# Patient Record
Sex: MALE | Race: WHITE | NOT HISPANIC OR LATINO | Employment: OTHER | URBAN - METROPOLITAN AREA
[De-identification: names, ages, dates, MRNs, and addresses within clinical notes are randomized per-mention and may not be internally consistent; named-entity substitution may affect disease eponyms.]

---

## 2017-01-04 ENCOUNTER — HISTORICAL (OUTPATIENT)
Dept: WOUND CARE | Facility: HOSPITAL | Age: 37
End: 2017-01-04

## 2017-01-25 ENCOUNTER — HISTORICAL (OUTPATIENT)
Dept: WOUND CARE | Facility: HOSPITAL | Age: 37
End: 2017-01-25

## 2017-01-30 ENCOUNTER — HISTORICAL (OUTPATIENT)
Dept: RADIOLOGY | Facility: HOSPITAL | Age: 37
End: 2017-01-30

## 2017-02-08 ENCOUNTER — HISTORICAL (OUTPATIENT)
Dept: WOUND CARE | Facility: HOSPITAL | Age: 37
End: 2017-02-08

## 2017-02-22 ENCOUNTER — HISTORICAL (OUTPATIENT)
Dept: WOUND CARE | Facility: HOSPITAL | Age: 37
End: 2017-02-22

## 2017-03-15 ENCOUNTER — HISTORICAL (OUTPATIENT)
Dept: WOUND CARE | Facility: HOSPITAL | Age: 37
End: 2017-03-15

## 2017-04-05 ENCOUNTER — HISTORICAL (OUTPATIENT)
Dept: WOUND CARE | Facility: HOSPITAL | Age: 37
End: 2017-04-05

## 2017-05-10 ENCOUNTER — HISTORICAL (OUTPATIENT)
Dept: WOUND CARE | Facility: HOSPITAL | Age: 37
End: 2017-05-10

## 2017-06-07 ENCOUNTER — HISTORICAL (OUTPATIENT)
Dept: WOUND CARE | Facility: HOSPITAL | Age: 37
End: 2017-06-07

## 2017-06-10 LAB — FINAL CULTURE: NORMAL

## 2017-06-11 LAB — FINAL CULTURE: NORMAL

## 2017-07-12 ENCOUNTER — HISTORICAL (OUTPATIENT)
Dept: WOUND CARE | Facility: HOSPITAL | Age: 37
End: 2017-07-12

## 2017-08-02 ENCOUNTER — HISTORICAL (OUTPATIENT)
Dept: WOUND CARE | Facility: HOSPITAL | Age: 37
End: 2017-08-02

## 2017-09-06 ENCOUNTER — HISTORICAL (OUTPATIENT)
Dept: WOUND CARE | Facility: HOSPITAL | Age: 37
End: 2017-09-06

## 2017-10-05 ENCOUNTER — HISTORICAL (OUTPATIENT)
Dept: WOUND CARE | Facility: HOSPITAL | Age: 37
End: 2017-10-05

## 2017-11-08 ENCOUNTER — HISTORICAL (OUTPATIENT)
Dept: WOUND CARE | Facility: HOSPITAL | Age: 37
End: 2017-11-08

## 2017-11-29 ENCOUNTER — HISTORICAL (OUTPATIENT)
Dept: WOUND CARE | Facility: HOSPITAL | Age: 37
End: 2017-11-29

## 2018-01-24 ENCOUNTER — HISTORICAL (OUTPATIENT)
Dept: WOUND CARE | Facility: HOSPITAL | Age: 38
End: 2018-01-24

## 2018-02-14 ENCOUNTER — HISTORICAL (OUTPATIENT)
Dept: WOUND CARE | Facility: HOSPITAL | Age: 38
End: 2018-02-14

## 2018-03-21 ENCOUNTER — HISTORICAL (OUTPATIENT)
Dept: WOUND CARE | Facility: HOSPITAL | Age: 38
End: 2018-03-21

## 2018-03-26 ENCOUNTER — HISTORICAL (OUTPATIENT)
Dept: WOUND CARE | Facility: HOSPITAL | Age: 38
End: 2018-03-26

## 2018-04-04 ENCOUNTER — HISTORICAL (OUTPATIENT)
Dept: WOUND CARE | Facility: HOSPITAL | Age: 38
End: 2018-04-04

## 2018-04-05 ENCOUNTER — HISTORICAL (OUTPATIENT)
Dept: LAB | Facility: HOSPITAL | Age: 38
End: 2018-04-05

## 2018-04-05 LAB — ALP SERPL-CCNC: 106 UNIT/L (ref 46–116)

## 2018-04-11 ENCOUNTER — HISTORICAL (OUTPATIENT)
Dept: WOUND CARE | Facility: HOSPITAL | Age: 38
End: 2018-04-11

## 2018-04-18 ENCOUNTER — HISTORICAL (OUTPATIENT)
Dept: WOUND CARE | Facility: HOSPITAL | Age: 38
End: 2018-04-18

## 2018-05-09 ENCOUNTER — HISTORICAL (OUTPATIENT)
Dept: WOUND CARE | Facility: HOSPITAL | Age: 38
End: 2018-05-09

## 2018-05-22 ENCOUNTER — HISTORICAL (OUTPATIENT)
Dept: RADIOLOGY | Facility: HOSPITAL | Age: 38
End: 2018-05-22

## 2018-05-30 ENCOUNTER — HISTORICAL (OUTPATIENT)
Dept: WOUND CARE | Facility: HOSPITAL | Age: 38
End: 2018-05-30

## 2018-06-27 ENCOUNTER — HISTORICAL (OUTPATIENT)
Dept: WOUND CARE | Facility: HOSPITAL | Age: 38
End: 2018-06-27

## 2018-07-25 ENCOUNTER — HISTORICAL (OUTPATIENT)
Dept: WOUND CARE | Facility: HOSPITAL | Age: 38
End: 2018-07-25

## 2018-08-15 ENCOUNTER — HISTORICAL (OUTPATIENT)
Dept: WOUND CARE | Facility: HOSPITAL | Age: 38
End: 2018-08-15

## 2018-08-22 ENCOUNTER — HISTORICAL (OUTPATIENT)
Dept: WOUND CARE | Facility: HOSPITAL | Age: 38
End: 2018-08-22

## 2018-09-12 ENCOUNTER — HISTORICAL (OUTPATIENT)
Dept: WOUND CARE | Facility: HOSPITAL | Age: 38
End: 2018-09-12

## 2018-10-31 ENCOUNTER — HISTORICAL (OUTPATIENT)
Dept: WOUND CARE | Facility: HOSPITAL | Age: 38
End: 2018-10-31

## 2018-11-04 LAB — FINAL CULTURE: NORMAL

## 2018-12-05 ENCOUNTER — HISTORICAL (OUTPATIENT)
Dept: WOUND CARE | Facility: HOSPITAL | Age: 38
End: 2018-12-05

## 2018-12-10 LAB — FINAL CULTURE: NORMAL

## 2018-12-26 ENCOUNTER — HISTORICAL (OUTPATIENT)
Dept: WOUND CARE | Facility: HOSPITAL | Age: 38
End: 2018-12-26

## 2019-01-16 ENCOUNTER — HISTORICAL (OUTPATIENT)
Dept: WOUND CARE | Facility: HOSPITAL | Age: 39
End: 2019-01-16

## 2019-02-13 ENCOUNTER — HISTORICAL (OUTPATIENT)
Dept: WOUND CARE | Facility: HOSPITAL | Age: 39
End: 2019-02-13

## 2019-03-06 ENCOUNTER — HISTORICAL (OUTPATIENT)
Dept: WOUND CARE | Facility: HOSPITAL | Age: 39
End: 2019-03-06

## 2019-03-11 LAB — FINAL CULTURE: NORMAL

## 2019-04-10 ENCOUNTER — HISTORICAL (OUTPATIENT)
Dept: WOUND CARE | Facility: HOSPITAL | Age: 39
End: 2019-04-10

## 2019-04-17 ENCOUNTER — HISTORICAL (OUTPATIENT)
Dept: WOUND CARE | Facility: HOSPITAL | Age: 39
End: 2019-04-17

## 2019-05-01 ENCOUNTER — HISTORICAL (OUTPATIENT)
Dept: WOUND CARE | Facility: HOSPITAL | Age: 39
End: 2019-05-01

## 2019-05-22 ENCOUNTER — HISTORICAL (OUTPATIENT)
Dept: WOUND CARE | Facility: HOSPITAL | Age: 39
End: 2019-05-22

## 2019-06-19 ENCOUNTER — HISTORICAL (OUTPATIENT)
Dept: WOUND CARE | Facility: HOSPITAL | Age: 39
End: 2019-06-19

## 2019-07-17 ENCOUNTER — HISTORICAL (OUTPATIENT)
Dept: WOUND CARE | Facility: HOSPITAL | Age: 39
End: 2019-07-17

## 2019-08-07 ENCOUNTER — HISTORICAL (OUTPATIENT)
Dept: WOUND CARE | Facility: HOSPITAL | Age: 39
End: 2019-08-07

## 2019-09-27 LAB — GRAM STN SPEC: NORMAL

## 2019-09-29 LAB — FINAL CULTURE: NORMAL

## 2019-12-05 ENCOUNTER — HISTORICAL (OUTPATIENT)
Dept: HEPATOLOGY | Facility: HOSPITAL | Age: 39
End: 2019-12-05

## 2019-12-05 LAB — POC CREATININE: 0.6 MG/DL (ref 0.6–1.3)

## 2020-09-23 LAB — GRAM STN SPEC: NORMAL

## 2020-09-25 LAB — FINAL CULTURE: NORMAL

## 2020-09-30 ENCOUNTER — HISTORICAL (OUTPATIENT)
Dept: HEPATOLOGY | Facility: HOSPITAL | Age: 40
End: 2020-09-30

## 2020-09-30 LAB — POC CREATININE: 0.6 MG/DL (ref 0.6–1.3)

## 2021-03-19 ENCOUNTER — HISTORICAL (OUTPATIENT)
Dept: RADIOLOGY | Facility: HOSPITAL | Age: 41
End: 2021-03-19

## 2022-05-02 NOTE — HISTORICAL [facility]ERNER
This is a historical note converted from Bobby. Formatting and pictures may have been removed.  Please reference Bobby for original formatting and attached multimedia. Chief Complaint  sacral wound  History of Present Illness  39yo male with paraplegic with a ?sacral wound. patient currently using topical antibiotics and collagen with dry dressing  Review of Systems  Constitutional:?no fever, fatigue, weakness  ENMT:?no nasal congestion/drainage  Respiratory:?no shortness of breath  Cardiovascular:?no chest pain, no edema  Gastrointestinal:?no nausea, vomiting  Hema/Lymph:?no abnormal bruising or bleeding  Musculoskeletal:?no muscle or joint pain, no joint swelling  Integumentary:?sacral wound  ?  ?  Physical Exam  Vitals & Measurements  HT:?177.8?cm? WT:?54.9?kg?  Incision/Wounds  ?1. Sacrum Pressure ulcer?- last charted: 05/22/2019 11:47  ?? ??Assessment Done By?- Nurse  ?? ??Dressing Type?- Collagen, Gauze dressing, Tape  ?? ??Dressing Assessment?- Drainage present  ?? ??Dressing Activity?- Changed  ?? ??Cleansing?- Commercial cleansing solution  ?? ??Length?- 0.4 cm  ?? ??Width?- 0.4 cm  ?? ??Depth?- 0.7 cm  ?? ??Exudate Amount?- Small  ?? ??Exudate Type?- Serous  ?   ????? This wound is curette-debrided of fibrinous slough and residual topical antibiotics and collagen.  ?   ???? This sacral cavity?has been present for some time and appears to be increasing in size in spite of a small external opening and in spite of our debridements and treatment with collagen products and topical antibiotics based on wound cultures.? This patient has?a healed pressure wound on the right hip, showing his capacity to heal. I would like [facility] this wound.  ?  ?  ?  ?  Assessment/Plan  1.?Paraplegia?G82.20  2.?Sacral decubitus ulcer, stage III?L89.153  continue dressing with topical antibiotics, collagen, and dry dressing  daily. follow up in one week. will refer to plastic surgeon   Problem List/Past Medical History  Ongoing  COPD - Chronic obstructive pulmonary disease  Paraplegia  Pressure ulcer of sacral region, stage 3  Tobacco user  Historical  No qualifying data  Procedure/Surgical History  Debridement (eg, high pressure waterjet with/without suction, sharp selective debridement with scissors, scalpel and forceps), open wound, (eg, fibrin, devitalized epidermis and/or dermis, exudate, debris, biofilm), including topical application(s), wound (05/01/2019)  Extraction of Back Skin, External Approach (05/01/2019)  Debridement (eg, high pressure waterjet with/without suction, sharp selective debridement with scissors, scalpel and forceps), open wound, (eg, fibrin, devitalized epidermis and/or dermis, exudate, debris, biofilm), including topical application(s), wound (04/17/2019)  Extraction of Back Skin, External Approach (04/17/2019)  Debridement of extensive eczematous or infected skin; up to 10% of body surface (04/10/2019)  Extraction of Back Skin, External Approach (04/10/2019)  Debridement (eg, high pressure waterjet with/without suction, sharp selective debridement with scissors, scalpel and forceps), open wound, (eg, fibrin, devitalized epidermis and/or dermis, exudate, debris, biofilm), including topical application(s), wound (03/06/2019)  Extraction of Back Skin, External Approach (03/06/2019)  Debridement (eg, high pressure waterjet with/without suction, sharp selective debridement with scissors, scalpel and forceps), open wound, (eg, fibrin, devitalized epidermis and/or dermis, exudate, debris, biofilm), including topical application(s), wound (02/13/2019)  Extraction of Back Skin, External Approach (02/13/2019)  Debridement (eg, high pressure waterjet with/without suction, sharp selective debridement with scissors, scalpel and forceps), open wound, (eg, fibrin, devitalized epidermis and/or dermis, exudate, debris,  biofilm), including topical application(s), wound (01/16/2019)  Extraction of Back Skin, External Approach (01/16/2019)  Debridement (eg, high pressure waterjet with/without suction, sharp selective debridement with scissors, scalpel and forceps), open wound, (eg, fibrin, devitalized epidermis and/or dermis, exudate, debris, biofilm), including topical application(s), wound (12/26/2018)  Extraction of Back Skin, External Approach (12/26/2018)  Debridement (eg, high pressure waterjet with/without suction, sharp selective debridement with scissors, scalpel and forceps), open wound, (eg, fibrin, devitalized epidermis and/or dermis, exudate, debris, biofilm), including topical application(s), wound (12/05/2018)  Extraction of Back Skin, External Approach (12/05/2018)  Debridement (eg, high pressure waterjet with/without suction, sharp selective debridement with scissors, scalpel and forceps), open wound, (eg, fibrin, devitalized epidermis and/or dermis, exudate, debris, biofilm), including topical application(s), wound (10/31/2018)  Extraction of Back Skin, External Approach (10/31/2018)  Debridement (eg, high pressure waterjet with/without suction, sharp selective debridement with scissors, scalpel and forceps), open wound, (eg, fibrin, devitalized epidermis and/or dermis, exudate, debris, biofilm), including topical application(s), wound (09/12/2018)  Extraction of Back Skin, External Approach (09/12/2018)  Debridement (eg, high pressure waterjet with/without suction, sharp selective debridement with scissors, scalpel and forceps), open wound, (eg, fibrin, devitalized epidermis and/or dermis, exudate, debris, biofilm), including topical application(s), wound (08/15/2018)  Extraction of Back Skin, External Approach (08/15/2018)  Debridement (eg, high pressure waterjet with/without suction, sharp selective debridement with scissors, scalpel and forceps), open wound, (eg, fibrin, devitalized epidermis and/or dermis,  exudate, debris, biofilm), including topical application(s), wound (07/25/2018)  Extraction of Back Skin, External Approach (07/25/2018)  Debridement (eg, high pressure waterjet with/without suction, sharp selective debridement with scissors, scalpel and forceps), open wound, (eg, fibrin, devitalized epidermis and/or dermis, exudate, debris, biofilm), including topical application(s), wound (06/27/2018)  Extraction of Back Skin, External Approach (06/27/2018)  Debridement (eg, high pressure waterjet with/without suction, sharp selective debridement with scissors, scalpel and forceps), open wound, (eg, fibrin, devitalized epidermis and/or dermis, exudate, debris, biofilm), including topical application(s), wound (05/09/2018)  Extraction of Right Upper Leg Skin, External Approach (05/09/2018)  Debridement (eg, high pressure waterjet with/without suction, sharp selective debridement with scissors, scalpel and forceps), open wound, (eg, fibrin, devitalized epidermis and/or dermis, exudate, debris, biofilm), including topical application(s), wound (04/18/2018)  Extraction of Back Skin, External Approach (04/18/2018)  Excision of Face Skin, External Approach (04/11/2018)  Excision, other benign lesion including margins, except skin tag (unless listed elsewhere), face, ears, eyelids, nose, lips, mucous membrane; excised diameter 1.1 to 2.0 cm (04/11/2018)  Debridement (eg, high pressure waterjet with/without suction, sharp selective debridement with scissors, scalpel and forceps), open wound, (eg, fibrin, devitalized epidermis and/or dermis, exudate, debris, biofilm), including topical application(s), wound (03/21/2018)  Extraction of Back Skin, External Approach (03/21/2018)  Debridement (eg, high pressure waterjet with/without suction, sharp selective debridement with scissors, scalpel and forceps), open wound, (eg, fibrin, devitalized epidermis and/or dermis, exudate, debris, biofilm), including topical application(s),  wound (02/14/2018)  Extraction of Back Skin, External Approach (02/14/2018)  Extraction of Left Lower Arm Skin, External Approach (02/14/2018)  Debridement (eg, high pressure waterjet with/without suction, sharp selective debridement with scissors, scalpel and forceps), open wound, (eg, fibrin, devitalized epidermis and/or dermis, exudate, debris, biofilm), including topical application(s), wound (01/24/2018)  Extraction of Back Skin, External Approach (01/24/2018)  Debridement (eg, high pressure waterjet with/without suction, sharp selective debridement with scissors, scalpel and forceps), open wound, (eg, fibrin, devitalized epidermis and/or dermis, exudate, debris, biofilm), including topical application(s), wound (11/29/2017)  Extraction of Back Skin, External Approach (11/29/2017)  Debridement (eg, high pressure waterjet with/without suction, sharp selective debridement with scissors, scalpel and forceps), open wound, (eg, fibrin, devitalized epidermis and/or dermis, exudate, debris, biofilm), including topical application(s), wound (10/05/2017)  Extraction of Back Skin, External Approach (10/05/2017)  Debridement (eg, high pressure waterjet with/without suction, sharp selective debridement with scissors, scalpel and forceps), open wound, (eg, fibrin, devitalized epidermis and/or dermis, exudate, debris, biofilm), including topical application(s), wound (09/06/2017)  Extraction of Back Skin, External Approach (09/06/2017)  Extraction of Right Foot Skin, External Approach (09/06/2017)  Debridement (eg, high pressure waterjet with/without suction, sharp selective debridement with scissors, scalpel and forceps), open wound, (eg, fibrin, devitalized epidermis and/or dermis, exudate, debris, biofilm), including topical application(s), wound (08/02/2017)  Extraction of Back Skin, External Approach (08/02/2017)  Debridement (eg, high pressure waterjet with/without suction, sharp selective debridement with scissors,  scalpel and forceps), open wound, (eg, fibrin, devitalized epidermis and/or dermis, exudate, debris, biofilm), including topical application(s), wound (07/12/2017)  Extraction of Back Skin, External Approach (07/12/2017)  Debridement (eg, high pressure waterjet with/without suction, sharp selective debridement with scissors, scalpel and forceps), open wound, (eg, fibrin, devitalized epidermis and/or dermis, exudate, debris, biofilm), including topical application(s), wound (06/07/2017)  Extraction of Back Skin, External Approach (06/07/2017)  Debridement (eg, high pressure waterjet with/without suction, sharp selective debridement with scissors, scalpel and forceps), open wound, (eg, fibrin, devitalized epidermis and/or dermis, exudate, debris, biofilm), including topical application(s), wound (02/22/2017)  Extraction of Back Skin, External Approach (02/22/2017)  Debridement (eg, high pressure waterjet with/without suction, sharp selective debridement with scissors, scalpel and forceps), open wound, (eg, fibrin, devitalized epidermis and/or dermis, exudate, debris, biofilm), including topical application(s), wound (02/08/2017)  Extraction of Back Skin, External Approach (02/08/2017)  Debridement (eg, high pressure waterjet with/without suction, sharp selective debridement with scissors, scalpel and forceps), open wound, (eg, fibrin, devitalized epidermis and/or dermis, exudate, debris, biofilm), including topical application(s), wound (01/25/2017)  Extraction of Genitalia Skin, External Approach (01/25/2017)  Debridement (eg, high pressure waterjet with/without suction, sharp selective debridement with scissors, scalpel and forceps), open wound, (eg, fibrin, devitalized epidermis and/or dermis, exudate, debris, biofilm), including topical application(s), wound (12/14/2016)  Extraction of Perineum Skin, External Approach (12/14/2016)  Debridement (eg, high pressure waterjet with/without suction, sharp selective  debridement with scissors, scalpel and forceps), open wound, (eg, fibrin, devitalized epidermis and/or dermis, exudate, debris, biofilm), including topical application(s), wound (09/28/2016)  Extraction of Perineum Skin, External Approach (09/28/2016)  Debridement (eg, high pressure waterjet with/without suction, sharp selective debridement with scissors, scalpel and forceps), open wound, (eg, fibrin, devitalized epidermis and/or dermis, exudate, debris, biofilm), including topical application(s), wound (08/31/2016)  Extraction of Perineum Skin, External Approach (08/31/2016)  Application of skin substitute graft to trunk, arms, legs, total wound surface area up to 100 sq cm; first 25 sq cm or less wound surface area (07/28/2016)  Replacement of Perineum Skin with Nonautologous Tissue Substitute, Full Thickness, External Approach (07/28/2016)  THERASKIN, PER SQUARE CENTIMETER (07/28/2016)  Neck surgery   Medications  BUSPIRONE TAB 5MG  Cymbalta 30 mg oral delayed release capsule  DULOXETINE CAP 30MG,? ?Not taking  HALOBETASOL CRE 0.05%,? ?Not taking  HYDROCO/APAP TAB 10-325MG, 1 tab(s), Oral, TID  Allergies  Levaquin?(rash)  Social History  Alcohol  Past, 1-2 times per year, 03/24/2018  Substance Abuse  Current, Marijuana, 03/24/2018  Tobacco  10 or more cigarettes (1/2 pack or more)/day in last 30 days, N/A, 04/10/2019  Current every day smoker, Cigarettes, 30 per day., 03/24/2018  Health Maintenance  Health Maintenance  ???Pending?(in the next year)  ??? ??OverDue  ??? ? ? ?Alcohol Misuse Screening due??01/01/19??and every 1??year(s)  ??? ? ? ?Obesity Screening due??01/01/19??and every 1??year(s)  ??? ? ? ?Smoking Cessation due??01/01/19??Variable frequency  ??? ? ? ?COPD Management-Oxygen Assessment due??03/24/19??and every 1??year(s)  ??? ??Due?  ??? ? ? ?COPD Maintenance-Pulmonary Rehab Education due??05/22/19??and every 1??year(s)  ??? ? ? ?COPD Management-COPD Medications Prescribed due??05/22/19??and every  1??year(s)  ??? ? ? ?Tetanus Vaccine due??05/22/19??and every 10??year(s)  ??? ??Due In Future?  ??? ? ? ?ADL Screening not due until??04/10/20??and every 1??year(s)  ???Satisfied?(in the past 1 year)  ??? ??Satisfied?  ??? ? ? ?ADL Screening on??04/10/19.??Satisfied by Jayden BELTRAN, Radha BRYANT  ??? ? ? ?Blood Pressure Screening on??05/01/19.??Satisfied by Judy Gerber.  ?  ?      Referral to Dr. Lemus.

## 2022-05-02 NOTE — HISTORICAL [facility]ERNER
This is a historical note converted from Bobby. Formatting and pictures may have been removed.  Please reference Bobby for original formatting and attached multimedia. Chief Complaint  sacral wound  History of Present Illness  37 yo male with a sacral wound.  Review of Systems  Constitutional:?no fever, fatigue, weakness  ENMT: no?nasal congestion/drainage  Respiratory:?no shortness of breath  Cardiovascular:?no chest pain, no edema  Gastrointestinal:?no nausea, vomiting  Hema/Lymph:?no abnormal bruising or bleeding  Musculoskeletal:?no muscle or joint pain, no joint swelling  Integumentary: sacral wound  ?  ?  ?  Physical Exam  Vitals & Measurements  T:?36.8? ?C (Oral)? HR:?73(Peripheral)? RR:?20? BP:?153/76?  HT:?177.8?cm? WT:?54.9?kg?  Incision/Wounds  ?1. Sacrum Pressure ulcer?- last charted: 04/17/2019 12:00  ?? ??Assessment Done By?- Wound Care Team  ?? ??Pressure Point?- Bony prominence  ?? ??Dressing Type?- Gauze dressing  ?? ??Dressing Assessment?- Drainage present, Intact  ?? ??Dressing Activity?- Removed  ?? ??Cleansing?- Cleaned with normal saline  ?? ??Length?- 0.6 cm  ?? ??Width?- 0.7 cm  ?? ??Depth?- 1.1 cm  ?? ??Wound Bed Tissue Type?- Granulating  ?? ??Percent Granulated?- 100 %  ?? ??Exudate Amount?- Scant  ?? ??Exudate Type?- Serous  ?? ??Exudate Odor?- None  ?? ??Edge?- Attached to wound bed  ?? ??Status?- Unchanged  ?   ?????Wound curette-debrided of fibrinous slough.  ?  ?  Assessment/Plan  1.?Paraplegia  2.?Pressure ulcer of sacral region, stage 3  Topical antibiotics with collagen to sacral wound with dry dressing QOD. RTC 2 weeks.  ?  New formula topical antimircrobials to be delivered to his home in [place], [place].   Problem List/Past Medical History  Ongoing  COPD - Chronic obstructive pulmonary disease  Paraplegia  Pressure ulcer of sacral region, stage 3  Tobacco user  Historical  No qualifying data  Procedure/Surgical History  Debridement (eg, high pressure waterjet with/without suction,  sharp selective debridement with scissors, scalpel and forceps), open wound, (eg, fibrin, devitalized epidermis and/or dermis, exudate, debris, biofilm), including topical application(s), wound (03/06/2019)  Extraction of Back Skin, External Approach (03/06/2019)  Debridement (eg, high pressure waterjet with/without suction, sharp selective debridement with scissors, scalpel and forceps), open wound, (eg, fibrin, devitalized epidermis and/or dermis, exudate, debris, biofilm), including topical application(s), wound (02/13/2019)  Extraction of Back Skin, External Approach (02/13/2019)  Debridement (eg, high pressure waterjet with/without suction, sharp selective debridement with scissors, scalpel and forceps), open wound, (eg, fibrin, devitalized epidermis and/or dermis, exudate, debris, biofilm), including topical application(s), wound (01/16/2019)  Extraction of Back Skin, External Approach (01/16/2019)  Debridement (eg, high pressure waterjet with/without suction, sharp selective debridement with scissors, scalpel and forceps), open wound, (eg, fibrin, devitalized epidermis and/or dermis, exudate, debris, biofilm), including topical application(s), wound (12/26/2018)  Extraction of Back Skin, External Approach (12/26/2018)  Debridement (eg, high pressure waterjet with/without suction, sharp selective debridement with scissors, scalpel and forceps), open wound, (eg, fibrin, devitalized epidermis and/or dermis, exudate, debris, biofilm), including topical application(s), wound (12/05/2018)  Extraction of Back Skin, External Approach (12/05/2018)  Debridement (eg, high pressure waterjet with/without suction, sharp selective debridement with scissors, scalpel and forceps), open wound, (eg, fibrin, devitalized epidermis and/or dermis, exudate, debris, biofilm), including topical application(s), wound (10/31/2018)  Extraction of Back Skin, External Approach (10/31/2018)  Debridement (eg, high pressure waterjet  with/without suction, sharp selective debridement with scissors, scalpel and forceps), open wound, (eg, fibrin, devitalized epidermis and/or dermis, exudate, debris, biofilm), including topical application(s), wound (09/12/2018)  Extraction of Back Skin, External Approach (09/12/2018)  Debridement (eg, high pressure waterjet with/without suction, sharp selective debridement with scissors, scalpel and forceps), open wound, (eg, fibrin, devitalized epidermis and/or dermis, exudate, debris, biofilm), including topical application(s), wound (08/15/2018)  Extraction of Back Skin, External Approach (08/15/2018)  Debridement (eg, high pressure waterjet with/without suction, sharp selective debridement with scissors, scalpel and forceps), open wound, (eg, fibrin, devitalized epidermis and/or dermis, exudate, debris, biofilm), including topical application(s), wound (07/25/2018)  Extraction of Back Skin, External Approach (07/25/2018)  Debridement (eg, high pressure waterjet with/without suction, sharp selective debridement with scissors, scalpel and forceps), open wound, (eg, fibrin, devitalized epidermis and/or dermis, exudate, debris, biofilm), including topical application(s), wound (06/27/2018)  Extraction of Back Skin, External Approach (06/27/2018)  Debridement (eg, high pressure waterjet with/without suction, sharp selective debridement with scissors, scalpel and forceps), open wound, (eg, fibrin, devitalized epidermis and/or dermis, exudate, debris, biofilm), including topical application(s), wound (05/09/2018)  Extraction of Right Upper Leg Skin, External Approach (05/09/2018)  Debridement (eg, high pressure waterjet with/without suction, sharp selective debridement with scissors, scalpel and forceps), open wound, (eg, fibrin, devitalized epidermis and/or dermis, exudate, debris, biofilm), including topical application(s), wound (04/18/2018)  Extraction of Back Skin, External Approach (04/18/2018)  Excision of Face  Skin, External Approach (04/11/2018)  Excision, other benign lesion including margins, except skin tag (unless listed elsewhere), face, ears, eyelids, nose, lips, mucous membrane; excised diameter 1.1 to 2.0 cm (04/11/2018)  Debridement (eg, high pressure waterjet with/without suction, sharp selective debridement with scissors, scalpel and forceps), open wound, (eg, fibrin, devitalized epidermis and/or dermis, exudate, debris, biofilm), including topical application(s), wound (03/21/2018)  Extraction of Back Skin, External Approach (03/21/2018)  Debridement (eg, high pressure waterjet with/without suction, sharp selective debridement with scissors, scalpel and forceps), open wound, (eg, fibrin, devitalized epidermis and/or dermis, exudate, debris, biofilm), including topical application(s), wound (02/14/2018)  Extraction of Back Skin, External Approach (02/14/2018)  Extraction of Left Lower Arm Skin, External Approach (02/14/2018)  Debridement (eg, high pressure waterjet with/without suction, sharp selective debridement with scissors, scalpel and forceps), open wound, (eg, fibrin, devitalized epidermis and/or dermis, exudate, debris, biofilm), including topical application(s), wound (01/24/2018)  Extraction of Back Skin, External Approach (01/24/2018)  Debridement (eg, high pressure waterjet with/without suction, sharp selective debridement with scissors, scalpel and forceps), open wound, (eg, fibrin, devitalized epidermis and/or dermis, exudate, debris, biofilm), including topical application(s), wound (11/29/2017)  Extraction of Back Skin, External Approach (11/29/2017)  Debridement (eg, high pressure waterjet with/without suction, sharp selective debridement with scissors, scalpel and forceps), open wound, (eg, fibrin, devitalized epidermis and/or dermis, exudate, debris, biofilm), including topical application(s), wound (10/05/2017)  Extraction of Back Skin, External Approach (10/05/2017)  Debridement (eg, high  pressure waterjet with/without suction, sharp selective debridement with scissors, scalpel and forceps), open wound, (eg, fibrin, devitalized epidermis and/or dermis, exudate, debris, biofilm), including topical application(s), wound (09/06/2017)  Extraction of Back Skin, External Approach (09/06/2017)  Extraction of Right Foot Skin, External Approach (09/06/2017)  Debridement (eg, high pressure waterjet with/without suction, sharp selective debridement with scissors, scalpel and forceps), open wound, (eg, fibrin, devitalized epidermis and/or dermis, exudate, debris, biofilm), including topical application(s), wound (08/02/2017)  Extraction of Back Skin, External Approach (08/02/2017)  Debridement (eg, high pressure waterjet with/without suction, sharp selective debridement with scissors, scalpel and forceps), open wound, (eg, fibrin, devitalized epidermis and/or dermis, exudate, debris, biofilm), including topical application(s), wound (07/12/2017)  Extraction of Back Skin, External Approach (07/12/2017)  Debridement (eg, high pressure waterjet with/without suction, sharp selective debridement with scissors, scalpel and forceps), open wound, (eg, fibrin, devitalized epidermis and/or dermis, exudate, debris, biofilm), including topical application(s), wound (06/07/2017)  Extraction of Back Skin, External Approach (06/07/2017)  Debridement (eg, high pressure waterjet with/without suction, sharp selective debridement with scissors, scalpel and forceps), open wound, (eg, fibrin, devitalized epidermis and/or dermis, exudate, debris, biofilm), including topical application(s), wound (02/22/2017)  Extraction of Back Skin, External Approach (02/22/2017)  Debridement (eg, high pressure waterjet with/without suction, sharp selective debridement with scissors, scalpel and forceps), open wound, (eg, fibrin, devitalized epidermis and/or dermis, exudate, debris, biofilm), including topical application(s), wound  (02/08/2017)  Extraction of Back Skin, External Approach (02/08/2017)  Debridement (eg, high pressure waterjet with/without suction, sharp selective debridement with scissors, scalpel and forceps), open wound, (eg, fibrin, devitalized epidermis and/or dermis, exudate, debris, biofilm), including topical application(s), wound (01/25/2017)  Extraction of Genitalia Skin, External Approach (01/25/2017)  Debridement (eg, high pressure waterjet with/without suction, sharp selective debridement with scissors, scalpel and forceps), open wound, (eg, fibrin, devitalized epidermis and/or dermis, exudate, debris, biofilm), including topical application(s), wound (12/14/2016)  Extraction of Perineum Skin, External Approach (12/14/2016)  Debridement (eg, high pressure waterjet with/without suction, sharp selective debridement with scissors, scalpel and forceps), open wound, (eg, fibrin, devitalized epidermis and/or dermis, exudate, debris, biofilm), including topical application(s), wound (09/28/2016)  Extraction of Perineum Skin, External Approach (09/28/2016)  Debridement (eg, high pressure waterjet with/without suction, sharp selective debridement with scissors, scalpel and forceps), open wound, (eg, fibrin, devitalized epidermis and/or dermis, exudate, debris, biofilm), including topical application(s), wound (08/31/2016)  Extraction of Perineum Skin, External Approach (08/31/2016)  Application of skin substitute graft to trunk, arms, legs, total wound surface area up to 100 sq cm; first 25 sq cm or less wound surface area (07/28/2016)  Replacement of Perineum Skin with Nonautologous Tissue Substitute, Full Thickness, External Approach (07/28/2016)  THERASKIN, PER SQUARE CENTIMETER (07/28/2016)  Neck surgery   Medications  BUSPIRONE TAB 5MG  Cymbalta 30 mg oral delayed release capsule  DULOXETINE CAP 30MG,? ?Not taking  HALOBETASOL CRE 0.05%,? ?Not taking  HYDROCO/APAP TAB 10-325MG, 1 tab(s), Oral,  TID  Allergies  Levaquin?(rash)  Social History  Alcohol  Past, 1-2 times per year, 03/24/2018  Substance Abuse  Current, Marijuana, 03/24/2018  Tobacco  10 or more cigarettes (1/2 pack or more)/day in last 30 days, N/A, 04/10/2019  Current every day smoker, Cigarettes, 30 per day., 03/24/2018  Health Maintenance  Health Maintenance  ???Pending?(in the next year)  ??? ??OverDue  ??? ? ? ?COPD Management-Oxygen Assessment due??03/24/19??and every 1??year(s)  ??? ??Due?  ??? ? ? ?Alcohol Misuse Screening due??04/17/19??and every 1??year(s)  ??? ? ? ?COPD Maintenance-Pulmonary Rehab Education due??04/17/19??and every 1??year(s)  ??? ? ? ?COPD Management-COPD Medications Prescribed due??04/17/19??and every 1??year(s)  ??? ? ? ?Obesity Screening due??04/17/19??and every 1??year(s)  ??? ? ? ?Smoking Cessation due??04/17/19??Variable frequency  ??? ? ? ?Tetanus Vaccine due??04/17/19??and every 10??year(s)  ??? ??Due In Future?  ??? ? ? ?ADL Screening not due until??04/10/20??and every 1??year(s)  ???Satisfied?(in the past 1 year)  ??? ??Satisfied?  ??? ? ? ?ADL Screening on??04/10/19.??Satisfied by Radha Carpenter RN  ??? ? ? ?Blood Pressure Screening on??04/17/19.??Satisfied by Radha Carpenter RN  ?  ?

## 2022-05-02 NOTE — HISTORICAL [facility]ER
This is a historical note converted from Bobby. Formatting and pictures may have been removed.  Please reference Bobby for original formatting and attached multimedia. Chief Complaint  Sacral wound  History of Present Illness  37 yo male with a sacral wound. Latest microbiology studies showing no bacteria in the wound, but the presence of Candida orthopsilosis and Nagonishia diffluens, both susceptible to itraconizole.  Review of Systems  Constitutional:?no fever, fatigue, weakness  ENMT: no?nasal congestion/drainage  Respiratory:?no shortness of breath  Cardiovascular:?no chest pain, no edema  Gastrointestinal:?no nausea, vomiting  Hema/Lymph:?no abnormal bruising or bleeding  Musculoskeletal:?no muscle or joint pain, no joint swelling  Integumentary: sacal wound  ?  ?  Physical Exam  Vitals & Measurements  T:?36.8? ?C (Oral)? HR:?80(Peripheral)? RR:?22? BP:?132/76?  HT:?177.8?cm? WT:?54.9?kg?  Incision/Wounds  ?1. Sacrum Pressure ulcer?- last charted: 04/10/2019 11:00  ?? ??Assessment Done By?- Wound Care Team  ?? ??Pressure Point?- Bony prominence  ?? ??Dressing Type?- Collagen, Gauze dressing  ?? ??Dressing Assessment?- Drainage present, Intact  ?? ??Dressing Activity?- Removed  ?? ??Cleansing?- Cleaned with normal saline  ?? ??Length?- 0.7 cm  ?? ??Width?- 0.5 cm  ?? ??Depth?- 1.0 cm  ?? ??Wound Bed Tissue Type?- Granulating, Necrotic tissue, slough  ?? ??Percent Granulated?- 50 %  ?? ??Percent Necrotic Tissue Slough?- 50 %  ?? ??Exudate Amount?- Scant  ?? ??Exudate Type?- Serous  ?? ??Exudate Odor?- None  ?? ??Edge?- Attached to wound bed  ?? ??Status?- Unchanged  ????  ????? This small wound has the volume of a large sweet pea.? Macerated partial thickness skin build-up at the wound entrance is?excised with a dermal?curette and the wound is curette-debrided of residual callagen and fibrinous slough.  ?  Assessment/Plan  1.?Tobacco user  2.?Pressure ulcer of sacral region, stage 3  3.?Paraplegia  Will hold  topical ?Merrem powder and send microbiology reports to [facility] for a topical antifungal to apply to this small sacral wound. Collagen with dry dressing QOD until antifungal powder arrives. Then Apply antifungal powder to the collagen for dressing change. RTC 1 week.   Problem List/Past Medical History  Ongoing  COPD - Chronic obstructive pulmonary disease  Paraplegia  Pressure ulcer of sacral region, stage 3  Tobacco user  Historical  No qualifying data  Procedure/Surgical History  Debridement (eg, high pressure waterjet with/without suction, sharp selective debridement with scissors, scalpel and forceps), open wound, (eg, fibrin, devitalized epidermis and/or dermis, exudate, debris, biofilm), including topical application(s), wound (03/06/2019)  Extraction of Back Skin, External Approach (03/06/2019)  Debridement (eg, high pressure waterjet with/without suction, sharp selective debridement with scissors, scalpel and forceps), open wound, (eg, fibrin, devitalized epidermis and/or dermis, exudate, debris, biofilm), including topical application(s), wound (02/13/2019)  Extraction of Back Skin, External Approach (02/13/2019)  Debridement (eg, high pressure waterjet with/without suction, sharp selective debridement with scissors, scalpel and forceps), open wound, (eg, fibrin, devitalized epidermis and/or dermis, exudate, debris, biofilm), including topical application(s), wound (01/16/2019)  Extraction of Back Skin, External Approach (01/16/2019)  Debridement (eg, high pressure waterjet with/without suction, sharp selective debridement with scissors, scalpel and forceps), open wound, (eg, fibrin, devitalized epidermis and/or dermis, exudate, debris, biofilm), including topical application(s), wound (12/26/2018)  Extraction of Back Skin, External Approach (12/26/2018)  Debridement (eg, high pressure waterjet with/without suction, sharp selective debridement with scissors, scalpel and forceps), open  wound, (eg, fibrin, devitalized epidermis and/or dermis, exudate, debris, biofilm), including topical application(s), wound (12/05/2018)  Extraction of Back Skin, External Approach (12/05/2018)  Debridement (eg, high pressure waterjet with/without suction, sharp selective debridement with scissors, scalpel and forceps), open wound, (eg, fibrin, devitalized epidermis and/or dermis, exudate, debris, biofilm), including topical application(s), wound (10/31/2018)  Extraction of Back Skin, External Approach (10/31/2018)  Debridement (eg, high pressure waterjet with/without suction, sharp selective debridement with scissors, scalpel and forceps), open wound, (eg, fibrin, devitalized epidermis and/or dermis, exudate, debris, biofilm), including topical application(s), wound (09/12/2018)  Extraction of Back Skin, External Approach (09/12/2018)  Debridement (eg, high pressure waterjet with/without suction, sharp selective debridement with scissors, scalpel and forceps), open wound, (eg, fibrin, devitalized epidermis and/or dermis, exudate, debris, biofilm), including topical application(s), wound (08/15/2018)  Extraction of Back Skin, External Approach (08/15/2018)  Debridement (eg, high pressure waterjet with/without suction, sharp selective debridement with scissors, scalpel and forceps), open wound, (eg, fibrin, devitalized epidermis and/or dermis, exudate, debris, biofilm), including topical application(s), wound (07/25/2018)  Extraction of Back Skin, External Approach (07/25/2018)  Debridement (eg, high pressure waterjet with/without suction, sharp selective debridement with scissors, scalpel and forceps), open wound, (eg, fibrin, devitalized epidermis and/or dermis, exudate, debris, biofilm), including topical application(s), wound (06/27/2018)  Extraction of Back Skin, External Approach (06/27/2018)  Debridement (eg, high pressure waterjet with/without suction, sharp selective debridement with scissors, scalpel and  forceps), open wound, (eg, fibrin, devitalized epidermis and/or dermis, exudate, debris, biofilm), including topical application(s), wound (05/09/2018)  Extraction of Right Upper Leg Skin, External Approach (05/09/2018)  Debridement (eg, high pressure waterjet with/without suction, sharp selective debridement with scissors, scalpel and forceps), open wound, (eg, fibrin, devitalized epidermis and/or dermis, exudate, debris, biofilm), including topical application(s), wound (04/18/2018)  Extraction of Back Skin, External Approach (04/18/2018)  Excision of Face Skin, External Approach (04/11/2018)  Excision, other benign lesion including margins, except skin tag (unless listed elsewhere), face, ears, eyelids, nose, lips, mucous membrane; excised diameter 1.1 to 2.0 cm (04/11/2018)  Debridement (eg, high pressure waterjet with/without suction, sharp selective debridement with scissors, scalpel and forceps), open wound, (eg, fibrin, devitalized epidermis and/or dermis, exudate, debris, biofilm), including topical application(s), wound (03/21/2018)  Extraction of Back Skin, External Approach (03/21/2018)  Debridement (eg, high pressure waterjet with/without suction, sharp selective debridement with scissors, scalpel and forceps), open wound, (eg, fibrin, devitalized epidermis and/or dermis, exudate, debris, biofilm), including topical application(s), wound (02/14/2018)  Extraction of Back Skin, External Approach (02/14/2018)  Extraction of Left Lower Arm Skin, External Approach (02/14/2018)  Debridement (eg, high pressure waterjet with/without suction, sharp selective debridement with scissors, scalpel and forceps), open wound, (eg, fibrin, devitalized epidermis and/or dermis, exudate, debris, biofilm), including topical application(s), wound (01/24/2018)  Extraction of Back Skin, External Approach (01/24/2018)  Debridement (eg, high pressure waterjet with/without suction, sharp selective debridement with scissors,  scalpel and forceps), open wound, (eg, fibrin, devitalized epidermis and/or dermis, exudate, debris, biofilm), including topical application(s), wound (11/29/2017)  Extraction of Back Skin, External Approach (11/29/2017)  Debridement (eg, high pressure waterjet with/without suction, sharp selective debridement with scissors, scalpel and forceps), open wound, (eg, fibrin, devitalized epidermis and/or dermis, exudate, debris, biofilm), including topical application(s), wound (10/05/2017)  Extraction of Back Skin, External Approach (10/05/2017)  Debridement (eg, high pressure waterjet with/without suction, sharp selective debridement with scissors, scalpel and forceps), open wound, (eg, fibrin, devitalized epidermis and/or dermis, exudate, debris, biofilm), including topical application(s), wound (09/06/2017)  Extraction of Back Skin, External Approach (09/06/2017)  Extraction of Right Foot Skin, External Approach (09/06/2017)  Debridement (eg, high pressure waterjet with/without suction, sharp selective debridement with scissors, scalpel and forceps), open wound, (eg, fibrin, devitalized epidermis and/or dermis, exudate, debris, biofilm), including topical application(s), wound (08/02/2017)  Extraction of Back Skin, External Approach (08/02/2017)  Debridement (eg, high pressure waterjet with/without suction, sharp selective debridement with scissors, scalpel and forceps), open wound, (eg, fibrin, devitalized epidermis and/or dermis, exudate, debris, biofilm), including topical application(s), wound (07/12/2017)  Extraction of Back Skin, External Approach (07/12/2017)  Debridement (eg, high pressure waterjet with/without suction, sharp selective debridement with scissors, scalpel and forceps), open wound, (eg, fibrin, devitalized epidermis and/or dermis, exudate, debris, biofilm), including topical application(s), wound (06/07/2017)  Extraction of Back Skin, External Approach (06/07/2017)  Debridement (eg, high  pressure waterjet with/without suction, sharp selective debridement with scissors, scalpel and forceps), open wound, (eg, fibrin, devitalized epidermis and/or dermis, exudate, debris, biofilm), including topical application(s), wound (02/22/2017)  Extraction of Back Skin, External Approach (02/22/2017)  Debridement (eg, high pressure waterjet with/without suction, sharp selective debridement with scissors, scalpel and forceps), open wound, (eg, fibrin, devitalized epidermis and/or dermis, exudate, debris, biofilm), including topical application(s), wound (02/08/2017)  Extraction of Back Skin, External Approach (02/08/2017)  Debridement (eg, high pressure waterjet with/without suction, sharp selective debridement with scissors, scalpel and forceps), open wound, (eg, fibrin, devitalized epidermis and/or dermis, exudate, debris, biofilm), including topical application(s), wound (01/25/2017)  Extraction of Genitalia Skin, External Approach (01/25/2017)  Debridement (eg, high pressure waterjet with/without suction, sharp selective debridement with scissors, scalpel and forceps), open wound, (eg, fibrin, devitalized epidermis and/or dermis, exudate, debris, biofilm), including topical application(s), wound (12/14/2016)  Extraction of Perineum Skin, External Approach (12/14/2016)  Debridement (eg, high pressure waterjet with/without suction, sharp selective debridement with scissors, scalpel and forceps), open wound, (eg, fibrin, devitalized epidermis and/or dermis, exudate, debris, biofilm), including topical application(s), wound (09/28/2016)  Extraction of Perineum Skin, External Approach (09/28/2016)  Debridement (eg, high pressure waterjet with/without suction, sharp selective debridement with scissors, scalpel and forceps), open wound, (eg, fibrin, devitalized epidermis and/or dermis, exudate, debris, biofilm), including topical application(s), wound (08/31/2016)  Extraction of Perineum Skin, External Approach  (08/31/2016)  Application of skin substitute graft to trunk, arms, legs, total wound surface area up to 100 sq cm; first 25 sq cm or less wound surface area (07/28/2016)  Replacement of Perineum Skin with Nonautologous Tissue Substitute, Full Thickness, External Approach (07/28/2016)  THERASKIN, PER SQUARE CENTIMETER (07/28/2016)  Neck surgery   Medications  BUSPIRONE TAB 5MG  Cymbalta 30 mg oral delayed release capsule  DULOXETINE CAP 30MG,? ?Not taking  HALOBETASOL CRE 0.05%,? ?Not taking  HYDROCO/APAP TAB 10-325MG, 1 tab(s), Oral, TID  Allergies  Levaquin?(rash)  Social History  Alcohol  Past, 1-2 times per year, 03/24/2018  Substance Abuse  Current, Marijuana, 03/24/2018  Tobacco  10 or more cigarettes (1/2 pack or more)/day in last 30 days, N/A, 04/10/2019  Current every day smoker, Cigarettes, 30 per day., 03/24/2018  Health Maintenance  Health Maintenance  ???Pending?(in the next year)  ??? ??OverDue  ??? ? ? ?COPD Management-Oxygen Assessment due??03/24/19??and every 1??year(s)  ??? ??Due?  ??? ? ? ?Alcohol Misuse Screening due??04/10/19??and every 1??year(s)  ??? ? ? ?COPD Maintenance-Pulmonary Rehab Education due??04/10/19??and every 1??year(s)  ??? ? ? ?COPD Management-COPD Medications Prescribed due??04/10/19??and every 1??year(s)  ??? ? ? ?Obesity Screening due??04/10/19??and every 1??year(s)  ??? ? ? ?Smoking Cessation due??04/10/19??Variable frequency  ??? ? ? ?Tetanus Vaccine due??04/10/19??and every 10??year(s)  ???Satisfied?(in the past 1 year)  ??? ??Satisfied?  ??? ? ? ?ADL Screening on??04/10/19.??Satisfied by Radha Carpenter RN  ??? ? ? ?Blood Pressure Screening on??04/10/19.??Satisfied by Jayden BELTRAN, Radha BRYANT  ?  ?

## 2022-05-02 NOTE — HISTORICAL [facility]ER
This is a historical note converted from Bobby. Formatting and pictures may have been removed.  Please reference Bobby for original formatting and attached multimedia. Chief Complaint  sacral wound  History of Present Illness  37 yo male with a sacral wound.? Pt. received new formulation of topical antibiotics from [facility] in [place].  Review of Systems  Constitutional:?no fever, fatigue, weakness  ENMT:?no?nasal congestion/drainage  Respiratory:?no couch, no shortness of breath  Cardiovascular:?no chest pain, no palpitations, no edema  Gastrointestinal:?no nausea, vomiting  Hema/Lymph:?no abnormal bruising or bleeding  Musculoskeletal:?no muscle or joint pain, no joint swelling  Integumentary:?sacral wound.  ?  ?  Physical Exam  Vitals & Measurements  HR:?82(Peripheral)? RR:?20? BP:?132/76?  HT:?177.8?cm? WT:?54.9?kg?  Incision/Wounds  ?1. Sacrum Pressure ulcer?- last charted: 05/01/2019 11:34  ?? ??Assessment Done By?- Wound Care Team  ?? ??Abnormality Color?- Pink  ?? ??Pressure Point?- Bony prominence  ?? ??Dressing Type?- Gauze dressing  ?? ??Dressing Assessment?- Drainage present  ?? ??Dressing Activity?- Changed  ?? ??Length?- 0.5 cm  ?? ??Width?- 0.4 cm  ?? ??Depth?- 0.9 cm  ?? ??Wound Bed Tissue Type?- Hypergranular  ?? ??Percent Granulated?- 100 %  ?? ??Exudate Amount?- Moderate  ?? ??Exudate Type?- Serosanguineous  ?? ??Exudate Odor?- None  ?? ??Edge?- Unattached to wound bed  ?? ??Surrounding Tissue Color?- Normal  ?? ??Surrounding Tissue Condition?- Maceration  ?? ??Status?- Improving  ?   ???? The wound is curette-debrided of fibrinous slough and residual?topical antibiotics and collagen with bright red bleed which subsided spontaneously with a temporary?packing of dry gauze.  ?  ?  Assessment/Plan  1.?Paraplegia?G82.20  2.?Pressure ulcer of sacral region, stage 3?L89.153  Topical antibiotics with collagen to sacral wound with dry dressing QOD. RTC 3 weeks   Problem List/Past  Medical History  Ongoing  COPD - Chronic obstructive pulmonary disease  Paraplegia  Pressure ulcer of sacral region, stage 3  Tobacco user  Historical  No qualifying data  Procedure/Surgical History  Debridement (eg, high pressure waterjet with/without suction, sharp selective debridement with scissors, scalpel and forceps), open wound, (eg, fibrin, devitalized epidermis and/or dermis, exudate, debris, biofilm), including topical application(s), wound (04/17/2019)  Extraction of Back Skin, External Approach (04/17/2019)  Debridement of extensive eczematous or infected skin; up to 10% of body surface (04/10/2019)  Extraction of Back Skin, External Approach (04/10/2019)  Debridement (eg, high pressure waterjet with/without suction, sharp selective debridement with scissors, scalpel and forceps), open wound, (eg, fibrin, devitalized epidermis and/or dermis, exudate, debris, biofilm), including topical application(s), wound (03/06/2019)  Extraction of Back Skin, External Approach (03/06/2019)  Debridement (eg, high pressure waterjet with/without suction, sharp selective debridement with scissors, scalpel and forceps), open wound, (eg, fibrin, devitalized epidermis and/or dermis, exudate, debris, biofilm), including topical application(s), wound (02/13/2019)  Extraction of Back Skin, External Approach (02/13/2019)  Debridement (eg, high pressure waterjet with/without suction, sharp selective debridement with scissors, scalpel and forceps), open wound, (eg, fibrin, devitalized epidermis and/or dermis, exudate, debris, biofilm), including topical application(s), wound (01/16/2019)  Extraction of Back Skin, External Approach (01/16/2019)  Debridement (eg, high pressure waterjet with/without suction, sharp selective debridement with scissors, scalpel and forceps), open wound, (eg, fibrin, devitalized epidermis and/or dermis, exudate, debris, biofilm), including topical application(s), wound (12/26/2018)  Extraction of Back  Skin, External Approach (12/26/2018)  Debridement (eg, high pressure waterjet with/without suction, sharp selective debridement with scissors, scalpel and forceps), open wound, (eg, fibrin, devitalized epidermis and/or dermis, exudate, debris, biofilm), including topical application(s), wound (12/05/2018)  Extraction of Back Skin, External Approach (12/05/2018)  Debridement (eg, high pressure waterjet with/without suction, sharp selective debridement with scissors, scalpel and forceps), open wound, (eg, fibrin, devitalized epidermis and/or dermis, exudate, debris, biofilm), including topical application(s), wound (10/31/2018)  Extraction of Back Skin, External Approach (10/31/2018)  Debridement (eg, high pressure waterjet with/without suction, sharp selective debridement with scissors, scalpel and forceps), open wound, (eg, fibrin, devitalized epidermis and/or dermis, exudate, debris, biofilm), including topical application(s), wound (09/12/2018)  Extraction of Back Skin, External Approach (09/12/2018)  Debridement (eg, high pressure waterjet with/without suction, sharp selective debridement with scissors, scalpel and forceps), open wound, (eg, fibrin, devitalized epidermis and/or dermis, exudate, debris, biofilm), including topical application(s), wound (08/15/2018)  Extraction of Back Skin, External Approach (08/15/2018)  Debridement (eg, high pressure waterjet with/without suction, sharp selective debridement with scissors, scalpel and forceps), open wound, (eg, fibrin, devitalized epidermis and/or dermis, exudate, debris, biofilm), including topical application(s), wound (07/25/2018)  Extraction of Back Skin, External Approach (07/25/2018)  Debridement (eg, high pressure waterjet with/without suction, sharp selective debridement with scissors, scalpel and forceps), open wound, (eg, fibrin, devitalized epidermis and/or dermis, exudate, debris, biofilm), including topical application(s), wound  (06/27/2018)  Extraction of Back Skin, External Approach (06/27/2018)  Debridement (eg, high pressure waterjet with/without suction, sharp selective debridement with scissors, scalpel and forceps), open wound, (eg, fibrin, devitalized epidermis and/or dermis, exudate, debris, biofilm), including topical application(s), wound (05/09/2018)  Extraction of Right Upper Leg Skin, External Approach (05/09/2018)  Debridement (eg, high pressure waterjet with/without suction, sharp selective debridement with scissors, scalpel and forceps), open wound, (eg, fibrin, devitalized epidermis and/or dermis, exudate, debris, biofilm), including topical application(s), wound (04/18/2018)  Extraction of Back Skin, External Approach (04/18/2018)  Excision of Face Skin, External Approach (04/11/2018)  Excision, other benign lesion including margins, except skin tag (unless listed elsewhere), face, ears, eyelids, nose, lips, mucous membrane; excised diameter 1.1 to 2.0 cm (04/11/2018)  Debridement (eg, high pressure waterjet with/without suction, sharp selective debridement with scissors, scalpel and forceps), open wound, (eg, fibrin, devitalized epidermis and/or dermis, exudate, debris, biofilm), including topical application(s), wound (03/21/2018)  Extraction of Back Skin, External Approach (03/21/2018)  Debridement (eg, high pressure waterjet with/without suction, sharp selective debridement with scissors, scalpel and forceps), open wound, (eg, fibrin, devitalized epidermis and/or dermis, exudate, debris, biofilm), including topical application(s), wound (02/14/2018)  Extraction of Back Skin, External Approach (02/14/2018)  Extraction of Left Lower Arm Skin, External Approach (02/14/2018)  Debridement (eg, high pressure waterjet with/without suction, sharp selective debridement with scissors, scalpel and forceps), open wound, (eg, fibrin, devitalized epidermis and/or dermis, exudate, debris, biofilm), including topical application(s),  wound (01/24/2018)  Extraction of Back Skin, External Approach (01/24/2018)  Debridement (eg, high pressure waterjet with/without suction, sharp selective debridement with scissors, scalpel and forceps), open wound, (eg, fibrin, devitalized epidermis and/or dermis, exudate, debris, biofilm), including topical application(s), wound (11/29/2017)  Extraction of Back Skin, External Approach (11/29/2017)  Debridement (eg, high pressure waterjet with/without suction, sharp selective debridement with scissors, scalpel and forceps), open wound, (eg, fibrin, devitalized epidermis and/or dermis, exudate, debris, biofilm), including topical application(s), wound (10/05/2017)  Extraction of Back Skin, External Approach (10/05/2017)  Debridement (eg, high pressure waterjet with/without suction, sharp selective debridement with scissors, scalpel and forceps), open wound, (eg, fibrin, devitalized epidermis and/or dermis, exudate, debris, biofilm), including topical application(s), wound (09/06/2017)  Extraction of Back Skin, External Approach (09/06/2017)  Extraction of Right Foot Skin, External Approach (09/06/2017)  Debridement (eg, high pressure waterjet with/without suction, sharp selective debridement with scissors, scalpel and forceps), open wound, (eg, fibrin, devitalized epidermis and/or dermis, exudate, debris, biofilm), including topical application(s), wound (08/02/2017)  Extraction of Back Skin, External Approach (08/02/2017)  Debridement (eg, high pressure waterjet with/without suction, sharp selective debridement with scissors, scalpel and forceps), open wound, (eg, fibrin, devitalized epidermis and/or dermis, exudate, debris, biofilm), including topical application(s), wound (07/12/2017)  Extraction of Back Skin, External Approach (07/12/2017)  Debridement (eg, high pressure waterjet with/without suction, sharp selective debridement with scissors, scalpel and forceps), open wound, (eg, fibrin, devitalized epidermis  and/or dermis, exudate, debris, biofilm), including topical application(s), wound (06/07/2017)  Extraction of Back Skin, External Approach (06/07/2017)  Debridement (eg, high pressure waterjet with/without suction, sharp selective debridement with scissors, scalpel and forceps), open wound, (eg, fibrin, devitalized epidermis and/or dermis, exudate, debris, biofilm), including topical application(s), wound (02/22/2017)  Extraction of Back Skin, External Approach (02/22/2017)  Debridement (eg, high pressure waterjet with/without suction, sharp selective debridement with scissors, scalpel and forceps), open wound, (eg, fibrin, devitalized epidermis and/or dermis, exudate, debris, biofilm), including topical application(s), wound (02/08/2017)  Extraction of Back Skin, External Approach (02/08/2017)  Debridement (eg, high pressure waterjet with/without suction, sharp selective debridement with scissors, scalpel and forceps), open wound, (eg, fibrin, devitalized epidermis and/or dermis, exudate, debris, biofilm), including topical application(s), wound (01/25/2017)  Extraction of Genitalia Skin, External Approach (01/25/2017)  Debridement (eg, high pressure waterjet with/without suction, sharp selective debridement with scissors, scalpel and forceps), open wound, (eg, fibrin, devitalized epidermis and/or dermis, exudate, debris, biofilm), including topical application(s), wound (12/14/2016)  Extraction of Perineum Skin, External Approach (12/14/2016)  Debridement (eg, high pressure waterjet with/without suction, sharp selective debridement with scissors, scalpel and forceps), open wound, (eg, fibrin, devitalized epidermis and/or dermis, exudate, debris, biofilm), including topical application(s), wound (09/28/2016)  Extraction of Perineum Skin, External Approach (09/28/2016)  Debridement (eg, high pressure waterjet with/without suction, sharp selective debridement with scissors, scalpel and forceps), open wound, (eg,  fibrin, devitalized epidermis and/or dermis, exudate, debris, biofilm), including topical application(s), wound (08/31/2016)  Extraction of Perineum Skin, External Approach (08/31/2016)  Application of skin substitute graft to trunk, arms, legs, total wound surface area up to 100 sq cm; first 25 sq cm or less wound surface area (07/28/2016)  Replacement of Perineum Skin with Nonautologous Tissue Substitute, Full Thickness, External Approach (07/28/2016)  THERASKIN, PER SQUARE CENTIMETER (07/28/2016)  Neck surgery   Medications  BUSPIRONE TAB 5MG  Cymbalta 30 mg oral delayed release capsule  DULOXETINE CAP 30MG,? ?Not taking  HALOBETASOL CRE 0.05%,? ?Not taking  HYDROCO/APAP TAB 10-325MG, 1 tab(s), Oral, TID  Allergies  Levaquin?(rash)  Social History  Alcohol  Past, 1-2 times per year, 03/24/2018  Substance Abuse  Current, Marijuana, 03/24/2018  Tobacco  10 or more cigarettes (1/2 pack or more)/day in last 30 days, N/A, 04/10/2019  Current every day smoker, Cigarettes, 30 per day., 03/24/2018  Health Maintenance  Health Maintenance  ???Pending?(in the next year)  ??? ??OverDue  ??? ? ? ?Alcohol Misuse Screening due??01/01/19??and every 1??year(s)  ??? ? ? ?Obesity Screening due??01/01/19??and every 1??year(s)  ??? ? ? ?Smoking Cessation due??01/01/19??Variable frequency  ??? ? ? ?COPD Management-Oxygen Assessment due??03/24/19??and every 1??year(s)  ??? ??Due?  ??? ? ? ?COPD Maintenance-Pulmonary Rehab Education due??05/01/19??and every 1??year(s)  ??? ? ? ?COPD Management-COPD Medications Prescribed due??05/01/19??and every 1??year(s)  ??? ? ? ?Tetanus Vaccine due??05/01/19??and every 10??year(s)  ??? ??Due In Future?  ??? ? ? ?ADL Screening not due until??04/10/20??and every 1??year(s)  ???Satisfied?(in the past 1 year)  ??? ??Satisfied?  ??? ? ? ?ADL Screening on??04/10/19.??Satisfied by Radha Carpenter RN  ??? ? ? ?Blood Pressure Screening on??05/01/19.??Satisfied by Judy Gerber  ?  ?

## 2022-05-03 ENCOUNTER — HOSPITAL ENCOUNTER (OUTPATIENT)
Dept: WOUND CARE | Facility: HOSPITAL | Age: 42
Discharge: HOME | End: 2022-05-03
Attending: NURSE PRACTITIONER
Payer: MEDICARE

## 2022-05-03 VITALS
TEMPERATURE: 98 F | SYSTOLIC BLOOD PRESSURE: 132 MMHG | HEART RATE: 68 BPM | BODY MASS INDEX: 7.61 KG/M2 | DIASTOLIC BLOOD PRESSURE: 76 MMHG | HEIGHT: 70 IN | RESPIRATION RATE: 18 BRPM | WEIGHT: 53.13 LBS

## 2022-05-04 RX ORDER — BUSPIRONE HYDROCHLORIDE 5 MG/1
10 TABLET ORAL 2 TIMES DAILY
COMMUNITY
Start: 2022-04-19 | End: 2023-02-13

## 2022-05-04 RX ORDER — DULOXETIN HYDROCHLORIDE 30 MG/1
30 CAPSULE, DELAYED RELEASE ORAL 2 TIMES DAILY
Status: ON HOLD | COMMUNITY
Start: 2022-04-19 | End: 2023-09-18 | Stop reason: SDUPTHER

## 2022-05-24 ENCOUNTER — HOSPITAL ENCOUNTER (OUTPATIENT)
Dept: WOUND CARE | Facility: HOSPITAL | Age: 42
Discharge: HOME | End: 2022-05-24
Attending: NURSE PRACTITIONER
Payer: MEDICARE

## 2022-05-24 DIAGNOSIS — G82.50 QUADRIPLEGIA: ICD-10-CM

## 2022-05-24 DIAGNOSIS — S71.002D UNSPECIFIED OPEN WOUND, LEFT HIP, SUBSEQUENT ENCOUNTER: ICD-10-CM

## 2022-05-24 DIAGNOSIS — L89.154 PRESSURE INJURY OF SACRAL REGION, STAGE 4: Primary | ICD-10-CM

## 2022-05-24 PROBLEM — Z72.0 TOBACCO USER: Status: ACTIVE | Noted: 2022-05-24

## 2022-05-24 PROBLEM — G82.20 PARAPLEGIA: Status: ACTIVE | Noted: 2022-05-24

## 2022-05-24 PROBLEM — L89.159 PRESSURE INJURY OF SKIN OF SACRAL REGION: Status: ACTIVE | Noted: 2022-05-24

## 2022-05-24 PROBLEM — J44.9 CHRONIC OBSTRUCTIVE PULMONARY DISEASE: Status: ACTIVE | Noted: 2022-05-24

## 2022-05-24 PROCEDURE — 99213 OFFICE O/P EST LOW 20 MIN: CPT

## 2022-05-24 NOTE — PROGRESS NOTES
Subjective:       Patient ID: Werner Jarrett is a 41 y.o. male.    Chief Complaint: Pressure Ulcer (To sacral and left hip)    HPI     Mr. Jarrett is a 41-year-old male who comes to us for wound care for pressure ulcers to the sacrum and the left hip.  He comes to us with a care attendant present.  He is nonambulatory due to paralysis and he does propel himself with an electric wheelchair.  This visit both wounds were assessed and they do show some improvement.  We will continue with the use of collagen in both of the wounds.  At this visit he has no additional concerns and he will return in 2 weeks.    Review of Systems   Constitutional: Negative.    HENT: Negative.    Respiratory: Negative.    Cardiovascular: Negative.    Musculoskeletal: Positive for gait problem and neck stiffness.   Skin: Positive for wound.   Psychiatric/Behavioral: Negative.          Objective:       CHERRY (20MG THC/ML) 600MG THC/30ML [place]  Physical Exam  Vitals reviewed. Exam conducted with a chaperone present.   Constitutional:       Comments: Thin, contractures   Cardiovascular:      Pulses: Normal pulses.   Pulmonary:      Effort: Pulmonary effort is normal.      Breath sounds: Normal breath sounds.   Genitourinary:     Comments: Suprapubic catheter  Musculoskeletal:      Cervical back: Rigidity present.      Comments: quadriplegic with contractures   Skin:     General: Skin is warm and dry.   Neurological:      General: No focal deficit present.      Mental Status: He is alert and oriented to person, place, and time.      Cranial Nerves: Cranial nerve deficit present.      Gait: Gait abnormal.   Psychiatric:         Mood and Affect: Mood normal.         Behavior: Behavior normal.         Thought Content: Thought content normal.         Judgment: Judgment normal.            Wound 05/03/22 1230 Other (comment) Sacral Spine (Active)   05/03/22 1230    Pre-existing: Yes   Primary Wound Type: Other   Side:    Orientation:     Location: Sacral Spine   Wound Number:    Ankle-Brachial Index:    Pulses:    Removal Indication and Assessment:    Wound Outcome:    (Retired) Wound Type:    (Retired) Wound Length (cm):    (Retired) Wound Width (cm):    (Retired) Depth (cm):    Wound Description (Comments):    Removal Indications:    Dressing Appearance Intact;Moist drainage 05/24/22 1402   Drainage Amount Moderate 05/24/22 1402   Drainage Characteristics/Odor Serosanguineous 05/24/22 1402   Appearance Slough;Moist;Bleeding 05/24/22 1402   Periwound Area Moist 05/24/22 1402   Wound Length (cm) 0.7 cm 05/24/22 1402   Wound Width (cm) 0.5 cm 05/24/22 1402   Wound Depth (cm) 0.4 cm 05/24/22 1402   Wound Volume (cm^3) 0.14 cm^3 05/24/22 1402   Wound Surface Area (cm^2) 0.35 cm^2 05/24/22 1402   Care Cleansed with:;Wound cleanser 05/24/22 1402   Dressing Applied;Collagen;Silver;Non-adherent 05/24/22 1402   Dressing Change Due 05/27/22 05/24/22 1402            Wound 05/03/22 1230 Other (comment) Left posterior Hip #2 (Active)   05/03/22 1230    Pre-existing: Yes   Primary Wound Type: Other   Side: Left   Orientation: posterior   Location: Hip   Wound Number: #2   Ankle-Brachial Index:    Pulses:    Removal Indication and Assessment:    Wound Outcome:    (Retired) Wound Type:    (Retired) Wound Length (cm):    (Retired) Wound Width (cm):    (Retired) Depth (cm):    Wound Description (Comments):    Removal Indications:    Dressing Appearance Dry;Intact 05/24/22 1402   Drainage Amount None 05/24/22 1402   Appearance Dry 05/24/22 1402   Periwound Area Dry 05/24/22 1402   Wound Length (cm) 0.3 cm 05/24/22 1402   Wound Width (cm) 0.3 cm 05/24/22 1402   Wound Depth (cm) 1.4 cm 05/24/22 1402   Wound Volume (cm^3) 0.126 cm^3 05/24/22 1402   Wound Surface Area (cm^2) 0.09 cm^2 05/24/22 1402   Care Cleansed with:;Wound cleanser 05/24/22 1402   Dressing Applied;Collagen;Other (comment);Silver 05/24/22 1402   Dressing Change Due 05/27/22 05/24/22 1402     CC       Assessment:         ICD-10-CM ICD-9-CM   1. Pressure injury of sacral region, stage 4  L89.154 707.03     707.24   2. Unspecified open wound, left hip, subsequent encounter  S71.002D V58.89     890.0   3. Quadriplegia  G82.50 344.00         Procedures:   Excisional debridement performed:  [] Yes [x] No   Selective debridement performed:  [] Yes [x] No   Mechanical debridement performed:  [x] Yes [] No   Silver nitrate applied:    [] Yes [x] No   Labs ordered this visit:   [] Yes [x] No   Imaging ordered this visit:   [] Yes [x] No   Tissue pathology and/or culture taken:  [] Yes [x] No     HOME HEALTH AGENCY:  N/A  TIMES PER WEEK/DAYS:  N/A  ORDERS:    Patient Orders:  Sacral wound/ Left hip wound: Cleanse wound with wound cleanser on Friday 5/27/22. Apply collagen to wound bed and cover with silver alginate. To be changed on Friday and Tuesday.      Follow up in about 2 weeks (around 6/7/2022).

## 2022-06-07 ENCOUNTER — HOSPITAL ENCOUNTER (OUTPATIENT)
Dept: WOUND CARE | Facility: HOSPITAL | Age: 42
Discharge: HOME | End: 2022-06-07
Attending: NURSE PRACTITIONER
Payer: MEDICARE

## 2022-06-07 VITALS
BODY MASS INDEX: 23.62 KG/M2 | SYSTOLIC BLOOD PRESSURE: 159 MMHG | WEIGHT: 165 LBS | RESPIRATION RATE: 19 BRPM | HEIGHT: 70 IN | DIASTOLIC BLOOD PRESSURE: 89 MMHG | HEART RATE: 74 BPM

## 2022-06-07 DIAGNOSIS — S71.002D UNSPECIFIED OPEN WOUND, LEFT HIP, SUBSEQUENT ENCOUNTER: ICD-10-CM

## 2022-06-07 DIAGNOSIS — L89.154 PRESSURE INJURY OF SACRAL REGION, STAGE 4: Primary | ICD-10-CM

## 2022-06-07 DIAGNOSIS — G82.50 QUADRIPLEGIA: ICD-10-CM

## 2022-06-07 PROCEDURE — 99213 OFFICE O/P EST LOW 20 MIN: CPT

## 2022-06-07 RX ORDER — DOXYCYCLINE HYCLATE 100 MG
100 TABLET ORAL 2 TIMES DAILY
Qty: 10 TABLET | Refills: 0 | Status: SHIPPED | OUTPATIENT
Start: 2022-06-07 | End: 2022-08-15

## 2022-06-07 NOTE — PROGRESS NOTES
Subjective:       Patient ID: Werner Jarrett is a 41 y.o. male.    Chief Complaint: Pressure Ulcer (Right hip (open wound ) sacrum (stage 4))    HPI   Mr. Jarrett is being seen for pressure ulcers to the right hip and sacrum.  He has doing well for several weeks consecutively.  However, this week he presents and states that he has been having bloody exudate from the wound on the right hip.  The periwound is also inflammed and slightly boggy.  This is unusual for this wound, and is concerning.  An x-ray is being ordered to determine to try to determine the cause of the change in the hip.  Today approximately 10 cc of blood were aspirated from the small tunneling wound.  A wound culture was obtained from that exudate.  HH is being sent an order to obtain lab work (renal panel).    The wound at the sacrum has also increased in size although there are no S & S of infection.    Today the patient will be prescribed doxycycline while awaiting culture results. He has a sensitivity to levofloxacin so will not be prescribed ciprofloxacin at this time.       Review of Systems   Musculoskeletal: Positive for gait problem.   Skin: Positive for wound.   All other systems reviewed and are negative.        Objective:           Physical Exam  Constitutional:       Appearance: Normal appearance. He is normal weight.   HENT:      Head: Normocephalic.   Cardiovascular:      Rate and Rhythm: Normal rate.      Pulses: Normal pulses.   Pulmonary:      Effort: Pulmonary effort is normal.   Musculoskeletal:      Comments: quadrapelgic   Skin:     General: Skin is warm and dry.   Neurological:      Mental Status: He is alert and oriented to person, place, and time.   Psychiatric:         Mood and Affect: Mood normal.            Wound 05/03/22 1230 Other (comment) Sacral Spine (Active)   05/03/22 1230    Pre-existing: Yes   Primary Wound Type: Other   Side:    Orientation:    Location: Sacral Spine   Wound Number:    Ankle-Brachial Index:     Pulses:    Removal Indication and Assessment:    Wound Outcome:    (Retired) Wound Type:    (Retired) Wound Length (cm):    (Retired) Wound Width (cm):    (Retired) Depth (cm):    Wound Description (Comments):    Removal Indications:    Dressing Appearance Moist drainage 06/07/22 1200   Drainage Amount Moderate 06/07/22 1200   Drainage Characteristics/Odor Serosanguineous 06/07/22 1200   Appearance Slough;Moist 06/07/22 1200   Tissue loss description Full thickness 06/07/22 1200   Periwound Area Pale white;Moist 06/07/22 1200   Wound Edges Undefined 06/07/22 1200   Wound Length (cm) 0.6 cm 06/07/22 1200   Wound Width (cm) 0.5 cm 06/07/22 1200   Wound Depth (cm) 0.8 cm 06/07/22 1200   Wound Volume (cm^3) 0.24 cm^3 06/07/22 1200   Wound Surface Area (cm^2) 0.3 cm^2 06/07/22 1200   Care Cleansed with:;Wound cleanser 06/07/22 1200   Dressing Applied 06/07/22 1200   Dressing Change Due 06/08/22 06/07/22 1200            Wound 05/03/22 1230 Other (comment) Left posterior Hip #2 (Active)   05/03/22 1230    Pre-existing: Yes   Primary Wound Type: Other   Side: Left   Orientation: posterior   Location: Hip   Wound Number: #2   Ankle-Brachial Index:    Pulses:    Removal Indication and Assessment:    Wound Outcome:    (Retired) Wound Type:    (Retired) Wound Length (cm):    (Retired) Wound Width (cm):    (Retired) Depth (cm):    Wound Description (Comments):    Removal Indications:    Dressing Appearance Moist drainage 06/07/22 1200   Drainage Amount Moderate 06/07/22 1200   Drainage Characteristics/Odor Serosanguineous 06/07/22 1200   Tissue loss description Full thickness 06/07/22 1200   Periwound Area Redness 06/07/22 1200   Wound Length (cm) 0.3 cm 06/07/22 1200   Wound Width (cm) 0.3 cm 06/07/22 1200   Wound Depth (cm) 2.5 cm 06/07/22 1200   Wound Volume (cm^3) 0.225 cm^3 06/07/22 1200   Wound Surface Area (cm^2) 0.09 cm^2 06/07/22 1200   Care Cleansed with:;Wound cleanser 06/07/22 1200   Dressing Applied 06/07/22 1200    Dressing Change Due 06/08/22 06/07/22 1200                   Sacrum   Right Hip     Wound #1 - Sacrum:  silver alginate, 4x4 mepilex     Wound #2 - Left hip:  4x4 gauze, 4x4 mepilex    CT      Assessment:         ICD-10-CM ICD-9-CM   1. Pressure injury of sacral region, stage 4  L89.154 707.03     707.24   2. Unspecified open wound, left hip, subsequent encounter  S71.002D V58.89     890.0   3. Quadriplegia  G82.50 344.00         Procedures:   Excisional debridement performed:  [] Yes [x] No   Selective debridement performed:  [] Yes [x] No   Mechanical debridement performed:  [] Yes [x] No   Silver nitrate applied:    [] Yes [x] No   Labs ordered this visit:   [x] Yes [] No   HH to draw renal panel  Imaging ordered this visit:   [x] Yes [] No   X-Ray left Hip  Tissue pathology and/or culture taken:  [x] Yes [] No   Left Hip  Aspiration - left hip    Procedures:  No procedures    HOME HEALTH AGENCY:  Complete Home Health  TIMES PER WEEK/DAYS:    ORDERS:    Lab work:  Renal panel  Left hip wound: cleanse with wound cleanser, apply 4x4 gauze, and dry dressing to be changed PRN soilage/drainage  Sacral wound: cleanse with wound cleanser, apply silver alginate, and dry dressing to be changed PRN soilage/drainage up to every 3 days.     Patient Orders:  Follow up in 1 week (on 6/14/2022) for sacral and hip wounds.

## 2022-06-13 ENCOUNTER — HOSPITAL ENCOUNTER (OUTPATIENT)
Dept: RADIOLOGY | Facility: HOSPITAL | Age: 42
Discharge: HOME | End: 2022-06-13
Attending: NURSE PRACTITIONER
Payer: MEDICARE

## 2022-06-13 ENCOUNTER — TELEPHONE (OUTPATIENT)
Dept: WOUND CARE | Facility: HOSPITAL | Age: 42
End: 2022-06-13
Payer: MEDICARE

## 2022-06-13 DIAGNOSIS — S71.002D UNSPECIFIED OPEN WOUND, LEFT HIP, SUBSEQUENT ENCOUNTER: ICD-10-CM

## 2022-06-13 DIAGNOSIS — G82.50 QUADRIPLEGIA: ICD-10-CM

## 2022-06-13 DIAGNOSIS — L89.154 PRESSURE INJURY OF SACRAL REGION, STAGE 4: Primary | ICD-10-CM

## 2022-06-13 PROCEDURE — 73502 X-RAY EXAM HIP UNI 2-3 VIEWS: CPT | Mod: TC,LT

## 2022-06-13 RX ORDER — SULFAMETHOXAZOLE AND TRIMETHOPRIM 800; 160 MG/1; MG/1
1 TABLET ORAL 2 TIMES DAILY
Qty: 20 TABLET | Refills: 0 | Status: SHIPPED | OUTPATIENT
Start: 2022-06-13 | End: 2022-06-23

## 2022-06-13 NOTE — TELEPHONE ENCOUNTER
Spoke with patient regarding results of x-ray, which were negative for osteomyelitis in the hip.    Discussed findings of DNA culture results, which was Staph aureus.  He has completed his Doxycycline as of today.  First line abx of choice is Bactrim DS.  This will be sent out today as he continues to note an odor and drainage from the wound.

## 2022-06-20 ENCOUNTER — HOSPITAL ENCOUNTER (OUTPATIENT)
Dept: WOUND CARE | Facility: HOSPITAL | Age: 42
Discharge: HOME | End: 2022-06-20
Attending: NURSE PRACTITIONER
Payer: MEDICARE

## 2022-06-20 VITALS
HEART RATE: 68 BPM | RESPIRATION RATE: 20 BRPM | BODY MASS INDEX: 23.62 KG/M2 | HEIGHT: 70 IN | WEIGHT: 165 LBS | DIASTOLIC BLOOD PRESSURE: 96 MMHG | SYSTOLIC BLOOD PRESSURE: 158 MMHG

## 2022-06-20 DIAGNOSIS — G82.50 QUADRIPLEGIA: ICD-10-CM

## 2022-06-20 DIAGNOSIS — L89.154 PRESSURE INJURY OF SACRAL REGION, STAGE 4: Primary | ICD-10-CM

## 2022-06-20 DIAGNOSIS — S71.002D UNSPECIFIED OPEN WOUND, LEFT HIP, SUBSEQUENT ENCOUNTER: ICD-10-CM

## 2022-06-20 PROCEDURE — 99213 OFFICE O/P EST LOW 20 MIN: CPT

## 2022-06-20 NOTE — PROGRESS NOTES
Subjective:       Patient ID: Werner Jarrett is a 41 y.o. male.    Chief Complaint: Pressure Ulcer (Sacral wound - stage 4 ) and Wound Care (Left posterior hip - open wound )    HPI    Mr. Jarrett is returning today for wound care for the sacrum and left hip.  At his last visit there was an increase in exudate.  A culture was obtained 6/7/22 and was positive for Staph Aureus in the hip.  He was prescribed Bactrim and has two days remaining.   He also had an x-ray done of the right hip which shows significant deformity of the femoral head and proximal femur related to remote injury with significant degenerative changes of the inferior medial and superolateral aspects of the hip joint there are also degenerative changes of the right hip joint sacroiliac joints and lumbosacral spine.  There is no evidence of osteomyelitis at this time.    Today he is picking up his topical abx, Linezolid, which will be applied to both wounds, daily.   The patient does report a decrease in drainage from the left hip.       Review of Systems   Musculoskeletal: Positive for gait problem.   Skin: Positive for wound.   All other systems reviewed and are negative.        Objective:      Vitals:    06/20/22 1317   BP: (!) 158/96   Pulse: 68   Resp: 20       Physical Exam  Constitutional:       Appearance: Normal appearance. He is normal weight.   HENT:      Head: Normocephalic.   Cardiovascular:      Rate and Rhythm: Normal rate.      Pulses: Normal pulses.   Pulmonary:      Effort: Pulmonary effort is normal.   Musculoskeletal:      Comments: quadrapelgic   Skin:     General: Skin is warm and dry.   Neurological:      Mental Status: He is alert and oriented to person, place, and time.   Psychiatric:         Mood and Affect: Mood normal.            Wound 05/03/22 1230 Other (comment) Sacral Spine #1 (Active)   05/03/22 1230    Pre-existing: Yes   Primary Wound Type: Other   Side:    Orientation:    Location: Sacral Spine   Wound Number: #1    Ankle-Brachial Index:    Pulses:    Removal Indication and Assessment:    Wound Outcome:    (Retired) Wound Type:    (Retired) Wound Length (cm):    (Retired) Wound Width (cm):    (Retired) Depth (cm):    Wound Description (Comments):    Removal Indications:    Dressing Appearance Moist drainage 06/20/22 1322   Drainage Amount Moderate 06/20/22 1322   Drainage Characteristics/Odor Serosanguineous 06/20/22 1322   Appearance White;Moist 06/20/22 1322   Tissue loss description Full thickness 06/20/22 1322   Wound Edges Open 06/20/22 1322   Wound Length (cm) 0.6 cm 06/20/22 1322   Wound Width (cm) 0.5 cm 06/20/22 1322   Wound Depth (cm) 1.2 cm 06/20/22 1322   Wound Volume (cm^3) 0.36 cm^3 06/20/22 1322   Wound Surface Area (cm^2) 0.3 cm^2 06/20/22 1322   Care Cleansed with:;Wound cleanser 06/20/22 1322   Dressing Applied 06/20/22 1322   Dressing Change Due 06/21/22 06/20/22 1322            Wound 05/03/22 1230 Other (comment) Left posterior Hip #2 (Active)   05/03/22 1230    Pre-existing: Yes   Primary Wound Type: Other   Side: Left   Orientation: posterior   Location: Hip   Wound Number: #2   Ankle-Brachial Index:    Pulses:    Removal Indication and Assessment:    Wound Outcome:    (Retired) Wound Type:    (Retired) Wound Length (cm):    (Retired) Wound Width (cm):    (Retired) Depth (cm):    Wound Description (Comments):    Removal Indications:    Dressing Appearance Moist drainage 06/20/22 1322   Drainage Amount Moderate 06/20/22 1322   Drainage Characteristics/Odor Serosanguineous 06/20/22 1322   Appearance Intact;Moist 06/20/22 1322   Tissue loss description Full thickness 06/20/22 1322   Periwound Area Intact 06/20/22 1322   Wound Length (cm) 0.3 cm 06/20/22 1322   Wound Width (cm) 0.3 cm 06/20/22 1322   Wound Depth (cm) 1.5 cm 06/20/22 1322   Wound Volume (cm^3) 0.135 cm^3 06/20/22 1322   Wound Surface Area (cm^2) 0.09 cm^2 06/20/22 1322   Care Cleansed with:;Wound cleanser 06/20/22 1322   Dressing Applied  "06/20/22 1322   Dressing Change Due 06/21/22 06/20/22 1322     NO PHOTOS - ML      Assessment:         ICD-10-CM ICD-9-CM   1. Pressure injury of sacral region, stage 4  L89.154 707.03     707.24   2. Unspecified open wound, left hip, subsequent encounter  S71.002D V58.89     890.0   3. Quadriplegia  G82.50 344.00         Procedures:   Excisional debridement performed:  [] Yes [x] No   Selective debridement performed:  [] Yes [x] No   Mechanical debridement performed:  [] Yes [x] No   Silver nitrate applied:    [] Yes [x] No   Labs ordered this visit:   [] Yes [x] No   Imaging ordered this visit:   [] Yes [x] No   Tissue pathology and/or culture taken:  [] Yes [x] No   Linezolid given to patient today    Procedures:  No procedures    MEDICATIONS    Current Outpatient Medications:     busPIRone (BUSPAR) 5 MG Tab, Take 10 mg by mouth 2 (two) times daily., Disp: , Rfl:     DULoxetine (CYMBALTA) 30 MG capsule, Take 30 mg by mouth 2 (two) times daily., Disp: , Rfl:     sulfamethoxazole-trimethoprim 800-160mg (BACTRIM DS) 800-160 mg Tab, Take 1 tablet by mouth 2 (two) times daily. for 10 days, Disp: 20 tablet, Rfl: 0    TOPICAL CUSTOM COMPOUND BUILDER, OUTPATIENT,, Linezolid 30% ln MCT powder, Disp: , Rfl:     doxycycline (VIBRA-TABS) 100 MG tablet, Take 1 tablet (100 mg total) by mouth 2 (two) times daily., Disp: 10 tablet, Rfl: 0   Review of patient's allergies indicates:   Allergen Reactions    Levofloxacin Rash       [facility] Home Health  1 x weekly  ORDERS:  Sacral and left hip wound: Cleanse with Puracyn wound cleanser and pack wounds with puracyn moistened/topical antibiotic mix 1/4" packing strip, cover with a dry dressing - to be changed 1 x weekly  Patient orders:  Sacral and left hip wound: Cleanse with Puracyn wound cleanser and pack wounds with puracyn moistened/topical antibiotic mix 1/4" packing strip, cover with a dry dressing - to be changed daily on all days that Home Health does not go.  Follow up " in about 3 weeks (around 7/11/2022) for Sacral (stage 4- PU), Left posterior hip (open wound) .      Electronically signed:  Ashley Coto NP    This note was created using [employer] voice recognition software that occasionally misinterprets phrases or words.

## 2022-07-11 ENCOUNTER — HOSPITAL ENCOUNTER (OUTPATIENT)
Dept: WOUND CARE | Facility: HOSPITAL | Age: 42
Discharge: HOME | End: 2022-07-11
Attending: NURSE PRACTITIONER
Payer: MEDICARE

## 2022-07-11 VITALS
BODY MASS INDEX: 23.62 KG/M2 | HEIGHT: 70 IN | WEIGHT: 165 LBS | DIASTOLIC BLOOD PRESSURE: 82 MMHG | HEART RATE: 80 BPM | SYSTOLIC BLOOD PRESSURE: 142 MMHG | RESPIRATION RATE: 18 BRPM

## 2022-07-11 DIAGNOSIS — L89.154 PRESSURE INJURY OF SACRAL REGION, STAGE 4: Primary | ICD-10-CM

## 2022-07-11 DIAGNOSIS — G82.50 QUADRIPLEGIA: ICD-10-CM

## 2022-07-11 DIAGNOSIS — S71.002D UNSPECIFIED OPEN WOUND, LEFT HIP, SUBSEQUENT ENCOUNTER: ICD-10-CM

## 2022-07-11 PROCEDURE — 27000999 HC MEDICAL RECORD PHOTO DOCUMENTATION

## 2022-07-11 PROCEDURE — 99212 OFFICE O/P EST SF 10 MIN: CPT

## 2022-07-11 NOTE — PROGRESS NOTES
Subjective:       Patient ID: Werner Jarrett is a 41 y.o. male.    Chief Complaint: Pressure Ulcer (Sacrum - stage 4 ) and Wound Care (Left posterior hip)    HPI    Mr. Jarrett is returning today for wound care for the sacrum and left hip.  At his last visit there was an increase in exudate.  A culture was obtained 6/7/22 and was positive for Staph Aureus in the hip.  He was prescribed Bactrim and has completed that abx.   He also had an x-ray done of the right hip which shows significant deformity of the femoral head and proximal femur related to remote injury with significant degenerative changes of the inferior medial and superolateral aspects of the hip joint.  There are also degenerative changes of the right hip joint sacroiliac joints and lumbosacral spine.  There is no evidence of osteomyelitis at this time.     He is currently using his topical abx, Linezolid, which will be applied to both wounds, daily.   The patient does report a decrease in drainage from the left hip.     Today it was noted that he has an area of concern on the left lateral foot which he reports he injured with his w/c.  We will monitor this site and if it deteriorates we will open as a wound for treatment.  The patient was instructed to contact the office should the wound deteriorate.    Review of Systems   Musculoskeletal: Positive for gait problem.   Skin: Positive for wound.   All other systems reviewed and are negative.        Objective:      Vitals:    07/11/22 1123   BP: (!) 142/82   Pulse: 80   Resp: 18       Physical Exam  Constitutional:       Appearance: Normal appearance. He is normal weight.   HENT:      Head: Normocephalic.   Cardiovascular:      Rate and Rhythm: Normal rate.      Pulses: Normal pulses.   Pulmonary:      Effort: Pulmonary effort is normal.   Musculoskeletal:      Comments: quadrapelgic   Skin:     General: Skin is warm and dry.   Neurological:      Mental Status: He is alert and oriented to person, place, and  time.   Psychiatric:         Mood and Affect: Mood normal.            Wound 05/03/22 1230 Other (comment) Sacral Spine #1 (Active)   05/03/22 1230    Pre-existing: Yes   Primary Wound Type: Other   Side:    Orientation:    Location: Sacral Spine   Wound Number: #1   Ankle-Brachial Index:    Pulses:    Removal Indication and Assessment:    Wound Outcome:    (Retired) Wound Type:    (Retired) Wound Length (cm):    (Retired) Wound Width (cm):    (Retired) Depth (cm):    Wound Description (Comments):    Removal Indications:    Dressing Appearance Moist drainage 07/11/22 1125   Drainage Amount Moderate 07/11/22 1125   Drainage Characteristics/Odor Serosanguineous 07/11/22 1125   Appearance Moist 07/11/22 1125   Tissue loss description Full thickness 07/11/22 1125   Periwound Area Intact 07/11/22 1125   Wound Edges Open 07/11/22 1125   Wound Length (cm) 1 cm 07/11/22 1125   Wound Width (cm) 0.5 cm 07/11/22 1125   Wound Depth (cm) 1.2 cm 07/11/22 1125   Wound Volume (cm^3) 0.6 cm^3 07/11/22 1125   Wound Surface Area (cm^2) 0.5 cm^2 07/11/22 1125   Care Cleansed with:;Wound cleanser 07/11/22 1125   Dressing Applied 07/11/22 1125   Dressing Change Due 07/12/22 07/11/22 1125            Wound 05/03/22 1230 Other (comment) Left posterior Hip #2 (Active)   05/03/22 1230    Pre-existing: Yes   Primary Wound Type: Other   Side: Left   Orientation: posterior   Location: Hip   Wound Number: #2   Ankle-Brachial Index:    Pulses:    Removal Indication and Assessment:    Wound Outcome:    (Retired) Wound Type:    (Retired) Wound Length (cm):    (Retired) Wound Width (cm):    (Retired) Depth (cm):    Wound Description (Comments):    Removal Indications:    Dressing Appearance Moist drainage 07/11/22 1125   Drainage Amount Moderate 07/11/22 1125   Drainage Characteristics/Odor Serosanguineous 07/11/22 1125   Appearance Wet 07/11/22 1125   Tissue loss description Full thickness 07/11/22 1125   Periwound Area Intact 07/11/22 1125   Wound  Edges Open 07/11/22 1125   Wound Length (cm) 0.3 cm 07/11/22 1125   Wound Width (cm) 0.3 cm 07/11/22 1125   Wound Depth (cm) 1.5 cm 07/11/22 1125   Wound Volume (cm^3) 0.135 cm^3 07/11/22 1125   Wound Surface Area (cm^2) 0.09 cm^2 07/11/22 1125   Care Cleansed with:;Wound cleanser 07/11/22 1125   Dressing Applied 07/11/22 1125   Dressing Change Due 07/12/22 07/11/22 1125        Sacrum     Left hip        Left lateral foot        Assessment:         ICD-10-CM ICD-9-CM   1. Pressure injury of sacral region, stage 4  L89.154 707.03     707.24   2. Unspecified open wound, left hip, subsequent encounter  S71.002D V58.89     890.0   3. Quadriplegia  G82.50 344.00           Procedures:     No debridement performed    Excisional debridement performed:          [] Yes [x] No   Selective debridement performed:           [] Yes [x] No   Mechanical debridement performed:        [] Yes [x] No   Silver nitrate applied:                                  [] Yes [x] No   Labs ordered this visit:                                [] Yes [x] No   Imaging ordered this visit:                          [] Yes [x] No   Tissue pathology and/or culture taken:    [] Yes [x] No  Currently using topical abx   MEDICATIONS    Current Outpatient Medications:     busPIRone (BUSPAR) 5 MG Tab, Take 10 mg by mouth 2 (two) times daily., Disp: , Rfl:     doxycycline (VIBRA-TABS) 100 MG tablet, Take 1 tablet (100 mg total) by mouth 2 (two) times daily., Disp: 10 tablet, Rfl: 0    DULoxetine (CYMBALTA) 30 MG capsule, Take 30 mg by mouth 2 (two) times daily., Disp: , Rfl:     TOPICAL C[employer] COMP[employer] BUILDER, OUTPATIENT,, Linezolid 30% ln MCT powder, Disp: , Rfl:    Review of patient's allergies indicates:   Allergen Reactions    Levofloxacin Rash       HOME HEALTH AGENCY:  [facility]  TIMES PER WEEK/DAYS:    WOUND CARE ORDERS:  Sacral and left hip wound: Cleanse with Puracyn wound cleanser and pack wounds with puracyn moistened/topical  "antibiotic mix 1/4" packing strip, cover with a dry dressing - to be changed daily on all days that Home Health does not go.    Follow up in about 2 weeks (around 7/25/2022) for sacrum and left posterior hip .        Electronically signed:  Ashley Coto NP    "

## 2022-07-25 ENCOUNTER — HOSPITAL ENCOUNTER (OUTPATIENT)
Dept: WOUND CARE | Facility: HOSPITAL | Age: 42
Discharge: HOME | End: 2022-07-25
Attending: NURSE PRACTITIONER
Payer: MEDICARE

## 2022-07-25 VITALS
HEIGHT: 70 IN | BODY MASS INDEX: 23.62 KG/M2 | DIASTOLIC BLOOD PRESSURE: 84 MMHG | HEART RATE: 73 BPM | SYSTOLIC BLOOD PRESSURE: 139 MMHG | WEIGHT: 165 LBS | RESPIRATION RATE: 18 BRPM

## 2022-07-25 DIAGNOSIS — L89.154 PRESSURE INJURY OF SACRAL REGION, STAGE 4: Primary | ICD-10-CM

## 2022-07-25 DIAGNOSIS — G82.50 QUADRIPLEGIA: ICD-10-CM

## 2022-07-25 DIAGNOSIS — S71.002D UNSPECIFIED OPEN WOUND, LEFT HIP, SUBSEQUENT ENCOUNTER: ICD-10-CM

## 2022-07-25 PROCEDURE — 99212 OFFICE O/P EST SF 10 MIN: CPT

## 2022-07-25 PROCEDURE — 27000999 HC MEDICAL RECORD PHOTO DOCUMENTATION

## 2022-07-25 NOTE — PROGRESS NOTES
Subjective:       Patient ID: Werner Jarrett is a 41 y.o. male.    Chief Complaint: Pressure Ulcer (Sacrum - stage 4 /Left medial ankle - stage ) and Wound Care (Left posterior hip )    HPI    Mr. Jarrett is returning today for wound care for the sacrum and left hip.  A culture was obtained 6/7/22 and was positive for Staph Aureus in the hip.  He was prescribed Bactrim and has completed that abx. He was also currently using his topical abx, Linezolid, which was being applied to both wounds, daily. We will d/c the topical abx as of this visit.  The wounds again appear stable, and there is a reported continuing decrease in drainage from the left hip.     He has recently had an x-ray done of the right hip which shows significant deformity of the femoral head and proximal femur related to remote injury with significant degenerative changes of the inferior medial and superolateral aspects of the hip joint.  There are also degenerative changes of the right hip joint sacroiliac joints and lumbosacral spine.  There is no evidence of osteomyelitis at this time.     Today it was noted that there was worsening on the left lateral foot which he reports he injured with his w/c.  Because the wound has deteriorated we will open as a wound for treatment.      Review of Systems   Musculoskeletal: Positive for gait problem.   Skin: Positive for wound.   All other systems reviewed and are negative.        Objective:      Vitals:    07/25/22 1121   BP: 139/84   Pulse: 73   Resp: 18       Physical Exam  Constitutional:       Appearance: Normal appearance. He is normal weight.   HENT:      Head: Normocephalic.   Cardiovascular:      Rate and Rhythm: Normal rate.      Pulses: Normal pulses.   Pulmonary:      Effort: Pulmonary effort is normal.   Musculoskeletal:      Comments: quadrapelgic   Skin:     General: Skin is warm and dry.   Neurological:      Mental Status: He is alert and oriented to person, place, and time.   Psychiatric:          Mood and Affect: Mood normal.            Wound 05/03/22 1230 Other (comment) Sacral Spine #1 (Active)   05/03/22 1230    Pre-existing: Yes   Primary Wound Type: Other   Side:    Orientation:    Location: Sacral Spine   Wound Number: #1   Ankle-Brachial Index:    Pulses:    Removal Indication and Assessment:    Wound Outcome:    (Retired) Wound Type:    (Retired) Wound Length (cm):    (Retired) Wound Width (cm):    (Retired) Depth (cm):    Wound Description (Comments):    Removal Indications:    Dressing Appearance Moist drainage 07/25/22 1123   Drainage Amount Moderate 07/25/22 1123   Drainage Characteristics/Odor Serosanguineous 07/25/22 1123   Appearance Moist 07/25/22 1123   Tissue loss description Full thickness 07/25/22 1123   Periwound Area Intact;Moist 07/25/22 1123   Wound Edges Open 07/25/22 1123   Wound Length (cm) 1.3 cm 07/25/22 1123   Wound Width (cm) 0.6 cm 07/25/22 1123   Wound Depth (cm) 1.3 cm 07/25/22 1123   Wound Volume (cm^3) 1.014 cm^3 07/25/22 1123   Wound Surface Area (cm^2) 0.78 cm^2 07/25/22 1123   Care Cleansed with:;Wound cleanser 07/25/22 1123   Dressing Applied 07/25/22 1123   Dressing Change Due 07/27/22 07/25/22 1123            Wound 05/03/22 1230 Other (comment) Left posterior Hip #2 (Active)   05/03/22 1230    Pre-existing: Yes   Primary Wound Type: Other   Side: Left   Orientation: posterior   Location: Hip   Wound Number: #2   Ankle-Brachial Index:    Pulses:    Removal Indication and Assessment:    Wound Outcome:    (Retired) Wound Type:    (Retired) Wound Length (cm):    (Retired) Wound Width (cm):    (Retired) Depth (cm):    Wound Description (Comments):    Removal Indications:    Dressing Appearance Moist drainage 07/25/22 1123   Drainage Amount Moderate 07/25/22 1123   Drainage Characteristics/Odor Serosanguineous 07/25/22 1123   Appearance Moist 07/25/22 1123   Tissue loss description Full thickness 07/25/22 1123   Periwound Area Intact 07/25/22 1123   Wound Edges Open  07/25/22 1123   Wound Length (cm) 0.4 cm 07/25/22 1123   Wound Width (cm) 0.4 cm 07/25/22 1123   Wound Depth (cm) 1.6 cm 07/25/22 1123   Wound Volume (cm^3) 0.256 cm^3 07/25/22 1123   Wound Surface Area (cm^2) 0.16 cm^2 07/25/22 1123   Care Cleansed with:;Wound cleanser 07/25/22 1123   Dressing Applied 07/25/22 1123   Dressing Change Due 07/27/22 07/25/22 1123            Wound 07/25/22 1123 Other (comment) Left medial Malleolus/Ankle #3 (Active)   07/25/22 1123    Pre-existing: Yes   Primary Wound Type: Other   Side: Left   Orientation: medial   Location: Malleolus/Ankle   Wound Number: #3   Ankle-Brachial Index:    Pulses:    Removal Indication and Assessment:    Wound Outcome:    (Retired) Wound Type:    (Retired) Wound Length (cm):    (Retired) Wound Width (cm):    (Retired) Depth (cm):    Wound Description (Comments):    Removal Indications:    Dressing Appearance Dried drainage 07/25/22 1123   Drainage Amount Moderate 07/25/22 1123   Drainage Characteristics/Odor Serosanguineous 07/25/22 1123   Appearance Eschar;Moist 07/25/22 1123   Tissue loss description Full thickness 07/25/22 1123   Periwound Area Intact 07/25/22 1123   Wound Edges Open 07/25/22 1123   Wound Length (cm) 1.5 cm 07/25/22 1123   Wound Width (cm) 1.7 cm 07/25/22 1123   Wound Depth (cm) 0.2 cm 07/25/22 1123   Wound Volume (cm^3) 0.51 cm^3 07/25/22 1123   Wound Surface Area (cm^2) 2.55 cm^2 07/25/22 1123   Care Cleansed with:;Wound cleanser 07/25/22 1123   Dressing Applied 07/25/22 1123   Dressing Change Due 07/26/22 07/25/22 1123       Sacrum       Left hip   collagen, mepilex boardered foam   collagen, gentle boardered foam      Left lateral foot   mesalt, mepilex boardered foam  ML      Assessment:         ICD-10-CM ICD-9-CM   1. Pressure injury of sacral region, stage 4  L89.154 707.03     707.24   2. Unspecified open wound, left hip, subsequent encounter  S71.002D V58.89     890.0   3. Quadriplegia  G82.50 344.00           Procedures:      No debridement performed    Excisional debridement performed:          [] Yes [x] No   Selective debridement performed:           [] Yes [x] No   Mechanical debridement performed:        [] Yes [x] No   Silver nitrate applied:                                  [] Yes [x] No   Labs ordered this visit:                                [] Yes [x] No   Imaging ordered this visit:                          [] Yes [x] No   Tissue pathology and/or culture taken:    [] Yes [x] No  Currently using topical abx   MEDICATIONS    Current Outpatient Medications:     busPIRone (BUSPAR) 5 MG Tab, Take 10 mg by mouth 2 (two) times daily., Disp: , Rfl:     DULoxetine (CYMBALTA) 30 MG capsule, Take 30 mg by mouth 2 (two) times daily., Disp: , Rfl:     TOPICAL CUSTOM COMPOUND BUILDER, OUTPATIENT,, Linezolid 30% ln MCT powder, Disp: , Rfl:     doxycycline (VIBRA-TABS) 100 MG tablet, Take 1 tablet (100 mg total) by mouth 2 (two) times daily., Disp: 10 tablet, Rfl: 0   Review of patient's allergies indicates:   Allergen Reactions    Levofloxacin Rash       HOME HEALTH AGENCY:  [facility]  TIMES PER WEEK/DAYS:    WOUND CARE ORDERS:  ORDERS:  Left hip and Sacral wound:**Do not remove dressing until Wednesday** On Wednesday cleanse with wound cleanser and pack wound with collagen and cover with a gentle foam boarder dressing - to be changed every other day.  Left medial ankle: Cleanse with wound cleanser and apply mesalt to the wound bed and cover with a gentle foam boarder dressing - to be changed daily.   Follow up in about 3 weeks (around 8/15/2022).        Electronically signed:  Ashley Coto NP

## 2022-08-15 ENCOUNTER — HOSPITAL ENCOUNTER (OUTPATIENT)
Dept: WOUND CARE | Facility: HOSPITAL | Age: 42
Discharge: HOME | End: 2022-08-15
Attending: NURSE PRACTITIONER
Payer: MEDICARE

## 2022-08-15 VITALS
HEIGHT: 70 IN | RESPIRATION RATE: 17 BRPM | SYSTOLIC BLOOD PRESSURE: 126 MMHG | DIASTOLIC BLOOD PRESSURE: 76 MMHG | TEMPERATURE: 98 F | WEIGHT: 165 LBS | BODY MASS INDEX: 23.62 KG/M2 | HEART RATE: 77 BPM

## 2022-08-15 DIAGNOSIS — S71.002D UNSPECIFIED OPEN WOUND, LEFT HIP, SUBSEQUENT ENCOUNTER: ICD-10-CM

## 2022-08-15 DIAGNOSIS — G82.50 QUADRIPLEGIA: ICD-10-CM

## 2022-08-15 DIAGNOSIS — L89.154 PRESSURE INJURY OF SACRAL REGION, STAGE 4: Primary | ICD-10-CM

## 2022-08-15 PROCEDURE — 99213 OFFICE O/P EST LOW 20 MIN: CPT

## 2022-08-15 PROCEDURE — 11719 TRIM NAIL(S) ANY NUMBER: CPT

## 2022-08-15 PROCEDURE — 27000999 HC MEDICAL RECORD PHOTO DOCUMENTATION

## 2022-08-15 NOTE — PROGRESS NOTES
Subjective:       Patient ID: Werner Jarrett is a 41 y.o. male.    Chief Complaint: Pressure Ulcer (Sacrum - stage 4 /Left medial ankle - stage 2/Left hip - stage 4 )    HPI    Mr. Jarrett is returning today for wound care for the sacrum and left hip.  A culture was obtained 6/7/22 and was positive for Staph Aureus in the hip.  He has completed the Bactrim as well as topical abx, Linezolid. The wounds again appear stable, and there is a reported continuing decrease in drainage from the left hip.     He has recently had an x-ray done of the right hip which shows significant deformity of the femoral head and proximal femur related to remote injury with significant degenerative changes of the inferior medial and superolateral aspects of the hip joint.  There are also degenerative changes of the right hip joint sacroiliac joints and lumbosacral spine.  There is no evidence of osteomyelitis at this time.     Today it was noted that the left lateral ankle continues to improve.    We will be using endoform on all wounds at this time.     Of note, the patient requested that his nails be trimmed due to being paraplegic and unable to trim his own nails.  9/10 nails were trimmed.    Review of Systems   Musculoskeletal: Positive for gait problem.   Skin: Positive for wound.   All other systems reviewed and are negative.        Objective:      Vitals:    08/15/22 1130   BP: 126/76   Pulse: 77   Resp: 17   Temp: 98 °F (36.7 °C)       Physical Exam  Constitutional:       Appearance: Normal appearance. He is normal weight.   HENT:      Head: Normocephalic.   Cardiovascular:      Rate and Rhythm: Normal rate.      Pulses: Normal pulses.   Pulmonary:      Effort: Pulmonary effort is normal.   Musculoskeletal:      Comments: quadrapelgic   Skin:     General: Skin is warm and dry.   Neurological:      Mental Status: He is alert and oriented to person, place, and time.   Psychiatric:         Mood and Affect: Mood normal.            Wound  05/03/22 1230 Other (comment) Sacral Spine #1 (Active)   05/03/22 1230    Pre-existing: Yes   Primary Wound Type: Other   Side:    Orientation:    Location: Sacral Spine   Wound Number: #1   Ankle-Brachial Index:    Pulses:    Removal Indication and Assessment:    Wound Outcome:    (Retired) Wound Type:    (Retired) Wound Length (cm):    (Retired) Wound Width (cm):    (Retired) Depth (cm):    Wound Description (Comments):    Removal Indications:    Dressing Appearance Moist drainage 08/15/22 1142   Drainage Amount Moderate 08/15/22 1142   Drainage Characteristics/Odor Sanguineous 08/15/22 1142   Appearance Pink;Intact;Slough 08/15/22 1142   Tissue loss description Full thickness 08/15/22 1142   Periwound Area Intact;Moist 08/15/22 1142   Wound Edges Defined 08/15/22 1142   Wound Length (cm) 1 cm 08/15/22 1142   Wound Width (cm) 0.6 cm 08/15/22 1142   Wound Depth (cm) 1.4 cm 08/15/22 1142   Wound Volume (cm^3) 0.84 cm^3 08/15/22 1142   Wound Surface Area (cm^2) 0.6 cm^2 08/15/22 1142   Care Cleansed with:;Wound cleanser 08/15/22 1142   Dressing Applied;Other (comment) 08/15/22 1142   Dressing Change Due 08/17/22 08/15/22 1142            Wound 05/03/22 1230 Other (comment) Left posterior Hip #2 (Active)   05/03/22 1230    Pre-existing: Yes   Primary Wound Type: Other   Side: Left   Orientation: posterior   Location: Hip   Wound Number: #2   Ankle-Brachial Index:    Pulses:    Removal Indication and Assessment:    Wound Outcome:    (Retired) Wound Type:    (Retired) Wound Length (cm):    (Retired) Wound Width (cm):    (Retired) Depth (cm):    Wound Description (Comments):    Removal Indications:    Dressing Appearance Moist drainage 08/15/22 1142   Drainage Amount Moderate 08/15/22 1142   Drainage Characteristics/Odor Sanguineous 08/15/22 1142   Appearance Intact;Moist 08/15/22 1142   Tissue loss description Full thickness 08/15/22 1142   Periwound Area Moist;Intact 08/15/22 1142   Wound Edges Defined 08/15/22 1146    Wound Length (cm) 0.4 cm 08/15/22 1142   Wound Width (cm) 0.4 cm 08/15/22 1142   Wound Depth (cm) 1.6 cm 08/15/22 1142   Wound Volume (cm^3) 0.256 cm^3 08/15/22 1142   Wound Surface Area (cm^2) 0.16 cm^2 08/15/22 1142   Care Cleansed with: 08/15/22 1142   Dressing Applied;Other (comment) 08/15/22 1142   Dressing Change Due 08/17/22 08/15/22 1142            Wound 07/25/22 1123 Other (comment) Left medial Malleolus/Ankle #3 (Active)   07/25/22 1123    Pre-existing: Yes   Primary Wound Type: Other   Side: Left   Orientation: medial   Location: Malleolus/Ankle   Wound Number: #3   Ankle-Brachial Index:    Pulses:    Removal Indication and Assessment:    Wound Outcome:    (Retired) Wound Type:    (Retired) Wound Length (cm):    (Retired) Wound Width (cm):    (Retired) Depth (cm):    Wound Description (Comments):    Removal Indications:    Dressing Appearance Moist drainage 08/15/22 1124   Drainage Amount Moderate 08/15/22 1124   Drainage Characteristics/Odor Sanguineous 08/15/22 1124   Appearance Slough;Moist;Pink;Intact 08/15/22 1124   Tissue loss description Full thickness 08/15/22 1124   Periwound Area Intact;Moist 08/15/22 1124   Wound Edges Defined 08/15/22 1124   Wound Length (cm) 1.2 cm 08/15/22 1124   Wound Width (cm) 1.6 cm 08/15/22 1124   Wound Depth (cm) 0.2 cm 08/15/22 1124   Wound Volume (cm^3) 0.384 cm^3 08/15/22 1124   Wound Surface Area (cm^2) 1.92 cm^2 08/15/22 1124   Care Cleansed with: 08/15/22 1124   Dressing Applied;Other (comment) 08/15/22 1124   Dressing Change Due 08/17/22 08/15/22 1124         No hip photo obtained    Sacrum       Left hip   endoform, 4x4 gentle foam border dressing   endoform, 4x4 gentle foam border dressing        Left lateral foot   endoform, 4x4 gentle foam border dressing  TR      Assessment:         ICD-10-CM ICD-9-CM   1. Pressure injury of sacral region, stage 4  L89.154 707.03     707.24   2. Unspecified open wound, left hip, subsequent encounter  S71.002D V58.89      890.0   3. Quadriplegia  G82.50 344.00           Procedures:     No debridement performed  Toe nails trimmed x 10    Excisional debridement performed:          [] Yes [x] No   Selective debridement performed:           [] Yes [x] No   Mechanical debridement performed:        [] Yes [x] No   Silver nitrate applied:                                  [] Yes [x] No   Labs ordered this visit:                                [] Yes [x] No   Imaging ordered this visit:                          [] Yes [x] No   Tissue pathology and/or culture taken:    [] Yes [x] No  Currently using topical abx   MEDICATIONS    Current Outpatient Medications:     busPIRone (BUSPAR) 5 MG Tab, Take 10 mg by mouth 2 (two) times daily., Disp: , Rfl:     DULoxetine (CYMBALTA) 30 MG capsule, Take 30 mg by mouth 2 (two) times daily., Disp: , Rfl:    Review of patient's allergies indicates:   Allergen Reactions    Levofloxacin Rash       [facility]ALTH [facility]:  [facility]  TIMES PER WEEK/DAYS:    WOUND CARE ORDERS:  **LEAVE CURRENT DRESSINGS IN PLACE UNTIL WEDNESDAY**  Left hip wound: Cleanse with wound cleanser, apply collagen, cover with gentle foam border dressing to be changed every other day.   Sacral wound: Cleanse with wound cleanser, apply collagen, cover with gentle foam border dressing to be changed every other day.   Left medial ankle wound: Cleanse with wound cleanser, apply collagen, cover with gentle foam border dressing to be changed every other day.     Follow up in 3 weeks (on 9/5/2022) for pressure injuries .        Electronically signed:  Ashley Coto NP

## 2022-08-29 ENCOUNTER — HOSPITAL ENCOUNTER (OUTPATIENT)
Dept: WOUND CARE | Facility: HOSPITAL | Age: 42
Discharge: HOME | End: 2022-08-29
Attending: NURSE PRACTITIONER
Payer: MEDICARE

## 2022-08-29 VITALS
DIASTOLIC BLOOD PRESSURE: 72 MMHG | RESPIRATION RATE: 18 BRPM | HEIGHT: 70 IN | BODY MASS INDEX: 23.62 KG/M2 | WEIGHT: 165 LBS | SYSTOLIC BLOOD PRESSURE: 130 MMHG | HEART RATE: 77 BPM

## 2022-08-29 DIAGNOSIS — S71.002D UNSPECIFIED OPEN WOUND, LEFT HIP, SUBSEQUENT ENCOUNTER: ICD-10-CM

## 2022-08-29 DIAGNOSIS — G82.50 QUADRIPLEGIA: ICD-10-CM

## 2022-08-29 DIAGNOSIS — L89.154 PRESSURE INJURY OF SACRAL REGION, STAGE 4: Primary | ICD-10-CM

## 2022-08-29 PROCEDURE — 11043 DBRDMT MUSC&/FSCA 1ST 20/<: CPT

## 2022-08-29 PROCEDURE — 99213 OFFICE O/P EST LOW 20 MIN: CPT | Mod: 25

## 2022-08-29 PROCEDURE — 27000999 HC MEDICAL RECORD PHOTO DOCUMENTATION

## 2022-08-29 NOTE — PROGRESS NOTES
Subjective:       Patient ID: Werner Jarrett is a 41 y.o. male.    Chief Complaint: Pressure Ulcer (Sacral spine- Stage 4/Left posterior hip- Stage 4/Left medial ankle- Stage 2)    HPI    Mr. Jarrett is returning today for wound care for the sacrum and left hip.  A culture was obtained 6/7/22 and was positive for Staph Aureus in the hip.  He has completed the Bactrim as well as topical abx, Linezolid. The wounds again appear stable, and there is a reported continuing decrease in drainage from the left hip.     He has recently had an x-ray done of the right hip which shows significant deformity of the femoral head and proximal femur related to remote injury with significant degenerative changes of the inferior medial and superolateral aspects of the hip joint.  There are also degenerative changes of the right hip joint sacroiliac joints and lumbosacral spine.  There is no evidence of osteomyelitis at this time.     Today it was noted that the left lateral ankle continues to improve.  We will use silver alginate on this wound.  The sacral wound has had a slight decrease in depth.  We will continue endoform on that wound, as well as the hip wound.    Review of Systems   Musculoskeletal:  Positive for gait problem.   Skin:  Positive for wound.   All other systems reviewed and are negative.      Objective:      Vitals:    08/29/22 1139   BP: 130/72   Pulse: 77   Resp: 18       Physical Exam  Constitutional:       Appearance: Normal appearance. He is normal weight.   HENT:      Head: Normocephalic.   Cardiovascular:      Rate and Rhythm: Normal rate.      Pulses: Normal pulses.   Pulmonary:      Effort: Pulmonary effort is normal.   Musculoskeletal:      Comments: quadrapelgic   Skin:     General: Skin is warm and dry.   Neurological:      Mental Status: He is alert and oriented to person, place, and time.   Psychiatric:         Mood and Affect: Mood normal.          Wound 05/03/22 1230 Other (comment) Sacral Spine #1  (Active)   05/03/22 1230    Pre-existing: Yes   Primary Wound Type: Other   Side:    Orientation:    Location: Sacral Spine   Wound Number: #1   Ankle-Brachial Index:    Pulses:    Removal Indication and Assessment:    Wound Outcome:    (Retired) Wound Type:    (Retired) Wound Length (cm):    (Retired) Wound Width (cm):    (Retired) Depth (cm):    Wound Description (Comments):    Removal Indications:    Dressing Appearance Moist drainage;Intact 08/29/22 1200   Drainage Amount Moderate 08/29/22 1200   Appearance Intact;Slough;Moist;Other (see comments) 08/29/22 1200   Tissue loss description Full thickness 08/29/22 1200   Wound Length (cm) 1 cm 08/29/22 1141   Wound Width (cm) 0.5 cm 08/29/22 1141   Wound Depth (cm) 1 cm 08/29/22 1141   Wound Volume (cm^3) 0.5 cm^3 08/29/22 1141   Wound Surface Area (cm^2) 0.5 cm^2 08/29/22 1141   Care Cleansed with:;Wound cleanser 08/29/22 1200   Dressing Other (comment) 08/29/22 1200   Dressing Change Due 08/31/22 08/29/22 1200            Wound 05/03/22 1230 Other (comment) Left posterior Hip #2 (Active)   05/03/22 1230    Pre-existing: Yes   Primary Wound Type: Other   Side: Left   Orientation: posterior   Location: Hip   Wound Number: #2   Ankle-Brachial Index:    Pulses:    Removal Indication and Assessment:    Wound Outcome:    (Retired) Wound Type:    (Retired) Wound Length (cm):    (Retired) Wound Width (cm):    (Retired) Depth (cm):    Wound Description (Comments):    Removal Indications:    Dressing Appearance Intact;Moist drainage 08/29/22 1200   Drainage Amount Moderate 08/29/22 1200   Drainage Characteristics/Odor Serosanguineous;Serous 08/29/22 1200   Appearance Intact 08/29/22 1200   Tissue loss description Full thickness 08/29/22 1200   Periwound Area Intact;Moist 08/29/22 1200   Wound Edges Open 08/29/22 1200   Wound Length (cm) 0.3 cm 08/29/22 1141   Wound Width (cm) 0.3 cm 08/29/22 1141   Wound Depth (cm) 1.3 cm 08/29/22 1141   Wound Volume (cm^3) 0.117 cm^3  08/29/22 1141   Wound Surface Area (cm^2) 0.09 cm^2 08/29/22 1141   Care Cleansed with:;Wound cleanser 08/29/22 1200   Dressing Applied;Other (comment) 08/29/22 1200   Dressing Change Due 08/31/22 08/29/22 1200            Wound 07/25/22 1123 Other (comment) Left medial Malleolus/Ankle #3 (Active)   07/25/22 1123    Pre-existing: Yes   Primary Wound Type: Other   Side: Left   Orientation: medial   Location: Malleolus/Ankle   Wound Number: #3   Ankle-Brachial Index:    Pulses:    Removal Indication and Assessment:    Wound Outcome:    (Retired) Wound Type:    (Retired) Wound Length (cm):    (Retired) Wound Width (cm):    (Retired) Depth (cm):    Wound Description (Comments):    Removal Indications:    Dressing Appearance Intact;Moist drainage 08/29/22 1200   Drainage Amount Moderate 08/29/22 1200   Drainage Characteristics/Odor Serosanguineous 08/29/22 1200   Appearance Intact;Pink;Slough 08/29/22 1200   Tissue loss description Full thickness 08/29/22 1200   Periwound Area Intact;Moist 08/29/22 1200   Wound Edges Open 08/29/22 1200   Wound Length (cm) 1.2 cm 08/29/22 1141   Wound Width (cm) 1.4 cm 08/29/22 1141   Wound Depth (cm) 0.2 cm 08/29/22 1141   Wound Volume (cm^3) 0.336 cm^3 08/29/22 1141   Wound Surface Area (cm^2) 1.68 cm^2 08/29/22 1141   Care Cleansed with:;Wound cleanser 08/29/22 1200   Dressing Change Due 08/30/22 08/29/22 1200         Left hip      Left lateral foot  moisten endoform, island dressing  Silver Ag, Mepilex border, tape        Sacrum, pre      post  Endoform, gentle border  TR      Assessment:         ICD-10-CM ICD-9-CM   1. Pressure injury of sacral region, stage 4  L89.154 707.03     707.24   2. Unspecified open wound, left hip, subsequent encounter  S71.002D V58.89     890.0   3. Quadriplegia  G82.50 344.00           Procedures:     Debridement     Date/Time: 8/29/2022 10:58 AM  Performed by: Ashley Coto NP  Authorized by: Ashley Coto NP      Time out: Immediately prior to  "procedure a "time out" was called to verify the correct patient, procedure, equipment, support staff and site/side marked as required.     Consent Done?:  Yes (Verbal)  Local anesthesia used?: No       Wound Details:    Location:  Sacrum    Type of Debridement:  Non-excisional       Length (cm):  1       Area (sq cm):  0.5       Width (cm):  0.5       Percent Debrided (%):  100       Depth (cm):  1       Total Area Debrided (sq cm):  0.5    Depth of debridement:  Epidermis/Dermis    Devitalized tissue debrided:  Biofilm, Exudate, Fibrin and Slough    Instruments:  Curette      TR    Excisional debridement performed:          [] Yes [x] No   Selective debridement performed:           [] Yes [x] No   Mechanical debridement performed:        [] Yes [x] No   Silver nitrate applied:                                  [] Yes [x] No   Labs ordered this visit:                                [] Yes [x] No   Imaging ordered this visit:                          [] Yes [x] No   Tissue pathology and/or culture taken:    [] Yes [x] No  Currently using topical abx   MEDICATIONS    Current Outpatient Medications:     busPIRone (BUSPAR) 5 MG Tab, Take 10 mg by mouth 2 (two) times daily., Disp: , Rfl:     DULoxetine (CYMBALTA) 30 MG capsule, Take 30 mg by mouth 2 (two) times daily., Disp: , Rfl:    Review of patient's allergies indicates:   Allergen Reactions    Levofloxacin Rash       [facility]  TIMES PER WEEK/DAYS:    WOUND CARE ORDERS:  ORDERS:  Left hip wound: Cleanse with wound cleanser, pack endoform into base of wound and cover with gentle foam border dressing to be changed every other day.   Sacral wound: Cleanse with wound cleanser, pack endoform into base of wound and cover with gentle foam border dressing to be changed every other day.   Left medial ankle wound: Cleanse with wound cleanser, apply silver AG to wound bed and cover with Mepilex border dressing to be changed daily.    Follow up in " about 2 weeks (around 9/12/2022) for Sacral, hip and ankle wound.        Electronically signed:  Ashley Coto NP

## 2022-09-06 ENCOUNTER — HOSPITAL ENCOUNTER (OUTPATIENT)
Dept: WOUND CARE | Facility: HOSPITAL | Age: 42
Discharge: HOME | End: 2022-09-06
Attending: NURSE PRACTITIONER
Payer: MEDICARE

## 2022-09-06 VITALS
HEIGHT: 70 IN | RESPIRATION RATE: 18 BRPM | BODY MASS INDEX: 23.62 KG/M2 | DIASTOLIC BLOOD PRESSURE: 72 MMHG | HEART RATE: 80 BPM | WEIGHT: 165 LBS | SYSTOLIC BLOOD PRESSURE: 126 MMHG

## 2022-09-06 DIAGNOSIS — L89.154 PRESSURE INJURY OF SACRAL REGION, STAGE 4: Primary | ICD-10-CM

## 2022-09-06 DIAGNOSIS — S71.002D UNSPECIFIED OPEN WOUND, LEFT HIP, SUBSEQUENT ENCOUNTER: ICD-10-CM

## 2022-09-06 DIAGNOSIS — G82.50 QUADRIPLEGIA: ICD-10-CM

## 2022-09-06 PROCEDURE — 99213 OFFICE O/P EST LOW 20 MIN: CPT

## 2022-09-06 PROCEDURE — 27000999 HC MEDICAL RECORD PHOTO DOCUMENTATION

## 2022-09-06 NOTE — PROGRESS NOTES
Subjective:       Patient ID: Werner Jarrett is a 41 y.o. male.    Chief Complaint: Pressure Ulcer (Sacral spine- Stage 4/Left posterior hip- Stage 4/Left medial ankle- Stage 2)    HPI    Mr. Jarrett is returning today for wound care for the sacrum and left hip.  The patient was scheduled one week early after a concerning call from Home health indicating that the wound was having an increase in exudate.    A culture was obtained 6/7/22 and was positive for Staph Aureus in the hip.  He has completed the Bactrim as well as topical abx, Linezolid.  Today the hip wound does have an increase in serosanguinous exudate as well as some blood clotting.  It is likely that home health has injured the wound bed while inserting collagen into the tunneling wound.  However, a culture will show us if any additional abx are indicated.  If the patient's culture comes back positive again for Staph we will seek approval for a Dalvance infusion.    He has recently had an x-ray done of the right hip which shows significant deformity of the femoral head and proximal femur related to remote injury with significant degenerative changes of the inferior medial and superolateral aspects of the hip joint.  There are also degenerative changes of the right hip joint sacroiliac joints and lumbosacral spine.  There is no evidence of osteomyelitis at this time.     Today it was noted that the left lateral ankle continues to improve.  We will continue to use silver alginate on this wound.  The sacral wound remains stable.  We will use silver alginate on this wound as well.    Review of Systems   Musculoskeletal:  Positive for gait problem.   Skin:  Positive for wound.   All other systems reviewed and are negative.      Objective:      Vitals:    09/06/22 1430   BP: 126/72   Pulse: 80   Resp: 18       Physical Exam  Constitutional:       Appearance: Normal appearance. He is normal weight.   HENT:      Head: Normocephalic.   Cardiovascular:      Rate  and Rhythm: Normal rate.      Pulses: Normal pulses.   Pulmonary:      Effort: Pulmonary effort is normal.   Musculoskeletal:      Comments: quadrapelgic   Skin:     General: Skin is warm and dry.   Neurological:      Mental Status: He is alert and oriented to person, place, and time.   Psychiatric:         Mood and Affect: Mood normal.          Wound 05/03/22 1230 Other (comment) Sacral Spine #1 (Active)   05/03/22 1230    Pre-existing: Yes   Primary Wound Type: Other   Side:    Orientation:    Location: Sacral Spine   Wound Number: #1   Ankle-Brachial Index:    Pulses:    Removal Indication and Assessment:    Wound Outcome:    (Retired) Wound Type:    (Retired) Wound Length (cm):    (Retired) Wound Width (cm):    (Retired) Depth (cm):    Wound Description (Comments):    Removal Indications:    Dressing Appearance Intact;Moist drainage 09/06/22 1431   Drainage Amount Moderate 09/06/22 1431   Drainage Characteristics/Odor Serosanguineous 09/06/22 1431   Appearance Intact;Pink;Slough;Moist;Other (see comments) 09/06/22 1431   Tissue loss description Full thickness 09/06/22 1431   Wound Length (cm) 1 cm 09/06/22 1431   Wound Width (cm) 0.5 cm 09/06/22 1431   Wound Depth (cm) 1 cm 09/06/22 1431   Wound Volume (cm^3) 0.5 cm^3 09/06/22 1431   Wound Surface Area (cm^2) 0.5 cm^2 09/06/22 1431   Care Cleansed with:;Wound cleanser 09/06/22 1431   Dressing Change Due 09/07/22 09/06/22 1431            Wound 05/03/22 1230 Other (comment) Left posterior Hip #2 (Active)   05/03/22 1230    Pre-existing: Yes   Primary Wound Type: Other   Side: Left   Orientation: posterior   Location: Hip   Wound Number: #2   Ankle-Brachial Index:    Pulses:    Removal Indication and Assessment:    Wound Outcome:    (Retired) Wound Type:    (Retired) Wound Length (cm):    (Retired) Wound Width (cm):    (Retired) Depth (cm):    Wound Description (Comments):    Removal Indications:    Dressing Appearance Intact;Moist drainage 09/06/22 1431   Drainage  Amount Moderate 09/06/22 1431   Drainage Characteristics/Odor Serosanguineous 09/06/22 1431   Appearance Intact;Slough 09/06/22 1431   Periwound Area Intact;Moist 09/06/22 1431   Wound Edges Rolled/closed;Defined 09/06/22 1431   Wound Length (cm) 0.3 cm 09/06/22 1431   Wound Width (cm) 0.3 cm 09/06/22 1431   Wound Depth (cm) 2.6 cm 09/06/22 1431   Wound Volume (cm^3) 0.234 cm^3 09/06/22 1431   Wound Surface Area (cm^2) 0.09 cm^2 09/06/22 1431   Care Cleansed with: 09/06/22 1431   Dressing Applied 09/06/22 1431   Dressing Change Due 09/07/22 09/06/22 1431            Wound 07/25/22 1123 Other (comment) Left medial Malleolus/Ankle #3 (Active)   07/25/22 1123    Pre-existing: Yes   Primary Wound Type: Other   Side: Left   Orientation: medial   Location: Malleolus/Ankle   Wound Number: #3   Ankle-Brachial Index:    Pulses:    Removal Indication and Assessment:    Wound Outcome:    (Retired) Wound Type:    (Retired) Wound Length (cm):    (Retired) Wound Width (cm):    (Retired) Depth (cm):    Wound Description (Comments):    Removal Indications:    Dressing Appearance Intact;Moist drainage 09/06/22 1500   Drainage Amount Moderate 09/06/22 1500   Drainage Characteristics/Odor Serosanguineous 09/06/22 1500   Appearance Pink;Slough;Moist 09/06/22 1500   Tissue loss description Full thickness 09/06/22 1500   Periwound Area Intact;Moist 09/06/22 1500   Wound Edges Defined;Open 09/06/22 1500   Wound Length (cm) 1.3 cm 09/06/22 1431   Wound Width (cm) 2 cm 09/06/22 1431   Wound Depth (cm) 0.2 cm 09/06/22 1431   Wound Volume (cm^3) 0.52 cm^3 09/06/22 1431   Wound Surface Area (cm^2) 2.6 cm^2 09/06/22 1431   Care Cleansed with:;Wound cleanser 09/06/22 1500   Dressing Applied 09/06/22 1500   Dressing Change Due 09/07/22 09/06/22 1500         Left hip      Left lateral foot  silver alginate, gentle border  silver alginate, gentle border        Sacrum, pre        silver alginate, gentle border  TR      Assessment:         ICD-10-CM  ICD-9-CM   1. Pressure injury of sacral region, stage 4  L89.154 707.03     707.24   2. Unspecified open wound, left hip, subsequent encounter  S71.002D V58.89     890.0   3. Quadriplegia  G82.50 344.00           Procedures:     No procedures performed    Excisional debridement performed:          [] Yes [x] No   Selective debridement performed:           [] Yes [x] No   Mechanical debridement performed:        [] Yes [x] No   Silver nitrate applied:                                  [] Yes [x] No   Labs ordered this visit:                                [] Yes [x] No   Imaging ordered this visit:                          [] Yes [x] No   Tissue pathology and/or culture taken:    [] Yes [x] No  re-cultured hip  MEDICATIONS    Current Outpatient Medications:     busPIRone (BUSPAR) 5 MG Tab, Take 10 mg by mouth 2 (two) times daily., Disp: , Rfl:     DULoxetine (CYMBALTA) 30 MG capsule, Take 30 mg by mouth 2 (two) times daily., Disp: , Rfl:    Review of patient's allergies indicates:   Allergen Reactions    Levofloxacin Rash       [facility]  TIMES PER WEEK/DAYS:    WOUND CARE ORDERS:    Left hip wound: Cleanse with wound cleanser, cover with silver alginate and dress with a gentle border to be changed daily .   Sacral wound:  Cleanse with wound cleanser, cover with silver alginate and dress with a sacral gentle border to be changed daily PRN soiling.  Left medial ankle wound: Cleanse with wound cleanser, cover with silver alginate and dress with a gentle border to be changed daily .    Follow up in about 6 days (around 9/12/2022) for sacral, left hip and left ankle .        Electronically signed:  Ashley Coto NP

## 2022-09-12 ENCOUNTER — HOSPITAL ENCOUNTER (OUTPATIENT)
Dept: WOUND CARE | Facility: HOSPITAL | Age: 42
Discharge: HOME | End: 2022-09-12
Attending: NURSE PRACTITIONER
Payer: MEDICARE

## 2022-09-12 DIAGNOSIS — L89.154 PRESSURE INJURY OF SACRAL REGION, STAGE 4: Primary | ICD-10-CM

## 2022-09-12 DIAGNOSIS — S71.002D UNSPECIFIED OPEN WOUND, LEFT HIP, SUBSEQUENT ENCOUNTER: ICD-10-CM

## 2022-09-12 DIAGNOSIS — G82.50 QUADRIPLEGIA: ICD-10-CM

## 2022-09-12 PROCEDURE — 99213 OFFICE O/P EST LOW 20 MIN: CPT

## 2022-09-12 PROCEDURE — 27000999 HC MEDICAL RECORD PHOTO DOCUMENTATION

## 2022-09-12 RX ORDER — SULFAMETHOXAZOLE AND TRIMETHOPRIM 800; 160 MG/1; MG/1
1 TABLET ORAL 2 TIMES DAILY
Qty: 14 TABLET | Refills: 1 | Status: SHIPPED | OUTPATIENT
Start: 2022-09-12 | End: 2022-11-07

## 2022-09-12 RX ORDER — AMOXICILLIN AND CLAVULANATE POTASSIUM 875; 125 MG/1; MG/1
1 TABLET, FILM COATED ORAL EVERY 12 HOURS
Qty: 14 TABLET | Refills: 1 | Status: SHIPPED | OUTPATIENT
Start: 2022-09-12 | End: 2022-11-07 | Stop reason: ALTCHOICE

## 2022-09-12 NOTE — PROGRESS NOTES
Subjective:       Patient ID: Werner Jarrett is a 41 y.o. male.    Chief Complaint: Pressure Ulcer (Sacral spine- Stage 4/Left posterior hip- Stage 4/Left medial ankle- Stage 2)    HPI      DEBRIDEMENT   No debridement     NOTES  Refer to Dr. Jara for evaluation of left hip   Augmentin and Bactrim x 14 days    Mr. Jarrett is returning today for wound care for the sacrum and left hip.  The patient was scheduled one week early after a concerning call from Home health indicating that the wound was having an increase in exudate.    A culture was obtained 6/7/22 and was positive for Staph Aureus in the hip.  He has completed the Bactrim as well as topical abx, Linezolid.  At his last visit the hip did have an increase in serosanguinous exudate as well as some blood clotting.  Today there was a great deal of exudate, which was flushed with a butterfly catheter and NS syringe.  The culture was reviewed with the patient today and it does show small quantities of multiple common skin pathogens.  Augmentin and Bactrim were prescribed as indicated.  We will not be ordering topical abx at this time    He has recently had an x-ray done of the right hip which shows significant deformity of the femoral head and proximal femur related to remote injury with significant degenerative changes of the inferior medial and superolateral aspects of the hip joint.  There are also degenerative changes of the right hip joint sacroiliac joints and lumbosacral spine.  There is no evidence of osteomyelitis at this time.    Because of the sudden deterioration in the hip, increase in depth and undermining, the patient is being referred back to Dr. Sandy Jara for possible surgical debridement.  He is scheduled for 9/20/22 at 2:30 pm at her [place], [place] office.     Today it was noted that the left medial ankle continues to improve.  We will continue to use silver alginate on this wound.  The sacral wound remains stable.  We will use  silver alginate on this wound as well.    Review of Systems   Musculoskeletal:  Positive for gait problem.   Skin:  Positive for wound.   All other systems reviewed and are negative.      Objective:      There were no vitals filed for this visit.      Physical Exam  Constitutional:       Appearance: Normal appearance. He is normal weight.   HENT:      Head: Normocephalic.   Cardiovascular:      Rate and Rhythm: Normal rate.      Pulses: Normal pulses.   Pulmonary:      Effort: Pulmonary effort is normal.   Musculoskeletal:      Comments: quadrapelgic   Skin:     General: Skin is warm and dry.   Neurological:      Mental Status: He is alert and oriented to person, place, and time.   Psychiatric:         Mood and Affect: Mood normal.          Wound 05/03/22 1230 Other (comment) Sacral Spine #1 (Active)   05/03/22 1230    Pre-existing: Yes   Primary Wound Type: Other   Side:    Orientation:    Location: Sacral Spine   Wound Number: #1   Ankle-Brachial Index:    Pulses:    Removal Indication and Assessment:    Wound Outcome:    (Retired) Wound Type:    (Retired) Wound Length (cm):    (Retired) Wound Width (cm):    (Retired) Depth (cm):    Wound Description (Comments):    Removal Indications:    Dressing Appearance Moist drainage 09/12/22 1127   Drainage Amount Moderate 09/12/22 1127   Drainage Characteristics/Odor Serosanguineous 09/12/22 1127   Appearance Moist;Slough 09/12/22 1127   Tissue loss description Full thickness 09/12/22 1127   Periwound Area Intact;Moist 09/12/22 1127   Wound Edges Open 09/12/22 1127   Wound Length (cm) 0.4 cm 09/12/22 1127   Wound Width (cm) 0.3 cm 09/12/22 1127   Wound Depth (cm) 1 cm 09/12/22 1127   Wound Volume (cm^3) 0.12 cm^3 09/12/22 1127   Wound Surface Area (cm^2) 0.12 cm^2 09/12/22 1127   Undermining (depth (cm)/location) 0.6cm from 10-11 o'clock 09/12/22 1127   Care Cleansed with:;Wound cleanser 09/12/22 1127   Dressing Applied;Other (comment) 09/12/22 1127   Dressing Change Due  09/13/22 09/12/22 1127            Wound 05/03/22 1230 Other (comment) Left posterior Hip #2 (Active)   05/03/22 1230    Pre-existing: Yes   Primary Wound Type: Other   Side: Left   Orientation: posterior   Location: Hip   Wound Number: #2   Ankle-Brachial Index:    Pulses:    Removal Indication and Assessment:    Wound Outcome:    (Retired) Wound Type:    (Retired) Wound Length (cm):    (Retired) Wound Width (cm):    (Retired) Depth (cm):    Wound Description (Comments):    Removal Indications:    Dressing Appearance Moist drainage 09/12/22 1127   Drainage Amount Large 09/12/22 1127   Drainage Characteristics/Odor Purulent 09/12/22 1127   Appearance Other (see comments) 09/12/22 1127   Tissue loss description Full thickness 09/12/22 1127   Periwound Area Intact 09/12/22 1127   Wound Edges Open 09/12/22 1127   Wound Length (cm) 0.7 cm 09/12/22 1127   Wound Width (cm) 0.5 cm 09/12/22 1127   Wound Depth (cm) 4.8 cm 09/12/22 1127   Wound Volume (cm^3) 1.68 cm^3 09/12/22 1127   Wound Surface Area (cm^2) 0.35 cm^2 09/12/22 1127   Undermining (depth (cm)/location) 5.5cm from 3-7 o'clock (deepest at 5 o'clock) 09/12/22 1127   Care Cleansed with:;Wound cleanser 09/12/22 1127   Dressing Applied;Other (comment) 09/12/22 1127   Dressing Change Due 09/13/22 09/12/22 1127            Wound 07/25/22 1123 Other (comment) Left medial Malleolus/Ankle #3 (Active)   07/25/22 1123    Pre-existing: Yes   Primary Wound Type: Other   Side: Left   Orientation: medial   Location: Malleolus/Ankle   Wound Number: #3   Ankle-Brachial Index:    Pulses:    Removal Indication and Assessment:    Wound Outcome:    (Retired) Wound Type:    (Retired) Wound Length (cm):    (Retired) Wound Width (cm):    (Retired) Depth (cm):    Wound Description (Comments):    Removal Indications:    Dressing Appearance Moist drainage 09/12/22 1127   Drainage Amount Moderate 09/12/22 1127   Drainage Characteristics/Odor Serosanguineous 09/12/22 1127   Appearance  Slough;Moist;Fibrin;Pink;Granulating 09/12/22 1127   Tissue loss description Full thickness 09/12/22 1127   Periwound Area Intact;Moist 09/12/22 1127   Wound Edges Open 09/12/22 1127   Wound Length (cm) 1.4 cm 09/12/22 1127   Wound Width (cm) 1.6 cm 09/12/22 1127   Wound Depth (cm) 0.2 cm 09/12/22 1127   Wound Volume (cm^3) 0.448 cm^3 09/12/22 1127   Wound Surface Area (cm^2) 2.24 cm^2 09/12/22 1127   Care Cleansed with:;Wound cleanser 09/12/22 1127   Dressing Applied;Other (comment) 09/12/22 1127   Dressing Change Due 09/13/22 09/12/22 1127         Left hip      Left medial ankle  silver alginate, gentle foam border dressing silver alginate, gentle foam border dressing          Sacrum, pre        silver alginate, gentle foam border dressing  CT      Assessment:         ICD-10-CM ICD-9-CM   1. Pressure injury of sacral region, stage 4  L89.154 707.03     707.24   2. Unspecified open wound, left hip, subsequent encounter  S71.002D V58.89     890.0   3. Quadriplegia  G82.50 344.00           Procedures:     No procedures performed    Excisional debridement performed:          [] Yes [x] No   Selective debridement performed:           [] Yes [x] No   Mechanical debridement performed:        [] Yes [x] No   Silver nitrate applied:                                  [] Yes [x] No   Labs ordered this visit:                                [] Yes [x] No   Imaging ordered this visit:                          [] Yes [x] No   Tissue pathology and/or culture taken:    [] Yes [x] No  Reviewed cx results  MEDICATIONS    Current Outpatient Medications:     busPIRone (BUSPAR) 5 MG Tab, Take 10 mg by mouth 2 (two) times daily., Disp: , Rfl:     DULoxetine (CYMBALTA) 30 MG capsule, Take 30 mg by mouth 2 (two) times daily., Disp: , Rfl:     amoxicillin-clavulanate 875-125mg (AUGMENTIN) 875-125 mg per tablet, Take 1 tablet by mouth every 12 (twelve) hours., Disp: 14 tablet, Rfl: 1    sulfamethoxazole-trimethoprim 800-160mg (BACTRIM DS)  800-160 mg Tab, Take 1 tablet by mouth 2 (two) times daily., Disp: 14 tablet, Rfl: 1   Review of patient's allergies indicates:   Allergen Reactions    Levofloxacin Rash       HOME [facility]ALTH [facility]ENCY:  [facility]  TIMES PER WEEK/DAYS:    WOUND CARE ORDERS:    Left hip wound: Cleanse with wound cleanser, cover with silver alginate and dress with a gentle border to be changed daily .   Sacral wound:  Cleanse with wound cleanser, cover with silver alginate and dress with a sacral gentle border to be changed daily PRN soiling.  Left medial ankle wound: Cleanse with wound cleanser, cover with silver alginate and dress with a gentle border to be changed daily .    Follow up in 2 weeks (on 9/26/2022).        Electronically signed:  Ashley Coto NP

## 2022-09-26 ENCOUNTER — HOSPITAL ENCOUNTER (OUTPATIENT)
Dept: WOUND CARE | Facility: HOSPITAL | Age: 42
Discharge: HOME | End: 2022-09-26
Attending: NURSE PRACTITIONER
Payer: MEDICARE

## 2022-09-26 VITALS
WEIGHT: 165 LBS | RESPIRATION RATE: 20 BRPM | SYSTOLIC BLOOD PRESSURE: 130 MMHG | BODY MASS INDEX: 23.62 KG/M2 | HEIGHT: 70 IN | HEART RATE: 77 BPM | DIASTOLIC BLOOD PRESSURE: 76 MMHG

## 2022-09-26 DIAGNOSIS — S71.002D UNSPECIFIED OPEN WOUND, LEFT HIP, SUBSEQUENT ENCOUNTER: ICD-10-CM

## 2022-09-26 DIAGNOSIS — G82.50 QUADRIPLEGIA: ICD-10-CM

## 2022-09-26 DIAGNOSIS — L89.154 PRESSURE INJURY OF SACRAL REGION, STAGE 4: Primary | ICD-10-CM

## 2022-09-26 NOTE — PROGRESS NOTES
Subjective:       Patient ID: Werner Jarrett is a 41 y.o. male.    Chief Complaint: Pressure Ulcer (Sacral spine- Stage 4/Left posterior hip- Stage 4/Left medial ankle- Stage 2/) and Wound Care (Right buttock- skin tear)    HPI    Mr. Jarrett is returning today for wound care for the sacrum and left hip.  The patient was scheduled one week early after a concerning call from Home health indicating that the wound was having an increase in exudate.    A culture was obtained 6/7/22 and was positive for Staph Aureus in the hip.  He has completed the Bactrim as well as topical abx, Linezolid.  At his visit on 9/6/22  the hip did have an increase in serosanguinous exudate as well as some blood clotting.  Because there was a great deal of exudate The culture was reviewed with the patient on 9/12/22 and it did show small quantities of multiple common skin pathogens.  Augmentin and Bactrim were prescribed as indicated.  No topical abx were ordered.    He has recently had an x-ray done of the right hip which shows significant deformity of the femoral head and proximal femur related to remote injury with significant degenerative changes of the inferior medial and superolateral aspects of the hip joint.  There are also degenerative changes of the right hip joint sacroiliac joints and lumbosacral spine.  There is no evidence of osteomyelitis at this time.    Because of the sudden deterioration in the hip, increase in depth and undermining, the patient was referred back to Dr. Sandy Jara for possible surgical debridement.  He saw her as scheduled on 9/20/22.  She shares the concern that there may be further deterioration within the wound that we are unable to see.  She has ordered an MRI (not scheduled as of this time) and will see him after review of the MRI to determine of surgical debridement is indicated.     Today it was noted that the left medial ankle remains stable.  We will continue to use silver alginate on this  wound.  The sacral wound remains stable.  We will use silver alginate on this wound as well.  The left hip had a moderate amount of blood that was expressed from the wound, as well as tissue coming up through the wound opening, which is unusual for this wound.   For the time being we will simply protect the wound's entry while he waits for Dr. Jara to assist with surgery.   He also presents today with an open wound to the right buttock which appears to be a skin tear due to ripping off of dressings.  We will apply endoform to this area.     Review of Systems   Musculoskeletal:  Positive for gait problem.   Skin:  Positive for wound.   All other systems reviewed and are negative.      Objective:      Vitals:    09/26/22 1243   BP: 130/76   Pulse: 77   Resp: 20         Physical Exam  Constitutional:       Appearance: Normal appearance. He is normal weight.   HENT:      Head: Normocephalic.   Cardiovascular:      Rate and Rhythm: Normal rate.      Pulses: Normal pulses.   Pulmonary:      Effort: Pulmonary effort is normal.   Musculoskeletal:      Comments: quadrapelgic   Skin:     General: Skin is warm and dry.   Neurological:      Mental Status: He is alert and oriented to person, place, and time.   Psychiatric:         Mood and Affect: Mood normal.          Altered Skin Integrity 09/26/22 1152 Right Buttocks #4 Skin Tear Partial thickness tissue loss. Shallow open ulcer with a red or pink wound bed, without slough. Intact or Open/Ruptured Serum-filled blister. (Active)   09/26/22 1152   Altered Skin Integrity Present on Admission: yes   Side: Right   Orientation:    Location: Buttocks   Wound Number: #4   Is this injury device related?: No   Primary Wound Type: Skin tear   Description of Altered Skin Integrity: Partial thickness tissue loss. Shallow open ulcer with a red or pink wound bed, without slough. Intact or Open/Ruptured Serum-filled blister.   Ankle-Brachial Index:    Pulses:    Removal Indication and  Assessment:    Wound Outcome:    (Retired) Wound Length (cm):    (Retired) Wound Width (cm):    (Retired) Depth (cm):    Wound Description (Comments):    Removal Indications:    Description of Altered Skin Integrity Partial thickness tissue loss. Shallow open ulcer with a red or pink wound bed, without slough. Intact or Open/Ruptured Serum-filled blister. 09/26/22 1122   Drainage Amount Moderate 09/26/22 1122   Appearance Intact;Red;Other (see comments) 09/26/22 1122   Tissue loss description Partial thickness 09/26/22 1122   Wound Edges Undefined 09/26/22 1122   Wound Length (cm) 3.5 cm 09/26/22 1122   Wound Width (cm) 1.5 cm 09/26/22 1122   Wound Depth (cm) 0.2 cm 09/26/22 1122   Wound Volume (cm^3) 1.05 cm^3 09/26/22 1122   Wound Surface Area (cm^2) 5.25 cm^2 09/26/22 1122   Care Cleansed with:;Wound cleanser 09/26/22 1122   Dressing Applied 09/26/22 1122   Dressing Change Due 09/28/22 09/26/22 1122            Wound 05/03/22 1230 Other (comment) Sacral Spine #1 (Active)   05/03/22 1230    Pre-existing: Yes   Primary Wound Type: Other   Side:    Orientation:    Location: Sacral Spine   Wound Number: #1   Ankle-Brachial Index:    Pulses:    Removal Indication and Assessment:    Wound Outcome:    (Retired) Wound Type:    (Retired) Wound Length (cm):    (Retired) Wound Width (cm):    (Retired) Depth (cm):    Wound Description (Comments):    Removal Indications:    Dressing Appearance Intact;Moist drainage 09/26/22 1122   Drainage Amount Moderate 09/26/22 1122   Appearance Intact;White 09/26/22 1122   Tissue loss description Full thickness 09/26/22 1122   Wound Edges Undefined 09/26/22 1122   Wound Length (cm) 0.5 cm 09/26/22 1122   Wound Width (cm) 0.4 cm 09/26/22 1122   Wound Depth (cm) 0.5 cm 09/26/22 1122   Wound Volume (cm^3) 0.1 cm^3 09/26/22 1122   Wound Surface Area (cm^2) 0.2 cm^2 09/26/22 1122   Care Cleansed with:;Wound cleanser 09/26/22 1122   Dressing Applied 09/26/22 1122   Dressing Change Due 09/27/22  09/26/22 1122            Wound 05/03/22 1230 Other (comment) Left posterior Hip #2 (Active)   05/03/22 1230    Pre-existing: Yes   Primary Wound Type: Other   Side: Left   Orientation: posterior   Location: Hip   Wound Number: #2   Ankle-Brachial Index:    Pulses:    Removal Indication and Assessment:    Wound Outcome:    (Retired) Wound Type:    (Retired) Wound Length (cm):    (Retired) Wound Width (cm):    (Retired) Depth (cm):    Wound Description (Comments):    Removal Indications:    Dressing Appearance Moist drainage;other (see comments) 09/26/22 1122   Drainage Amount Moderate 09/26/22 1122   Appearance Moist;Bleeding;Other (see comments) 09/26/22 1122   Tissue loss description Full thickness 09/26/22 1122   Wound Edges Defined;Open 09/26/22 1122   Wound Length (cm) 0.7 cm 09/26/22 1122   Wound Width (cm) 0.5 cm 09/26/22 1122   Wound Depth (cm) 2.5 cm 09/26/22 1122   Wound Volume (cm^3) 0.875 cm^3 09/26/22 1122   Wound Surface Area (cm^2) 0.35 cm^2 09/26/22 1122   Care Cleansed with:;Wound cleanser 09/26/22 1122   Dressing Applied 09/26/22 1122   Dressing Change Due 09/27/22 09/26/22 1122            Wound 07/25/22 1123 Other (comment) Left medial Malleolus/Ankle #3 (Active)   07/25/22 1123    Pre-existing: Yes   Primary Wound Type: Other   Side: Left   Orientation: medial   Location: Malleolus/Ankle   Wound Number: #3   Ankle-Brachial Index:    Pulses:    Removal Indication and Assessment:    Wound Outcome:    (Retired) Wound Type:    (Retired) Wound Length (cm):    (Retired) Wound Width (cm):    (Retired) Depth (cm):    Wound Description (Comments):    Removal Indications:    Tissue loss description Full thickness 09/26/22 1122   Periwound Area Intact;Moist;[place] 09/26/22 1122   Wound Edges Defined 09/26/22 1122   Wound Length (cm) 1.5 cm 09/26/22 1122   Wound Width (cm) 1.7 cm 09/26/22 1122   Wound Depth (cm) 0.2 cm 09/26/22 1122   Wound Volume (cm^3) 0.51 cm^3 09/26/22 1122   Wound Surface Area (cm^2) 2.55  cm^2 09/26/22 1122   Care Wound cleanser;Applied: 09/26/22 1122   Dressing Applied 09/26/22 1122   Dressing Change Due 09/27/22 09/26/22 1122           Left hip      Left medial ankle  mepilex border flex bandage   silver alginate, mepilex          Sacrum      Right buttock       silver alginate, mepilex    endoform, mepilex border flex  TR      Assessment:         ICD-10-CM ICD-9-CM   1. Pressure injury of sacral region, stage 4  L89.154 707.03     707.24   2. Unspecified open wound, left hip, subsequent encounter  S71.002D V58.89     890.0   3. Quadriplegia  G82.50 344.00           Procedures:     No procedures performed    Excisional debridement performed:          [] Yes [x] No   Selective debridement performed:           [] Yes [x] No   Mechanical debridement performed:        [] Yes [x] No   Silver nitrate applied:                                  [] Yes [x] No   Labs ordered this visit:                                [] Yes [x] No   Imaging ordered this visit:                          [] Yes [x] No   Tissue pathology and/or culture taken:    [] Yes [x] No    MEDICATIONS    Current Outpatient Medications:     amoxicillin-clavulanate 875-125mg (AUGMENTIN) 875-125 mg per tablet, Take 1 tablet by mouth every 12 (twelve) hours., Disp: 14 tablet, Rfl: 1    busPIRone (BUSPAR) 5 MG Tab, Take 10 mg by mouth 2 (two) times daily., Disp: , Rfl:     DULoxetine (CYMBALTA) 30 MG capsule, Take 30 mg by mouth 2 (two) times daily., Disp: , Rfl:     sulfamethoxazole-trimethoprim 800-160mg (BACTRIM DS) 800-160 mg Tab, Take 1 tablet by mouth 2 (two) times daily., Disp: 14 tablet, Rfl: 1   Review of patient's allergies indicates:   Allergen Reactions    Levofloxacin Rash       [facility]:  [facility]  TIMES PER WEEK/DAYS:    WOUND CARE ORDERS:    Left hip wound: Cleanse with wound cleanser, cover with silver alginate and dress with folded 4x4, Mepilex border flex bandage,to be changed daily .   Sacral wound:   Cleanse with wound cleanser, cover with silver alginate and dress with a Mepilex border flex bandage to be changed daily, PRN soiling.  Left medial ankle wound: Cleanse with wound cleanser, cover with silver alginate and dress with a Mepilex border flex bandage, to be changed daily .   Right buttock- DO NOT CHANGE DRESSING until Wednesday 9/28. Cleanse with wound cleanser, cover with silver alginate and dress with mepilex border flex bandage, to be changed daily.     Follow up in about 3 weeks (around 10/17/2022) for stretcher.        Electronically signed:  Ashley Coto NP

## 2022-10-10 ENCOUNTER — HOSPITAL ENCOUNTER (OUTPATIENT)
Dept: WOUND CARE | Facility: HOSPITAL | Age: 42
Discharge: HOME | End: 2022-10-10
Attending: NURSE PRACTITIONER
Payer: MEDICARE

## 2022-10-10 VITALS
BODY MASS INDEX: 23.62 KG/M2 | WEIGHT: 165 LBS | RESPIRATION RATE: 18 BRPM | HEART RATE: 59 BPM | DIASTOLIC BLOOD PRESSURE: 90 MMHG | HEIGHT: 70 IN | SYSTOLIC BLOOD PRESSURE: 136 MMHG

## 2022-10-10 DIAGNOSIS — L89.154 PRESSURE INJURY OF SACRAL REGION, STAGE 4: Primary | ICD-10-CM

## 2022-10-10 DIAGNOSIS — G82.50 QUADRIPLEGIA: ICD-10-CM

## 2022-10-10 DIAGNOSIS — S71.002D UNSPECIFIED OPEN WOUND, LEFT HIP, SUBSEQUENT ENCOUNTER: ICD-10-CM

## 2022-10-10 PROCEDURE — 27000999 HC MEDICAL RECORD PHOTO DOCUMENTATION

## 2022-10-10 PROCEDURE — 99213 OFFICE O/P EST LOW 20 MIN: CPT

## 2022-10-10 NOTE — PROGRESS NOTES
Subjective:       Patient ID: Werner Jarrett is a 41 y.o. male.    Chief Complaint: Pressure Ulcer (Sacral spine - stage 4 /Left posterior hip - stage 4 /Left meidal  ankle - stage 2) and Wound Care (Right buttock - skin tear )    HPI    Mr. Jarrett is returning today for wound care for the sacrum and left hip.  The patient was scheduled one week early after a concerning call from Home health indicating that the wound was having an increase in exudate.    A culture was obtained 6/7/22 and was positive for Staph Aureus in the hip.  He has completed the Bactrim as well as topical abx, Linezolid.  At his visit on 9/6/22  the hip did have an increase in serosanguinous exudate as well as some blood clotting.  Because there was a great deal of exudate The culture was reviewed with the patient on 9/12/22 and it did show small quantities of multiple common skin pathogens.  Augmentin and Bactrim were prescribed as indicated.  No topical abx were ordered.    He has recently had an x-ray done of the right hip which shows significant deformity of the femoral head and proximal femur related to remote injury with significant degenerative changes of the inferior medial and superolateral aspects of the hip joint.  There are also degenerative changes of the right hip joint sacroiliac joints and lumbosacral spine.  There is no evidence of osteomyelitis at this time.    Because of the sudden deterioration in the hip, increase in depth and undermining, the patient was referred back to Dr. Sandy Jara for possible surgical debridement.  He saw her as scheduled on 9/20/22.  She shares the concern that there may be further deterioration within the wound that we are unable to see.  She ordered an MRI which was done last Thursday, 10/06/2022 and he will see her tomorrow, 10/11/2022 for followup for the results of the MRI and to see if surgical debridement is indicated.      Today it was noted that the left medial ankle remains stable  but slightly dry, we will continue to use silver alginate for 1 more round, and if necessary changed to Santyl or hydrocolloid dressing.  The sacral wound remains stable.  We will use silver alginate on this wound as well.  The left hip still has a small amount of tissue coming up through the wound opening, which is unusual for this wound.  This will be addressed during debridement is indicated  For the time being we will simply protect the wound's entry while he waits for Dr. Jara to assist with surgery.   He also had an open wound to the right buttock which appears to be a skin tear due to ripping off of dressings.  This wound is now healed.     Review of Systems   Musculoskeletal:  Positive for gait problem.   Skin:  Positive for wound.   All other systems reviewed and are negative.      Objective:      Vitals:    10/10/22 1136   BP: (!) 136/90   Pulse: (!) 59   Resp: 18         Physical Exam  Constitutional:       Appearance: Normal appearance. He is normal weight.   HENT:      Head: Normocephalic.   Cardiovascular:      Rate and Rhythm: Normal rate.      Pulses: Normal pulses.   Pulmonary:      Effort: Pulmonary effort is normal.   Musculoskeletal:      Comments: quadrapelgic   Skin:     General: Skin is warm and dry.   Neurological:      Mental Status: He is alert and oriented to person, place, and time.   Psychiatric:         Mood and Affect: Mood normal.          Wound 05/03/22 1230 Other (comment) Sacral Spine #1 (Active)   05/03/22 1230    Pre-existing: Yes   Primary Wound Type: Other   Side:    Orientation:    Location: Sacral Spine   Wound Number: #1   Ankle-Brachial Index:    Pulses:    Removal Indication and Assessment:    Wound Outcome:    (Retired) Wound Type:    (Retired) Wound Length (cm):    (Retired) Wound Width (cm):    (Retired) Depth (cm):    Wound Description (Comments):    Removal Indications:    Dressing Appearance Moist drainage 10/10/22 1144   Drainage Amount Moderate 10/10/22 1144    Drainage Characteristics/Odor Serosanguineous 10/10/22 1144   Appearance Pink;Moist 10/10/22 1144   Tissue loss description Full thickness 10/10/22 1144   Periwound Area Macerated 10/10/22 1144   Wound Edges Open 10/10/22 1144   Wound Length (cm) 0.4 cm 10/10/22 1144   Wound Width (cm) 0.4 cm 10/10/22 1144   Wound Depth (cm) 0.6 cm 10/10/22 1144   Wound Volume (cm^3) 0.096 cm^3 10/10/22 1144   Wound Surface Area (cm^2) 0.16 cm^2 10/10/22 1144   Care Cleansed with:;Wound cleanser 10/10/22 1144            Wound 05/03/22 1230 Other (comment) Left posterior Hip #2 (Active)   05/03/22 1230    Pre-existing: Yes   Primary Wound Type: Other   Side: Left   Orientation: posterior   Location: Hip   Wound Number: #2   Ankle-Brachial Index:    Pulses:    Removal Indication and Assessment:    Wound Outcome:    (Retired) Wound Type:    (Retired) Wound Length (cm):    (Retired) Wound Width (cm):    (Retired) Depth (cm):    Wound Description (Comments):    Removal Indications:    Dressing Appearance Moist drainage 10/10/22 1144   Drainage Amount Moderate 10/10/22 1144   Drainage Characteristics/Odor Serosanguineous 10/10/22 1144   Appearance Pink;Moist 10/10/22 1144   Tissue loss description Full thickness 10/10/22 1144   Periwound Area Intact 10/10/22 1144   Wound Edges Open 10/10/22 1144   Wound Length (cm) 0.5 cm 10/10/22 1144   Wound Width (cm) 0.4 cm 10/10/22 1144   Wound Depth (cm) 1.5 cm 10/10/22 1144   Wound Volume (cm^3) 0.3 cm^3 10/10/22 1144   Wound Surface Area (cm^2) 0.2 cm^2 10/10/22 1144   Care Cleansed with:;Wound cleanser 10/10/22 1144            Wound 07/25/22 1123 Other (comment) Left medial Malleolus/Ankle #3 (Active)   07/25/22 1123    Pre-existing: Yes   Primary Wound Type: Other   Side: Left   Orientation: medial   Location: Malleolus/Ankle   Wound Number: #3   Ankle-Brachial Index:    Pulses:    Removal Indication and Assessment:    Wound Outcome:    (Retired) Wound Type:    (Retired) Wound Length (cm):     (Retired) Wound Width (cm):    (Retired) Depth (cm):    Wound Description (Comments):    Removal Indications:    Dressing Appearance Moist drainage 10/10/22 1144   Drainage Amount Moderate 10/10/22 1144   Drainage Characteristics/Odor Serosanguineous 10/10/22 1144   Appearance Pink;Moist 10/10/22 1144   Tissue loss description Full thickness 10/10/22 1144   Periwound Area Intact 10/10/22 1144   Wound Edges Open 10/10/22 1144   Wound Length (cm) 1.2 cm 10/10/22 1144   Wound Width (cm) 1.5 cm 10/10/22 1144   Wound Depth (cm) 0.2 cm 10/10/22 1144   Wound Volume (cm^3) 0.36 cm^3 10/10/22 1144   Wound Surface Area (cm^2) 1.8 cm^2 10/10/22 1144   Care Cleansed with:;Wound cleanser 10/10/22 1144             Left hip      Left medial ankle  silver alginate, 4x4 gauze,    silver alginate, meplex foam, tape  super absorbant pad, tape      Sacrum      Right buttock, healed       silver alginate, meplex foam, tape     TR      Assessment:         ICD-10-CM ICD-9-CM   1. Pressure injury of sacral region, stage 4  L89.154 707.03     707.24   2. Unspecified open wound, left hip, subsequent encounter  S71.002D V58.89     890.0   3. Quadriplegia  G82.50 344.00           Procedures:     No procedures performed    Excisional debridement performed:          [] Yes [x] No   Selective debridement performed:           [] Yes [x] No   Mechanical debridement performed:        [] Yes [x] No   Silver nitrate applied:                                  [] Yes [x] No   Labs ordered this visit:                                [] Yes [x] No   Imaging ordered this visit:                          [] Yes [x] No   Tissue pathology and/or culture taken:    [] Yes [x] No    MEDICATIONS    Current Outpatient Medications:     busPIRone (BUSPAR) 5 MG Tab, Take 10 mg by mouth 2 (two) times daily., Disp: , Rfl:     DULoxetine (CYMBALTA) 30 MG capsule, Take 30 mg by mouth 2 (two) times daily., Disp: , Rfl:     amoxicillin-clavulanate 875-125mg (AUGMENTIN)  875-125 mg per tablet, Take 1 tablet by mouth every 12 (twelve) hours., Disp: 14 tablet, Rfl: 1    sulfamethoxazole-trimethoprim 800-160mg (BACTRIM DS) 800-160 mg Tab, Take 1 tablet by mouth 2 (two) times daily., Disp: 14 tablet, Rfl: 1   Review of patient's allergies indicates:   Allergen Reactions    Levofloxacin Rash       HOME [facility]ALTH AGENCY:  [facility]  TIMES PER WEEK/DAYS:    WOUND CARE ORDERS:  Left hip wound: Cleanse with wound cleanser, cover with silver alginate and dress with folded 4x4, Mepilex border flex bandage,to be changed daily .   Sacral wound:  Cleanse with wound cleanser, cover with silver alginate and dress with a Mepilex border flex bandage to be changed daily, PRN soiling.  Left medial ankle wound: Cleanse with wound cleanser, cover with silver alginate and dress with a Mepilex border flex bandage, to be changed daily .     Follow up in about 3 weeks (around 10/31/2022) for Stretcher room .        Electronically signed:  Ashley Coto NP

## 2022-11-07 ENCOUNTER — HOSPITAL ENCOUNTER (OUTPATIENT)
Dept: WOUND CARE | Facility: HOSPITAL | Age: 42
Discharge: HOME | End: 2022-11-07
Attending: NURSE PRACTITIONER
Payer: MEDICARE

## 2022-11-07 VITALS
HEART RATE: 68 BPM | WEIGHT: 165 LBS | RESPIRATION RATE: 18 BRPM | SYSTOLIC BLOOD PRESSURE: 115 MMHG | BODY MASS INDEX: 23.62 KG/M2 | DIASTOLIC BLOOD PRESSURE: 72 MMHG | HEIGHT: 70 IN

## 2022-11-07 DIAGNOSIS — G82.50 QUADRIPLEGIA: ICD-10-CM

## 2022-11-07 DIAGNOSIS — S71.002D UNSPECIFIED OPEN WOUND, LEFT HIP, SUBSEQUENT ENCOUNTER: ICD-10-CM

## 2022-11-07 DIAGNOSIS — L89.154 PRESSURE INJURY OF SACRAL REGION, STAGE 4: Primary | ICD-10-CM

## 2022-11-07 PROCEDURE — 99213 OFFICE O/P EST LOW 20 MIN: CPT

## 2022-11-07 PROCEDURE — 27000999 HC MEDICAL RECORD PHOTO DOCUMENTATION

## 2022-11-07 PROCEDURE — 17250 CHEM CAUT OF GRANLTJ TISSUE: CPT

## 2022-11-07 NOTE — PROGRESS NOTES
Subjective:       Patient ID: Werner Jarrett is a 41 y.o. male.    Chief Complaint: Pressure Ulcer (Sacral spine - stage 4 /Left posterior hip - stage 4 /Left meidal  ankle - stage 2)    HPI    Mr. Jarrett is returning today for wound care for the sacrum and left hip.  The patient was scheduled one week early after a concerning call from Home health indicating that the wound was having an increase in exudate.    A culture was obtained 6/7/22 and was positive for Staph Aureus in the hip.  He has completed the Bactrim as well as topical abx, Linezolid.  At his visit on 9/6/22  the hip did have an increase in serosanguinous exudate as well as some blood clotting.  Because there was a great deal of exudate The culture was reviewed with the patient on 9/12/22 and it did show small quantities of multiple common skin pathogens.  Augmentin and Bactrim were prescribed as indicated.  No topical abx were ordered.    He has recently had an x-ray done of the right hip which shows significant deformity of the femoral head and proximal femur related to remote injury with significant degenerative changes of the inferior medial and superolateral aspects of the hip joint.  There are also degenerative changes of the right hip joint sacroiliac joints and lumbosacral spine.  There is no evidence of osteomyelitis at this time.    Because of the sudden deterioration in the hip, increase in depth and undermining, the patient was referred back to Dr. Sandy Jara for possible surgical debridement.  He saw her as scheduled on 9/20/22.  She shares the concern that there may be further deterioration within the wound that we are unable to see.  She ordered an MRI which was done roberto 10/06/2022 and he saw her on 10/11/2022 for followup for the results of the MRI and to see if surgical debridement is indicated.  He reports that the MRI was positive for osteomyelitis in the left hip.  Dr. Delgado has referred him to an orthopedic specialist to  determine if the hip needs to be reset or if a washout would be adequate to meet the needs of this wound.  He was uncertain the details but does have a followup with Dr. Delgado on 11/15/2022 to discuss the next steps regarding the hip wound.  She did say that there was no area of abscess in the very small tunneling wound.  He was reminded to and encouraged to lay on his left hip when able several times per day to allow any exudate to drain out.  There was a small amount of proud flesh that was protruding out of this small tunneling wound.  The flesh was treated with silver nitrate.     Today it was noted that the left medial ankle remains stable but slightly dry, we will continue to use silver alginate and consider endoform or a hydrocolloid dressing at the next visit.    The sacral wound remains stable.  We will use silver alginate on this wound as well.    Review of Systems   Musculoskeletal:  Positive for gait problem.   Skin:  Positive for wound.   All other systems reviewed and are negative.      Objective:      Vitals:    11/07/22 1138   BP: 115/72   Pulse: 68   Resp: 18         Physical Exam  Constitutional:       Appearance: Normal appearance. He is normal weight.   HENT:      Head: Normocephalic.   Cardiovascular:      Rate and Rhythm: Normal rate.      Pulses: Normal pulses.   Pulmonary:      Effort: Pulmonary effort is normal.   Musculoskeletal:      Comments: quadrapelgic   Skin:     General: Skin is warm and dry.   Neurological:      Mental Status: He is alert and oriented to person, place, and time.   Psychiatric:         Mood and Affect: Mood normal.          Wound 05/03/22 1230 Other (comment) Sacral Spine #1 (Active)   05/03/22 1230    Pre-existing: Yes   Primary Wound Type: Other   Side:    Orientation:    Location: Sacral Spine   Wound Number: #1   Ankle-Brachial Index:    Pulses:    Removal Indication and Assessment:    Wound Outcome:    (Retired) Wound Type:    (Retired) Wound Length (cm):     (Retired) Wound Width (cm):    (Retired) Depth (cm):    Wound Description (Comments):    Removal Indications:    Dressing Appearance Moist drainage 11/07/22 1140   Drainage Amount Moderate 11/07/22 1140   Drainage Characteristics/Odor Serosanguineous 11/07/22 1140   Appearance Pink;Moist 11/07/22 1140   Tissue loss description Full thickness 11/07/22 1140   Periwound Area Redness 11/07/22 1140   Wound Edges Open 11/07/22 1140   Wound Length (cm) 0.5 cm 11/07/22 1140   Wound Width (cm) 0.3 cm 11/07/22 1140   Wound Depth (cm) 0.6 cm 11/07/22 1140   Wound Volume (cm^3) 0.09 cm^3 11/07/22 1140   Wound Surface Area (cm^2) 0.15 cm^2 11/07/22 1140   Care Cleansed with:;Wound cleanser 11/07/22 1140   Dressing Applied 11/07/22 1221   Dressing Change Due 11/08/22 11/07/22 1221            Wound 05/03/22 1230 Other (comment) Left posterior Hip #2 (Active)   05/03/22 1230    Pre-existing: Yes   Primary Wound Type: Other   Side: Left   Orientation: posterior   Location: Hip   Wound Number: #2   Ankle-Brachial Index:    Pulses:    Removal Indication and Assessment:    Wound Outcome:    (Retired) Wound Type:    (Retired) Wound Length (cm):    (Retired) Wound Width (cm):    (Retired) Depth (cm):    Wound Description (Comments):    Removal Indications:    Dressing Appearance Moist drainage 11/07/22 1140   Drainage Amount Large 11/07/22 1140   Drainage Characteristics/Odor Serosanguineous 11/07/22 1140   Appearance Pink;Moist 11/07/22 1140   Tissue loss description Full thickness 11/07/22 1140   Periwound Area Intact 11/07/22 1140   Wound Edges Open 11/07/22 1140   Wound Length (cm) 0.5 cm 11/07/22 1140   Wound Width (cm) 0.4 cm 11/07/22 1140   Wound Depth (cm) 1 cm 11/07/22 1140   Wound Volume (cm^3) 0.2 cm^3 11/07/22 1140   Wound Surface Area (cm^2) 0.2 cm^2 11/07/22 1140   Care Cleansed with:;Wound cleanser 11/07/22 1140   Dressing Applied 11/07/22 1221   Dressing Change Due 11/08/22 11/07/22 1221            Wound 07/25/22 1123  Other (comment) Left medial Malleolus/Ankle #3 (Active)   07/25/22 1123    Pre-existing: Yes   Primary Wound Type: Other   Side: Left   Orientation: medial   Location: Malleolus/Ankle   Wound Number: #3   Ankle-Brachial Index:    Pulses:    Removal Indication and Assessment:    Wound Outcome:    (Retired) Wound Type:    (Retired) Wound Length (cm):    (Retired) Wound Width (cm):    (Retired) Depth (cm):    Wound Description (Comments):    Removal Indications:    Dressing Appearance Moist drainage 11/07/22 1140   Drainage Amount Moderate 11/07/22 1140   Drainage Characteristics/Odor Serosanguineous 11/07/22 1140   Appearance Pink;Moist 11/07/22 1140   Tissue loss description Full thickness 11/07/22 1140   Periwound Area Intact 11/07/22 1140   Wound Edges Open 11/07/22 1140   Wound Length (cm) 1 cm 11/07/22 1140   Wound Width (cm) 1.3 cm 11/07/22 1140   Wound Depth (cm) 0.2 cm 11/07/22 1140   Wound Volume (cm^3) 0.26 cm^3 11/07/22 1140   Wound Surface Area (cm^2) 1.3 cm^2 11/07/22 1140   Care Cleansed with:;Wound cleanser 11/07/22 1140   Dressing Applied 11/07/22 1221   Dressing Change Due 11/08/22 11/07/22 1221             Left hip      Left medial ankle  silver alginate, island dressing   silver alginate, mepilex foam gentle border      Sacrum             silver alginate, island dressing    ML      Assessment:         ICD-10-CM ICD-9-CM   1. Pressure injury of sacral region, stage 4  L89.154 707.03     707.24   2. Unspecified open wound, left hip, subsequent encounter  S71.002D V58.89     890.0   3. Quadriplegia  G82.50 344.00           Procedures:     No procedures performed.  Silver nitrate applied in treatment of hypergranulated tissue.    Excisional debridement performed:          [] Yes [x] No   Selective debridement performed:           [] Yes [x] No   Mechanical debridement performed:        [] Yes [x] No   Silver nitrate applied:                                  [x] Yes [] No   Labs ordered this visit:                                 [] Yes [x] No   Imaging ordered this visit:                          [] Yes [x] No   Tissue pathology and/or culture taken:    [] Yes [x] No    MEDICATIONS    Current Outpatient Medications:     busPIRone (BUSPAR) 5 MG Tab, Take 10 mg by mouth 2 (two) times daily., Disp: , Rfl:     DULoxetine (CYMBALTA) 30 MG capsule, Take 30 mg by mouth 2 (two) times daily., Disp: , Rfl:    Review of patient's allergies indicates:   Allergen Reactions    Levofloxacin Rash       [facility]:  [facility]  TIMES PER WEEK/DAYS:    WOUND CARE ORDERS:    Left hip wound: Cleanse with wound cleanser, cover with silver alginate and dress with folded 4x4, Mepilex border flex bandage,to be changed daily .   Sacral wound:  Cleanse with wound cleanser, cover with silver alginate and dress with a Mepilex border flex bandage to be changed daily, PRN soiling.  Left medial ankle wound: Cleanse with wound cleanser, cover with silver alginate and dress with a Mepilex border flex bandage, to be changed daily .     Follow up in about 3 weeks (around 11/28/2022).        Electronically signed:  Ashley Coto NP

## 2022-11-28 ENCOUNTER — HOSPITAL ENCOUNTER (OUTPATIENT)
Dept: WOUND CARE | Facility: HOSPITAL | Age: 42
Discharge: HOME | End: 2022-11-28
Attending: NURSE PRACTITIONER
Payer: MEDICARE

## 2022-11-28 VITALS
WEIGHT: 165 LBS | BODY MASS INDEX: 23.62 KG/M2 | HEIGHT: 70 IN | RESPIRATION RATE: 18 BRPM | DIASTOLIC BLOOD PRESSURE: 73 MMHG | SYSTOLIC BLOOD PRESSURE: 114 MMHG | HEART RATE: 67 BPM

## 2022-11-28 DIAGNOSIS — S71.002D UNSPECIFIED OPEN WOUND, LEFT HIP, SUBSEQUENT ENCOUNTER: ICD-10-CM

## 2022-11-28 DIAGNOSIS — L89.154 PRESSURE INJURY OF SACRAL REGION, STAGE 4: Primary | ICD-10-CM

## 2022-11-28 DIAGNOSIS — G82.50 QUADRIPLEGIA: ICD-10-CM

## 2022-11-28 PROCEDURE — 97597 DBRDMT OPN WND 1ST 20 CM/<: CPT

## 2022-11-28 PROCEDURE — 27000999 HC MEDICAL RECORD PHOTO DOCUMENTATION

## 2022-11-28 PROCEDURE — 99213 OFFICE O/P EST LOW 20 MIN: CPT | Mod: 25

## 2022-11-28 NOTE — PROCEDURES
"Debridement    Date/Time: 11/28/2022 11:00 AM  Performed by: Ashley Coto NP  Authorized by: Ashley Coto NP     Time out: Immediately prior to procedure a "time out" was called to verify the correct patient, procedure, equipment, support staff and site/side marked as required.    Consent Done?:  Yes (Verbal)  Local anesthesia used?: No      Wound Details:    Location:  Left foot    Location:  Left Ankle    Type of Debridement:  Non-excisional       Length (cm):  1       Area (sq cm):  1       Width (cm):  1       Percent Debrided (%):  100       Depth (cm):  0.2       Total Area Debrided (sq cm):  1    Depth of debridement:  Epidermis/Dermis    Devitalized tissue debrided:  Biofilm, Callus and Exudate    Instruments:  Curette (Dermal)     CT  "

## 2022-11-28 NOTE — PROGRESS NOTES
Subjective:       Patient ID: Werner Jarrett is a 42 y.o. male.    Chief Complaint: Pressure Ulcer (Sacrum - stage 4 /Left posterior hip - stage 4 /Left medial ankle - stage 2)    HPI    Mr. Jarrett is returning today for wound care for the sacrum and left hip.  The patient was scheduled one week early after a concerning call from Home health indicating that the wound was having an increase in exudate.    He has recently had an x-ray done of the right hip which shows significant deformity of the femoral head and proximal femur related to remote injury with significant degenerative changes of the inferior medial and superolateral aspects of the hip joint.  There are also degenerative changes of the right hip joint sacroiliac joints and lumbosacral spine.  There is no evidence of osteomyelitis at this time.    Because of the sudden deterioration in the hip, increase in depth and undermining, the patient was referred back to Dr. Sandy Jara for possible surgical debridement.  He saw her as scheduled on 9/20/22.  She shares the concern that there may be further deterioration within the wound that we are unable to see.  She ordered an MRI which was done roberto 10/06/2022 and he saw her on 10/11/2022 for followup for the results of the MRI and to see if surgical debridement is indicated.  He reports that the MRI was positive for osteomyelitis in the left hip.  Dr. Delgado has referred him to an orthopedic specialist to determine if the hip needs to be reset or if a washout would be adequate to meet the needs of this wound.    He did have an appointment with ortho, Dr. Gutiérrez, within the last couple of weeks.  He states that Dr. Gutiérrez has agreed to go in and shave down some bone if his debridement is scheduled in [place].  Dr. Gutiérrez's records are being requested.   He will follow up with Dr. Sandy Jara tomorrow, 11/29/22 to discuss scheduling the hip procedure.  She did say that there was no area of abscess in  the very small tunneling wound.      For the time being we will continue to use silver alginate on the exterior surfaces of both wounds with no packing to be done.    Review of Systems   Musculoskeletal:  Positive for gait problem.   Skin:  Positive for wound.   All other systems reviewed and are negative.      Objective:      Vitals:    11/28/22 1122   BP: 114/73   Pulse: 67   Resp: 18         Physical Exam  Constitutional:       Appearance: Normal appearance. He is normal weight.   HENT:      Head: Normocephalic.   Cardiovascular:      Rate and Rhythm: Normal rate.      Pulses: Normal pulses.   Pulmonary:      Effort: Pulmonary effort is normal.   Musculoskeletal:      Comments: quadrapelgic   Skin:     General: Skin is warm and dry.   Neurological:      Mental Status: He is alert and oriented to person, place, and time.   Psychiatric:         Mood and Affect: Mood normal.          Wound 05/03/22 1230 Other (comment) Sacral Spine #1 (Active)   05/03/22 1230    Pre-existing: Yes   Primary Wound Type: Other   Side:    Orientation:    Location: Sacral Spine   Wound Number: #1   Ankle-Brachial Index:    Pulses:    Removal Indication and Assessment:    Wound Outcome:    (Retired) Wound Type:    (Retired) Wound Length (cm):    (Retired) Wound Width (cm):    (Retired) Depth (cm):    Wound Description (Comments):    Removal Indications:    Dressing Appearance Moist drainage 11/28/22 1123   Drainage Amount Moderate 11/28/22 1123   Drainage Characteristics/Odor Serosanguineous 11/28/22 1123   Appearance Pink;Granulating;Moist 11/28/22 1123   Tissue loss description Full thickness 11/28/22 1123   Periwound Area Intact 11/28/22 1123   Wound Edges Open 11/28/22 1123   Wound Length (cm) 0.6 cm 11/28/22 1123   Wound Width (cm) 0.5 cm 11/28/22 1123   Wound Depth (cm) 0.3 cm 11/28/22 1123   Wound Volume (cm^3) 0.09 cm^3 11/28/22 1123   Wound Surface Area (cm^2) 0.3 cm^2 11/28/22 1123   Care Cleansed with:;Wound cleanser 11/28/22  1123   Dressing Applied;Other (comment) 11/28/22 1123   Dressing Change Due 11/29/22 11/28/22 1123            Wound 05/03/22 1230 Other (comment) Left posterior Hip #2 (Active)   05/03/22 1230    Pre-existing: Yes   Primary Wound Type: Other   Side: Left   Orientation: posterior   Location: Hip   Wound Number: #2   Ankle-Brachial Index:    Pulses:    Removal Indication and Assessment:    Wound Outcome:    (Retired) Wound Type:    (Retired) Wound Length (cm):    (Retired) Wound Width (cm):    (Retired) Depth (cm):    Wound Description (Comments):    Removal Indications:    Dressing Appearance Moist drainage 11/28/22 1123   Drainage Amount Moderate 11/28/22 1123   Drainage Characteristics/Odor Serosanguineous 11/28/22 1123   Appearance Moist 11/28/22 1123   Tissue loss description Full thickness 11/28/22 1123   Periwound Area Intact 11/28/22 1123   Wound Edges Open 11/28/22 1123   Wound Length (cm) 0.3 cm 11/28/22 1123   Wound Width (cm) 0.3 cm 11/28/22 1123   Wound Depth (cm) 1 cm 11/28/22 1123   Wound Volume (cm^3) 0.09 cm^3 11/28/22 1123   Wound Surface Area (cm^2) 0.09 cm^2 11/28/22 1123   Care Cleansed with:;Wound cleanser 11/28/22 1123   Dressing Applied;Other (comment) 11/28/22 1123   Dressing Change Due 11/29/22 11/28/22 1123            Wound 07/25/22 1123 Other (comment) Left medial Malleolus/Ankle #3 (Active)   07/25/22 1123    Pre-existing: Yes   Primary Wound Type: Other   Side: Left   Orientation: medial   Location: Malleolus/Ankle   Wound Number: #3   Ankle-Brachial Index:    Pulses:    Removal Indication and Assessment:    Wound Outcome:    (Retired) Wound Type:    (Retired) Wound Length (cm):    (Retired) Wound Width (cm):    (Retired) Depth (cm):    Wound Description (Comments):    Removal Indications:    Dressing Appearance Moist drainage 11/28/22 1123   Drainage Amount Moderate 11/28/22 1123   Drainage Characteristics/Odor Serosanguineous 11/28/22 1123   Appearance Red;Eschar;Slough;Moist 11/28/22  "1123   Tissue loss description Full thickness 11/28/22 1123   Periwound Area Intact 11/28/22 1123   Wound Edges Open 11/28/22 1123   Wound Length (cm) 1 cm 11/28/22 1123   Wound Width (cm) 1 cm 11/28/22 1123   Wound Depth (cm) 0.2 cm 11/28/22 1123   Wound Volume (cm^3) 0.2 cm^3 11/28/22 1123   Wound Surface Area (cm^2) 1 cm^2 11/28/22 1123   Care Cleansed with:;Wound cleanser 11/28/22 1123   Dressing Applied;Other (comment) 11/28/22 1123   Dressing Change Due 11/29/22 11/28/22 1123             Left hip      Sacrum  Both wounds:  silver alginate, 4x4 gentle foam border dressing        Left medial ankle, pre    post  silver alginate, mepilex foam  CT      Assessment:         ICD-10-CM ICD-9-CM   1. Pressure injury of sacral region, stage 4  L89.154 707.03     707.24   2. Unspecified open wound, left hip, subsequent encounter  S71.002D V58.89     890.0   3. Quadriplegia  G82.50 344.00           Procedures:     Debridement     Date/Time: 11/28/2022 11:00 AM  Performed by: Ashley Coto NP  Authorized by: Ashley Coto NP      Time out: Immediately prior to procedure a "time out" was called to verify the correct patient, procedure, equipment, support staff and site/side marked as required.     Consent Done?:  Yes (Verbal)  Local anesthesia used?: No       Wound Details:    Location:  Left foot    Location:  Left Ankle    Type of Debridement:  Non-excisional       Length (cm):  1       Area (sq cm):  1       Width (cm):  1       Percent Debrided (%):  100       Depth (cm):  0.2       Total Area Debrided (sq cm):  1    Depth of debridement:  Epidermis/Dermis    Devitalized tissue debrided:  Biofilm, Callus and Exudate    Instruments:  Curette (Dermal)      CT    Excisional debridement performed:          [] Yes [x] No   Selective debridement performed:           [x] Yes [] No   Mechanical debridement performed:        [] Yes [x] No   Silver nitrate applied:                                  [] Yes [x] No   Labs " ordered this visit:                                [] Yes [x] No   Imaging ordered this visit:                          [] Yes [x] No   Tissue pathology and/or culture taken:    [] Yes [x] No    MEDICATIONS    Current Outpatient Medications:     busPIRone (BUSPAR) 5 MG Tab, Take 10 mg by mouth 2 (two) times daily., Disp: , Rfl:     DULoxetine (CYMBALTA) 30 MG capsule, Take 30 mg by mouth 2 (two) times daily., Disp: , Rfl:    Review of patient's allergies indicates:   Allergen Reactions    Levofloxacin Rash       HOME HEALTH AGENCY:  [facility]  TIMES PER WEEK/DAYS:      Left hip wound: Cleanse with wound cleanser, apply silver alginate, 4x4 gentle foam border dressing to be changed daily   Sacral wound:  Cleanse with wound cleanser, apply silver alginate, 4x4 gentle foam border dressing to be changed daily   Left medial ankle wound: Cleanse with wound cleanser, apply silver alginate, mepilex foam to be changed daily    Follow up in 3 weeks (on 12/19/2022).        Electronically signed:  Ashley Coto NP

## 2022-12-12 ENCOUNTER — HOSPITAL ENCOUNTER (OUTPATIENT)
Dept: RADIOLOGY | Facility: HOSPITAL | Age: 42
Discharge: HOME | End: 2022-12-12
Attending: SURGERY
Payer: MEDICARE

## 2022-12-12 ENCOUNTER — CLINICAL SUPPORT (OUTPATIENT)
Dept: RESPIRATORY THERAPY | Facility: HOSPITAL | Age: 42
End: 2022-12-12
Attending: SURGERY
Payer: MEDICARE

## 2022-12-12 ENCOUNTER — ANESTHESIA EVENT (OUTPATIENT)
Dept: SURGERY | Facility: HOSPITAL | Age: 42
End: 2022-12-12
Payer: MEDICARE

## 2022-12-12 DIAGNOSIS — S71.009A: Primary | ICD-10-CM

## 2022-12-12 DIAGNOSIS — Z01.811 PRE-OP CHEST EXAM: ICD-10-CM

## 2022-12-12 DIAGNOSIS — S71.009A: ICD-10-CM

## 2022-12-12 PROCEDURE — 93010 EKG 12-LEAD: ICD-10-PCS | Mod: ,,, | Performed by: STUDENT IN AN ORGANIZED HEALTH CARE EDUCATION/TRAINING PROGRAM

## 2022-12-12 PROCEDURE — 93010 ELECTROCARDIOGRAM REPORT: CPT | Mod: ,,, | Performed by: STUDENT IN AN ORGANIZED HEALTH CARE EDUCATION/TRAINING PROGRAM

## 2022-12-12 PROCEDURE — 93005 ELECTROCARDIOGRAM TRACING: CPT

## 2022-12-12 PROCEDURE — 71045 X-RAY EXAM CHEST 1 VIEW: CPT | Mod: TC

## 2022-12-12 RX ORDER — ACETAMINOPHEN 325 MG/1
650 TABLET ORAL EVERY 6 HOURS PRN
COMMUNITY

## 2022-12-12 RX ORDER — DICLOFENAC SODIUM 10 MG/G
2 GEL TOPICAL 4 TIMES DAILY PRN
Status: ON HOLD | COMMUNITY
End: 2023-01-10 | Stop reason: HOSPADM

## 2022-12-12 RX ORDER — GUAIFENESIN, PSEUDOEPHEDRINE HYDROCHLORIDE 600; 60 MG/1; MG/1
1 TABLET, EXTENDED RELEASE ORAL 2 TIMES DAILY PRN
Status: ON HOLD | COMMUNITY
End: 2023-01-10 | Stop reason: HOSPADM

## 2022-12-12 RX ORDER — HYDROCODONE BITARTRATE AND ACETAMINOPHEN 10; 325 MG/1; MG/1
1 TABLET ORAL EVERY 8 HOURS PRN
Status: ON HOLD | COMMUNITY
End: 2023-01-10 | Stop reason: SDUPTHER

## 2022-12-12 RX ORDER — CETIRIZINE HYDROCHLORIDE 1 MG/ML
10 SOLUTION ORAL NIGHTLY
Status: ON HOLD | COMMUNITY
End: 2023-01-10 | Stop reason: HOSPADM

## 2022-12-12 RX ORDER — BISACODYL 10 MG
10 SUPPOSITORY, RECTAL RECTAL DAILY PRN
COMMUNITY

## 2022-12-12 RX ORDER — FLUTICASONE PROPIONATE 50 MCG
SPRAY, SUSPENSION (ML) NASAL DAILY PRN
COMMUNITY
End: 2023-03-27 | Stop reason: ALTCHOICE

## 2022-12-12 RX ORDER — MINERAL OIL
180 ENEMA (ML) RECTAL EVERY MORNING
COMMUNITY

## 2022-12-12 RX ORDER — ZINC GLUCONATE 50 MG
50 TABLET ORAL NIGHTLY
COMMUNITY
End: 2023-08-15

## 2022-12-12 RX ORDER — MELOXICAM 7.5 MG/1
7.5 TABLET ORAL 2 TIMES DAILY PRN
COMMUNITY

## 2022-12-12 RX ORDER — IBUPROFEN 100 MG/5ML
1000 SUSPENSION, ORAL (FINAL DOSE FORM) ORAL NIGHTLY
COMMUNITY

## 2022-12-13 NOTE — ANESTHESIA PREPROCEDURE EVALUATION
12/13/2022  Werner Jarrett is a 42 y.o., male.      Pre-op Assessment    I have reviewed the Patient Summary Reports.     I have reviewed the Nursing Notes. I have reviewed the NPO Status.   I have reviewed the Medications.     Review of Systems  Anesthesia Hx:  Denies Family Hx of Anesthesia complications.   Denies Personal Hx of Anesthesia complications.   Social:  Smoker, Alcohol Use    Hematology/Oncology:  Hematology Normal   Oncology Normal     EENT/Dental:EENT/Dental Normal   Cardiovascular:  Cardiovascular Normal  ECG has been reviewed.    Pulmonary:   COPD, moderate    Renal/:  Renal/ Normal     Hepatic/GI:  Hepatic/GI Normal    Musculoskeletal:  Musculoskeletal Normal    Neurological:   Neuromuscular Disease, Quadraplegia   Dermatological:  Skin Normal    Psych:   Psychiatric History          Physical Exam  General: Cooperative, Alert and Oriented    Airway:  Mallampati: II   Mouth Opening: Normal  TM Distance: Normal  Tongue: Normal  Neck ROM: Normal ROM    Dental:  Intact        Anesthesia Plan  Type of Anesthesia, risks & benefits discussed:    Anesthesia Type: Gen Supraglottic Airway  Intra-op Monitoring Plan: Standard ASA Monitors  Post Op Pain Control Plan: multimodal analgesia  Induction:  IV  Airway Plan: Direct  Informed Consent: Informed consent signed with the Patient and all parties understand the risks and agree with anesthesia plan.  All questions answered. Patient consented to blood products? Yes  ASA Score: 3    Ready For Surgery From Anesthesia Perspective.     .

## 2022-12-14 ENCOUNTER — HOSPITAL ENCOUNTER (OUTPATIENT)
Facility: HOSPITAL | Age: 42
Discharge: NURSING, REHAB OR LONG-TERM CARE | End: 2022-12-15
Attending: SURGERY | Admitting: SURGERY
Payer: MEDICARE

## 2022-12-14 ENCOUNTER — ANESTHESIA (OUTPATIENT)
Dept: SURGERY | Facility: HOSPITAL | Age: 42
End: 2022-12-14
Payer: MEDICARE

## 2022-12-14 DIAGNOSIS — S71.102D: ICD-10-CM

## 2022-12-14 DIAGNOSIS — S71.002D: ICD-10-CM

## 2022-12-14 DIAGNOSIS — S71.002A: ICD-10-CM

## 2022-12-14 PROBLEM — G82.20 PARAPLEGIA: Status: RESOLVED | Noted: 2022-05-24 | Resolved: 2022-12-14

## 2022-12-14 PROBLEM — I95.9 HYPOTENSION: Chronic | Status: ACTIVE | Noted: 2022-12-14

## 2022-12-14 PROBLEM — Z72.0 TOBACCO USER: Chronic | Status: ACTIVE | Noted: 2022-05-24

## 2022-12-14 PROBLEM — L89.154 PRESSURE INJURY OF SACRAL REGION, STAGE 4: Chronic | Status: ACTIVE | Noted: 2022-05-24

## 2022-12-14 PROBLEM — I95.9 HYPOTENSION: Status: ACTIVE | Noted: 2022-12-14

## 2022-12-14 PROBLEM — M62.838 MUSCLE SPASMS OF LOWER EXTREMITY: Chronic | Status: ACTIVE | Noted: 2022-12-14

## 2022-12-14 PROBLEM — G82.50 QUADRIPLEGIA: Chronic | Status: ACTIVE | Noted: 2022-05-24

## 2022-12-14 PROBLEM — J44.9 CHRONIC OBSTRUCTIVE PULMONARY DISEASE: Chronic | Status: ACTIVE | Noted: 2022-05-24

## 2022-12-14 PROCEDURE — 63600175 PHARM REV CODE 636 W HCPCS: Performed by: NURSE ANESTHETIST, CERTIFIED REGISTERED

## 2022-12-14 PROCEDURE — 63600175 PHARM REV CODE 636 W HCPCS: Performed by: SURGERY

## 2022-12-14 PROCEDURE — 27201423 OPTIME MED/SURG SUP & DEVICES STERILE SUPPLY: Performed by: SURGERY

## 2022-12-14 PROCEDURE — 87075 CULTR BACTERIA EXCEPT BLOOD: CPT | Mod: 59 | Performed by: SURGERY

## 2022-12-14 PROCEDURE — 94799 UNLISTED PULMONARY SVC/PX: CPT

## 2022-12-14 PROCEDURE — 37000009 HC ANESTHESIA EA ADD 15 MINS: Performed by: SURGERY

## 2022-12-14 PROCEDURE — 36000704 HC OR TIME LEV I 1ST 15 MIN: Performed by: SURGERY

## 2022-12-14 PROCEDURE — 87070 CULTURE OTHR SPECIMN AEROBIC: CPT | Mod: 59 | Performed by: SURGERY

## 2022-12-14 PROCEDURE — 94761 N-INVAS EAR/PLS OXIMETRY MLT: CPT

## 2022-12-14 PROCEDURE — 63600175 PHARM REV CODE 636 W HCPCS: Performed by: ANESTHESIOLOGY

## 2022-12-14 PROCEDURE — 63600175 PHARM REV CODE 636 W HCPCS: Performed by: EMERGENCY MEDICINE

## 2022-12-14 PROCEDURE — 25000003 PHARM REV CODE 250: Performed by: NURSE ANESTHETIST, CERTIFIED REGISTERED

## 2022-12-14 PROCEDURE — 87205 SMEAR GRAM STAIN: CPT | Performed by: SURGERY

## 2022-12-14 PROCEDURE — 99900035 HC TECH TIME PER 15 MIN (STAT)

## 2022-12-14 PROCEDURE — 87040 BLOOD CULTURE FOR BACTERIA: CPT | Performed by: SURGERY

## 2022-12-14 PROCEDURE — 36000705 HC OR TIME LEV I EA ADD 15 MIN: Performed by: SURGERY

## 2022-12-14 PROCEDURE — 36415 COLL VENOUS BLD VENIPUNCTURE: CPT | Performed by: SURGERY

## 2022-12-14 PROCEDURE — 25000003 PHARM REV CODE 250: Performed by: SURGERY

## 2022-12-14 PROCEDURE — 37000008 HC ANESTHESIA 1ST 15 MINUTES: Performed by: SURGERY

## 2022-12-14 PROCEDURE — 71000033 HC RECOVERY, INTIAL HOUR: Performed by: SURGERY

## 2022-12-14 RX ORDER — FLUTICASONE PROPIONATE 50 MCG
1 SPRAY, SUSPENSION (ML) NASAL DAILY PRN
Status: DISCONTINUED | OUTPATIENT
Start: 2022-12-14 | End: 2022-12-15 | Stop reason: HOSPADM

## 2022-12-14 RX ORDER — ONDANSETRON 2 MG/ML
4 INJECTION INTRAMUSCULAR; INTRAVENOUS EVERY 12 HOURS PRN
Status: DISCONTINUED | OUTPATIENT
Start: 2022-12-14 | End: 2022-12-15 | Stop reason: HOSPADM

## 2022-12-14 RX ORDER — ACETAMINOPHEN 325 MG/1
650 TABLET ORAL EVERY 6 HOURS PRN
Status: DISCONTINUED | OUTPATIENT
Start: 2022-12-14 | End: 2022-12-15 | Stop reason: HOSPADM

## 2022-12-14 RX ORDER — CETIRIZINE HYDROCHLORIDE 1 MG/ML
10 SOLUTION ORAL NIGHTLY
Status: DISCONTINUED | OUTPATIENT
Start: 2022-12-14 | End: 2022-12-15 | Stop reason: HOSPADM

## 2022-12-14 RX ORDER — NALOXONE HCL 0.4 MG/ML
0.02 VIAL (ML) INJECTION
Status: DISCONTINUED | OUTPATIENT
Start: 2022-12-14 | End: 2022-12-15 | Stop reason: HOSPADM

## 2022-12-14 RX ORDER — ONDANSETRON 2 MG/ML
INJECTION INTRAMUSCULAR; INTRAVENOUS
Status: DISCONTINUED | OUTPATIENT
Start: 2022-12-14 | End: 2022-12-14

## 2022-12-14 RX ORDER — LIDOCAINE HYDROCHLORIDE 20 MG/ML
INJECTION, SOLUTION EPIDURAL; INFILTRATION; INTRACAUDAL; PERINEURAL
Status: DISCONTINUED | OUTPATIENT
Start: 2022-12-14 | End: 2022-12-14

## 2022-12-14 RX ORDER — HYDROCODONE BITARTRATE AND ACETAMINOPHEN 10; 325 MG/1; MG/1
1 TABLET ORAL EVERY 8 HOURS PRN
Status: DISCONTINUED | OUTPATIENT
Start: 2022-12-14 | End: 2022-12-15 | Stop reason: HOSPADM

## 2022-12-14 RX ORDER — ENOXAPARIN SODIUM 100 MG/ML
40 INJECTION SUBCUTANEOUS EVERY 24 HOURS
Status: DISCONTINUED | OUTPATIENT
Start: 2022-12-14 | End: 2022-12-15 | Stop reason: HOSPADM

## 2022-12-14 RX ORDER — FAMOTIDINE 20 MG/1
20 TABLET, FILM COATED ORAL 2 TIMES DAILY
Status: DISCONTINUED | OUTPATIENT
Start: 2022-12-14 | End: 2022-12-15 | Stop reason: HOSPADM

## 2022-12-14 RX ORDER — ONDANSETRON 4 MG/1
8 TABLET, ORALLY DISINTEGRATING ORAL EVERY 8 HOURS PRN
Status: DISCONTINUED | OUTPATIENT
Start: 2022-12-14 | End: 2022-12-15 | Stop reason: HOSPADM

## 2022-12-14 RX ORDER — FENTANYL CITRATE 50 UG/ML
INJECTION, SOLUTION INTRAMUSCULAR; INTRAVENOUS
Status: DISCONTINUED | OUTPATIENT
Start: 2022-12-14 | End: 2022-12-14

## 2022-12-14 RX ORDER — HYDROCODONE BITARTRATE AND ACETAMINOPHEN 5; 325 MG/1; MG/1
1 TABLET ORAL EVERY 4 HOURS PRN
Status: DISCONTINUED | OUTPATIENT
Start: 2022-12-14 | End: 2022-12-15 | Stop reason: HOSPADM

## 2022-12-14 RX ORDER — DEXAMETHASONE SODIUM PHOSPHATE 4 MG/ML
INJECTION, SOLUTION INTRA-ARTICULAR; INTRALESIONAL; INTRAMUSCULAR; INTRAVENOUS; SOFT TISSUE
Status: DISCONTINUED | OUTPATIENT
Start: 2022-12-14 | End: 2022-12-14

## 2022-12-14 RX ORDER — MELOXICAM 7.5 MG/1
7.5 TABLET ORAL 2 TIMES DAILY PRN
Status: DISCONTINUED | OUTPATIENT
Start: 2022-12-14 | End: 2022-12-15 | Stop reason: HOSPADM

## 2022-12-14 RX ORDER — DULOXETIN HYDROCHLORIDE 30 MG/1
30 CAPSULE, DELAYED RELEASE ORAL 2 TIMES DAILY
Status: DISCONTINUED | OUTPATIENT
Start: 2022-12-14 | End: 2022-12-15 | Stop reason: HOSPADM

## 2022-12-14 RX ORDER — HYDRALAZINE HYDROCHLORIDE 20 MG/ML
INJECTION INTRAMUSCULAR; INTRAVENOUS
Status: DISCONTINUED | OUTPATIENT
Start: 2022-12-14 | End: 2022-12-14

## 2022-12-14 RX ORDER — BUSPIRONE HYDROCHLORIDE 5 MG/1
10 TABLET ORAL 2 TIMES DAILY
Status: DISCONTINUED | OUTPATIENT
Start: 2022-12-14 | End: 2022-12-15 | Stop reason: HOSPADM

## 2022-12-14 RX ORDER — ASCORBIC ACID 250 MG
1000 TABLET ORAL NIGHTLY
Status: DISCONTINUED | OUTPATIENT
Start: 2022-12-14 | End: 2022-12-15 | Stop reason: HOSPADM

## 2022-12-14 RX ORDER — SODIUM CHLORIDE, SODIUM LACTATE, POTASSIUM CHLORIDE, CALCIUM CHLORIDE 600; 310; 30; 20 MG/100ML; MG/100ML; MG/100ML; MG/100ML
INJECTION, SOLUTION INTRAVENOUS CONTINUOUS
Status: DISCONTINUED | OUTPATIENT
Start: 2022-12-14 | End: 2022-12-15 | Stop reason: HOSPADM

## 2022-12-14 RX ORDER — PHENYLEPHRINE HYDROCHLORIDE 10 MG/ML
INJECTION INTRAVENOUS
Status: DISCONTINUED | OUTPATIENT
Start: 2022-12-14 | End: 2022-12-14

## 2022-12-14 RX ORDER — HYDROCODONE BITARTRATE AND ACETAMINOPHEN 10; 325 MG/1; MG/1
1 TABLET ORAL EVERY 8 HOURS PRN
Status: DISCONTINUED | OUTPATIENT
Start: 2022-12-14 | End: 2022-12-14

## 2022-12-14 RX ORDER — PROPOFOL 10 MG/ML
VIAL (ML) INTRAVENOUS
Status: DISCONTINUED | OUTPATIENT
Start: 2022-12-14 | End: 2022-12-14

## 2022-12-14 RX ORDER — GUAIFENESIN, PSEUDOEPHEDRINE HYDROCHLORIDE 600; 60 MG/1; MG/1
1 TABLET, EXTENDED RELEASE ORAL 2 TIMES DAILY PRN
Status: DISCONTINUED | OUTPATIENT
Start: 2022-12-14 | End: 2022-12-15 | Stop reason: HOSPADM

## 2022-12-14 RX ORDER — HYDROMORPHONE HYDROCHLORIDE 2 MG/ML
0.2 INJECTION, SOLUTION INTRAMUSCULAR; INTRAVENOUS; SUBCUTANEOUS EVERY 5 MIN PRN
Status: DISCONTINUED | OUTPATIENT
Start: 2022-12-14 | End: 2022-12-14

## 2022-12-14 RX ORDER — CEFAZOLIN SODIUM 2 G/50ML
2 SOLUTION INTRAVENOUS
Status: COMPLETED | OUTPATIENT
Start: 2022-12-14 | End: 2022-12-14

## 2022-12-14 RX ORDER — MUPIROCIN 20 MG/G
OINTMENT TOPICAL 2 TIMES DAILY
Status: DISCONTINUED | OUTPATIENT
Start: 2022-12-14 | End: 2022-12-15 | Stop reason: HOSPADM

## 2022-12-14 RX ORDER — SODIUM CHLORIDE 0.9 % (FLUSH) 0.9 %
10 SYRINGE (ML) INJECTION
Status: DISCONTINUED | OUTPATIENT
Start: 2022-12-14 | End: 2022-12-14

## 2022-12-14 RX ORDER — ONDANSETRON 2 MG/ML
4 INJECTION INTRAMUSCULAR; INTRAVENOUS EVERY 6 HOURS PRN
Status: DISCONTINUED | OUTPATIENT
Start: 2022-12-14 | End: 2022-12-15 | Stop reason: HOSPADM

## 2022-12-14 RX ORDER — FENTANYL CITRATE 50 UG/ML
25 INJECTION, SOLUTION INTRAMUSCULAR; INTRAVENOUS EVERY 5 MIN PRN
Status: DISCONTINUED | OUTPATIENT
Start: 2022-12-14 | End: 2022-12-14

## 2022-12-14 RX ORDER — SODIUM CHLORIDE, SODIUM LACTATE, POTASSIUM CHLORIDE, CALCIUM CHLORIDE 600; 310; 30; 20 MG/100ML; MG/100ML; MG/100ML; MG/100ML
INJECTION, SOLUTION INTRAVENOUS CONTINUOUS
Status: DISCONTINUED | OUTPATIENT
Start: 2022-12-14 | End: 2022-12-14

## 2022-12-14 RX ORDER — BISACODYL 10 MG
10 SUPPOSITORY, RECTAL RECTAL DAILY PRN
Status: DISCONTINUED | OUTPATIENT
Start: 2022-12-14 | End: 2022-12-15 | Stop reason: HOSPADM

## 2022-12-14 RX ADMIN — PIPERACILLIN AND TAZOBACTAM 4.5 G: 4; .5 INJECTION, POWDER, LYOPHILIZED, FOR SOLUTION INTRAVENOUS; PARENTERAL at 11:12

## 2022-12-14 RX ADMIN — SODIUM CHLORIDE, POTASSIUM CHLORIDE, SODIUM LACTATE AND CALCIUM CHLORIDE: 600; 310; 30; 20 INJECTION, SOLUTION INTRAVENOUS at 11:12

## 2022-12-14 RX ADMIN — VANCOMYCIN HYDROCHLORIDE 1000 MG: 1 INJECTION, POWDER, LYOPHILIZED, FOR SOLUTION INTRAVENOUS at 09:12

## 2022-12-14 RX ADMIN — HYDROCODONE BITARTRATE AND ACETAMINOPHEN 1 TABLET: 5; 325 TABLET ORAL at 06:12

## 2022-12-14 RX ADMIN — PHENYLEPHRINE HYDROCHLORIDE 200 MCG: 10 INJECTION INTRAVENOUS at 01:12

## 2022-12-14 RX ADMIN — BUSPIRONE HYDROCHLORIDE 10 MG: 5 TABLET ORAL at 09:12

## 2022-12-14 RX ADMIN — SODIUM CHLORIDE, POTASSIUM CHLORIDE, SODIUM LACTATE AND CALCIUM CHLORIDE: 600; 310; 30; 20 INJECTION, SOLUTION INTRAVENOUS at 06:12

## 2022-12-14 RX ADMIN — FENTANYL CITRATE 50 MCG: 50 INJECTION, SOLUTION INTRAMUSCULAR; INTRAVENOUS at 01:12

## 2022-12-14 RX ADMIN — SODIUM CHLORIDE, POTASSIUM CHLORIDE, SODIUM LACTATE AND CALCIUM CHLORIDE 500 ML: 600; 310; 30; 20 INJECTION, SOLUTION INTRAVENOUS at 03:12

## 2022-12-14 RX ADMIN — VANCOMYCIN HYDROCHLORIDE 1000 MG: 1 INJECTION, POWDER, LYOPHILIZED, FOR SOLUTION INTRAVENOUS at 04:12

## 2022-12-14 RX ADMIN — DULOXETINE 30 MG: 30 CAPSULE, DELAYED RELEASE ORAL at 09:12

## 2022-12-14 RX ADMIN — HYDROCODONE BITARTRATE AND ACETAMINOPHEN 1 TABLET: 5; 325 TABLET ORAL at 11:12

## 2022-12-14 RX ADMIN — PROPOFOL 120 MG: 10 INJECTION, EMULSION INTRAVENOUS at 12:12

## 2022-12-14 RX ADMIN — Medication 1000 MG: at 09:12

## 2022-12-14 RX ADMIN — DEXAMETHASONE SODIUM PHOSPHATE 4 MG: 4 INJECTION, SOLUTION INTRA-ARTICULAR; INTRALESIONAL; INTRAMUSCULAR; INTRAVENOUS; SOFT TISSUE at 01:12

## 2022-12-14 RX ADMIN — MULTIVIT AND MINERALS-FERROUS GLUCONATE 9 MG IRON/15 ML ORAL LIQUID 15 ML: at 04:12

## 2022-12-14 RX ADMIN — CEFAZOLIN SODIUM 2 G: 2 SOLUTION INTRAVENOUS at 12:12

## 2022-12-14 RX ADMIN — ENOXAPARIN SODIUM 40 MG: 40 INJECTION SUBCUTANEOUS at 04:12

## 2022-12-14 RX ADMIN — LIDOCAINE HYDROCHLORIDE 60 MG: 20 INJECTION, SOLUTION EPIDURAL; INFILTRATION; INTRACAUDAL; PERINEURAL at 12:12

## 2022-12-14 RX ADMIN — PIPERACILLIN AND TAZOBACTAM 4.5 G: 4; .5 INJECTION, POWDER, LYOPHILIZED, FOR SOLUTION INTRAVENOUS; PARENTERAL at 03:12

## 2022-12-14 RX ADMIN — HYDRALAZINE HYDROCHLORIDE 10 MG: 20 INJECTION INTRAMUSCULAR; INTRAVENOUS at 12:12

## 2022-12-14 RX ADMIN — ONDANSETRON 4 MG: 2 INJECTION INTRAMUSCULAR; INTRAVENOUS at 01:12

## 2022-12-14 RX ADMIN — MUPIROCIN: 20 OINTMENT TOPICAL at 09:12

## 2022-12-14 RX ADMIN — FENTANYL CITRATE 50 MCG: 50 INJECTION, SOLUTION INTRAMUSCULAR; INTRAVENOUS at 12:12

## 2022-12-14 RX ADMIN — FAMOTIDINE 20 MG: 20 TABLET ORAL at 09:12

## 2022-12-14 RX ADMIN — CETIRIZINE HYDROCHLORIDE 10 MG: 1 SOLUTION ORAL at 09:12

## 2022-12-14 NOTE — OP NOTE
[facility]                     [address], [place] [address]    PATIENT NAME:      TERENCE CONNELL  YOB: 1980  CSN:               [id number]  MRN:               [id number]  ADMIT DATE:        12/14/2022 10:47:00  PHYSICIAN:         Sandy Jara MD                          OPERATIVE REPORT      DATE OF SURGERY:        SURGEON:  Sandy Jara MD    PREOPERATIVE DIAGNOSES:    1. Left hip soft tissue infection with associated osteomyelitis, chronic,   spanning over several years.    2. History of paraplegia.  3. Tobacco abuse.  4. Emphysema/chronic obstructive pulmonary disease.    POSTOPERATIVE DIAGNOSES:    1. Left hip soft tissue infection with associated osteomyelitis, chronic,   spanning over several years.    2. History of paraplegia.  3. Tobacco abuse.  4. Emphysema/chronic obstructive pulmonary disease.    PROCEDURES:    1. Debridement and drainage of left hip wound, total dimensions of wound 8 x 10   cm in size.  2. Cultures of fluid of the wound and also soft tissue and bone obtained and   submitted for regular culture as well as Vycor cultures with PCR testing.    COMPLICATIONS:  None.    OPERATIVE REPORT:  The patient was brought to the OR, placed in supine position.    LMA was advanced as the airway was controlled.  The patient was placed in   decubitus position with the left side up.  The left hip and leg were prepped and   draped in a sterile manner.  After time-out was carried out and agreed upon,   incision was created along the iliotibial band on the left hip.  The area of   drainage was excised completely using an elliptical incision.  Subcutaneous   tissue was dissected free down to fascia.  The fascia was entered and dissection   was carried out to bone at the greater trochanter.  A drill was used to drill   into the bone.  A curette was used to harvest a few pieces of bone for culture.     Ultimately, these were submitted for both routine culture and also Vycor   culture with PCR.  The bone was trimmed so that there were no sharp shards.    Otherwise, the wound was irrigated.  Additional cultures were taken of the   defect with some necrotic-type tissue posteriorly.  This was cleaned with a   curette.  The wound was irrigated copiously with sterile saline, wet-to-dry.    Saline-soaked Kerlix was applied.  The patient tolerated the procedure well.    There were no complications.        ______________________________  Sandy Jara MD    SS/AQS  DD:  12/14/2022  Time:  01:45PM  DT:  12/14/2022  Time:  03:03PM  Job #:  [id number]    cc:   MD Dr. Tino Aguilar        OPERATIVE REPORT

## 2022-12-14 NOTE — CONSULTS
[facility] - [facility] Surgical Unit  Hospital Medicine  Consult Note    Patient Name: Werner Jarrett  MRN: [id number]  Admission Date: 12/14/2022  Hospital Length of Stay: 0 days  Attending Physician: BARBARA Perez, *   Primary Care Provider: Kosta Sidhu MD           Patient information was obtained from patient and ER records.     Consults  Subjective:     Principal Problem: Unspecified open wound, left hip, subsequent encounter    Chief Complaint: No chief complaint on file.       HPI:   42-year-old male with past medical history of cervical spine injury, quadriplegic, COPD was admitted to the hospital after surgical intervention.    Patient has been having a left hip infection.  Osteomyelitis of the hip.  This infections been going on for significant amount of time for which patient has been treated in the past and does wound care as outpatient.  He had a scheduled I and D of the left hip which appears to be infected with possible underlying osteomyelitis.    After surgery patient was transferred on the floor placed on IV antibiotic.    Noticed post surgical  hypotension.  Hospital Medicine was consulted to assist with management of  COPD and hypotension.      Past Medical History:   Diagnosis Date    Anxiety     C5 vertebral fracture     Paralyzed nipple line down    COPD (chronic obstructive pulmonary disease)     Depression        Past Surgical History:   Procedure Laterality Date    MEDIPORT INSERTION, SINGLE      Rt Chest wall    SPINE SURGERY      wound debridements      Multiple       Review of patient's allergies indicates:   Allergen Reactions    Levofloxacin Rash       No current facility-administered medications on file prior to encounter.     Current Outpatient Medications on File Prior to Encounter   Medication Sig    acetaminophen (TYLENOL) 325 MG tablet Take 650 mg by mouth every 6 (six) hours as needed for Pain.    ascorbic acid, vitamin C, (VITAMIN C) 1000 MG  tablet Take 1,000 mg by mouth every evening.    bisacodyL (DULCOLAX) 10 mg Supp Place 10 mg rectally daily as needed.    busPIRone (BUSPAR) 5 MG Tab Take 10 mg by mouth 2 (two) times daily.    cetirizine (ZYRTEC) 1 mg/mL syrup 10 mg every evening.    diclofenac sodium (VOLTAREN) 1 % Gel Apply 2 g topically 4 (four) times daily as needed.    DULoxetine (CYMBALTA) 30 MG capsule Take 30 mg by mouth 2 (two) times daily.    fexofenadine (ALLEGRA) 180 MG tablet Take 180 mg by mouth every morning.    fluticasone propionate (FLONASE) 50 mcg/actuation nasal spray 1 spray by Each Nostril route daily as needed for Rhinitis.    HYDROcodone-acetaminophen (NORCO)  mg per tablet Take 1 tablet by mouth every 8 (eight) hours as needed for Pain.    meloxicam (MOBIC) 7.5 MG tablet Take 7.5 mg by mouth 2 (two) times daily as needed for Pain.    multivitamin/iron/folic acid (CENTRUM ORAL) Take 1 tablet by mouth every morning.    pseudoephedrine-guaiFENesin  mg (MUCINEX D)  mg per tablet Take 1 tablet by mouth 2 (two) times daily as needed for Congestion.    zinc gluconate 50 mg tablet Take 50 mg by mouth every evening.     Family History       Problem Relation (Age of Onset)    Brain cancer Mother    Lung cancer Mother    No Known Problems Father          Tobacco Use    Smoking status: Every Day     Packs/day: 1.00     Types: Vaping with nicotine, Cigarettes    Smokeless tobacco: Never   Substance and Sexual Activity    Alcohol use: Yes     Comment: 3-5 times week    Drug use: Not Currently    Sexual activity: Not Currently     Review of Systems   Constitutional:  Positive for fatigue. Negative for activity change and fever.   HENT: Negative.     Eyes: Negative.    Respiratory:  Positive for cough.    Cardiovascular: Negative.    Gastrointestinal:  Positive for abdominal pain.   Genitourinary: Negative.    All other systems reviewed and are negative.  Objective:     Vital Signs (Most Recent):  Temp:  98.4 °F (36.9 °C) (12/14/22 1600)  Pulse: 95 (12/14/22 1600)  Resp: 18 (12/14/22 1600)  BP: (!) 80/47 (12/14/22 1600)  SpO2: (!) 93 % (12/14/22 1600)   Vital Signs (24h Range):  Temp:  [96.8 °F (36 °C)-98.4 °F (36.9 °C)] 98.4 °F (36.9 °C)  Pulse:  [] 95  Resp:  [18-20] 18  SpO2:  [93 %-96 %] 93 %  BP: ()/(47-91) 80/47     Weight: 74.8 kg (164 lb 14.5 oz)  Body mass index is 23.66 kg/m².    Physical Exam  Vitals and nursing note reviewed.   Constitutional:       General: He is not in acute distress.     Appearance: He is ill-appearing. He is not toxic-appearing or diaphoretic.      Comments:  Patient weak, frail appearance but no distress.   HENT:      Head: Normocephalic and atraumatic.      Mouth/Throat:      Mouth: Mucous membranes are moist.   Eyes:      Extraocular Movements: Extraocular movements intact.      Pupils: Pupils are equal, round, and reactive to light.   Neck:      Comments: There is a scar in the middle of the neck from previous tracheostomy which has been closed  Cardiovascular:      Rate and Rhythm: Normal rate and regular rhythm.      Heart sounds: No murmur heard.    No gallop.   Pulmonary:      Effort: No respiratory distress.      Breath sounds: Wheezing present. No rales.      Comments: Tympanic on percussion, decreased air movement with minimal wheezing.  Abdominal:      General: Bowel sounds are normal. There is distension.      Palpations: Abdomen is soft.   Musculoskeletal:      Cervical back: No rigidity.      Right lower leg: Edema present.      Left lower leg: Edema present.      Comments: Flaccid muscles.    There is twitching kind of spasms in the lower ex   Lymphadenopathy:      Cervical: No cervical adenopathy.   Skin:     Capillary Refill: Capillary refill takes 2 to 3 seconds.      Comments: Stage IV decubitus ulcer sacrum   Left hip status post I&D has been dressed with gauzes.  Mild surrounding edema.   Neurological:      Mental Status: He is alert. Mental status  is at baseline.      Comments: Quadriplegic.       Significant Labs: All pertinent labs within the past 24 hours have been reviewed.  Recent Lab Results       None            Significant Imaging:     Assessment/Plan:     * Unspecified open wound, left hip, subsequent encounter   Status post I& D.  Cultures have been taken.    Will obtain a x-ray of the hip joint if not done.      Hypotension    Post surgical hypertension.    Give patient a bolus of IV fluid and after that normal saline at 100 cc an hour      Chronic obstructive pulmonary disease   Nebulizer treatment.      Pressure injury of sacral region, stage 4    Continue local care.      Muscle spasms of lower extremity    Continue home medication.      VTE Risk Mitigation (From admission, onward)         Ordered     enoxaparin injection 40 mg  Daily         12/14/22 1336     IP VTE HIGH RISK PATIENT  Once         12/14/22 1336     Place sequential compression device  Until discontinued         12/14/22 1336                    Thank you for your consult. I will follow-up with patient. Please contact us if you have any additional questions.    Hernandez Cortez MD  Department of Hospital Medicine   [facility]

## 2022-12-14 NOTE — HPI
42-year-old male with past medical history of cervical spine injury, quadriplegic, COPD was admitted to the hospital after surgical intervention.    Patient has been having a left hip infection.  Osteomyelitis of the hip.  This infections been going on for significant amount of time for which patient has been treated in the past and does wound care as outpatient.  He had a scheduled I and D of the left hip which appears to be infected with possible underlying osteomyelitis.    After surgery patient was transferred on the floor placed on IV antibiotic.    Noticed post surgical  hypotension.  Hospital Medicine was consulted to assist with management of  COPD and hypotension.

## 2022-12-14 NOTE — ANESTHESIA PROCEDURE NOTES
Intubation    Date/Time: 12/14/2022 12:50 PM  Performed by: Derrick Randle CRNA  Authorized by: Derrick Randle CRNA     Intubation:     Induction:  Intravenous    Intubated:  Postinduction    Mask Ventilation:  Easy mask    Attempts:  1    Attempted By:  CRNA    Difficult Airway Encountered?: No      Complications:  None    Airway Device:  Supraglottic airway/LMA    Placement Verified By:  Capnometry    Complicating Factors:  None    Findings Post-Intubation:  BS equal bilateral and atraumatic/condition of teeth unchanged

## 2022-12-14 NOTE — SUBJECTIVE & OBJECTIVE
Past Medical History:   Diagnosis Date    Anxiety     C5 vertebral fracture     Paralyzed nipple line down    COPD (chronic obstructive pulmonary disease)     Depression        Past Surgical History:   Procedure Laterality Date    MEDIPORT INSERTION, SINGLE      Rt Chest wall    SPINE SURGERY      wound debridements      Multiple       Review of patient's allergies indicates:   Allergen Reactions    Levofloxacin Rash       No current facility-administered medications on file prior to encounter.     Current Outpatient Medications on File Prior to Encounter   Medication Sig    acetaminophen (TYLENOL) 325 MG tablet Take 650 mg by mouth every 6 (six) hours as needed for Pain.    ascorbic acid, vitamin C, (VITAMIN C) 1000 MG tablet Take 1,000 mg by mouth every evening.    bisacodyL (DULCOLAX) 10 mg Supp Place 10 mg rectally daily as needed.    busPIRone (BUSPAR) 5 MG Tab Take 10 mg by mouth 2 (two) times daily.    cetirizine (ZYRTEC) 1 mg/mL syrup 10 mg every evening.    diclofenac sodium (VOLTAREN) 1 % Gel Apply 2 g topically 4 (four) times daily as needed.    DULoxetine (CYMBALTA) 30 MG capsule Take 30 mg by mouth 2 (two) times daily.    fexofenadine (ALLEGRA) 180 MG tablet Take 180 mg by mouth every morning.    fluticasone propionate (FLONASE) 50 mcg/actuation nasal spray 1 spray by Each Nostril route daily as needed for Rhinitis.    HYDROcodone-acetaminophen (NORCO)  mg per tablet Take 1 tablet by mouth every 8 (eight) hours as needed for Pain.    meloxicam (MOBIC) 7.5 MG tablet Take 7.5 mg by mouth 2 (two) times daily as needed for Pain.    multivitamin/iron/folic acid (CENTRUM ORAL) Take 1 tablet by mouth every morning.    pseudoephedrine-guaiFENesin  mg (MUCINEX D)  mg per tablet Take 1 tablet by mouth 2 (two) times daily as needed for Congestion.    zinc gluconate 50 mg tablet Take 50 mg by mouth every evening.     Family History       Problem Relation (Age of Onset)    Brain cancer Mother     Lung cancer Mother    No Known Problems Father          Tobacco Use    Smoking status: Every Day     Packs/day: 1.00     Types: Vaping with nicotine, Cigarettes    Smokeless tobacco: Never   Substance and Sexual Activity    Alcohol use: Yes     Comment: 3-5 times week    Drug use: Not Currently    Sexual activity: Not Currently     Review of Systems   Constitutional:  Positive for fatigue. Negative for activity change and fever.   HENT: Negative.     Eyes: Negative.    Respiratory:  Positive for cough.    Cardiovascular: Negative.    Gastrointestinal:  Positive for abdominal pain.   Genitourinary: Negative.    All other systems reviewed and are negative.  Objective:     Vital Signs (Most Recent):  Temp: 98.4 °F (36.9 °C) (12/14/22 1600)  Pulse: 95 (12/14/22 1600)  Resp: 18 (12/14/22 1600)  BP: (!) 80/47 (12/14/22 1600)  SpO2: (!) 93 % (12/14/22 1600)   Vital Signs (24h Range):  Temp:  [96.8 °F (36 °C)-98.4 °F (36.9 °C)] 98.4 °F (36.9 °C)  Pulse:  [] 95  Resp:  [18-20] 18  SpO2:  [93 %-96 %] 93 %  BP: ()/(47-91) 80/47     Weight: 74.8 kg (164 lb 14.5 oz)  Body mass index is 23.66 kg/m².    Physical Exam  Vitals and nursing note reviewed.   Constitutional:       General: He is not in acute distress.     Appearance: He is ill-appearing. He is not toxic-appearing or diaphoretic.      Comments:  Patient weak, frail appearance but no distress.   HENT:      Head: Normocephalic and atraumatic.      Mouth/Throat:      Mouth: Mucous membranes are moist.   Eyes:      Extraocular Movements: Extraocular movements intact.      Pupils: Pupils are equal, round, and reactive to light.   Neck:      Comments: There is a scar in the middle of the neck from previous tracheostomy which has been closed  Cardiovascular:      Rate and Rhythm: Normal rate and regular rhythm.      Heart sounds: No murmur heard.    No gallop.   Pulmonary:      Effort: No respiratory distress.      Breath sounds: Wheezing present. No rales.       Comments: Tympanic on percussion, decreased air movement with minimal wheezing.  Abdominal:      General: Bowel sounds are normal. There is distension.      Palpations: Abdomen is soft.   Musculoskeletal:      Cervical back: No rigidity.      Right lower leg: Edema present.      Left lower leg: Edema present.      Comments: Flaccid muscles.    There is twitching kind of spasms in the lower ex   Lymphadenopathy:      Cervical: No cervical adenopathy.   Skin:     Capillary Refill: Capillary refill takes 2 to 3 seconds.      Comments: Stage IV decubitus ulcer sacrum   Left hip status post I&D has been dressed with gauzes.  Mild surrounding edema.   Neurological:      Mental Status: He is alert. Mental status is at baseline.      Comments: Quadriplegic.       Significant Labs: All pertinent labs within the past 24 hours have been reviewed.  Recent Lab Results       None            Significant Imaging:

## 2022-12-14 NOTE — ANESTHESIA POSTPROCEDURE EVALUATION
Anesthesia Post Evaluation    Patient: Werner Jarrett    Procedure(s) Performed: Procedure(s) (LRB):  INCISION AND DRAINAGE (I&D debridement of left hip wound, left hip and femur) (Left)    Final Anesthesia Type: general      Patient location during evaluation: PACU  Patient participation: Yes- Able to Participate  Level of consciousness: awake and alert and oriented  Post-procedure vital signs: reviewed and stable  Pain management: adequate  Airway patency: patent    PONV status at discharge: No PONV  Anesthetic complications: no      Cardiovascular status: stable  Respiratory status: unassisted, spontaneous ventilation and room air  Hydration status: euvolemic  Follow-up not needed.          Vitals Value Taken Time   /91 12/14/22 1337   Temp  12/14/22 1339   Pulse 91 12/14/22 1339   Resp  12/14/22 1339   SpO2 95 % 12/14/22 1339   Vitals shown include unvalidated device data.      No case tracking events are documented in the log.      Pain/Alexx Score: No data recorded

## 2022-12-14 NOTE — ASSESSMENT & PLAN NOTE
Status post I& D.  Cultures have been taken.    Will obtain a x-ray of the hip joint if not done.

## 2022-12-14 NOTE — TRANSFER OF CARE
Anesthesia Transfer of Care Note    Patient: Werner Jarrett    Procedure(s) Performed: Procedure(s) (LRB):  INCISION AND DRAINAGE (I&D debridement of left hip wound, left hip and femur) (Left)    Patient location: PACU    Anesthesia Type: general    Transport from OR: Transported from OR on room air with adequate spontaneous ventilation    Post pain: adequate analgesia    Post assessment: no apparent anesthetic complications    Post vital signs: stable    Level of consciousness: sedated    Nausea/Vomiting: no nausea/vomiting    Complications: none    Transfer of care protocol was followed      Last vitals:   Visit Vitals  /77   Pulse 76   Temp 36 °C (96.8 °F) (Oral)   Resp 18   Wt 74.8 kg (164 lb 14.5 oz)   SpO2 96%   BMI 23.66 kg/m²

## 2022-12-14 NOTE — ASSESSMENT & PLAN NOTE
Post surgical hypertension.    Give patient a bolus of IV fluid and after that normal saline at 100 cc an hour

## 2022-12-15 VITALS
BODY MASS INDEX: 23.6 KG/M2 | HEIGHT: 70 IN | HEART RATE: 66 BPM | OXYGEN SATURATION: 96 % | SYSTOLIC BLOOD PRESSURE: 111 MMHG | WEIGHT: 164.88 LBS | TEMPERATURE: 99 F | DIASTOLIC BLOOD PRESSURE: 64 MMHG | RESPIRATION RATE: 18 BRPM

## 2022-12-15 LAB
ALBUMIN SERPL-MCNC: 3.4 G/DL (ref 3.5–5)
ALBUMIN/GLOB SERPL: 1 RATIO (ref 1.1–2)
ALP SERPL-CCNC: 82 UNIT/L (ref 40–150)
ALT SERPL-CCNC: 10 UNIT/L (ref 0–55)
AST SERPL-CCNC: 14 UNIT/L (ref 5–34)
BASOPHILS # BLD AUTO: 0.02 X10(3)/MCL (ref 0–0.2)
BASOPHILS NFR BLD AUTO: 0.2 %
BILIRUBIN DIRECT+TOT PNL SERPL-MCNC: 0.5 MG/DL
BUN SERPL-MCNC: 11 MG/DL (ref 8.9–20.6)
CALCIUM SERPL-MCNC: 9.2 MG/DL (ref 8.4–10.2)
CHLORIDE SERPL-SCNC: 103 MMOL/L (ref 98–107)
CO2 SERPL-SCNC: 24 MMOL/L (ref 22–29)
CREAT SERPL-MCNC: 0.6 MG/DL (ref 0.73–1.18)
CRP SERPL-MCNC: 44.9 MG/L
EOSINOPHIL # BLD AUTO: 0 X10(3)/MCL (ref 0–0.9)
EOSINOPHIL NFR BLD AUTO: 0 %
ERYTHROCYTE [DISTWIDTH] IN BLOOD BY AUTOMATED COUNT: 15.9 % (ref 11.6–14.4)
ERYTHROCYTE [SEDIMENTATION RATE] IN BLOOD: 22 MM/HR (ref 0–15)
GFR SERPLBLD CREATININE-BSD FMLA CKD-EPI: >60 MLS/MIN/1.73/M2
GLOBULIN SER-MCNC: 3.5 GM/DL (ref 2.4–3.5)
GLUCOSE SERPL-MCNC: 127 MG/DL (ref 74–100)
GRAM STN SPEC: NORMAL
GRAM STN SPEC: NORMAL
HCT VFR BLD AUTO: 40.3 % (ref 42–52)
HGB BLD-MCNC: 12.6 GM/DL (ref 14–18)
IMM GRANULOCYTES # BLD AUTO: 0.04 X10(3)/MCL (ref 0–0.04)
IMM GRANULOCYTES NFR BLD AUTO: 0.4 %
LYMPHOCYTES # BLD AUTO: 1.95 X10(3)/MCL (ref 0.6–4.6)
LYMPHOCYTES NFR BLD AUTO: 17.5 %
MAGNESIUM SERPL-MCNC: 2.1 MG/DL (ref 1.6–2.6)
MCH RBC QN AUTO: 27.2 PG
MCHC RBC AUTO-ENTMCNC: 31.3 MG/DL (ref 33–36)
MCV RBC AUTO: 87 FL (ref 80–94)
MONOCYTES # BLD AUTO: 0.77 X10(3)/MCL (ref 0.1–1.3)
MONOCYTES NFR BLD AUTO: 6.9 %
NEUTROPHILS # BLD AUTO: 8.37 X10(3)/MCL (ref 2.1–9.2)
NEUTROPHILS NFR BLD AUTO: 75 %
PHOSPHATE SERPL-MCNC: 2.8 MG/DL (ref 2.3–4.7)
PLATELET # BLD AUTO: 287 X10(3)/MCL (ref 140–371)
PMV BLD AUTO: 9.5 FL (ref 9.4–12.4)
POTASSIUM SERPL-SCNC: 4.1 MMOL/L (ref 3.5–5.1)
PROT SERPL-MCNC: 6.9 GM/DL (ref 6.4–8.3)
RBC # BLD AUTO: 4.63 X10(6)/MCL (ref 4.7–6.1)
SARS-COV-2 RDRP RESP QL NAA+PROBE: NEGATIVE
SODIUM SERPL-SCNC: 137 MMOL/L (ref 136–145)
VANCOMYCIN TROUGH SERPL-MCNC: 17.1 UG/ML (ref 15–20)
WBC # SPEC AUTO: 11.2 X10(3)/MCL (ref 4.5–11.5)

## 2022-12-15 PROCEDURE — 80202 ASSAY OF VANCOMYCIN: CPT | Performed by: SURGERY

## 2022-12-15 PROCEDURE — 99900035 HC TECH TIME PER 15 MIN (STAT)

## 2022-12-15 PROCEDURE — 86140 C-REACTIVE PROTEIN: CPT | Performed by: EMERGENCY MEDICINE

## 2022-12-15 PROCEDURE — 87635 SARS-COV-2 COVID-19 AMP PRB: CPT | Performed by: SURGERY

## 2022-12-15 PROCEDURE — 80053 COMPREHEN METABOLIC PANEL: CPT | Performed by: EMERGENCY MEDICINE

## 2022-12-15 PROCEDURE — 63600175 PHARM REV CODE 636 W HCPCS: Performed by: SURGERY

## 2022-12-15 PROCEDURE — 84100 ASSAY OF PHOSPHORUS: CPT | Performed by: EMERGENCY MEDICINE

## 2022-12-15 PROCEDURE — 85025 COMPLETE CBC W/AUTO DIFF WBC: CPT | Performed by: SURGERY

## 2022-12-15 PROCEDURE — 85651 RBC SED RATE NONAUTOMATED: CPT | Performed by: EMERGENCY MEDICINE

## 2022-12-15 PROCEDURE — G0378 HOSPITAL OBSERVATION PER HR: HCPCS

## 2022-12-15 PROCEDURE — 25000003 PHARM REV CODE 250: Performed by: SURGERY

## 2022-12-15 PROCEDURE — 36415 COLL VENOUS BLD VENIPUNCTURE: CPT | Performed by: SURGERY

## 2022-12-15 PROCEDURE — 83735 ASSAY OF MAGNESIUM: CPT | Performed by: EMERGENCY MEDICINE

## 2022-12-15 PROCEDURE — 94761 N-INVAS EAR/PLS OXIMETRY MLT: CPT

## 2022-12-15 PROCEDURE — 36415 COLL VENOUS BLD VENIPUNCTURE: CPT | Performed by: EMERGENCY MEDICINE

## 2022-12-15 PROCEDURE — 94799 UNLISTED PULMONARY SVC/PX: CPT

## 2022-12-15 PROCEDURE — 96372 THER/PROPH/DIAG INJ SC/IM: CPT | Performed by: SURGERY

## 2022-12-15 PROCEDURE — 63600175 PHARM REV CODE 636 W HCPCS: Performed by: EMERGENCY MEDICINE

## 2022-12-15 RX ORDER — MUPIROCIN 20 MG/G
OINTMENT TOPICAL 2 TIMES DAILY
Status: CANCELLED | OUTPATIENT
Start: 2022-12-15 | End: 2022-12-19

## 2022-12-15 RX ORDER — CETIRIZINE HYDROCHLORIDE 1 MG/ML
10 SOLUTION ORAL NIGHTLY
Status: CANCELLED | OUTPATIENT
Start: 2022-12-15

## 2022-12-15 RX ORDER — DULOXETIN HYDROCHLORIDE 30 MG/1
30 CAPSULE, DELAYED RELEASE ORAL 2 TIMES DAILY
Status: CANCELLED | OUTPATIENT
Start: 2022-12-15

## 2022-12-15 RX ORDER — SODIUM CHLORIDE, SODIUM LACTATE, POTASSIUM CHLORIDE, CALCIUM CHLORIDE 600; 310; 30; 20 MG/100ML; MG/100ML; MG/100ML; MG/100ML
INJECTION, SOLUTION INTRAVENOUS CONTINUOUS
Status: CANCELLED | OUTPATIENT
Start: 2022-12-15

## 2022-12-15 RX ORDER — ENOXAPARIN SODIUM 100 MG/ML
40 INJECTION SUBCUTANEOUS EVERY 24 HOURS
Status: CANCELLED | OUTPATIENT
Start: 2022-12-15

## 2022-12-15 RX ORDER — BISACODYL 10 MG
10 SUPPOSITORY, RECTAL RECTAL DAILY PRN
Status: CANCELLED | OUTPATIENT
Start: 2022-12-15

## 2022-12-15 RX ORDER — ASCORBIC ACID 250 MG
1000 TABLET ORAL NIGHTLY
Status: CANCELLED | OUTPATIENT
Start: 2022-12-15

## 2022-12-15 RX ORDER — BUSPIRONE HYDROCHLORIDE 5 MG/1
10 TABLET ORAL 2 TIMES DAILY
Status: CANCELLED | OUTPATIENT
Start: 2022-12-15

## 2022-12-15 RX ORDER — ONDANSETRON 4 MG/1
8 TABLET, ORALLY DISINTEGRATING ORAL EVERY 8 HOURS PRN
Status: CANCELLED | OUTPATIENT
Start: 2022-12-15

## 2022-12-15 RX ORDER — ONDANSETRON 2 MG/ML
4 INJECTION INTRAMUSCULAR; INTRAVENOUS EVERY 6 HOURS PRN
Status: CANCELLED | OUTPATIENT
Start: 2022-12-15

## 2022-12-15 RX ORDER — FAMOTIDINE 20 MG/1
20 TABLET, FILM COATED ORAL 2 TIMES DAILY
Status: CANCELLED | OUTPATIENT
Start: 2022-12-15

## 2022-12-15 RX ORDER — ACETAMINOPHEN 325 MG/1
650 TABLET ORAL EVERY 6 HOURS PRN
Status: CANCELLED | OUTPATIENT
Start: 2022-12-15

## 2022-12-15 RX ORDER — HYDROCODONE BITARTRATE AND ACETAMINOPHEN 10; 325 MG/1; MG/1
1 TABLET ORAL EVERY 8 HOURS PRN
Status: CANCELLED | OUTPATIENT
Start: 2022-12-15

## 2022-12-15 RX ORDER — NALOXONE HCL 0.4 MG/ML
0.02 VIAL (ML) INJECTION
Status: CANCELLED | OUTPATIENT
Start: 2022-12-15

## 2022-12-15 RX ORDER — ONDANSETRON 2 MG/ML
4 INJECTION INTRAMUSCULAR; INTRAVENOUS EVERY 12 HOURS PRN
Status: CANCELLED | OUTPATIENT
Start: 2022-12-15

## 2022-12-15 RX ORDER — FLUTICASONE PROPIONATE 50 MCG
1 SPRAY, SUSPENSION (ML) NASAL DAILY PRN
Status: CANCELLED | OUTPATIENT
Start: 2022-12-15

## 2022-12-15 RX ORDER — GUAIFENESIN, PSEUDOEPHEDRINE HYDROCHLORIDE 600; 60 MG/1; MG/1
1 TABLET, EXTENDED RELEASE ORAL 2 TIMES DAILY PRN
Status: CANCELLED | OUTPATIENT
Start: 2022-12-15

## 2022-12-15 RX ORDER — MELOXICAM 7.5 MG/1
7.5 TABLET ORAL DAILY PRN
Status: CANCELLED | OUTPATIENT
Start: 2022-12-15

## 2022-12-15 RX ORDER — HYDROCODONE BITARTRATE AND ACETAMINOPHEN 5; 325 MG/1; MG/1
1 TABLET ORAL EVERY 4 HOURS PRN
Status: CANCELLED | OUTPATIENT
Start: 2022-12-15

## 2022-12-15 RX ADMIN — MULTIVIT AND MINERALS-FERROUS GLUCONATE 9 MG IRON/15 ML ORAL LIQUID 15 ML: at 09:12

## 2022-12-15 RX ADMIN — HYDROCODONE BITARTRATE AND ACETAMINOPHEN 1 TABLET: 5; 325 TABLET ORAL at 03:12

## 2022-12-15 RX ADMIN — SODIUM CHLORIDE, POTASSIUM CHLORIDE, SODIUM LACTATE AND CALCIUM CHLORIDE: 600; 310; 30; 20 INJECTION, SOLUTION INTRAVENOUS at 10:12

## 2022-12-15 RX ADMIN — HYDROCODONE BITARTRATE AND ACETAMINOPHEN 1 TABLET: 5; 325 TABLET ORAL at 06:12

## 2022-12-15 RX ADMIN — PIPERACILLIN AND TAZOBACTAM 4.5 G: 4; .5 INJECTION, POWDER, LYOPHILIZED, FOR SOLUTION INTRAVENOUS; PARENTERAL at 05:12

## 2022-12-15 RX ADMIN — VANCOMYCIN HYDROCHLORIDE 1000 MG: 1 INJECTION, POWDER, LYOPHILIZED, FOR SOLUTION INTRAVENOUS at 01:12

## 2022-12-15 RX ADMIN — VANCOMYCIN HYDROCHLORIDE 1000 MG: 1 INJECTION, POWDER, LYOPHILIZED, FOR SOLUTION INTRAVENOUS at 05:12

## 2022-12-15 RX ADMIN — FAMOTIDINE 20 MG: 20 TABLET ORAL at 09:12

## 2022-12-15 RX ADMIN — HYDROCODONE BITARTRATE AND ACETAMINOPHEN 1 TABLET: 5; 325 TABLET ORAL at 10:12

## 2022-12-15 RX ADMIN — BUSPIRONE HYDROCHLORIDE 10 MG: 5 TABLET ORAL at 09:12

## 2022-12-15 RX ADMIN — ENOXAPARIN SODIUM 40 MG: 40 INJECTION SUBCUTANEOUS at 04:12

## 2022-12-15 RX ADMIN — DULOXETINE 30 MG: 30 CAPSULE, DELAYED RELEASE ORAL at 09:12

## 2022-12-15 RX ADMIN — MUPIROCIN 1 G: 20 OINTMENT TOPICAL at 09:12

## 2022-12-15 RX ADMIN — PIPERACILLIN AND TAZOBACTAM 4.5 G: 4; .5 INJECTION, POWDER, LYOPHILIZED, FOR SOLUTION INTRAVENOUS; PARENTERAL at 03:12

## 2022-12-15 NOTE — SUBJECTIVE & OBJECTIVE
Interval History:  Patient doing well.  No complaints.    Review of Systems   Constitutional:  Positive for activity change and fatigue. Negative for appetite change and fever.   Eyes: Negative.    Respiratory: Negative.     Gastrointestinal:  Positive for abdominal pain.   Musculoskeletal:         Left hip and sacral decubitus unchanged   All other systems reviewed and are negative.  Objective:     Vital Signs (Most Recent):  Temp: 98.4 °F (36.9 °C) (12/15/22 1231)  Pulse: 81 (12/15/22 1231)  Resp: 18 (12/15/22 1231)  BP: (!) 93/57 (12/15/22 1231)  SpO2: 97 % (12/15/22 1231)   Vital Signs (24h Range):  Temp:  [97.5 °F (36.4 °C)-99.4 °F (37.4 °C)] 98.4 °F (36.9 °C)  Pulse:  [] 81  Resp:  [18-20] 18  SpO2:  [88 %-97 %] 97 %  BP: ()/(47-97) 93/57     Weight: 74.8 kg (164 lb 14.5 oz)  Body mass index is 23.66 kg/m².    Intake/Output Summary (Last 24 hours) at 12/15/2022 1320  Last data filed at 12/15/2022 0951  Gross per 24 hour   Intake 1427 ml   Output 2925 ml   Net -1498 ml      Physical Exam  Vitals and nursing note reviewed.   Constitutional:       General: He is not in acute distress.     Appearance: He is ill-appearing. He is not toxic-appearing or diaphoretic.   HENT:      Head: Normocephalic and atraumatic.      Mouth/Throat:      Pharynx: Oropharynx is clear.   Eyes:      Extraocular Movements: Extraocular movements intact.      Pupils: Pupils are equal, round, and reactive to light.   Cardiovascular:      Rate and Rhythm: Normal rate and regular rhythm.   Pulmonary:      Comments: Coarse breathing bilaterally.  Abdominal:      General: There is distension.      Palpations: Abdomen is soft.      Tenderness: There is no abdominal tenderness. There is no right CVA tenderness or guarding.   Musculoskeletal:      Cervical back: Normal range of motion.      Comments: Flexure contractures into the knee.  Muscle wasting upper lower extremity.  Flaccid muscle with some twitching   Neurological:       Mental Status: He is alert.      Comments: Quadriplegic       Significant Labs: All pertinent labs within the past 24 hours have been reviewed.  Recent Lab Results         12/15/22  0333   12/15/22  0332        Albumin/Globulin Ratio   1.0       Albumin   3.4       Alkaline Phosphatase   82       ALT   10       AST   14       Baso #   0.02       Basophil %   0.2       BILIRUBIN TOTAL   0.5       BUN   11.0       Calcium   9.2       Chloride   103       CO2   24       Creatinine   0.60       CRP   44.90       eGFR   >60       Eos #   0.00       Eosinophil %   0.0       Globulin, Total   3.5       Glucose   127       HEMATOCRIT   40.3       HEMOGLOBIN   12.6       Immature Grans (Abs)   0.04       Immature Granulocytes   0.4       Lymph #   1.95       LYMPH %   17.5       Magnesium   2.10       MCH   27.2       MCHC   31.3       MCV   87.0       Mono #   0.77       Mono %   6.9       MPV   9.5       Neut #   8.37       Neut %   75.0       Phosphorus   2.8       Platelets   287       Potassium   4.1       PROTEIN TOTAL   6.9       RBC   4.63       RDW   15.9       Sed Rate 22         Sodium   137       WBC   11.2             Narrative & Impression  EXAMINATION:  Three radiographic views of the LEFT HIP.     CLINICAL HISTORY:  Rule out osteomyelitis of the hip;     TECHNIQUE:  3 radiographic views of the LEFT HIP.     COMPARISON:  Left hip radiograph 06/13/2022.     FINDINGS:  Three views of the left hip demonstrate no change in the fracture deformity of the left femur.  There is extensive soft tissue swelling and subcutaneous air lateral to the left hip.  There is periosteal reaction of the greater trochanter.     Impression:     Chronic fracture deformity of the left femur with extensive adjacent soft tissue swelling and subcutaneous air with periosteal reaction of the trick rater trochanter suspicious for osteomyelitis.  MRI with and without IV contrast would be beneficial.        Electronically signed by: Ryne Lidnsay  MD  Date:                                            12/14/2022  Time:                                           18:08           Exam Ended: 12/14/22 17:55

## 2022-12-15 NOTE — PLAN OF CARE
Pt accepted to go to [facility] today.  Informed nurse to call MD for orders and to swab pt for COVID.  COVID testing ordered.

## 2022-12-15 NOTE — PROGRESS NOTES
Inpatient Nutrition Assessment    Admit Date: 12/14/2022   Total duration of encounter: 1 day     Nutrition Recommendation/Prescription     Continue Regular diet as tolerated.  Will add double protein portions to help support wound healing  Continue MVI and Vit C.  May consider ordering Zinc Sulfate 220 mg daily - depending on length of time patient was receiving prior to admit.  Long term use of zinc sulfate is not recommended.  Monitor need for Paul wound healing supplement and /or protein supplement if po intake decreases.    Communication of Recommendations: reviewed with patient/caregiver    Nutrition Assessment     Malnutrition Assessment/Nutrition-Focused Physical Exam    Malnutrition in the context of chronic illness  Degree of Malnutrition: does not meet criteria  Energy Intake: does not meet criteria  Interpretation of Weight Loss: does not meet criteria  Body Fat: does not meet criteria  Area of Body Fat Loss: does not meet criteria  Muscle Mass Loss: does not meet criteria  Area of Muscle Mass Loss: does not meet criteria  Fluid Accumulation: does not meet criteria  Edema: does not meet criteria  Reduced  Strength: unable to obtain  A minimum of two characteristics is recommended for diagnosis of either severe or non-severe malnutrition.    Chart Review    Reason Seen: continuous nutrition monitoring    Diagnosis:  L Hip soft tissue infection with associated osteomyelitis, Status post I& D.    Chronic obstructive pulmonary disease  Pressure injury of sacral region, stage 4  Muscle spasms of lower extremity      Relevant Medical History: C5 vertebral fracture, COPD    Nutrition-Related Medications: Vit C 1000 mg, MVI with Fe, LR@ 100 ml/hr  Calorie Containing IV Medications: no significant kcals from medications at this time    Nutrition-Related Labs:  12/15:  H/H 12.6/40.3 L, Glu 127 H, Alb 3.4 L, CRP 44.9 H    Diet/PN Order: Diet Adult Regular  Oral Supplement Order: none  Tube Feeding Order:  "none  Appetite/Oral Intake: good/% of meals  Factors Affecting Nutritional Intake: none identified  Food/Synagogue/Cultural Preferences: none reported  Food Allergies: no known food allergies    Skin Integrity: incision  Wound(s):   altered skin integrity to sacral area, left hip wound    Comments    12/15:  Pt eating lunch when visited.  Pt reports good appetite / po intake.  Denies n/v/d, chew / swallow difficulties.  Pt states following high protein diet at home and taking MVI, Vit C and Zinc for wound healing.  Labs / meds reviewed:  decreased Alb and increased CRP reflective of inflammatory process.      Anthropometrics    Height: 5' 10" (177.8 cm) Height Method: Estimated (per EMR)  Last Weight: 74.8 kg (164 lb 14.5 oz) (22 105)    BMI (Calculated): 23.7  BMI Classification: normal (BMI 18.5-24.9)        Ideal Body Weight (IBW), Male: 166 lb                 Paraplegia Ideal Body Weight (IBW) Adjustment: 148 lb 13 oz     Usual Body Weight (UBW), k.8 kg  % Usual Body Weight: 100.21     Usual Weight Provided By: EMR weight history    Wt Readings from Last 5 Encounters:   22 74.8 kg (164 lb 14.5 oz)   22 74.8 kg (165 lb)   22 74.8 kg (165 lb)   10/10/22 74.8 kg (165 lb)   22 74.8 kg (165 lb)     Weight Change(s) Since Admission:  Admit Weight: 74.8 kg (164 lb 14.5 oz) (22 1057)  No wt changes noted    Estimated Needs    Weight Used For Calorie Calculations: 74.8 kg (164 lb 14.5 oz)  Energy Calorie Requirements (kcal): 1645 - 1870 kcal/d (22-25 kcal/kg)  Energy Need Method: Kcal/kg  Weight Used For Protein Calculations: 74.8 kg (164 lb 14.5 oz)  Protein Requirements:  g Pro/d (1.2-1.5 g/kg)  Fluid Requirements (mL): 2200 mL/d (30 ml/kg)  Temp: 98.4 °F (36.9 °C)       Enteral Nutrition    Patient not receiving enteral nutrition at this time.    Parenteral Nutrition    Patient not receiving parenteral nutrition support at this time.    Evaluation of Received " Nutrient Intake    Calories: meeting estimated needs  Protein: meeting estimated needs    Patient Education    Not applicable.    Nutrition Diagnosis     PES: Increased nutrient needs related to increased demand for nutrients for wound healing as evidenced by L hip I&D and altered skin integrity to sacral area. (new)    Interventions/Goals     Intervention(s): general/healthful diet and multivitamin/mineral supplement therapy  Goal: Meet greater than 75% of nutritional needs by follow-up. (new)    Monitoring & Evaluation     Dietitian will monitor food and beverage intake and weight.  Nutrition Risk/Follow-Up: moderate (follow-up in 3-5 days)   Please consult if re-assessment needed sooner.

## 2022-12-15 NOTE — HOSPITAL COURSE
12/15/2022.    Overall patient doing much better.  Blood pressure, heart rate improved   X-ray of the hip consistent with infection questionable osteomyelitis.    Continue IV antibiotic   Continue wound care.  Follow up with surgery.  Patient is scheduled to go to LTAC for long-term IV antibiotics

## 2022-12-15 NOTE — PROGRESS NOTES
[facility] - Medical Surgical Unit  Hospital Medicine  Progress Note    Patient Name: Werner Jarrett  MRN: [id number]  Patient Class: OP- Observation   Admission Date: 12/14/2022  Length of Stay: 0 days  Attending Physician: BARBARA Perez, *  Primary Care Provider: Kosta Sidhu MD        Subjective:     Principal Problem:Unspecified open wound, left hip, subsequent encounter        HPI:    42-year-old male with past medical history of cervical spine injury, quadriplegic, COPD was admitted to the hospital after surgical intervention.    Patient has been having a left hip infection.  Osteomyelitis of the hip.  This infections been going on for significant amount of time for which patient has been treated in the past and does wound care as outpatient.  He had a scheduled I and D of the left hip which appears to be infected with possible underlying osteomyelitis.    After surgery patient was transferred on the floor placed on IV antibiotic.    Noticed post surgical  hypotension.  Hospital Medicine was consulted to assist with management of  COPD and hypotension.      Overview/Hospital Course:  12/15/2022.    Overall patient doing much better.  Blood pressure, heart rate improved   X-ray of the hip consistent with infection questionable osteomyelitis.    Continue IV antibiotic   Continue wound care.  Follow up with surgery.  Patient is scheduled to go to LTAC for long-term IV antibiotics      Interval History:  Patient doing well.  No complaints.    Review of Systems   Constitutional:  Positive for activity change and fatigue. Negative for appetite change and fever.   Eyes: Negative.    Respiratory: Negative.     Gastrointestinal:  Positive for abdominal pain.   Musculoskeletal:         Left hip and sacral decubitus unchanged   All other systems reviewed and are negative.  Objective:     Vital Signs (Most Recent):  Temp: 98.4 °F (36.9 °C) (12/15/22 1231)  Pulse: 81 (12/15/22 1231)  Resp: 18  (12/15/22 1231)  BP: (!) 93/57 (12/15/22 1231)  SpO2: 97 % (12/15/22 1231)   Vital Signs (24h Range):  Temp:  [97.5 °F (36.4 °C)-99.4 °F (37.4 °C)] 98.4 °F (36.9 °C)  Pulse:  [] 81  Resp:  [18-20] 18  SpO2:  [88 %-97 %] 97 %  BP: ()/(47-97) 93/57     Weight: 74.8 kg (164 lb 14.5 oz)  Body mass index is 23.66 kg/m².    Intake/Output Summary (Last 24 hours) at 12/15/2022 1320  Last data filed at 12/15/2022 0951  Gross per 24 hour   Intake 1427 ml   Output 2925 ml   Net -1498 ml      Physical Exam  Vitals and nursing note reviewed.   Constitutional:       General: He is not in acute distress.     Appearance: He is ill-appearing. He is not toxic-appearing or diaphoretic.   HENT:      Head: Normocephalic and atraumatic.      Mouth/Throat:      Pharynx: Oropharynx is clear.   Eyes:      Extraocular Movements: Extraocular movements intact.      Pupils: Pupils are equal, round, and reactive to light.   Cardiovascular:      Rate and Rhythm: Normal rate and regular rhythm.   Pulmonary:      Comments: Coarse breathing bilaterally.  Abdominal:      General: There is distension.      Palpations: Abdomen is soft.      Tenderness: There is no abdominal tenderness. There is no right CVA tenderness or guarding.   Musculoskeletal:      Cervical back: Normal range of motion.      Comments: Flexure contractures into the knee.  Muscle wasting upper lower extremity.  Flaccid muscle with some twitching   Neurological:      Mental Status: He is alert.      Comments: Quadriplegic       Significant Labs: All pertinent labs within the past 24 hours have been reviewed.  Recent Lab Results         12/15/22  0333   12/15/22  0332        Albumin/Globulin Ratio   1.0       Albumin   3.4       Alkaline Phosphatase   82       ALT   10       AST   14       Baso #   0.02       Basophil %   0.2       BILIRUBIN TOTAL   0.5       BUN   11.0       Calcium   9.2       Chloride   103       CO2   24       Creatinine   0.60       CRP   44.90        eGFR   >60       Eos #   0.00       Eosinophil %   0.0       Globulin, Total   3.5       Glucose   127       HEMATOCRIT   40.3       HEMOGLOBIN   12.6       Immature Grans (Abs)   0.04       Immature Granulocytes   0.4       Lymph #   1.95       LYMPH %   17.5       Magnesium   2.10       MCH   27.2       MCHC   31.3       MCV   87.0       Mono #   0.77       Mono %   6.9       MPV   9.5       Neut #   8.37       Neut %   75.0       Phosphorus   2.8       Platelets   287       Potassium   4.1       PROTEIN TOTAL   6.9       RBC   4.63       RDW   15.9       Sed Rate 22         Sodium   137       WBC   11.2             Narrative & Impression  EXAMINATION:  Three radiographic views of the LEFT HIP.     CLINICAL HISTORY:  Rule out osteomyelitis of the hip;     TECHNIQUE:  3 radiographic views of the LEFT HIP.     COMPARISON:  Left hip radiograph 06/13/2022.     FINDINGS:  Three views of the left hip demonstrate no change in the fracture deformity of the left femur.  There is extensive soft tissue swelling and subcutaneous air lateral to the left hip.  There is periosteal reaction of the greater trochanter.     Impression:     Chronic fracture deformity of the left femur with extensive adjacent soft tissue swelling and subcutaneous air with periosteal reaction of the trick rater trochanter suspicious for osteomyelitis.  MRI with and without IV contrast would be beneficial.        Electronically signed by: Ryne Lindsay MD  Date:                                            12/14/2022  Time:                                           18:08           Exam Ended: 12/14/22 17:55             Assessment/Plan:      * Unspecified open wound, left hip, subsequent encounter  Continue IV antibiotic   Continue wound care.    Follow up with culture and biopsy of the hip      Hypotension  Postsurgical hypotension resolved      Chronic obstructive pulmonary disease   Nebulizer treatment as needed      Pressure injury of sacral region, stage  4    Continue local care.      Quadriplegia  Continue supportive care      Muscle spasms of lower extremity    Continue home medication.      VTE Risk Mitigation (From admission, onward)         Ordered     enoxaparin injection 40 mg  Daily         12/14/22 1336     IP VTE HIGH RISK PATIENT  Once         12/14/22 1336     Place sequential compression device  Until discontinued         12/14/22 1336                Discharge Planning   ASHLEE:      Code Status: Not on file   Is the patient medically ready for discharge?:     Reason for patient still in hospital (select all that apply): Treatment                     Hernandez Cortez MD  Department of Hospital Medicine   [facility] - [facility] Surgical Unit

## 2022-12-16 ENCOUNTER — HOSPITAL ENCOUNTER (OUTPATIENT)
Dept: RADIOLOGY | Facility: HOSPITAL | Age: 42
Discharge: HOME | End: 2022-12-16
Attending: FAMILY MEDICINE
Payer: MEDICARE

## 2022-12-16 LAB — BACTERIA SPEC CULT: ABNORMAL

## 2022-12-16 PROCEDURE — 71045 X-RAY EXAM CHEST 1 VIEW: CPT | Mod: TC

## 2022-12-18 LAB
BACTERIA SPEC ANAEROBE CULT: ABNORMAL
BACTERIA SPEC CULT: ABNORMAL

## 2022-12-19 LAB
BACTERIA BLD CULT: NORMAL
BACTERIA BLD CULT: NORMAL
BACTERIA SPEC ANAEROBE CULT: ABNORMAL

## 2023-01-18 NOTE — DISCHARGE SUMMARY
[facility]  Medical Surgical Unit  General Surgery  Discharge Summary      Patient Name: Werner Jarrett  MRN: [id number]  Admission Date: 12/14/2022  Hospital Length of Stay: 0 days  Discharge Date and Time: 12/15/2022  6:18 PM  Attending Physician: No att. providers found   Discharging Provider: Jez Jara MD  Primary Care Provider: Kosta Sidhu MD     HPI: 42M with left hip wound    Procedure(s) (LRB):  Incision and drainage Hip (I&D debridement of left hip wound, left hip and femur) (Left)     Hospital Course: Patient was admitted after debridement in the OR with tissue sampling for culture and PCR testing.  He did well and was accepted to LTAC fairly quickly.  Please see chart for additional details.     Consults:     Significant Diagnostic Studies: see chart    Pending Diagnostic Studies:       None          Final Active Diagnoses:    Diagnosis Date Noted POA    PRINCIPAL PROBLEM:  Unspecified open wound, left hip, subsequent encounter [S71.002D] 05/24/2022 Not Applicable     Chronic    Hypotension [I95.9] 12/14/2022 Yes     Chronic    Muscle spasms of lower extremity [M62.838] 12/14/2022 Yes     Chronic    Chronic obstructive pulmonary disease [J44.9] 05/24/2022 Yes     Chronic    Paraplegia [G82.20] 05/24/2022 Yes    Pressure injury of sacral region, stage 4 [L89.154] 05/24/2022 Yes     Chronic    Quadriplegia [G82.50] 05/24/2022 Yes     Chronic      Problems Resolved During this Admission:      Discharged Condition: good    Disposition: Long Term Care    Follow Up:    Patient Instructions:   No discharge procedures on file.  Medications:  Reconciled Home Medications:      Medication List        ASK your doctor about these medications      acetaminophen 325 MG tablet  Commonly known as: TYLENOL  Take 650 mg by mouth every 6 (six) hours as needed for Pain.     ascorbic acid (vitamin C) 1000 MG tablet  Commonly known as: VITAMIN C  Take 1,000 mg by mouth every evening.      bisacodyL 10 mg Supp  Commonly known as: DULCOLAX  Place 10 mg rectally daily as needed.     busPIRone 5 MG Tab  Commonly known as: BUSPAR  Take 10 mg by mouth 2 (two) times daily.     CENTRUM ORAL  Take 1 tablet by mouth every morning.     DULoxetine 30 MG capsule  Commonly known as: CYMBALTA  Take 30 mg by mouth 2 (two) times daily.     fexofenadine 180 MG tablet  Commonly known as: ALLEGRA  Take 180 mg by mouth every morning.     fluticasone propionate 50 mcg/actuation nasal spray  Commonly known as: FLONASE  1 spray by Each Nostril route daily as needed for Rhinitis.     meloxicam 7.5 MG tablet  Commonly known as: MOBIC  Take 7.5 mg by mouth 2 (two) times daily as needed for Pain.     zinc gluconate 50 mg tablet  Take 50 mg by mouth every evening.              Jez Jara MD  General Surgery  [facility] - Medical Surgical Unit

## 2023-01-19 ENCOUNTER — HOSPITAL ENCOUNTER (OUTPATIENT)
Dept: WOUND CARE | Facility: HOSPITAL | Age: 43
Discharge: HOME | End: 2023-01-19
Attending: NURSE PRACTITIONER
Payer: MEDICARE

## 2023-01-19 VITALS
HEIGHT: 70 IN | RESPIRATION RATE: 18 BRPM | WEIGHT: 164 LBS | DIASTOLIC BLOOD PRESSURE: 77 MMHG | BODY MASS INDEX: 23.48 KG/M2 | SYSTOLIC BLOOD PRESSURE: 132 MMHG | HEART RATE: 89 BPM

## 2023-01-19 DIAGNOSIS — S71.002D UNSPECIFIED OPEN WOUND, LEFT HIP, SUBSEQUENT ENCOUNTER: ICD-10-CM

## 2023-01-19 DIAGNOSIS — L89.154 PRESSURE INJURY OF SACRAL REGION, STAGE 4: Primary | ICD-10-CM

## 2023-01-19 DIAGNOSIS — G82.50 QUADRIPLEGIA: ICD-10-CM

## 2023-01-19 PROCEDURE — 99213 OFFICE O/P EST LOW 20 MIN: CPT | Mod: 25

## 2023-01-19 PROCEDURE — 27000999 HC MEDICAL RECORD PHOTO DOCUMENTATION

## 2023-01-19 PROCEDURE — 97597 DBRDMT OPN WND 1ST 20 CM/<: CPT

## 2023-01-19 RX ORDER — PIPERACILLIN SODIUM, TAZOBACTAM SODIUM 36; 4.5 G/152ML; G/152ML
INJECTION, POWDER, LYOPHILIZED, FOR SOLUTION INTRAVENOUS
COMMUNITY
Start: 2023-01-17 | End: 2023-01-30 | Stop reason: ALTCHOICE
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
## 2023-01-19 NOTE — PROCEDURES
"Debridement    Date/Time: 1/19/2023 11:20 AM  Performed by: Ashley Coto NP  Authorized by: Ashley Coto NP     Time out: Immediately prior to procedure a "time out" was called to verify the correct patient, procedure, equipment, support staff and site/side marked as required.    Consent Done?:  Yes (Verbal)  Local anesthesia used?: No      Wound Details:    Location:  Left ankle    Type of Debridement:  Non-excisional       Length (cm):  1.1       Area (sq cm):  1.1       Width (cm):  1       Percent Debrided (%):  100       Depth (cm):  0.2       Total Area Debrided (sq cm):  1.1    Depth of debridement:  Epidermis/Dermis    Devitalized tissue debrided:  Biofilm, Callus, Exudate and Fibrin    Instruments:  Curette (Dermal)     TR  "

## 2023-01-30 ENCOUNTER — HOSPITAL ENCOUNTER (OUTPATIENT)
Dept: WOUND CARE | Facility: HOSPITAL | Age: 43
Discharge: HOME | End: 2023-01-30
Attending: NURSE PRACTITIONER
Payer: MEDICARE

## 2023-01-30 VITALS
HEART RATE: 80 BPM | WEIGHT: 164 LBS | BODY MASS INDEX: 23.48 KG/M2 | DIASTOLIC BLOOD PRESSURE: 76 MMHG | RESPIRATION RATE: 18 BRPM | HEIGHT: 70 IN | SYSTOLIC BLOOD PRESSURE: 128 MMHG

## 2023-01-30 DIAGNOSIS — G82.50 QUADRIPLEGIA: ICD-10-CM

## 2023-01-30 DIAGNOSIS — L89.154 PRESSURE INJURY OF SACRAL REGION, STAGE 4: Primary | ICD-10-CM

## 2023-01-30 DIAGNOSIS — S71.002D UNSPECIFIED OPEN WOUND, LEFT HIP, SUBSEQUENT ENCOUNTER: ICD-10-CM

## 2023-01-30 PROCEDURE — 99213 OFFICE O/P EST LOW 20 MIN: CPT | Mod: 25

## 2023-01-30 PROCEDURE — 27000999 HC MEDICAL RECORD PHOTO DOCUMENTATION

## 2023-01-30 PROCEDURE — 97597 DBRDMT OPN WND 1ST 20 CM/<: CPT

## 2023-01-30 NOTE — PROCEDURES
"Debridement    Date/Time: 1/30/2023 11:00 AM  Performed by: Ashley Coto NP  Authorized by: Ashley Coto NP     Time out: Immediately prior to procedure a "time out" was called to verify the correct patient, procedure, equipment, support staff and site/side marked as required.    Consent Done?:  Yes (Verbal)  Local anesthesia used?: No      Wound Details:    Location:  Left foot    Location:  Left Ankle    Type of Debridement:  Non-excisional       Length (cm):  1       Area (sq cm):  1.2       Width (cm):  1.2       Percent Debrided (%):  100       Depth (cm):  0.2       Total Area Debrided (sq cm):  1.2    Depth of debridement:  Epidermis/Dermis    Devitalized tissue debrided:  Biofilm, Callus, Exudate and Fibrin    Instruments:  Curette (Dermal)     CT  "
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
## 2023-02-13 ENCOUNTER — HOSPITAL ENCOUNTER (OUTPATIENT)
Dept: WOUND CARE | Facility: HOSPITAL | Age: 43
Discharge: HOME | End: 2023-02-13
Attending: NURSE PRACTITIONER
Payer: MEDICARE

## 2023-02-13 VITALS
SYSTOLIC BLOOD PRESSURE: 134 MMHG | BODY MASS INDEX: 23.48 KG/M2 | HEIGHT: 70 IN | HEART RATE: 68 BPM | DIASTOLIC BLOOD PRESSURE: 81 MMHG | RESPIRATION RATE: 18 BRPM | WEIGHT: 164 LBS

## 2023-02-13 DIAGNOSIS — S71.002D UNSPECIFIED OPEN WOUND, LEFT HIP, SUBSEQUENT ENCOUNTER: ICD-10-CM

## 2023-02-13 DIAGNOSIS — G82.50 QUADRIPLEGIA: ICD-10-CM

## 2023-02-13 DIAGNOSIS — L89.154 PRESSURE INJURY OF SACRAL REGION, STAGE 4: Primary | ICD-10-CM

## 2023-02-13 PROCEDURE — 27000999 HC MEDICAL RECORD PHOTO DOCUMENTATION

## 2023-02-13 PROCEDURE — 99213 OFFICE O/P EST LOW 20 MIN: CPT

## 2023-02-13 RX ORDER — SULFAMETHOXAZOLE AND TRIMETHOPRIM 800; 160 MG/1; MG/1
1 TABLET ORAL EVERY 12 HOURS
COMMUNITY
Start: 2023-02-07 | End: 2023-03-27 | Stop reason: ALTCHOICE

## 2023-02-13 RX ORDER — BUSPIRONE HYDROCHLORIDE 10 MG/1
10 TABLET ORAL 2 TIMES DAILY
COMMUNITY
Start: 2022-11-21 | End: 2023-05-29
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
## 2023-02-27 ENCOUNTER — HOSPITAL ENCOUNTER (OUTPATIENT)
Dept: WOUND CARE | Facility: HOSPITAL | Age: 43
Discharge: HOME | End: 2023-02-27
Attending: NURSE PRACTITIONER
Payer: MEDICARE

## 2023-02-27 VITALS
BODY MASS INDEX: 23.48 KG/M2 | SYSTOLIC BLOOD PRESSURE: 138 MMHG | HEIGHT: 70 IN | WEIGHT: 164 LBS | DIASTOLIC BLOOD PRESSURE: 78 MMHG | RESPIRATION RATE: 18 BRPM | HEART RATE: 74 BPM

## 2023-02-27 DIAGNOSIS — L89.154 PRESSURE INJURY OF SACRAL REGION, STAGE 4: Primary | ICD-10-CM

## 2023-02-27 DIAGNOSIS — G82.50 QUADRIPLEGIA: ICD-10-CM

## 2023-02-27 DIAGNOSIS — S71.002D UNSPECIFIED OPEN WOUND, LEFT HIP, SUBSEQUENT ENCOUNTER: ICD-10-CM

## 2023-02-27 PROCEDURE — 27000999 HC MEDICAL RECORD PHOTO DOCUMENTATION

## 2023-02-27 PROCEDURE — 99213 OFFICE O/P EST LOW 20 MIN: CPT
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
## 2023-03-02 ENCOUNTER — DOCUMENTATION ONLY (OUTPATIENT)
Dept: WOUND CARE | Facility: HOSPITAL | Age: 43
End: 2023-03-02
Payer: MEDICARE

## 2023-03-02 NOTE — PROGRESS NOTES
VM left notifying patient that culture results have been reviewed; Bactrim is indicated - continue taking as ordered by Dr. Delgado.  Call with questions.

## 2023-03-06 ENCOUNTER — HOSPITAL ENCOUNTER (OUTPATIENT)
Dept: WOUND CARE | Facility: HOSPITAL | Age: 43
Discharge: HOME | End: 2023-03-06
Attending: NURSE PRACTITIONER
Payer: MEDICARE

## 2023-03-06 VITALS
BODY MASS INDEX: 23.48 KG/M2 | SYSTOLIC BLOOD PRESSURE: 88 MMHG | DIASTOLIC BLOOD PRESSURE: 53 MMHG | WEIGHT: 164 LBS | RESPIRATION RATE: 18 BRPM | HEART RATE: 106 BPM | HEIGHT: 70 IN

## 2023-03-06 DIAGNOSIS — L89.224 PRESSURE INJURY OF LEFT HIP, STAGE 4: ICD-10-CM

## 2023-03-06 DIAGNOSIS — G82.50 QUADRIPLEGIA: ICD-10-CM

## 2023-03-06 DIAGNOSIS — L89.154 PRESSURE INJURY OF SACRAL REGION, STAGE 4: Primary | ICD-10-CM

## 2023-03-06 PROCEDURE — 27000999 HC MEDICAL RECORD PHOTO DOCUMENTATION

## 2023-03-06 PROCEDURE — 99213 OFFICE O/P EST LOW 20 MIN: CPT

## 2023-03-07 ENCOUNTER — ANESTHESIA EVENT (OUTPATIENT)
Dept: SURGERY | Facility: HOSPITAL | Age: 43
DRG: 579 | End: 2023-03-07
Payer: MEDICARE

## 2023-03-07 PROBLEM — L89.224 PRESSURE INJURY OF LEFT HIP, STAGE 4: Status: ACTIVE | Noted: 2023-03-07

## 2023-03-07 RX ORDER — HYDROCODONE BITARTRATE AND ACETAMINOPHEN 10; 325 MG/1; MG/1
1 TABLET ORAL EVERY 8 HOURS PRN
COMMUNITY
End: 2023-03-27 | Stop reason: ALTCHOICE

## 2023-03-07 RX ORDER — CETIRIZINE HYDROCHLORIDE 10 MG/1
10 TABLET ORAL 2 TIMES DAILY
COMMUNITY
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
## 2023-03-08 ENCOUNTER — HOSPITAL ENCOUNTER (OUTPATIENT)
Dept: CARDIOLOGY | Facility: HOSPITAL | Age: 43
Discharge: HOME | End: 2023-03-08
Attending: SURGERY
Payer: MEDICARE

## 2023-03-08 ENCOUNTER — HOSPITAL ENCOUNTER (OUTPATIENT)
Dept: RADIOLOGY | Facility: HOSPITAL | Age: 43
Discharge: HOME | End: 2023-03-08
Attending: SURGERY
Payer: MEDICARE

## 2023-03-08 DIAGNOSIS — R93.89 ABNORMAL CXR: Primary | ICD-10-CM

## 2023-03-08 DIAGNOSIS — R93.89 ABNORMAL CXR: ICD-10-CM

## 2023-03-08 DIAGNOSIS — R94.31 EKG ABNORMALITIES: ICD-10-CM

## 2023-03-08 DIAGNOSIS — R94.31 EKG ABNORMALITIES: Primary | ICD-10-CM

## 2023-03-08 PROCEDURE — 71046 X-RAY EXAM CHEST 2 VIEWS: CPT | Mod: TC

## 2023-03-08 PROCEDURE — 93005 ELECTROCARDIOGRAM TRACING: CPT

## 2023-03-08 PROCEDURE — 93041 RHYTHM ECG TRACING: CPT

## 2023-03-08 PROCEDURE — 93010 EKG 12-LEAD: ICD-10-PCS | Mod: ,,, | Performed by: INTERNAL MEDICINE

## 2023-03-08 PROCEDURE — 93010 ELECTROCARDIOGRAM REPORT: CPT | Mod: ,,, | Performed by: INTERNAL MEDICINE

## 2023-03-10 ENCOUNTER — ANESTHESIA (OUTPATIENT)
Dept: SURGERY | Facility: HOSPITAL | Age: 43
DRG: 579 | End: 2023-03-10
Payer: MEDICARE

## 2023-03-10 ENCOUNTER — HOSPITAL ENCOUNTER (INPATIENT)
Facility: HOSPITAL | Age: 43
LOS: 4 days | Discharge: HOME HEALTH | DRG: 579 | End: 2023-03-14
Attending: SURGERY | Admitting: SURGERY
Payer: MEDICARE

## 2023-03-10 DIAGNOSIS — L89.224 PRESSURE INJURY OF LEFT HIP, STAGE 4: Primary | ICD-10-CM

## 2023-03-10 DIAGNOSIS — S73.025D: ICD-10-CM

## 2023-03-10 DIAGNOSIS — S71.002D: ICD-10-CM

## 2023-03-10 PROBLEM — S71.002A OPEN WOUND OF LEFT HIP: Status: ACTIVE | Noted: 2022-05-24

## 2023-03-10 PROCEDURE — 37000009 HC ANESTHESIA EA ADD 15 MINS: Performed by: SURGERY

## 2023-03-10 PROCEDURE — 27201423 OPTIME MED/SURG SUP & DEVICES STERILE SUPPLY: Performed by: SURGERY

## 2023-03-10 PROCEDURE — 63600175 PHARM REV CODE 636 W HCPCS: Performed by: SURGERY

## 2023-03-10 PROCEDURE — 88305 TISSUE EXAM BY PATHOLOGIST: CPT | Performed by: SURGERY

## 2023-03-10 PROCEDURE — 63600175 PHARM REV CODE 636 W HCPCS: Performed by: NURSE ANESTHETIST, CERTIFIED REGISTERED

## 2023-03-10 PROCEDURE — 71000033 HC RECOVERY, INTIAL HOUR: Performed by: SURGERY

## 2023-03-10 PROCEDURE — 11000001 HC ACUTE MED/SURG PRIVATE ROOM

## 2023-03-10 PROCEDURE — 94761 N-INVAS EAR/PLS OXIMETRY MLT: CPT

## 2023-03-10 PROCEDURE — 25000003 PHARM REV CODE 250: Performed by: SURGERY

## 2023-03-10 PROCEDURE — S0039 INJECTION, SULFAMETHOXAZOLE: HCPCS | Performed by: SURGERY

## 2023-03-10 PROCEDURE — 37000008 HC ANESTHESIA 1ST 15 MINUTES: Performed by: SURGERY

## 2023-03-10 PROCEDURE — 94799 UNLISTED PULMONARY SVC/PX: CPT

## 2023-03-10 PROCEDURE — 87070 CULTURE OTHR SPECIMN AEROBIC: CPT | Performed by: SURGERY

## 2023-03-10 PROCEDURE — 36000705 HC OR TIME LEV I EA ADD 15 MIN: Performed by: SURGERY

## 2023-03-10 PROCEDURE — 99900031 HC PATIENT EDUCATION (STAT)

## 2023-03-10 PROCEDURE — 36000704 HC OR TIME LEV I 1ST 15 MIN: Performed by: SURGERY

## 2023-03-10 PROCEDURE — 87075 CULTR BACTERIA EXCEPT BLOOD: CPT | Performed by: SURGERY

## 2023-03-10 PROCEDURE — 25000003 PHARM REV CODE 250: Performed by: NURSE ANESTHETIST, CERTIFIED REGISTERED

## 2023-03-10 RX ORDER — ONDANSETRON 2 MG/ML
4 INJECTION INTRAMUSCULAR; INTRAVENOUS EVERY 6 HOURS PRN
Status: DISCONTINUED | OUTPATIENT
Start: 2023-03-10 | End: 2023-03-14 | Stop reason: HOSPADM

## 2023-03-10 RX ORDER — PHENYLEPHRINE HCL IN 0.9% NACL 1 MG/10 ML
SYRINGE (ML) INTRAVENOUS
Status: DISCONTINUED | OUTPATIENT
Start: 2023-03-10 | End: 2023-03-10

## 2023-03-10 RX ORDER — VASOPRESSIN 20 [USP'U]/ML
INJECTION, SOLUTION INTRAMUSCULAR; SUBCUTANEOUS
Status: DISCONTINUED | OUTPATIENT
Start: 2023-03-10 | End: 2023-03-10

## 2023-03-10 RX ORDER — OXYCODONE HYDROCHLORIDE 5 MG/1
5 TABLET ORAL EVERY 4 HOURS PRN
Status: DISCONTINUED | OUTPATIENT
Start: 2023-03-10 | End: 2023-03-14 | Stop reason: HOSPADM

## 2023-03-10 RX ORDER — FAMOTIDINE 20 MG/1
20 TABLET, FILM COATED ORAL 2 TIMES DAILY
Status: DISCONTINUED | OUTPATIENT
Start: 2023-03-10 | End: 2023-03-14 | Stop reason: HOSPADM

## 2023-03-10 RX ORDER — ENOXAPARIN SODIUM 100 MG/ML
40 INJECTION SUBCUTANEOUS EVERY 24 HOURS
Status: DISCONTINUED | OUTPATIENT
Start: 2023-03-10 | End: 2023-03-14 | Stop reason: HOSPADM

## 2023-03-10 RX ORDER — ONDANSETRON 4 MG/1
8 TABLET, ORALLY DISINTEGRATING ORAL EVERY 8 HOURS PRN
Status: DISCONTINUED | OUTPATIENT
Start: 2023-03-10 | End: 2023-03-14 | Stop reason: HOSPADM

## 2023-03-10 RX ORDER — MUPIROCIN 20 MG/G
OINTMENT TOPICAL 2 TIMES DAILY
Status: DISCONTINUED | OUTPATIENT
Start: 2023-03-10 | End: 2023-03-14 | Stop reason: HOSPADM

## 2023-03-10 RX ORDER — ACETAMINOPHEN 325 MG/1
650 TABLET ORAL EVERY 8 HOURS PRN
Status: DISCONTINUED | OUTPATIENT
Start: 2023-03-10 | End: 2023-03-14 | Stop reason: HOSPADM

## 2023-03-10 RX ORDER — TALC
6 POWDER (GRAM) TOPICAL NIGHTLY PRN
Status: DISCONTINUED | OUTPATIENT
Start: 2023-03-10 | End: 2023-03-14 | Stop reason: HOSPADM

## 2023-03-10 RX ORDER — LIDOCAINE HYDROCHLORIDE 10 MG/ML
1 INJECTION, SOLUTION EPIDURAL; INFILTRATION; INTRACAUDAL; PERINEURAL ONCE AS NEEDED
Status: DISCONTINUED | OUTPATIENT
Start: 2023-03-10 | End: 2023-03-14 | Stop reason: HOSPADM

## 2023-03-10 RX ORDER — LIDOCAINE HCL/PF 100 MG/5ML
SYRINGE (ML) INTRAVENOUS
Status: DISCONTINUED | OUTPATIENT
Start: 2023-03-10 | End: 2023-03-10

## 2023-03-10 RX ORDER — NALOXONE HCL 0.4 MG/ML
0.02 VIAL (ML) INJECTION
Status: DISCONTINUED | OUTPATIENT
Start: 2023-03-10 | End: 2023-03-14 | Stop reason: HOSPADM

## 2023-03-10 RX ORDER — SODIUM CHLORIDE 0.9 % (FLUSH) 0.9 %
10 SYRINGE (ML) INJECTION
Status: DISCONTINUED | OUTPATIENT
Start: 2023-03-10 | End: 2023-03-14 | Stop reason: HOSPADM

## 2023-03-10 RX ORDER — CALCIUM CHLORIDE INJECTION 100 MG/ML
INJECTION, SOLUTION INTRAVENOUS
Status: DISCONTINUED | OUTPATIENT
Start: 2023-03-10 | End: 2023-03-10

## 2023-03-10 RX ORDER — GLYCOPYRROLATE 0.2 MG/ML
INJECTION INTRAMUSCULAR; INTRAVENOUS
Status: DISCONTINUED | OUTPATIENT
Start: 2023-03-10 | End: 2023-03-10

## 2023-03-10 RX ORDER — HYDROMORPHONE HYDROCHLORIDE 2 MG/ML
0.5 INJECTION, SOLUTION INTRAMUSCULAR; INTRAVENOUS; SUBCUTANEOUS EVERY 6 HOURS PRN
Status: DISCONTINUED | OUTPATIENT
Start: 2023-03-10 | End: 2023-03-14 | Stop reason: HOSPADM

## 2023-03-10 RX ORDER — MIDAZOLAM HYDROCHLORIDE 1 MG/ML
INJECTION INTRAMUSCULAR; INTRAVENOUS
Status: DISCONTINUED | OUTPATIENT
Start: 2023-03-10 | End: 2023-03-10

## 2023-03-10 RX ORDER — FENTANYL CITRATE 50 UG/ML
INJECTION, SOLUTION INTRAMUSCULAR; INTRAVENOUS
Status: DISCONTINUED | OUTPATIENT
Start: 2023-03-10 | End: 2023-03-10

## 2023-03-10 RX ORDER — PROPOFOL 10 MG/ML
INJECTION, EMULSION INTRAVENOUS
Status: DISCONTINUED | OUTPATIENT
Start: 2023-03-10 | End: 2023-03-10

## 2023-03-10 RX ADMIN — FENTANYL CITRATE 50 MCG: 50 INJECTION, SOLUTION INTRAMUSCULAR; INTRAVENOUS at 02:03

## 2023-03-10 RX ADMIN — OXYCODONE HYDROCHLORIDE 5 MG: 5 TABLET ORAL at 09:03

## 2023-03-10 RX ADMIN — ACETAMINOPHEN 325MG 650 MG: 325 TABLET ORAL at 08:03

## 2023-03-10 RX ADMIN — Medication 300 MCG: at 02:03

## 2023-03-10 RX ADMIN — Medication 6 MG: at 08:03

## 2023-03-10 RX ADMIN — SULFAMETHOXAZOLE AND TRIMETHOPRIM 371.8 MG: 80; 16 INJECTION INTRAVENOUS at 01:03

## 2023-03-10 RX ADMIN — VASOPRESSIN 2 UNITS: 20 INJECTION INTRAVENOUS at 03:03

## 2023-03-10 RX ADMIN — FENTANYL CITRATE 50 MCG: 50 INJECTION, SOLUTION INTRAMUSCULAR; INTRAVENOUS at 03:03

## 2023-03-10 RX ADMIN — PROPOFOL 10 MG: 10 INJECTION, EMULSION INTRAVENOUS at 02:03

## 2023-03-10 RX ADMIN — GLYCOPYRROLATE 0.2 MG: 0.2 INJECTION INTRAMUSCULAR; INTRAVENOUS at 02:03

## 2023-03-10 RX ADMIN — LIDOCAINE HYDROCHLORIDE 80 MG: 20 INJECTION, SOLUTION INTRAVENOUS at 02:03

## 2023-03-10 RX ADMIN — FAMOTIDINE 20 MG: 20 TABLET, FILM COATED ORAL at 08:03

## 2023-03-10 RX ADMIN — ENOXAPARIN SODIUM 40 MG: 40 INJECTION SUBCUTANEOUS at 05:03

## 2023-03-10 RX ADMIN — CALCIUM CHLORIDE INJECTION 1 G: 100 INJECTION, SOLUTION INTRAVENOUS at 02:03

## 2023-03-10 RX ADMIN — VASOPRESSIN 3 UNITS: 20 INJECTION INTRAVENOUS at 02:03

## 2023-03-10 RX ADMIN — MIDAZOLAM HYDROCHLORIDE 2 MG: 1 INJECTION, SOLUTION INTRAMUSCULAR; INTRAVENOUS at 01:03

## 2023-03-10 RX ADMIN — CEFEPIME 1 G: 1 INJECTION, POWDER, FOR SOLUTION INTRAMUSCULAR; INTRAVENOUS at 05:03

## 2023-03-10 RX ADMIN — SODIUM CHLORIDE, SODIUM GLUCONATE, SODIUM ACETATE, POTASSIUM CHLORIDE AND MAGNESIUM CHLORIDE: 526; 502; 368; 37; 30 INJECTION, SOLUTION INTRAVENOUS at 01:03

## 2023-03-10 RX ADMIN — MUPIROCIN: 20 OINTMENT TOPICAL at 09:03

## 2023-03-10 NOTE — OP NOTE
[facility]                       [address] [place] [address]    PATIENT NAME:      TERENCE CONNELL  YOB: 1980  CSN:               [id number]  MRN:               [id number]  ADMIT DATE:        03/10/2023 09:05:00  PHYSICIAN:         Sandy Jara MD                          OPERATIVE REPORT      DATE OF SURGERY:    03/10/2023 00:00:00    SURGEON:  Sandy Jara MD    PREOPERATIVE DIAGNOSES:    1. Chronic wound to the left hip with recurrent infection.  2. Paraplegia with wheelchair dependence.  3. Chronic tobacco abuse.    POSTOPERATIVE DIAGNOSES:    1. Chronic wound to the left hip with recurrent infection.  2. Paraplegia with wheelchair dependence.  3. Chronic tobacco abuse.    PROCEDURE:  Incision and drainage with debridement of left hip wound.    ASSISTANT:  OR staff.    COMPLICATIONS:  None.    FINDINGS:  Left hip wound opened with dimensions of wound after opening 7 x 6   cm. The area of sharp and blunt debridement measured 4 x 3 cm.  There was   fibrinopurulent material within the cavity that was foul smelling.  This was   cultured with a swab and also some of the tissue was sent for culture.  Some of   the tissue was also sent for Pathology.    DESCRIPTION OF PROCEDURE:  Patient was brought to the OR, placed in supine   position, anesthesia was induced.  LMA was used to control the airway.  The   patient was placed in lateral decubitus position with the left side up.  After   time-out was carried out and agreed upon, the wound was opened superiorly and   inferiorly and also medially and laterally.  There was some fibrinopurulent   material that was identified.  This was cultured with swabs and the additional   material was retrieved with a 15 blade scalpel.  The area of tunneling was 2 cm   posteriorly, 1 cm anteriorly.  The total area of excisional debridement was   about 4 x 4 cm.  The total  wound dimension was 6 x 7 cm.  Hemostasis was   ensured.  Irrigation was carried out.  A white sponge was placed within the   cavity at the area of the tunneling with a black sponge placed on top of that   and a Prevena was placed on top of the sponge for suction.  The patient   tolerated the procedure well.  There were no complications.        ______________________________  MD SASHA Lara/JACQUIE  DD:  03/10/2023  Time:  03:37PM  DT:  03/10/2023  Time:  04:07PM  Job #:  [id number]      OPERATIVE REPORT

## 2023-03-10 NOTE — PLAN OF CARE
[facility] Authorization form uploaded. Notified [facility] regarding wound vac order. Wound Vac dressing change order faxed to [facility] at [phone]. Pending approval for [facility] wound vac.     Spoke to Ryne in surgery, Dr. Sidhu will place a provena. Notified [facility] to cancel order.

## 2023-03-10 NOTE — TRANSFER OF CARE
"Anesthesia Transfer of Care Note    Patient: Werner Jarrett    Procedure(s) Performed: Procedure(s) (LRB):  Incision and Drainage (Left)    Patient location: PACU    Anesthesia Type: general    Transport from OR: Transported from OR on room air with adequate spontaneous ventilation    Post pain: adequate analgesia    Post assessment: no apparent anesthetic complications and tolerated procedure well    Post vital signs: stable    Level of consciousness: awake, alert and oriented    Nausea/Vomiting: no nausea/vomiting    Complications: none    Transfer of care protocol was followed      Last vitals:   Visit Vitals  /76   Pulse 82   Temp 36.9 °C (98.4 °F) (Oral)   Resp 19   Ht 5' 10" (1.778 m)   Wt 74.4 kg (164 lb)   SpO2 97%   BMI 23.53 kg/m²     "

## 2023-03-10 NOTE — ANESTHESIA PREPROCEDURE EVALUATION
03/10/2023  Werner Jarrett is a 42 y.o., male.    Other Medical History   COPD (chronic obstructive pulmonary disease) C5 vertebral fracture   Anxiety Depression   Osteomyelitis hip Heart murmur   Insomnia Arthritis     Surgical History  SPINE SURGERY wound debridements   MEDIPORT INSERTION, SINGLE INCISION AND DRAINAGE HIP     Prior gen/lma for washout of left hip without complications  C5 vertebral injury with subsequent quadriplegia, wheelchair dependent  Labs reviewed, mild anemia, thrombocytosis   ECG nsr, nonspecific T wave abnormality    LMA ok if supine    Lian SCHMIDT     Pre-op Assessment    I have reviewed the Patient Summary Reports.     I have reviewed the Nursing Notes. I have reviewed the NPO Status.   I have reviewed the Medications.     Review of Systems  Anesthesia Hx:  Denies Family Hx of Anesthesia complications.   Denies Personal Hx of Anesthesia complications.   Social:  Smoker, Alcohol Use    Hematology/Oncology:  Hematology Normal   Oncology Normal     EENT/Dental:EENT/Dental Normal   Cardiovascular:  Cardiovascular Normal  ECG has been reviewed.    Pulmonary:   COPD, moderate    Renal/:  Renal/ Normal     Hepatic/GI:  Hepatic/GI Normal    Musculoskeletal:  Musculoskeletal Normal    Neurological:   Neuromuscular Disease, Quadraplegia   Dermatological:  Skin Normal    Psych:   Psychiatric History          Physical Exam  General: Cooperative, Alert and Oriented    Airway:  Mallampati: II   Mouth Opening: Normal  TM Distance: Normal  Tongue: Normal  Neck ROM: Normal ROM    Dental:  Edentulous    Chest/Lungs:  Normal Respiratory Rate    Heart:  Rate: Normal        Anesthesia Plan  Type of Anesthesia, risks & benefits discussed:    Anesthesia Type: Gen Supraglottic Airway, Gen ETT  Intra-op Monitoring Plan: Standard ASA Monitors  Post Op Pain Control Plan: multimodal analgesia  Induction:   IV  Informed Consent: Informed consent signed with the Patient and all parties understand the risks and agree with anesthesia plan.  All questions answered.   ASA Score: 3  Day of Surgery Review of History & Physical: H&P Update referred to the surgeon/provider.    Ready For Surgery From Anesthesia Perspective.     .

## 2023-03-10 NOTE — ANESTHESIA POSTPROCEDURE EVALUATION
Anesthesia Post Evaluation    Patient: Werner Jarrett    Procedure(s) Performed: Procedure(s) (LRB):  Incision and Drainage (Left)    Final Anesthesia Type: general      Patient location during evaluation: PACU  Patient participation: Yes- Able to Participate  Level of consciousness: awake and alert  Post-procedure vital signs: reviewed and stable  Pain management: adequate  Airway patency: patent    PONV status at discharge: No PONV  Anesthetic complications: no      Cardiovascular status: blood pressure returned to baseline  Respiratory status: spontaneous ventilation and unassisted  Hydration status: euvolemic  Follow-up needed           Vitals Value Taken Time   /103 03/10/23 1542   Temp 36.6 °C (97.9 °F) 03/10/23 1530   Pulse 86 03/10/23 1544   Resp 18 03/10/23 1544   SpO2 98 % 03/10/23 1544   Vitals shown include unvalidated device data.      No case tracking events are documented in the log.      Pain/Alexx Score: Alexx Score: 8 (3/10/2023  3:30 PM)

## 2023-03-11 LAB
ANION GAP SERPL CALC-SCNC: 10 MEQ/L
BASOPHILS # BLD AUTO: 0.04 X10(3)/MCL (ref 0–0.2)
BASOPHILS NFR BLD AUTO: 0.6 %
BUN SERPL-MCNC: 9 MG/DL (ref 8.9–20.6)
CALCIUM SERPL-MCNC: 9 MG/DL (ref 8.4–10.2)
CHLORIDE SERPL-SCNC: 103 MMOL/L (ref 98–107)
CO2 SERPL-SCNC: 22 MMOL/L (ref 22–29)
CREAT SERPL-MCNC: 0.68 MG/DL (ref 0.73–1.18)
CREAT/UREA NIT SERPL: 13
EOSINOPHIL # BLD AUTO: 0.1 X10(3)/MCL (ref 0–0.9)
EOSINOPHIL NFR BLD AUTO: 1.6 %
ERYTHROCYTE [DISTWIDTH] IN BLOOD BY AUTOMATED COUNT: 17.1 % (ref 11.5–17)
GFR SERPLBLD CREATININE-BSD FMLA CKD-EPI: >60 MLS/MIN/1.73/M2
GLUCOSE SERPL-MCNC: 97 MG/DL (ref 74–100)
HCT VFR BLD AUTO: 35 % (ref 42–52)
HGB BLD-MCNC: 10.7 G/DL (ref 14–18)
IMM GRANULOCYTES # BLD AUTO: 0.03 X10(3)/MCL (ref 0–0.04)
IMM GRANULOCYTES NFR BLD AUTO: 0.5 %
LYMPHOCYTES # BLD AUTO: 1.65 X10(3)/MCL (ref 0.6–4.6)
LYMPHOCYTES NFR BLD AUTO: 25.8 %
MCH RBC QN AUTO: 25.2 PG
MCHC RBC AUTO-ENTMCNC: 30.6 G/DL (ref 33–36)
MCV RBC AUTO: 82.4 FL (ref 80–94)
MONOCYTES # BLD AUTO: 0.72 X10(3)/MCL (ref 0.1–1.3)
MONOCYTES NFR BLD AUTO: 11.3 %
NEUTROPHILS # BLD AUTO: 3.86 X10(3)/MCL (ref 2.1–9.2)
NEUTROPHILS NFR BLD AUTO: 60.2 %
NRBC BLD AUTO-RTO: 0 %
PLATELET # BLD AUTO: 528 X10(3)/MCL (ref 130–400)
PMV BLD AUTO: 8.4 FL (ref 7.4–10.4)
POTASSIUM SERPL-SCNC: 4.2 MMOL/L (ref 3.5–5.1)
RBC # BLD AUTO: 4.25 X10(6)/MCL (ref 4.7–6.1)
SODIUM SERPL-SCNC: 135 MMOL/L (ref 136–145)
WBC # SPEC AUTO: 6.4 X10(3)/MCL (ref 4.5–11.5)

## 2023-03-11 PROCEDURE — 85025 COMPLETE CBC W/AUTO DIFF WBC: CPT | Performed by: SURGERY

## 2023-03-11 PROCEDURE — 11000001 HC ACUTE MED/SURG PRIVATE ROOM

## 2023-03-11 PROCEDURE — 80048 BASIC METABOLIC PNL TOTAL CA: CPT | Performed by: SURGERY

## 2023-03-11 PROCEDURE — 63600175 PHARM REV CODE 636 W HCPCS: Performed by: SURGERY

## 2023-03-11 PROCEDURE — 25000003 PHARM REV CODE 250: Performed by: SURGERY

## 2023-03-11 RX ADMIN — OXYCODONE HYDROCHLORIDE 5 MG: 5 TABLET ORAL at 11:03

## 2023-03-11 RX ADMIN — Medication 6 MG: at 09:03

## 2023-03-11 RX ADMIN — CEFEPIME 1 G: 1 INJECTION, POWDER, FOR SOLUTION INTRAMUSCULAR; INTRAVENOUS at 09:03

## 2023-03-11 RX ADMIN — ENOXAPARIN SODIUM 40 MG: 40 INJECTION SUBCUTANEOUS at 04:03

## 2023-03-11 RX ADMIN — FAMOTIDINE 20 MG: 20 TABLET, FILM COATED ORAL at 09:03

## 2023-03-11 RX ADMIN — OXYCODONE HYDROCHLORIDE 5 MG: 5 TABLET ORAL at 03:03

## 2023-03-11 RX ADMIN — CEFEPIME 1 G: 1 INJECTION, POWDER, FOR SOLUTION INTRAMUSCULAR; INTRAVENOUS at 04:03

## 2023-03-11 RX ADMIN — CEFEPIME 1 G: 1 INJECTION, POWDER, FOR SOLUTION INTRAMUSCULAR; INTRAVENOUS at 01:03

## 2023-03-11 RX ADMIN — OXYCODONE HYDROCHLORIDE 5 MG: 5 TABLET ORAL at 09:03

## 2023-03-11 RX ADMIN — OXYCODONE HYDROCHLORIDE 5 MG: 5 TABLET ORAL at 04:03

## 2023-03-11 NOTE — NURSING
Nurses Note -- 4 Eyes      3/10/2023   6:39 PM      Skin assessed during: Admit      [] No Pressure Injuries Present    []Prevention Measures Documented      [] Yes- Altered Skin Integrity Present or Discovered   [] LDA Added if Not in Epic (Describe Wound)   [] New Altered Skin Integrity was Present on Admit and Documented in LDA   [] Wound Image Taken    Wound Care Consulted? No    Attending Nurse:  Bart Carpenter LPN     Second RN/Staff Member:  Portia Espino RN

## 2023-03-11 NOTE — PROGRESS NOTES
Postop day 1. Debridement of chronic left hip wound with VAC placement    Patient indicates that he wants to be discharged however plan of care as previously outlined is for Infectious Disease consult and then arrangement for appropriate home IV antibiotic therapy which will likely not be able to be facilitated until Monday.    Patient has no complaints at present    Vital signs are stable, normal, he is afebrile    There are no labs    Patient is awake alert appropriate and oriented  He has no specific complaints except that he wants to go home  Left hip wound is dressed with good VAC coverage  Modest amount of clear slightly heme tinged output    Cultures are pending  Infectious Disease has been consulted    Will consult Internal Medicine to assist with medical management as this patient has anticipated > 40 hour stay and is on multiple medications for chronic illness at home    HK

## 2023-03-12 LAB
ABO + RH BLD: NORMAL
ABO + RH BLD: NORMAL
ANION GAP SERPL CALC-SCNC: 10 MEQ/L
BASOPHILS # BLD AUTO: 0.05 X10(3)/MCL (ref 0–0.2)
BASOPHILS NFR BLD AUTO: 0.6 %
BLD PROD TYP BPU: NORMAL
BLD PROD TYP BPU: NORMAL
BLOOD UNIT EXPIRATION DATE: NORMAL
BLOOD UNIT EXPIRATION DATE: NORMAL
BLOOD UNIT TYPE CODE: 9500
BLOOD UNIT TYPE CODE: 9500
BUN SERPL-MCNC: 9.7 MG/DL (ref 8.9–20.6)
CALCIUM SERPL-MCNC: 9.2 MG/DL (ref 8.4–10.2)
CHLORIDE SERPL-SCNC: 105 MMOL/L (ref 98–107)
CO2 SERPL-SCNC: 22 MMOL/L (ref 22–29)
CREAT SERPL-MCNC: 0.59 MG/DL (ref 0.73–1.18)
CREAT/UREA NIT SERPL: 16
CROSSMATCH INTERPRETATION: NORMAL
CROSSMATCH INTERPRETATION: NORMAL
DISPENSE STATUS: NORMAL
DISPENSE STATUS: NORMAL
EOSINOPHIL # BLD AUTO: 0.11 X10(3)/MCL (ref 0–0.9)
EOSINOPHIL NFR BLD AUTO: 1.4 %
ERYTHROCYTE [DISTWIDTH] IN BLOOD BY AUTOMATED COUNT: 17.2 % (ref 11.5–17)
GFR SERPLBLD CREATININE-BSD FMLA CKD-EPI: >60 MLS/MIN/1.73/M2
GLUCOSE SERPL-MCNC: 105 MG/DL (ref 74–100)
GROUP & RH: NORMAL
HCT VFR BLD AUTO: 36.3 % (ref 42–52)
HGB BLD-MCNC: 11.2 G/DL (ref 14–18)
IMM GRANULOCYTES # BLD AUTO: 0.04 X10(3)/MCL (ref 0–0.04)
IMM GRANULOCYTES NFR BLD AUTO: 0.5 %
INDIRECT COOMBS GEL: NORMAL
LYMPHOCYTES # BLD AUTO: 1.48 X10(3)/MCL (ref 0.6–4.6)
LYMPHOCYTES NFR BLD AUTO: 18.2 %
MCH RBC QN AUTO: 25.7 PG
MCHC RBC AUTO-ENTMCNC: 30.9 G/DL (ref 33–36)
MCV RBC AUTO: 83.3 FL (ref 80–94)
MONOCYTES # BLD AUTO: 0.84 X10(3)/MCL (ref 0.1–1.3)
MONOCYTES NFR BLD AUTO: 10.3 %
NEUTROPHILS # BLD AUTO: 5.61 X10(3)/MCL (ref 2.1–9.2)
NEUTROPHILS NFR BLD AUTO: 69 %
NRBC BLD AUTO-RTO: 0 %
PLATELET # BLD AUTO: 558 X10(3)/MCL (ref 130–400)
PMV BLD AUTO: 8.2 FL (ref 7.4–10.4)
POTASSIUM SERPL-SCNC: 4.6 MMOL/L (ref 3.5–5.1)
RBC # BLD AUTO: 4.36 X10(6)/MCL (ref 4.7–6.1)
SODIUM SERPL-SCNC: 137 MMOL/L (ref 136–145)
UNIT NUMBER: NORMAL
UNIT NUMBER: NORMAL
WBC # SPEC AUTO: 8.1 X10(3)/MCL (ref 4.5–11.5)

## 2023-03-12 PROCEDURE — 11000001 HC ACUTE MED/SURG PRIVATE ROOM

## 2023-03-12 PROCEDURE — 25000003 PHARM REV CODE 250: Performed by: INTERNAL MEDICINE

## 2023-03-12 PROCEDURE — 86900 BLOOD TYPING SEROLOGIC ABO: CPT | Performed by: SURGERY

## 2023-03-12 PROCEDURE — 85025 COMPLETE CBC W/AUTO DIFF WBC: CPT | Performed by: SURGERY

## 2023-03-12 PROCEDURE — 94761 N-INVAS EAR/PLS OXIMETRY MLT: CPT

## 2023-03-12 PROCEDURE — P9016 RBC LEUKOCYTES REDUCED: HCPCS | Performed by: SURGERY

## 2023-03-12 PROCEDURE — 36430 TRANSFUSION BLD/BLD COMPNT: CPT

## 2023-03-12 PROCEDURE — 86923 COMPATIBILITY TEST ELECTRIC: CPT | Performed by: SURGERY

## 2023-03-12 PROCEDURE — 63600175 PHARM REV CODE 636 W HCPCS: Performed by: SURGERY

## 2023-03-12 PROCEDURE — 25000003 PHARM REV CODE 250: Performed by: SURGERY

## 2023-03-12 PROCEDURE — 80048 BASIC METABOLIC PNL TOTAL CA: CPT | Performed by: SURGERY

## 2023-03-12 RX ORDER — HYDROCODONE BITARTRATE AND ACETAMINOPHEN 500; 5 MG/1; MG/1
TABLET ORAL
Status: DISCONTINUED | OUTPATIENT
Start: 2023-03-12 | End: 2023-03-14 | Stop reason: HOSPADM

## 2023-03-12 RX ORDER — BUSPIRONE HYDROCHLORIDE 5 MG/1
10 TABLET ORAL 2 TIMES DAILY
Status: DISCONTINUED | OUTPATIENT
Start: 2023-03-12 | End: 2023-03-14 | Stop reason: HOSPADM

## 2023-03-12 RX ORDER — DULOXETIN HYDROCHLORIDE 30 MG/1
30 CAPSULE, DELAYED RELEASE ORAL 2 TIMES DAILY
Status: DISCONTINUED | OUTPATIENT
Start: 2023-03-12 | End: 2023-03-14 | Stop reason: HOSPADM

## 2023-03-12 RX ORDER — LIDOCAINE HYDROCHLORIDE AND EPINEPHRINE 10; 10 MG/ML; UG/ML
1 INJECTION, SOLUTION INFILTRATION; PERINEURAL ONCE
Status: DISCONTINUED | OUTPATIENT
Start: 2023-03-12 | End: 2023-03-14 | Stop reason: HOSPADM

## 2023-03-12 RX ADMIN — ONDANSETRON 8 MG: 4 TABLET, ORALLY DISINTEGRATING ORAL at 09:03

## 2023-03-12 RX ADMIN — OXYCODONE HYDROCHLORIDE 5 MG: 5 TABLET ORAL at 10:03

## 2023-03-12 RX ADMIN — MUPIROCIN: 20 OINTMENT TOPICAL at 08:03

## 2023-03-12 RX ADMIN — OXYCODONE HYDROCHLORIDE 5 MG: 5 TABLET ORAL at 01:03

## 2023-03-12 RX ADMIN — Medication 6 MG: at 08:03

## 2023-03-12 RX ADMIN — DULOXETINE 30 MG: 30 CAPSULE, DELAYED RELEASE ORAL at 12:03

## 2023-03-12 RX ADMIN — CEFEPIME 1 G: 1 INJECTION, POWDER, FOR SOLUTION INTRAMUSCULAR; INTRAVENOUS at 08:03

## 2023-03-12 RX ADMIN — BUSPIRONE HYDROCHLORIDE 10 MG: 5 TABLET ORAL at 12:03

## 2023-03-12 RX ADMIN — FAMOTIDINE 20 MG: 20 TABLET, FILM COATED ORAL at 08:03

## 2023-03-12 RX ADMIN — CEFEPIME 1 G: 1 INJECTION, POWDER, FOR SOLUTION INTRAMUSCULAR; INTRAVENOUS at 05:03

## 2023-03-12 RX ADMIN — OXYCODONE HYDROCHLORIDE 5 MG: 5 TABLET ORAL at 06:03

## 2023-03-12 RX ADMIN — DULOXETINE 30 MG: 30 CAPSULE, DELAYED RELEASE ORAL at 08:03

## 2023-03-12 RX ADMIN — CEFEPIME 1 G: 1 INJECTION, POWDER, FOR SOLUTION INTRAMUSCULAR; INTRAVENOUS at 01:03

## 2023-03-12 RX ADMIN — BUSPIRONE HYDROCHLORIDE 10 MG: 5 TABLET ORAL at 08:03

## 2023-03-12 RX ADMIN — OXYCODONE HYDROCHLORIDE 5 MG: 5 TABLET ORAL at 03:03

## 2023-03-12 RX ADMIN — OXYCODONE HYDROCHLORIDE 5 MG: 5 TABLET ORAL at 08:03
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
## 2023-03-12 NOTE — PROGRESS NOTES
Postop day 2. Debridement of chronic left hip wound with VAC placement    Upon arriving to the hospital I was paged to the surgical floor as the patient was noted to have a significant hematoma at the VAC site following his bath earlier this morning.    Presenting to the bedside, the patient's VAC has been taken down.  There was a large amount of blood.  The patient is awake and alert.  Systolic pressure is reported in the 70s.  Heart rate 100s.  I immediately called for type and crossmatch and given the volume of blood that I was seeing 2 units of packed red blood cells for immediate transfusion.  With request to keep 2 units ahead.    Removal of the packing from the wound revealed a medium caliber vessel with arterial flow at the inferior distal aspect of the incision.  A hemostat was placed across the vessel which was then suture ligated with 2-0 silk suture.    The wound was the clot.  No active bleeding was noted.  There is small amount of ooze.  Small amount of Surgicel was placed in the bed of the wound which was subsequently Repacked.       Patient will be transfused 2 units of packed blood cells with 1 amp of calcium gluconate to be given immediately afterwards with stat H&H to follow.      Internal Medicine has engaged, thanks to Dr. Buck    Infectious Disease is pending.      Patient indicates that he wants to be discharged however plan of care as previously outlined is for Infectious Disease consult and then arrangement for appropriate home IV antibiotic therapy which will likely not be able to be facilitated until Monday.     Transfusion and follow-up labs for acute hemorrhagic anemia, symptomatic    HK

## 2023-03-13 LAB
ANION GAP SERPL CALC-SCNC: 10 MEQ/L
BACTERIA SPEC ANAEROBE CULT: NORMAL
BACTERIA SPEC CULT: ABNORMAL
BACTERIA SPEC CULT: ABNORMAL
BASOPHILS # BLD AUTO: 0.05 X10(3)/MCL (ref 0–0.2)
BASOPHILS NFR BLD AUTO: 0.5 %
BUN SERPL-MCNC: 11.6 MG/DL (ref 8.9–20.6)
CALCIUM SERPL-MCNC: 9.9 MG/DL (ref 8.4–10.2)
CHLORIDE SERPL-SCNC: 100 MMOL/L (ref 98–107)
CO2 SERPL-SCNC: 24 MMOL/L (ref 22–29)
CREAT SERPL-MCNC: 0.54 MG/DL (ref 0.73–1.18)
CREAT/UREA NIT SERPL: 21
EOSINOPHIL # BLD AUTO: 0.09 X10(3)/MCL (ref 0–0.9)
EOSINOPHIL NFR BLD AUTO: 0.9 %
ERYTHROCYTE [DISTWIDTH] IN BLOOD BY AUTOMATED COUNT: 16.8 % (ref 11.5–17)
GFR SERPLBLD CREATININE-BSD FMLA CKD-EPI: >60 MLS/MIN/1.73/M2
GLUCOSE SERPL-MCNC: 109 MG/DL (ref 74–100)
HCT VFR BLD AUTO: 41.4 % (ref 42–52)
HGB BLD-MCNC: 13.2 G/DL (ref 14–18)
IMM GRANULOCYTES # BLD AUTO: 0.07 X10(3)/MCL (ref 0–0.04)
IMM GRANULOCYTES NFR BLD AUTO: 0.7 %
LYMPHOCYTES # BLD AUTO: 2.7 X10(3)/MCL (ref 0.6–4.6)
LYMPHOCYTES NFR BLD AUTO: 25.7 %
MCH RBC QN AUTO: 26.2 PG
MCHC RBC AUTO-ENTMCNC: 31.9 G/DL (ref 33–36)
MCV RBC AUTO: 82.3 FL (ref 80–94)
MONOCYTES # BLD AUTO: 1.43 X10(3)/MCL (ref 0.1–1.3)
MONOCYTES NFR BLD AUTO: 13.6 %
NEUTROPHILS # BLD AUTO: 6.16 X10(3)/MCL (ref 2.1–9.2)
NEUTROPHILS NFR BLD AUTO: 58.6 %
NRBC BLD AUTO-RTO: 0 %
PLATELET # BLD AUTO: 469 X10(3)/MCL (ref 130–400)
PMV BLD AUTO: 8 FL (ref 7.4–10.4)
POTASSIUM SERPL-SCNC: 4.7 MMOL/L (ref 3.5–5.1)
PSYCHE PATHOLOGY RESULT: NORMAL
RBC # BLD AUTO: 5.03 X10(6)/MCL (ref 4.7–6.1)
SODIUM SERPL-SCNC: 134 MMOL/L (ref 136–145)
WBC # SPEC AUTO: 10.5 X10(3)/MCL (ref 4.5–11.5)

## 2023-03-13 PROCEDURE — 63600175 PHARM REV CODE 636 W HCPCS: Performed by: SURGERY

## 2023-03-13 PROCEDURE — 25000003 PHARM REV CODE 250: Performed by: SURGERY

## 2023-03-13 PROCEDURE — 25000003 PHARM REV CODE 250: Performed by: INTERNAL MEDICINE

## 2023-03-13 PROCEDURE — 99222 1ST HOSP IP/OBS MODERATE 55: CPT | Mod: FS,,, | Performed by: GENERAL PRACTICE

## 2023-03-13 PROCEDURE — 11000001 HC ACUTE MED/SURG PRIVATE ROOM

## 2023-03-13 PROCEDURE — 80048 BASIC METABOLIC PNL TOTAL CA: CPT | Performed by: SURGERY

## 2023-03-13 PROCEDURE — 85025 COMPLETE CBC W/AUTO DIFF WBC: CPT | Performed by: SURGERY

## 2023-03-13 PROCEDURE — 99222 PR INITIAL HOSPITAL CARE,LEVL II: ICD-10-PCS | Mod: FS,,, | Performed by: GENERAL PRACTICE

## 2023-03-13 RX ORDER — SILVER NITRATE 38.21; 12.74 MG/1; MG/1
4 STICK TOPICAL ONCE
Status: DISCONTINUED | OUTPATIENT
Start: 2023-03-13 | End: 2023-03-14 | Stop reason: HOSPADM

## 2023-03-13 RX ADMIN — OXYCODONE HYDROCHLORIDE 5 MG: 5 TABLET ORAL at 10:03

## 2023-03-13 RX ADMIN — FAMOTIDINE 20 MG: 20 TABLET, FILM COATED ORAL at 07:03

## 2023-03-13 RX ADMIN — OXYCODONE HYDROCHLORIDE 5 MG: 5 TABLET ORAL at 07:03

## 2023-03-13 RX ADMIN — DULOXETINE 30 MG: 30 CAPSULE, DELAYED RELEASE ORAL at 09:03

## 2023-03-13 RX ADMIN — CEFEPIME 1 G: 1 INJECTION, POWDER, FOR SOLUTION INTRAMUSCULAR; INTRAVENOUS at 09:03

## 2023-03-13 RX ADMIN — FAMOTIDINE 20 MG: 20 TABLET, FILM COATED ORAL at 09:03

## 2023-03-13 RX ADMIN — DULOXETINE 30 MG: 30 CAPSULE, DELAYED RELEASE ORAL at 07:03

## 2023-03-13 RX ADMIN — MUPIROCIN: 20 OINTMENT TOPICAL at 09:03

## 2023-03-13 RX ADMIN — OXYCODONE HYDROCHLORIDE 5 MG: 5 TABLET ORAL at 06:03

## 2023-03-13 RX ADMIN — BUSPIRONE HYDROCHLORIDE 10 MG: 5 TABLET ORAL at 07:03

## 2023-03-13 RX ADMIN — CEFEPIME 1 G: 1 INJECTION, POWDER, FOR SOLUTION INTRAMUSCULAR; INTRAVENOUS at 04:03

## 2023-03-13 RX ADMIN — BUSPIRONE HYDROCHLORIDE 10 MG: 5 TABLET ORAL at 09:03

## 2023-03-13 RX ADMIN — OXYCODONE HYDROCHLORIDE 5 MG: 5 TABLET ORAL at 01:03

## 2023-03-13 RX ADMIN — CALCIUM GLUCONATE 1 G: 98 INJECTION, SOLUTION INTRAVENOUS at 02:03

## 2023-03-13 RX ADMIN — OXYCODONE HYDROCHLORIDE 5 MG: 5 TABLET ORAL at 03:03

## 2023-03-13 NOTE — PLAN OF CARE
Referrals sent to [facility] for home IV infusion via care port. Sent referral to [facility] via care port.

## 2023-03-13 NOTE — PROGRESS NOTES
[facility] - 8th Floor [facility] Medicine  Progress Note    Patient Name: Werner Jarrett  MRN: [id number]  Patient Class: IP- Inpatient   Admission Date: 3/10/2023  Length of Stay: 3 days  Attending Physician: BARBARA Perez, *  Primary Care Provider: Kosta Sidhu MD        Subjective:     Principal Problem:Open wound of left hip    Interval History: Patient doing well post wound debridement. Continue IV antibiotics and wound vac.    Review of Systems   Musculoskeletal:  Positive for gait problem.   Skin:  Positive for wound.   All other systems reviewed and are negative.  Objective:     Vital Signs (Most Recent):  Temp: 98.8 °F (37.1 °C) (03/13/23 1538)  Pulse: 106 (03/13/23 1538)  Resp: 18 (03/13/23 1538)  BP: (!) 85/57 (03/13/23 1538)  SpO2: 96 % (03/13/23 1538)   Vital Signs (24h Range):  Temp:  [97.6 °F (36.4 °C)-99.2 °F (37.3 °C)] 98.8 °F (37.1 °C)  Pulse:  [] 106  Resp:  [16-19] 18  SpO2:  [94 %-98 %] 96 %  BP: ()/(57-95) 85/57     Weight: 74.4 kg (164 lb)  Body mass index is 23.53 kg/m².    Intake/Output Summary (Last 24 hours) at 3/13/2023 1755  Last data filed at 3/13/2023 1511  Gross per 24 hour   Intake 2213.33 ml   Output 1050 ml   Net 1163.33 ml      Physical Exam  Vitals and nursing note reviewed. Exam conducted with a chaperone present.   Constitutional:       Appearance: Normal appearance.   HENT:      Head: Normocephalic and atraumatic.      Right Ear: Tympanic membrane, ear canal and external ear normal.      Left Ear: Tympanic membrane, ear canal and external ear normal.      Nose: Nose normal.      Mouth/Throat:      Mouth: Mucous membranes are moist.   Eyes:      Extraocular Movements: Extraocular movements intact.      Pupils: Pupils are equal, round, and reactive to light.   Cardiovascular:      Rate and Rhythm: Normal rate and regular rhythm.      Pulses: Normal pulses.      Heart sounds: Normal heart sounds.   Pulmonary:      Effort:  Pulmonary effort is normal.      Breath sounds: Normal breath sounds.   Abdominal:      General: Abdomen is flat.      Palpations: Abdomen is soft.   Genitourinary:     Comments: Suprapubic stable  Musculoskeletal:      Cervical back: Normal range of motion and neck supple.   Neurological:      Mental Status: He is alert.         Overview/Hospital Course: As above    Significant Labs: All pertinent labs within the past 24 hours have been reviewed.  CBC:   Recent Labs   Lab 03/12/23  0832 03/13/23  0458   WBC 8.1 10.5   HGB 11.2* 13.2*   HCT 36.3* 41.4*   * 469*     CMP:   Recent Labs   Lab 03/12/23  0554 03/13/23  0458    134*   K 4.6 4.7   CO2 22 24   BUN 9.7 11.6   CREATININE 0.59* 0.54*   CALCIUM 9.2 9.9       Significant Imaging: I have reviewed all pertinent imaging results/findings within the past 24 hours.    Assessment/Plan:  1-Left hip debridement with wound vac placement  2-Traumatic quadriplegia  3-Chronic smoker  Continue management will discharge tomorrow home with IV antibiotics and wound vac.      Active Diagnoses:    Diagnosis Date Noted POA    PRINCIPAL PROBLEM:  Open wound of left hip [S71.002A] 05/24/2022 Yes      Problems Resolved During this Admission:     VTE Risk Mitigation (From admission, onward)           Ordered     enoxaparin injection 40 mg  Daily         03/10/23 1540     IP VTE HIGH RISK PATIENT  Once         03/10/23 1540     Place sequential compression device  Until discontinued         03/10/23 1540                       Kosta Sidhu MD  Department of Hospital Medicine   [facility] - 8th Floor Med Surg

## 2023-03-13 NOTE — PLAN OF CARE
Problem: Adult Inpatient Plan of Care  Goal: Plan of Care Review  3/13/2023 1512 by Erwin Adams LPN  Outcome: Ongoing, Progressing  3/13/2023 1131 by Erwin Adams LPN  Outcome: Ongoing, Progressing  Goal: Patient-Specific Goal (Individualized)  3/13/2023 1512 by Erwin Adams LPN  Outcome: Ongoing, Progressing  3/13/2023 1131 by Erwin Adams LPN  Outcome: Ongoing, Progressing  Goal: Absence of Hospital-Acquired Illness or Injury  3/13/2023 1512 by Erwin Adams LPN  Outcome: Ongoing, Progressing  3/13/2023 1131 by Erwin Adams LPN  Outcome: Ongoing, Progressing  Goal: Optimal Comfort and Wellbeing  3/13/2023 1512 by Erwin Adams LPN  Outcome: Ongoing, Progressing  3/13/2023 1131 by Erwin Adams LPN  Outcome: Ongoing, Progressing  Goal: Readiness for Transition of Care  3/13/2023 1512 by Erwin Adams LPN  Outcome: Ongoing, Progressing  3/13/2023 1131 by Erwin Adams LPN  Outcome: Ongoing, Progressing     Problem: Impaired Wound Healing  Goal: Optimal Wound Healing  3/13/2023 1512 by Erwin Adams LPN  Outcome: Ongoing, Progressing  3/13/2023 1131 by Erwin Adams LPN  Outcome: Ongoing, Progressing     Problem: Skin Injury Risk Increased  Goal: Skin Health and Integrity  3/13/2023 1512 by Erwin Adams LPN  Outcome: Ongoing, Progressing  3/13/2023 1131 by Erwin Adams LPN  Outcome: Ongoing, Progressing     Problem: Fall Injury Risk  Goal: Absence of Fall and Fall-Related Injury  3/13/2023 1512 by Erwin Adams LPN  Outcome: Ongoing, Progressing  3/13/2023 1131 by Erwin Adams LPN  Outcome: Ongoing, Progressing

## 2023-03-13 NOTE — PROGRESS NOTES
Feeling well  Bleeding over weekend noted  He had some bleeding on dressing change today  - surgicell was placed in the wound per nursing staffwith good result   Getting cefipime iv and wound vac arranged for home via Case Management    Vs ok, some mild tachycardia noted  A+ox3, nad  Left hip wound dressing removed - small areas of ooze controlled with silver nitrate   VAC dressing replaced    Labs -   WBC 10.5, HGB 13.2, Plt 469  Na 134, K 4.7  BUN/CR 11/0.5  Culture wound with Enterobacter cloacae S to cefepime    42M with chronic wound left hip - s/p repeat debridement with cultures.  ID following.  Bleeding controlled.  Plan for:  1) Cont plans for dc to home with iv abx and VAC dressing  2) Cont supportive care    Appreciate all help with Mr. Jarrett's care.

## 2023-03-13 NOTE — PLAN OF CARE
03/13/23 1158   Discharge Assessment   Assessment Type Discharge Planning Assessment   Confirmed/corrected address, phone number and insurance Yes   Confirmed Demographics Correct on Facesheet   Source of Information patient   Reason For Admission open wound of left hip   People in Home parent(s);sibling(s)   Do you expect to return to your current living situation? Yes   Do you have help at home or someone to help you manage your care at home? Yes   Who are your caregiver(s) and their phone number(s)? Pt has [facility] and PCA services provided by [facility]. He has 24 hour caregiver support.   Current cognitive status: Alert/Oriented   Walking or Climbing Stairs ambulation difficulty, dependent   Dressing/Bathing bathing difficulty, dependent   Equipment Currently Used at Home wheelchair;lift device;hospital bed;feeding device  (low air loss mattress)   Do you currently have service(s) that help you manage your care at home? Yes   Name and Contact number of agency [facility] and [facility] provide 24 hour care   Is the pt/caregiver preference to resume services with current agency Yes   Who is going to help you get home at discharge? Pt states his PCA or family member will be able to drive him home, he has a lift for his van for use with his wheelchair   How do you get to doctors appointments? family or friend will provide   Are you on dialysis? No   Do you take coumadin? No   Discharge Plan A Home Health;Home with family   Discharge Plan B Home Health;Home with family   Discharge Plan discussed with: Patient   Discharge Barriers Identified None     Pt states he lives with his step dad Carlos Ponce [phone] and his brother Travon [phone]. Pt has 24 hour care giver support. He is active with [facility]. He has DME. Will follow for discharge needs.

## 2023-03-13 NOTE — PROGRESS NOTES
"Inpatient Nutrition Evaluation    Admit Date: 3/10/2023   Total duration of encounter: 3 days    Nutrition Recommendation/Prescription     -Continue Regular Diet as tolerated.   -Paul BID for wound healing.   -Also for wound healing, consider an MVI with minerals as medically feasible.     Nutrition Assessment     Chart Review    Reason Seen: continuous nutrition monitoring    Diagnosis:  1. Chronic wound to the left hip with recurrent infection.  2. Paraplegia with wheelchair dependence.  3. Chronic tobacco abuse    Relevant Medical History:   Anxiety   Arthritis   C5 vertebral fracture   COPD (chronic obstructive pulmonary disease)   Depression   Heart murmur   Insomnia   Osteomyelitis hip       Nutrition-Related Medications: reviewed    Nutrition-Related Labs:  3/13/23: Na 134, Glu 109, GFR>60    Diet Order: Diet Adult Regular  Diet Adult Regular  Oral Supplement Order: none  Appetite/Oral Intake: good/% of meals  Factors Affecting Nutritional Intake: none identified  Food/Advent/Cultural Preferences: none reported  Food Allergies: no known food allergies    Skin Integrity: incision, wound  Wound(s):   altered skin integrity     Comments    3/13/23: Pt reports good appetite/intake; denies n/v; reports usual good intake at home; receptive to oral supplement for wound healing; reports that usual wt is ~ 165 lbs.     Anthropometrics    Height: 5' 10" (177.8 cm) Height Method: Stated  Last Weight: 74.4 kg (164 lb) (03/10/23 1007) Weight Method: Stated  BMI (Calculated): 23.5  BMI Classification: normal (BMI 18.5-24.9)        Ideal Body Weight (IBW), Male: 166 lb     % Ideal Body Weight, Male (lb): 98.8 %                          Usual Weight Provided By: patient    Wt Readings from Last 3 Encounters:   03/10/23 1007 74.4 kg (164 lb)   03/07/23 0816 74.4 kg (164 lb)   03/06/23 1347 74.4 kg (164 lb)   02/27/23 1235 74.4 kg (164 lb)      Weight Change(s) Since Admission:  Admit Weight: 74.4 kg (164 lb) " (03/07/23 6393)      Patient Education    Not applicable.    Monitoring & Evaluation     Dietitian will monitor energy intake.  Nutrition Risk/Follow-Up: low (follow-up in 5-7 days)  Patients assigned 'low nutrition risk' status do not qualify for a full nutritional assessment but will be monitored and re-evaluated in a 5-7 day time period. Please consult if re-evaluation needed sooner.

## 2023-03-13 NOTE — PROGRESS NOTES
[facility] - 8th Floor Med Surg  Wound Care    Patient Name:  Werner Jarrett   MRN:  [id number]  Date: 3/13/2023  Diagnosis: Open wound of left hip    History:     Past Medical History:   Diagnosis Date    Anxiety     Arthritis     C5 vertebral fracture     Paralyzed nipple line down    COPD (chronic obstructive pulmonary disease)     Depression     Heart murmur     Insomnia     Osteomyelitis hip        Social History     Socioeconomic History    Marital status: Single   Tobacco Use    Smoking status: Every Day     Packs/day: 1.00     Types: Cigarettes    Smokeless tobacco: Never   Substance and Sexual Activity    Alcohol use: Yes     Comment: 3-5 times week    Drug use: Not Currently    Sexual activity: Not Currently       Precautions:     Allergies as of 03/03/2023 - Reviewed 02/27/2023   Allergen Reaction Noted    Levofloxacin Rash 05/04/2022       WOC Assessment Details/Treatment   WOCN Consult related to application of wound vac; old dressing saturated with sanguinous drainage, blood ooze from 1000 and 1300, applied surgicel x2 and it slowed the bleeding.  Packed with 4x4 gauze, abd pad to cover, Medipore to secure.  Wound not appropriate for NPWT at this time.     03/13/23 1415        Incision/Site 03/10/23 1513 Left Hip   Date First Assessed/Time First Assessed: 03/10/23 1513   Side: Left  Location: Hip   Wound Image    Dressing Appearance Saturated   Drainage Amount Large   Drainage Characteristics/Odor Sanguineous   Red (%), Wound Tissue Color 100 %   Wound Edges Irregular   Wound Length (cm) 5 cm   Wound Width (cm) 8 cm   Wound Depth (cm) 4 cm   Wound Volume (cm^3) 160 cm^3   Wound Surface Area (cm^2) 40 cm^2   Care Cleansed with:;Sterile normal saline;Applied:  (Surgicel x2)   Dressing Gauze  (abd pad, Medipore to secure)     Will continue to follow.
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
## 2023-03-14 VITALS
DIASTOLIC BLOOD PRESSURE: 72 MMHG | HEART RATE: 84 BPM | OXYGEN SATURATION: 96 % | SYSTOLIC BLOOD PRESSURE: 117 MMHG | WEIGHT: 164 LBS | BODY MASS INDEX: 23.48 KG/M2 | TEMPERATURE: 98 F | RESPIRATION RATE: 18 BRPM | HEIGHT: 70 IN

## 2023-03-14 LAB
ABS NEUT (OLG): 6.89 X10(3)/MCL (ref 2.1–9.2)
ANION GAP SERPL CALC-SCNC: 12 MEQ/L
BACTERIA SPEC ANAEROBE CULT: NORMAL
BASOPHILS NFR BLD MANUAL: 0.11 X10(3)/MCL (ref 0–0.2)
BASOPHILS NFR BLD MANUAL: 1 %
BUN SERPL-MCNC: 14.7 MG/DL (ref 8.9–20.6)
CALCIUM SERPL-MCNC: 9.1 MG/DL (ref 8.4–10.2)
CHLORIDE SERPL-SCNC: 100 MMOL/L (ref 98–107)
CO2 SERPL-SCNC: 26 MMOL/L (ref 22–29)
CREAT SERPL-MCNC: 0.52 MG/DL (ref 0.73–1.18)
CREAT/UREA NIT SERPL: 28
EOSINOPHIL NFR BLD MANUAL: 0.21 X10(3)/MCL (ref 0–0.9)
EOSINOPHIL NFR BLD MANUAL: 2 %
ERYTHROCYTE [DISTWIDTH] IN BLOOD BY AUTOMATED COUNT: 16.7 % (ref 11.5–17)
GFR SERPLBLD CREATININE-BSD FMLA CKD-EPI: >60 MLS/MIN/1.73/M2
GLUCOSE SERPL-MCNC: 102 MG/DL (ref 74–100)
HCT VFR BLD AUTO: 37.9 % (ref 42–52)
HGB BLD-MCNC: 12 G/DL (ref 14–18)
INSTRUMENT WBC (OLG): 10.6 X10(3)/MCL
LYMPHOCYTES NFR BLD MANUAL: 1.8 X10(3)/MCL
LYMPHOCYTES NFR BLD MANUAL: 17 %
MCH RBC QN AUTO: 25.8 PG
MCHC RBC AUTO-ENTMCNC: 31.7 G/DL (ref 33–36)
MCV RBC AUTO: 81.5 FL (ref 80–94)
MONOCYTES NFR BLD MANUAL: 1.59 X10(3)/MCL (ref 0.1–1.3)
MONOCYTES NFR BLD MANUAL: 15 %
NEUTROPHILS NFR BLD MANUAL: 65 %
NRBC BLD AUTO-RTO: 0 %
PLATELET # BLD AUTO: 456 X10(3)/MCL (ref 130–400)
PLATELET # BLD EST: NORMAL 10*3/UL
PMV BLD AUTO: 8.8 FL (ref 7.4–10.4)
POTASSIUM SERPL-SCNC: 4.3 MMOL/L (ref 3.5–5.1)
RBC # BLD AUTO: 4.65 X10(6)/MCL (ref 4.7–6.1)
RBC MORPH BLD: NORMAL
SODIUM SERPL-SCNC: 138 MMOL/L (ref 136–145)
WBC # SPEC AUTO: 10.6 X10(3)/MCL (ref 4.5–11.5)

## 2023-03-14 PROCEDURE — 63600175 PHARM REV CODE 636 W HCPCS: Performed by: SURGERY

## 2023-03-14 PROCEDURE — 63600175 PHARM REV CODE 636 W HCPCS: Performed by: NURSE PRACTITIONER

## 2023-03-14 PROCEDURE — 25000003 PHARM REV CODE 250: Performed by: NURSE PRACTITIONER

## 2023-03-14 PROCEDURE — 85027 COMPLETE CBC AUTOMATED: CPT | Performed by: SURGERY

## 2023-03-14 PROCEDURE — 94761 N-INVAS EAR/PLS OXIMETRY MLT: CPT

## 2023-03-14 PROCEDURE — 99232 SBSQ HOSP IP/OBS MODERATE 35: CPT | Mod: ,,, | Performed by: GENERAL PRACTICE

## 2023-03-14 PROCEDURE — 80048 BASIC METABOLIC PNL TOTAL CA: CPT | Performed by: SURGERY

## 2023-03-14 PROCEDURE — 25000003 PHARM REV CODE 250: Performed by: SURGERY

## 2023-03-14 PROCEDURE — 99232 PR SUBSEQUENT HOSPITAL CARE,LEVL II: ICD-10-PCS | Mod: ,,, | Performed by: GENERAL PRACTICE

## 2023-03-14 PROCEDURE — 25000003 PHARM REV CODE 250: Performed by: INTERNAL MEDICINE

## 2023-03-14 RX ADMIN — FAMOTIDINE 20 MG: 20 TABLET, FILM COATED ORAL at 08:03

## 2023-03-14 RX ADMIN — OXYCODONE HYDROCHLORIDE 5 MG: 5 TABLET ORAL at 12:03

## 2023-03-14 RX ADMIN — OXYCODONE HYDROCHLORIDE 5 MG: 5 TABLET ORAL at 04:03

## 2023-03-14 RX ADMIN — OXYCODONE HYDROCHLORIDE 5 MG: 5 TABLET ORAL at 01:03

## 2023-03-14 RX ADMIN — BUSPIRONE HYDROCHLORIDE 10 MG: 5 TABLET ORAL at 08:03

## 2023-03-14 RX ADMIN — OXYCODONE HYDROCHLORIDE 5 MG: 5 TABLET ORAL at 08:03

## 2023-03-14 RX ADMIN — OXYCODONE HYDROCHLORIDE 5 MG: 5 TABLET ORAL at 05:03

## 2023-03-14 RX ADMIN — CEFEPIME 1 G: 1 INJECTION, POWDER, FOR SOLUTION INTRAMUSCULAR; INTRAVENOUS at 08:03

## 2023-03-14 RX ADMIN — DULOXETINE 30 MG: 30 CAPSULE, DELAYED RELEASE ORAL at 08:03

## 2023-03-14 RX ADMIN — ENOXAPARIN SODIUM 40 MG: 40 INJECTION SUBCUTANEOUS at 04:03

## 2023-03-14 RX ADMIN — CEFEPIME 1 G: 1 INJECTION, POWDER, FOR SOLUTION INTRAMUSCULAR; INTRAVENOUS at 12:03

## 2023-03-14 RX ADMIN — CEFEPIME 2 G: 2 INJECTION, POWDER, FOR SOLUTION INTRAVENOUS at 04:03

## 2023-03-14 NOTE — PHYSICIAN QUERY
"PT Name: Werner Jarrett  MR #: [id number]    DOCUMENTATION CLARIFICATION     CDS: Brandy Capley, RN  Email: [email or web address]  This form is a permanent document in the medical record.    Query Date: March 14, 2023  By submitting this query, we are merely seeking further clarification of documentation. Please utilize your independent clinical judgment when addressing the question(s) below.    The Medical Record contains the following:   Indicator Supporting Clinical Findings Location in Medical Record   X Documentation of "Debridement"   DATE OF SURGERY:    03/10/2023 00:00:00     POSTOPERATIVE DIAGNOSES:    1. Chronic wound to the left hip with recurrent infection.  2. Paraplegia with wheelchair dependence.  3. Chronic tobacco abuse.     PROCEDURE:  Incision and drainage with debridement of left hip wound.    FINDINGS:    Left hip wound opened with dimensions of wound after opening 7 x 6   cm. The area of sharp and blunt debridement measured 4 x 3 cm.  There was   fibrinopurulent material within the cavity that was foul smelling.  This was   cultured with a swab and also some of the tissue was sent for culture.     After time-out was carried out and agreed upon, the wound was opened superiorly and   inferiorly and also medially and laterally.  There was some fibrinopurulent   material that was identified.  This was cultured with swabs and the additional   material was retrieved with a 15 blade scalpel.  The area of tunneling was 2 cm   posteriorly, 1 cm anteriorly.  The total area of excisional debridement was   about 4 x 4 cm.  The total wound dimension was 6 x 7 cm.  Hemostasis was   ensured.  Irrigation was carried out.     Op note 3/10    Documentation of "I&D"      Other       Excisional debridement is the surgical removal or cutting away of such tissue, necrosis, or slough and is classified to the root operation "Excision." Use of a sharp instrument does not always indicate that an excisional debridement was " "performed. Minor removal of loose fragments with scissors or using a sharp instrument to scrape away tissue is not an excisional debridement. Excisional debridement involves the use of a scalpel to remove devitalized tissue.  Nonexcisional debridement is the nonoperative brushing, irrigating, scrubbing, or washing of devitalized tissue, necrosis, slough, or foreign material. Most nonexcisional debridement procedures are classified to the root operation "Extraction" (pulling or stripping out or off all or a portion of a body part by the use of force).     Provider, please specify depth of excisional debridement  on ___left hip wound    :    [  x ] Excisional Debridement of skin   [  x ] Excisional Debridement of subcutaneous tissue and/or fascia   [  x ] Excisional Debridement of muscle   [   ] Excisional Debridement of tendon   [   ] Excisional Debridement of bursa and/or ligaments   [   ] Excisional Debridement of bone   [   ] Excisional Debridement of joint        [   ] Other Procedure (please specify): _____________     Reference:    ICD-10-CM/PCS Coding Clinic Third Quarter ICD-10, Effective with discharges: October 7, 2015 [facility] [employer] § Excisional and nonexcisional debridement (2015).    Form No. 46921   "
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
## 2023-03-14 NOTE — PLAN OF CARE
Wound vac [id number] delivered to nurse's station. Nurse Mirna notified. Proof of delivery faxed to [phone], ATTN [place].

## 2023-03-14 NOTE — PLAN OF CARE
Sent the updated IV antibiotic order to [facility] via care [employer] Notified Sunday with Marco of correct order. Also sent ID note from today to [facility] via [email or web address].

## 2023-03-14 NOTE — PROGRESS NOTES
PROGRESS NOTE      SUBJECTIVE:  AF, VSS.  No issues overnight.  No complaints.         REVIEW OF SYSTEMS: Negative unless stated above         MEDICATIONS:   Reviewed in EMR        PHYSICAL EXAM:     Vitals:    03/14/23 1308   BP:    Pulse:    Resp: 18   Temp:         GENERAL: Chronically ill appearing; NAD; does not appear toxic  SKIN: no rash  HEENT: sclera non-icteric; PERRL  NECK: supple; no LAD; healed trach site noted  CHEST: CTA; nonlabored, equal expansion; no adventitious BS  CARDIOVASCULAR: RRR, S1S2; no murmur; strong, equal peripheral pulses; no edema  ABDOMEN:  active bowel sounds; abdomen soft, nondistended, nontender to palpation  EXTREMITIES: no cyanosis or clubbing; incomplete quadriplegia   NEURO: AAO x4; CN II-XII grossly intact  PSYCH: Mentation and affect appropriate          LABORATORY DATA: Reviewed         RADIOLOGICAL DATA:  None          IMPRESSION:   42-year-old incomplete quadriplegic male presenting with drainage from left hip wound and subsequently found to have concern for osteomyelitis and left hip septic arthritis, status post I&D.  Cultures with Enterobacter.  Patient currently on cefepime.  Id consulted for assistance with antimicrobials.    Left hip septic arthritis / OM, s/p I&D  Incomplete quadriplegia  Depression      PLAN:  Continue cefepime - end date: 4/21.  Weekly CBC, CMP, CRP, ESR while on abx.  Fax results to us at [phone].  F/u with us as scheduled.  CM consulted for assistance with OPAT.   Discussed with patient.

## 2023-03-14 NOTE — PLAN OF CARE
Sent AVS to [facility] via care port. Spoke to Estela at [phone] to notify of discharge.

## 2023-03-14 NOTE — PROGRESS NOTES
[facility] - 8th Floor Med Surg  Wound Care    Patient Name:  Werner Jarrett   MRN:  [id number]  Date: 3/14/2023  Diagnosis: Open wound of left hip    History:     Past Medical History:   Diagnosis Date    Anxiety     Arthritis     C5 vertebral fracture     Paralyzed nipple line down    COPD (chronic obstructive pulmonary disease)     Depression     Heart murmur     Insomnia     Osteomyelitis hip        Social History     Socioeconomic History    Marital status: Single   Tobacco Use    Smoking status: Every Day     Packs/day: 1.00     Types: Cigarettes    Smokeless tobacco: Never   Substance and Sexual Activity    Alcohol use: Yes     Comment: 3-5 times week    Drug use: Not Currently    Sexual activity: Not Currently       Precautions:     Allergies as of 03/03/2023 - Reviewed 02/27/2023   Allergen Reaction Noted    Levofloxacin Rash 05/04/2022       WO Assessment Details/Treatment   WO follow up visit related to consult 03/14/23 for wound vac placement.  Per patient, physician used silver nitrate to stop oozing yesterday afternoon, then applied NPWT.  Patient anticipates discharge today.   03/14/23 0840        Negative Pressure Wound Therapy  Left   No placement date or time found.   Side: Left  Location: Hip   NPWT Type Vacuum Therapy   Therapy Setting NPWT Continuous therapy   Pressure Setting NPWT 125 mmHg

## 2023-03-14 NOTE — NURSING
DC note: Patient educated on DC instructions, new medications. NAD noted. All questions answered and appropriate education provided. F/U appointments made.

## 2023-03-15 ENCOUNTER — PATIENT MESSAGE (OUTPATIENT)
Dept: ADMINISTRATIVE | Facility: CLINIC | Age: 43
End: 2023-03-15
Payer: MEDICARE

## 2023-03-15 ENCOUNTER — PATIENT OUTREACH (OUTPATIENT)
Dept: ADMINISTRATIVE | Facility: CLINIC | Age: 43
End: 2023-03-15
Payer: MEDICARE

## 2023-03-15 NOTE — PROGRESS NOTES
C3 nurse attempted to contact Werner Jarrett for a TCC post hospital discharge follow up call. No answer, left VM, CB# provided, pt portal msg also sent.  The patient has a scheduled HOSFU appointment with Jez Jara MD (General Surgery) on 3/21/2023 2:30pm.

## 2023-03-15 NOTE — DISCHARGE SUMMARY
[facility] - 8th Floor Med Surg  General Surgery  Discharge Summary      Patient Name: Werner Jarrett  MRN: [id number]  Admission Date: 3/10/2023  Hospital Length of Stay: 4 days  Discharge Date and Time: 3/14/2023  5:44 PM  Attending Physician: No att. providers found   Discharging Provider: Jez Jara MD  Primary Care Provider: Kosta Sidhu MD     HPI: 42M with necrosis and infection left hip decubitus ulceration    Procedure(s) (LRB):  INCISION AND DRAINAGE, HIP (Left)     Hospital Course: Patient was admitted for debridement and drainage let hip wound.  VAC dressing was placed in the OR.  Wound Care was consulted, and ID was consulted.  Patient was arranged for Wound Vac at home and also iv abx at home.  He had some bleeding at the superficial level within the wound and required blood transfusion.  He is stable for dc with followup with me and also Dr. Ceron as requested.     Consults:   Consults (From admission, onward)          Status Ordering Provider     Inpatient consult to Social Work/Case Management  Once        Provider:  (Not yet assigned)    Completed ABDON SUERO     Inpatient consult to Infectious Diseases  Once        Provider:  Guero Ceron MD    Completed EMMA M. S            Significant Diagnostic Studies: Labs: CMP   Recent Labs   Lab 03/14/23  0346      K 4.3   CO2 26   BUN 14.7   CREATININE 0.52*   CALCIUM 9.1   , CBC   Recent Labs   Lab 03/14/23  0346   WBC 10.6   HGB 12.0*   HCT 37.9*   *   , and INR   Lab Results   Component Value Date    INR 1.11 03/08/2023    INR 1.04 12/12/2022    PROTIME 11.1 (L) 03/08/2023    PROTIME 13.5 12/12/2022       Pending Diagnostic Studies:       None          Final Active Diagnoses:    Diagnosis Date Noted POA    PRINCIPAL PROBLEM:  Open wound of left hip [S71.002A] 05/24/2022 Yes      Problems Resolved During this Admission:      Discharged Condition: good    Disposition: Home-Health Care  [facility]    Follow Up:   Follow-up Information       Jez Jara MD Follow up on 3/21/2023.    Specialty: General Surgery  Why: 2:30pm  Contact information:  [address]  [address]  [place] [address]  [phone]               Guero Ceron MD Follow up in 1 week(s).    Specialty: Infectious Diseases  Why: appt already made  Contact information:  [address] [place] [address] [address] [place] [address]  [phone]               [facility] [place] Follow up.    Why: This is your home health agency  Contact information:  [address] [place] [address]  [phone]               [facility] Follow up.    Why: This is the agency that will provide your medications  Contact information:  [address] [facility] [facility]  [address]                         Patient Instructions:      Diet Adult Regular     Diet Adult Regular     Notify your health care provider if you experience any of the following:  temperature >100.4     Notify your health care provider if you experience any of the following:  persistent nausea and vomiting or diarrhea     Notify your health care provider if you experience any of the following:  severe uncontrolled pain     Notify your health care provider if you experience any of the following:  redness, tenderness, or signs of infection (pain, swelling, redness, odor or green/yellow discharge around incision site)      Remove dressing in 72 hours   Order Comments: Wound vac care with white sponge at area of tunnelling then black sponge then Prevena.     Remove dressing in 48 hours   Order Comments: VAC dressing change per Wound Care     Reason for not Ordering Smoking Cessation Referral     Order Specific Question Answer Comments   Reason for not ordering: Patient refused      Reason for not Prescribing Nicotine Replacement     Order Specific Question Answer Comments   Reason for not Prescribing: Patient refused      Reason for not Ordering Smoking Cessation Referral   Order  Comments: refused     Order Specific Question Answer Comments   Reason for not ordering: Patient refused      Reason for not Prescribing Nicotine Replacement     Order Specific Question Answer Comments   Reason for not Prescribing: Patient refused      Activity as tolerated     Medications:  Reconciled Home Medications:      Medication List        CONTINUE taking these medications      acetaminophen 325 MG tablet  Commonly known as: TYLENOL  Take 650 mg by mouth every 6 (six) hours as needed for Pain.     ascorbic acid (vitamin C) 1000 MG tablet  Commonly known as: VITAMIN C  Take 1,000 mg by mouth every evening.     BACTRIM -160 mg Tab  Generic drug: sulfamethoxazole-trimethoprim 800-160mg  Take 1 tablet by mouth every 12 (twelve) hours.     bisacodyL 10 mg Supp  Commonly known as: DULCOLAX  Place 10 mg rectally daily as needed.     busPIRone 10 MG tablet  Commonly known as: BUSPAR  Take 10 mg by mouth 2 (two) times daily.     CENTRUM ORAL  Take 1 tablet by mouth every morning.     cetirizine 10 MG tablet  Commonly known as: ZYRTEC  Take 10 mg by mouth every evening.     DULoxetine 30 MG capsule  Commonly known as: CYMBALTA  Take 30 mg by mouth 2 (two) times daily.     fexofenadine 180 MG tablet  Commonly known as: ALLEGRA  Take 180 mg by mouth every morning.     fluticasone propionate 50 mcg/actuation nasal spray  Commonly known as: FLONASE  by Each Nostril route daily as needed for Rhinitis.     HYDROcodone-acetaminophen  mg per tablet  Commonly known as: NORCO  Take 1 tablet by mouth every 8 (eight) hours as needed for Pain.     meloxicam 7.5 MG tablet  Commonly known as: MOBIC  Take 7.5 mg by mouth 2 (two) times daily as needed for Pain.     NON FORMULARY MEDICATION  Take 3 drops by mouth 2 (two) times daily as needed. THC     zinc gluconate 50 mg tablet  Take 50 mg by mouth every evening.              Jez Jara MD  General Surgery  [facility] - 8th Floor Med Surg

## 2023-03-15 NOTE — PHYSICIAN QUERY
PT Name: Werner Jarrett  MR #: [id number]    DOCUMENTATION CLARIFICATION   CDS: Brandy Capley, RN  Email: [email or web address]     This form is a permanent document in the medical record.     Query Date: March 15, 2023    By submitting this query, we are merely seeking further clarification of documentation.  Please utilize your independent clinical judgment when addressing the question(s) below.    The medical record contains the following:  Pathology Findings Location in Medical Record     LEFT HIP TISSUE: WET GANGRENE   Surgical pathology 3/10       Please clarify the pathology findings.    [  x] Pathology findings noted above are ruled in/confirmed as diagnoses   [  ] Pathology findings noted above are not confirmed as diagnoses   [  ] Other diagnosis (please specify): ___________   [  ] Clinically Undetermined     Please document in your progress notes daily for the duration of treatment until resolved and include in your discharge summary.    Form No. [id number]

## 2023-03-15 NOTE — PROGRESS NOTES
C3 nurse attempted to contact Werner Jarertt for a TCC post hospital discharge follow up call. No answer, left VM, CB# provided, pt portal msg also sent.  The patient has a scheduled HOSFU appointment with Jez Jara MD (General Surgery) on 3/21/2023 2:30pm.

## 2023-03-16 ENCOUNTER — PATIENT MESSAGE (OUTPATIENT)
Dept: ADMINISTRATIVE | Facility: CLINIC | Age: 43
End: 2023-03-16
Payer: MEDICARE

## 2023-03-17 ENCOUNTER — HOSPITAL ENCOUNTER (EMERGENCY)
Facility: HOSPITAL | Age: 43
Discharge: HOME | End: 2023-03-17
Attending: SPECIALIST
Payer: MEDICARE

## 2023-03-17 VITALS
WEIGHT: 165 LBS | TEMPERATURE: 98 F | BODY MASS INDEX: 23.62 KG/M2 | RESPIRATION RATE: 18 BRPM | DIASTOLIC BLOOD PRESSURE: 62 MMHG | HEIGHT: 70 IN | HEART RATE: 103 BPM | OXYGEN SATURATION: 97 % | SYSTOLIC BLOOD PRESSURE: 99 MMHG

## 2023-03-17 DIAGNOSIS — Z46.89 ENCOUNTER FOR MANAGEMENT OF VACUUM-ASSISTED CLOSURE (VAC) OF WOUND: Primary | ICD-10-CM

## 2023-03-17 PROCEDURE — 99281 EMR DPT VST MAYX REQ PHY/QHP: CPT

## 2023-03-17 NOTE — ED PROVIDER NOTES
Encounter Date: 3/17/2023       History     Chief Complaint   Patient presents with    Wound Check     Pt had I&D of a left hip pressure ulcer last Friday, was discharged from hospital  Tuesday; home health nurse was there today for wound check and dressing change, pt states she was concerned because there was more drainage/bleeding, called surgeon and told to come to ER; pt and family state the wound vac machine has been beeping since visit; denies fevers; no foul odor to drainage/no redness or swelling      42-year-old male presents to ER stating that his wound VAC on a left hip bedsore that was eye indeed earlier this week is stopped up.  He states the home health nurse came today to check the wound VAC and felt like it was bleeding little more than it should be.  Patient states he is had no fever vomiting.  He states the wound is not bleeding anymore than it had when his dressing was changed in the hospital.  The home health nurse called Dr. Jara who suggested the patient come to the ER and have wound checked    The history is provided by the patient. No  was used.   Review of patient's allergies indicates:   Allergen Reactions    Levofloxacin Rash     Past Medical History:   Diagnosis Date    Anxiety     Arthritis     C5 vertebral fracture     Paralyzed nipple line down    COPD (chronic obstructive pulmonary disease)     Depression     Heart murmur     Insomnia     Osteomyelitis hip      Past Surgical History:   Procedure Laterality Date    INCISION AND DRAINAGE HIP Left 12/14/2022    Procedure: Incision and drainage Hip (I&D debridement of left hip wound, left hip and femur);  Surgeon: BARBARA Perez MD;  Location: [facility] OR;  Service: General;  Laterality: Left;    INCISION AND DRAINAGE HIP Left 3/10/2023    Procedure: INCISION AND DRAINAGE, HIP;  Surgeon: BARBARA Perez MD;  Location: [facility] OR;  Service: General;  Laterality: Left;  LEFT HIP    MEDIPORT INSERTION,  SINGLE      Rt Chest wall    SPINE SURGERY      wound debridements      Multiple     Family History   Problem Relation Age of Onset    Lung cancer Mother     Brain cancer Mother      Social History     Tobacco Use    Smoking status: Every Day     Packs/day: 1.00     Types: Cigarettes    Smokeless tobacco: Never   Substance Use Topics    Alcohol use: Yes     Comment: 3-5 times week    Drug use: Not Currently     Review of Systems   Constitutional:  Negative for fever.   Gastrointestinal:  Negative for vomiting.   Skin:  Positive for wound.   All other systems reviewed and are negative.    Physical Exam     Initial Vitals [03/17/23 1753]   BP Pulse Resp Temp SpO2   99/62 103 18 98.2 °F (36.8 °C) 97 %      MAP       --         Physical Exam    Constitutional: He appears well-developed and well-nourished.   HENT:   Head: Normocephalic.   Eyes: EOM are normal.   Neck: Neck supple.   Cardiovascular:  Normal rate and regular rhythm.           Pulmonary/Chest: Breath sounds normal. No respiratory distress.   Abdominal: He exhibits no distension.   Musculoskeletal:         General: Normal range of motion.      Cervical back: Neck supple.      Comments: paraplegic     Neurological: He is alert and oriented to person, place, and time.   Skin: Skin is warm and dry. Capillary refill takes less than 2 seconds.        Psychiatric: He has a normal mood and affect.       ED Course   Procedures  Labs Reviewed - No data to display       Imaging Results    None          Medications - No data to display  Medical Decision Making:   Differential Diagnosis:   Wound vac malfunction, wound vac clogged  ED Management:  Wound VAC dressing removed.  Several clots noted to the back of the sponge.  Mild serosanguineous drainage but no profuse bleeding.  Wound has beefy red tissue in wound bed without purulent drainage.  There is no odor.  Wound VAC replaced without difficulty. Good seal. Patient without any fever vomiting.  There is no redness  surrounding the wound.  No tenderness of the area.  Patient to follow-up with surgeon as previously scheduled.                        Clinical Impression:   Final diagnoses:  [Z46.89] Encounter for management of vacuum-assisted closure (VAC) of wound (Primary)        ED Disposition Condition    Discharge Stable          ED Prescriptions    None       Follow-up Information    None          CRISTIAN Arita  03/17/23 3553

## 2023-03-17 NOTE — ED NOTES
Pt wound vac removed to left hip, replaced and changed. New dressing, foam, and vac applied. Wound vac suctioning well prior to dc. Serosanguinous drainage noted.

## 2023-03-27 ENCOUNTER — HOSPITAL ENCOUNTER (OUTPATIENT)
Dept: WOUND CARE | Facility: HOSPITAL | Age: 43
Discharge: HOME | End: 2023-03-27
Attending: NURSE PRACTITIONER
Payer: MEDICARE

## 2023-03-27 VITALS
SYSTOLIC BLOOD PRESSURE: 169 MMHG | DIASTOLIC BLOOD PRESSURE: 100 MMHG | HEART RATE: 57 BPM | BODY MASS INDEX: 23.62 KG/M2 | WEIGHT: 165 LBS | RESPIRATION RATE: 18 BRPM | HEIGHT: 70 IN

## 2023-03-27 DIAGNOSIS — L89.224 PRESSURE INJURY OF LEFT HIP, STAGE 4: Primary | ICD-10-CM

## 2023-03-27 DIAGNOSIS — G82.50 QUADRIPLEGIA: ICD-10-CM

## 2023-03-27 DIAGNOSIS — S71.002D UNSPECIFIED OPEN WOUND, LEFT HIP, SUBSEQUENT ENCOUNTER: ICD-10-CM

## 2023-03-27 DIAGNOSIS — L89.154 PRESSURE INJURY OF SACRAL REGION, STAGE 4: ICD-10-CM

## 2023-03-27 PROCEDURE — 99212 OFFICE O/P EST SF 10 MIN: CPT | Mod: 25

## 2023-03-27 PROCEDURE — 97605 NEG PRS WND THER DME<=50SQCM: CPT

## 2023-03-27 PROCEDURE — 27000999 HC MEDICAL RECORD PHOTO DOCUMENTATION

## 2023-03-27 RX ORDER — TRAMADOL HYDROCHLORIDE 50 MG/1
50 TABLET ORAL EVERY 12 HOURS PRN
Qty: 14 EACH | Refills: 0 | Status: SHIPPED | OUTPATIENT
Start: 2023-03-27 | End: 2023-04-03

## 2023-03-27 RX ORDER — CEFEPIME HYDROCHLORIDE 2 G/1
INJECTION, POWDER, FOR SOLUTION INTRAVENOUS
COMMUNITY
Start: 2023-03-22 | End: 2023-04-24 | Stop reason: ALTCHOICE
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
## 2023-04-03 ENCOUNTER — HOSPITAL ENCOUNTER (OUTPATIENT)
Dept: WOUND CARE | Facility: HOSPITAL | Age: 43
Discharge: HOME | End: 2023-04-03
Attending: NURSE PRACTITIONER
Payer: MEDICARE

## 2023-04-03 VITALS
BODY MASS INDEX: 23.62 KG/M2 | SYSTOLIC BLOOD PRESSURE: 134 MMHG | HEART RATE: 73 BPM | RESPIRATION RATE: 18 BRPM | WEIGHT: 165 LBS | HEIGHT: 70 IN | DIASTOLIC BLOOD PRESSURE: 85 MMHG

## 2023-04-03 DIAGNOSIS — L89.224 PRESSURE INJURY OF LEFT HIP, STAGE 4: Primary | ICD-10-CM

## 2023-04-03 DIAGNOSIS — G82.50 QUADRIPLEGIA: ICD-10-CM

## 2023-04-03 DIAGNOSIS — L89.154 PRESSURE INJURY OF SACRAL REGION, STAGE 4: ICD-10-CM
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
## 2023-04-04 ENCOUNTER — OFFICE VISIT (OUTPATIENT)
Dept: INFECTIOUS DISEASES | Facility: CLINIC | Age: 43
End: 2023-04-04
Payer: MEDICARE

## 2023-04-04 VITALS
DIASTOLIC BLOOD PRESSURE: 71 MMHG | SYSTOLIC BLOOD PRESSURE: 103 MMHG | BODY MASS INDEX: 23.62 KG/M2 | HEIGHT: 70 IN | RESPIRATION RATE: 18 BRPM | WEIGHT: 165 LBS | HEART RATE: 93 BPM | OXYGEN SATURATION: 96 %

## 2023-04-04 DIAGNOSIS — S71.002A OPEN WOUND OF LEFT HIP, INITIAL ENCOUNTER: ICD-10-CM

## 2023-04-04 DIAGNOSIS — M86.28 SUBACUTE OSTEOMYELITIS, OTHER SITE: ICD-10-CM

## 2023-04-04 DIAGNOSIS — L89.154 PRESSURE INJURY OF SACRAL REGION, STAGE 4: Primary | Chronic | ICD-10-CM

## 2023-04-04 DIAGNOSIS — L89.224 PRESSURE INJURY OF LEFT HIP, STAGE 4: ICD-10-CM

## 2023-04-04 DIAGNOSIS — G82.50 CHRONIC INCOMPLETE QUADRIPLEGIA: ICD-10-CM

## 2023-04-04 PROCEDURE — 99999 PR PBB SHADOW E&M-EST. PATIENT-LVL IV: CPT | Mod: PBBFAC,,, | Performed by: GENERAL PRACTICE

## 2023-04-04 PROCEDURE — 99214 OFFICE O/P EST MOD 30 MIN: CPT | Mod: PBBFAC | Performed by: GENERAL PRACTICE

## 2023-04-04 PROCEDURE — 99213 OFFICE O/P EST LOW 20 MIN: CPT | Mod: S$PBB,,, | Performed by: GENERAL PRACTICE

## 2023-04-04 PROCEDURE — 99999 PR PBB SHADOW E&M-EST. PATIENT-LVL IV: ICD-10-PCS | Mod: PBBFAC,,, | Performed by: GENERAL PRACTICE

## 2023-04-04 PROCEDURE — 99213 PR OFFICE/OUTPT VISIT, EST, LEVL III, 20-29 MIN: ICD-10-PCS | Mod: S$PBB,,, | Performed by: GENERAL PRACTICE

## 2023-04-04 NOTE — PROGRESS NOTES
Subjective:       Patient ID: Werner Jarrett 42 y.o.     Chief Complaint: No chief complaint on file.       HPI:  03/14/2023 Hospital evaluation:  42-year-old incomplete quadriplegic male presenting with drainage from left hip wound and subsequently found to have concern for osteomyelitis and left hip septic arthritis, status post I&D.  Cultures with Enterobacter.  Patient currently on cefepime.  ID consulted for assistance with antimicrobials.     Left hip septic arthritis / OM, s/p I&D  Incomplete quadriplegia  Depression      PLAN:  Continue cefepime - end date: 4/21.  Weekly CBC, CMP, CRP, ESR while on abx.  Fax results to us at [phone].  F/u with us as scheduled.  CM consulted for assistance with OPAT.   Discussed with patient.     04/04/2023:  Wound is healing well, no fevers, no chills, tolerating his Cefepime well, receiving it through port. No current complications. He reports his wound has been healing well with secondary intention, he is following with wound care.       Past Medical History:   Diagnosis Date    Anxiety     Arthritis     C5 vertebral fracture     Paralyzed nipple line down    COPD (chronic obstructive pulmonary disease)     Depression     Heart murmur     Insomnia     Osteomyelitis hip         Past Surgical History:   Procedure Laterality Date    INCISION AND DRAINAGE HIP Left 12/14/2022    Procedure: Incision and drainage Hip (I&D debridement of left hip wound, left hip and femur);  Surgeon: BARBARA Perez MD;  Location: [facility] OR;  Service: General;  Laterality: Left;    INCISION AND DRAINAGE HIP Left 3/10/2023    Procedure: INCISION AND DRAINAGE, HIP;  Surgeon: BARBARA Perez MD;  Location: [facility] OR;  Service: General;  Laterality: Left;  LEFT HIP    MEDIPORT INSERTION, SINGLE      Rt Chest wall    SPINE SURGERY      wound debridements      Multiple        Social History     Socioeconomic History    Marital status: Single   Tobacco Use    Smoking status: Every Day      "Packs/day: 1.00     Types: Cigarettes    Smokeless tobacco: Never   Substance and Sexual Activity    Alcohol use: Yes     Comment: 3-5 times week    Drug use: Not Currently    Sexual activity: Not Currently        Family History   Problem Relation Age of Onset    Lung cancer Mother     Brain cancer Mother         Review of patient's allergies indicates:   Allergen Reactions    Levofloxacin Rash          There is no immunization history on file for this patient.     Review of Systems   All other systems reviewed and are negative.       Objective:      /71 (BP Location: Right arm)   Pulse 93   Resp 18   Ht 5' 10" (1.778 m)   Wt 74.8 kg (165 lb)   SpO2 96%   BMI 23.68 kg/m²      Physical Exam  Constitutional:       Appearance: Normal appearance.   HENT:      Head: Normocephalic and atraumatic.      Mouth/Throat:      Pharynx: No oropharyngeal exudate or posterior oropharyngeal erythema.   Eyes:      Extraocular Movements: Extraocular movements intact.      Pupils: Pupils are equal, round, and reactive to light.   Cardiovascular:      Rate and Rhythm: Normal rate and regular rhythm.      Heart sounds: No murmur heard.     Comments: Port in place and accessed  Pulmonary:      Effort: No respiratory distress.      Breath sounds: No wheezing, rhonchi or rales.   Abdominal:      General: Bowel sounds are normal. There is no distension.      Palpations: Abdomen is soft.      Tenderness: There is no abdominal tenderness. There is no right CVA tenderness or left CVA tenderness.   Musculoskeletal:         General: No swelling or tenderness.      Cervical back: Neck supple. No rigidity or tenderness.   Lymphadenopathy:      Cervical: No cervical adenopathy.   Skin:     Findings: No lesion or rash.   Neurological:      Mental Status: He is alert and oriented to person, place, and time. Mental status is at baseline.      Cranial Nerves: No cranial nerve deficit.      Motor: Weakness present.      Comments: Incomplete " paraplegia at baseline   Psychiatric:         Mood and Affect: Mood normal.         Behavior: Behavior normal.        Labs: Reviewed most recent relevant labs available, notable results highlighted in this note    Imaging: Reviewed most recent relevant imaging studies available, notable results highlighted in this note    Assessment:       Problem List Items Addressed This Visit          Neuro    Chronic incomplete quadriplegia       ID    Subacute osteomyelitis, other site       Orthopedic    Pressure injury of sacral region, stage 4 - Primary (Chronic)    Open wound of left hip    Pressure injury of left hip, stage 4          Plan:       -Enterobacter cloacae in L hip tissue intraoperatively  -Continue Cefepime 2g IV q8h  -Anticipated end date 04/21/2023  -After completion, should continue with aggressive wound care   -Discussed with patient in details     No follow-ups on file.

## 2023-04-24 ENCOUNTER — HOSPITAL ENCOUNTER (OUTPATIENT)
Dept: WOUND CARE | Facility: HOSPITAL | Age: 43
Discharge: HOME | End: 2023-04-24
Attending: NURSE PRACTITIONER
Payer: MEDICARE

## 2023-04-24 VITALS
HEIGHT: 70 IN | HEART RATE: 94 BPM | RESPIRATION RATE: 18 BRPM | WEIGHT: 165 LBS | DIASTOLIC BLOOD PRESSURE: 73 MMHG | SYSTOLIC BLOOD PRESSURE: 111 MMHG | BODY MASS INDEX: 23.62 KG/M2

## 2023-04-24 DIAGNOSIS — G82.50 QUADRIPLEGIA: ICD-10-CM

## 2023-04-24 DIAGNOSIS — L89.154 PRESSURE INJURY OF SACRAL REGION, STAGE 4: ICD-10-CM

## 2023-04-24 DIAGNOSIS — L89.224 PRESSURE INJURY OF LEFT HIP, STAGE 4: Primary | ICD-10-CM

## 2023-04-24 PROCEDURE — 27000999 HC MEDICAL RECORD PHOTO DOCUMENTATION

## 2023-04-24 PROCEDURE — 97605 NEG PRS WND THER DME<=50SQCM: CPT

## 2023-04-24 PROCEDURE — 99213 OFFICE O/P EST LOW 20 MIN: CPT | Mod: 25
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
## 2023-05-08 ENCOUNTER — HOSPITAL ENCOUNTER (OUTPATIENT)
Dept: WOUND CARE | Facility: HOSPITAL | Age: 43
Discharge: HOME | End: 2023-05-08
Attending: NURSE PRACTITIONER
Payer: MEDICARE

## 2023-05-08 ENCOUNTER — TELEPHONE (OUTPATIENT)
Dept: INFECTIOUS DISEASES | Facility: CLINIC | Age: 43
End: 2023-05-08
Payer: MEDICARE

## 2023-05-08 VITALS
SYSTOLIC BLOOD PRESSURE: 114 MMHG | DIASTOLIC BLOOD PRESSURE: 72 MMHG | HEART RATE: 88 BPM | WEIGHT: 165 LBS | HEIGHT: 70 IN | BODY MASS INDEX: 23.62 KG/M2 | RESPIRATION RATE: 18 BRPM

## 2023-05-08 DIAGNOSIS — L89.224 PRESSURE INJURY OF LEFT HIP, STAGE 4: Primary | ICD-10-CM

## 2023-05-08 DIAGNOSIS — L89.154 PRESSURE INJURY OF SACRAL REGION, STAGE 4: ICD-10-CM

## 2023-05-08 DIAGNOSIS — G82.50 QUADRIPLEGIA: ICD-10-CM

## 2023-05-08 DIAGNOSIS — S71.002D UNSPECIFIED OPEN WOUND, LEFT HIP, SUBSEQUENT ENCOUNTER: ICD-10-CM

## 2023-05-08 PROCEDURE — 99213 OFFICE O/P EST LOW 20 MIN: CPT

## 2023-05-08 PROCEDURE — 27000999 HC MEDICAL RECORD PHOTO DOCUMENTATION

## 2023-05-08 PROCEDURE — 97605 NEG PRS WND THER DME<=50SQCM: CPT

## 2023-05-08 NOTE — TELEPHONE ENCOUNTER
Patient advised of message below, he states, he's already received medication. Voiced understanding.     ----- Message from CRISTIAN Green sent at 5/8/2023  3:07 PM CDT -----  Surgeon spoke with Dr. Ceron and had some concerns for worsening infection of R hip wound. Will extend Cefepime 2g IV q8 for an additional two weeks. If wound remains concerning for infection after completion of additional two week he will need additional debridement. I spoke  with Bon on Friday 5/5 to restart therapy, but was unable to get in contact with Henrry Werner when I called on Friday. Can you call him and let him know of the plans to restart antibiotics?
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
## 2023-05-15 ENCOUNTER — HOSPITAL ENCOUNTER (OUTPATIENT)
Dept: WOUND CARE | Facility: HOSPITAL | Age: 43
Discharge: HOME | End: 2023-05-15
Attending: NURSE PRACTITIONER
Payer: MEDICARE

## 2023-05-15 VITALS
DIASTOLIC BLOOD PRESSURE: 87 MMHG | SYSTOLIC BLOOD PRESSURE: 149 MMHG | RESPIRATION RATE: 18 BRPM | HEART RATE: 59 BPM | HEIGHT: 70 IN | BODY MASS INDEX: 23.62 KG/M2 | WEIGHT: 165 LBS

## 2023-05-15 DIAGNOSIS — S71.002D UNSPECIFIED OPEN WOUND, LEFT HIP, SUBSEQUENT ENCOUNTER: ICD-10-CM

## 2023-05-15 DIAGNOSIS — L89.224 PRESSURE INJURY OF LEFT HIP, STAGE 4: Primary | ICD-10-CM

## 2023-05-15 DIAGNOSIS — L89.154 PRESSURE INJURY OF SACRAL REGION, STAGE 4: ICD-10-CM

## 2023-05-15 DIAGNOSIS — G82.50 QUADRIPLEGIA: ICD-10-CM

## 2023-05-15 PROCEDURE — 11042 DBRDMT SUBQ TIS 1ST 20SQCM/<: CPT

## 2023-05-15 PROCEDURE — 97605 NEG PRS WND THER DME<=50SQCM: CPT

## 2023-05-15 PROCEDURE — 27000999 HC MEDICAL RECORD PHOTO DOCUMENTATION

## 2023-05-15 PROCEDURE — 99213 OFFICE O/P EST LOW 20 MIN: CPT | Mod: 25

## 2023-05-15 RX ORDER — CEFEPIME HYDROCHLORIDE 2 G/1
INJECTION, POWDER, FOR SOLUTION INTRAVENOUS
COMMUNITY
Start: 2023-05-11 | End: 2023-06-12
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
## 2023-05-16 NOTE — PROCEDURES
"Debridement    Date/Time: 5/15/2023 11:09 AM  Performed by: Ashley Ctoo NP  Authorized by: Ashley Coto NP     Time out: Immediately prior to procedure a "time out" was called to verify the correct patient, procedure, equipment, support staff and site/side marked as required.    Consent Done?:  Yes (Verbal)    Preparation: Patient was prepped and draped with clean technique    Local anesthesia used?: No      Wound Details:    Location:  Left hip    Type of Debridement:  Excisional       Length (cm):  3.2       Area (sq cm):  13.12       Width (cm):  4.1       Percent Debrided (%):  100       Depth (cm):  1.8       Total Area Debrided (sq cm):  13.12    Depth of debridement:  Subcutaneous tissue    Tissue debrided:  Adipose, Subcutaneous and Hypergranulation    Devitalized tissue debrided:  Exudate, Fibrin, Slough and Necrotic/Eschar    Instruments:  Forceps and Scissors     RC  "

## 2023-05-22 ENCOUNTER — TELEPHONE (OUTPATIENT)
Dept: INFECTIOUS DISEASES | Facility: CLINIC | Age: 43
End: 2023-05-22
Payer: MEDICARE

## 2023-05-22 NOTE — TELEPHONE ENCOUNTER
----- Message from CRISTIAN Green sent at 5/18/2023  9:23 AM CDT -----  Perfect  ----- Message -----  From: Christine Mckeon LPN  Sent: 5/17/2023   4:05 PM CDT  To: CRISTIAN Green    Spoke with [facility] with [facility], he states, no labs done but they can draw at completion which is tomorrow and fax them to us.   ----- Message -----  From: CRISTIAN Green  Sent: 5/17/2023   2:27 PM CDT  To: Christine Mckeon LPN    Okay. If they were not completed during the two week period can we get them upon completion? Thanks   ----- Message -----  From: Christine Mckeon LPN  Sent: 5/17/2023  11:37 AM CDT  To: CRISTIAN Green, #    Spoke with benigno at [facility], he is not sure if labs were done for 2 additional weeks, he will check with  agency and if done have them sent to our office.   ----- Message -----  From: CRISTIAN Green  Sent: 5/17/2023  10:51 AM CDT  To: , #    Can we reach out to [facility] to ensure we are getting weekly CBC, CMP, ESR, and CRP while completing the additional two weeks of Cefepime? Thanks

## 2023-05-29 ENCOUNTER — HOSPITAL ENCOUNTER (OUTPATIENT)
Dept: WOUND CARE | Facility: HOSPITAL | Age: 43
Discharge: HOME | End: 2023-05-29
Attending: NURSE PRACTITIONER
Payer: MEDICARE

## 2023-05-29 VITALS
BODY MASS INDEX: 23.62 KG/M2 | DIASTOLIC BLOOD PRESSURE: 85 MMHG | HEIGHT: 70 IN | WEIGHT: 165 LBS | SYSTOLIC BLOOD PRESSURE: 133 MMHG | RESPIRATION RATE: 18 BRPM | HEART RATE: 66 BPM

## 2023-05-29 DIAGNOSIS — L89.154 PRESSURE INJURY OF SACRAL REGION, STAGE 4: ICD-10-CM

## 2023-05-29 DIAGNOSIS — L89.224 PRESSURE INJURY OF LEFT HIP, STAGE 4: Primary | ICD-10-CM

## 2023-05-29 DIAGNOSIS — G82.50 QUADRIPLEGIA: ICD-10-CM

## 2023-05-29 PROCEDURE — 97605 NEG PRS WND THER DME<=50SQCM: CPT

## 2023-05-29 PROCEDURE — 99213 OFFICE O/P EST LOW 20 MIN: CPT

## 2023-05-29 PROCEDURE — 27000999 HC MEDICAL RECORD PHOTO DOCUMENTATION

## 2023-05-29 RX ORDER — BUSPIRONE HYDROCHLORIDE 5 MG/1
10 TABLET ORAL 2 TIMES DAILY
COMMUNITY
Start: 2023-05-23 | End: 2023-07-24 | Stop reason: DRUGHIGH
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
## 2023-06-12 ENCOUNTER — HOSPITAL ENCOUNTER (OUTPATIENT)
Dept: WOUND CARE | Facility: HOSPITAL | Age: 43
Discharge: HOME | End: 2023-06-12
Attending: NURSE PRACTITIONER
Payer: MEDICARE

## 2023-06-12 VITALS
DIASTOLIC BLOOD PRESSURE: 96 MMHG | HEIGHT: 70 IN | HEART RATE: 66 BPM | SYSTOLIC BLOOD PRESSURE: 154 MMHG | RESPIRATION RATE: 18 BRPM | WEIGHT: 165 LBS | BODY MASS INDEX: 23.62 KG/M2

## 2023-06-12 DIAGNOSIS — L89.224 PRESSURE INJURY OF LEFT HIP, STAGE 4: Primary | ICD-10-CM

## 2023-06-12 DIAGNOSIS — L89.154 PRESSURE INJURY OF SACRAL REGION, STAGE 4: ICD-10-CM

## 2023-06-12 DIAGNOSIS — G82.50 QUADRIPLEGIA: ICD-10-CM

## 2023-06-12 PROCEDURE — 97605 NEG PRS WND THER DME<=50SQCM: CPT

## 2023-06-12 PROCEDURE — 27000999 HC MEDICAL RECORD PHOTO DOCUMENTATION

## 2023-06-12 PROCEDURE — 99213 OFFICE O/P EST LOW 20 MIN: CPT
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
## 2023-06-14 PROBLEM — G82.50 QUADRIPLEGIA: Status: ACTIVE | Noted: 2023-06-14

## 2023-06-19 ENCOUNTER — HOSPITAL ENCOUNTER (OUTPATIENT)
Dept: WOUND CARE | Facility: HOSPITAL | Age: 43
Discharge: HOME | End: 2023-06-19
Attending: FAMILY MEDICINE
Payer: MEDICARE

## 2023-06-19 VITALS
RESPIRATION RATE: 17 BRPM | HEART RATE: 87 BPM | WEIGHT: 165 LBS | HEIGHT: 70 IN | SYSTOLIC BLOOD PRESSURE: 127 MMHG | BODY MASS INDEX: 23.62 KG/M2 | DIASTOLIC BLOOD PRESSURE: 87 MMHG

## 2023-06-19 DIAGNOSIS — L89.154 PRESSURE INJURY OF SACRAL REGION, STAGE 4: ICD-10-CM

## 2023-06-19 DIAGNOSIS — L89.224 PRESSURE INJURY OF LEFT HIP, STAGE 4: Primary | ICD-10-CM

## 2023-06-19 DIAGNOSIS — S71.002D OPEN WOUND OF LEFT HIP, SUBSEQUENT ENCOUNTER: ICD-10-CM

## 2023-06-19 DIAGNOSIS — M86.28 SUBACUTE OSTEOMYELITIS, OTHER SITE: ICD-10-CM

## 2023-06-19 PROCEDURE — 88311 DECALCIFY TISSUE: CPT

## 2023-06-19 PROCEDURE — 88305 TISSUE EXAM BY PATHOLOGIST: CPT | Performed by: FAMILY MEDICINE

## 2023-06-19 PROCEDURE — 27000999 HC MEDICAL RECORD PHOTO DOCUMENTATION

## 2023-06-19 PROCEDURE — 99212 OFFICE O/P EST SF 10 MIN: CPT

## 2023-06-19 RX ORDER — SULFAMETHOXAZOLE AND TRIMETHOPRIM 800; 160 MG/1; MG/1
TABLET ORAL
COMMUNITY
Start: 2023-06-10 | End: 2023-08-01

## 2023-06-19 NOTE — PROGRESS NOTES
"   Subjective:       Patient ID: Werner Jarrett is a 42 y.o. male.    Chief Complaint: No chief complaint on file.    42-year-old white male, a quadriplegia, who is here for follow up of the left hip stage IV pressure injury, along with a stage IV smaller sacral injury.  He has been using a wound VAC on the left hip ulcer, but some particles of bone were found on the white foam today.  He recently treated for acute osteomyelitis, with 6 weeks of IV antibiotics.  He is scheduled to see Dr. Delgado next week.    Review of Systems   Constitutional: Negative.    Respiratory: Negative.     Cardiovascular: Negative.    Skin:         As documented in the HPI.   All other systems reviewed and are negative.      Objective:      Vitals:    06/19/23 1127   BP: 127/87   BP Location: Left arm   Patient Position: Lying   Pulse: 87   Resp: 17   Weight: 74.8 kg (165 lb)   Height: 5' 10" (1.778 m)        Physical Exam  Vitals and nursing note reviewed. Exam conducted with a chaperone present.   Constitutional:       Appearance: Normal appearance.   Cardiovascular:      Rate and Rhythm: Normal rate.   Pulmonary:      Effort: Pulmonary effort is normal.   Skin:     General: Skin is warm and dry.      Comments: The left hip open wound has some tunneling in 3 places.  I do not palpate bone at this time.  There is no evidence of secondary infection.  The sacral ulcer is very small, and I did a mechanical debridement on both open wounds.   Neurological:      Mental Status: He is alert.     Sacrum    Left hip         Assessment:         ICD-10-CM ICD-9-CM   1. Pressure injury of left hip, stage 4  L89.224 707.04     707.24   2. Pressure injury of sacral region, stage 4  L89.154 707.03     707.24         Procedures:   Excisional debridement performed:  [] Yes [x] No   Selective debridement performed:  [] Yes [x] No   Mechanical debridement performed:  [x] Yes [] No   Silver nitrate applied :    [] Yes [x] No   Labs ordered this visit:   [] Yes " "[x] No   Imaging ordered this visit:   [] Yes [x] No   Tissue pathology and/or culture taken:  [] Yes [x] No     Procedures:  HOME HEALTH AGENCY:  [facility] Team Member     Since 1/11/2023     [phone]       TIMES PER WEEK/DAYS:  ORDERS:  Sacrum: Cleanse with wound cleanser, apply silver alginate to wound bed, cover with mepilex foam dressing, change Monday, Wednesday and Friday, and prn soilage  Left hip: Cleanse with wound cleanser, GENTLY pack ALL tunnels at 12, 3 and 6 o'clock with hypertonic saline moisten 2" kerlix and cover with 4 x 4 and a 6 x 6 gentle border to be changed daily.     Patient Orders:    Currently taking bactrim  Follow up with Dr. Quijano scheduled for next week, Dr. Serra requesting sooner appt.   Bone Fragments from left hip sent to pathology for suspected .                    Follow up in about 2 weeks (around 7/3/2023).                 "

## 2023-06-19 NOTE — ADDENDUM NOTE
Encounter addended by: Duarte Serra MD on: 6/19/2023 1:13 PM   Actions taken: Pharmacy for encounter modified, Order list changed, Diagnosis association updated

## 2023-06-19 NOTE — ADDENDUM NOTE
Encounter addended by: Duarte Serra MD on: 6/19/2023 2:12 PM   Actions taken: Pharmacy for encounter modified, Order list changed, Diagnosis association updated

## 2023-06-19 NOTE — ADDENDUM NOTE
Encounter addended by: Jeanette Sloan RN on: 6/19/2023 1:31 PM   Actions taken: Pharmacy for encounter modified, Order list changed

## 2023-06-21 LAB — PSYCHE PATHOLOGY RESULT: NORMAL

## 2023-07-03 ENCOUNTER — HOSPITAL ENCOUNTER (OUTPATIENT)
Dept: WOUND CARE | Facility: HOSPITAL | Age: 43
Discharge: HOME | End: 2023-07-03
Attending: FAMILY MEDICINE
Payer: MEDICARE

## 2023-07-03 VITALS
RESPIRATION RATE: 17 BRPM | DIASTOLIC BLOOD PRESSURE: 78 MMHG | HEIGHT: 70 IN | BODY MASS INDEX: 23.62 KG/M2 | SYSTOLIC BLOOD PRESSURE: 126 MMHG | HEART RATE: 87 BPM | WEIGHT: 165 LBS

## 2023-07-03 DIAGNOSIS — M86.18: ICD-10-CM

## 2023-07-03 DIAGNOSIS — L89.224 PRESSURE INJURY OF LEFT HIP, STAGE 4: Primary | ICD-10-CM

## 2023-07-03 PROCEDURE — 99213 OFFICE O/P EST LOW 20 MIN: CPT

## 2023-07-03 PROCEDURE — 27000999 HC MEDICAL RECORD PHOTO DOCUMENTATION

## 2023-07-03 NOTE — PROGRESS NOTES
"NOTES:   Follow up with ID on 07/05/2023 for 6 month follow up from IV abx   Follow up with Dr. Delgado 07/11/2023  Subjective:       Patient ID: Werner Jarrett is a 42 y.o. male.    Chief Complaint: Pressure Ulcer (Sacrum - stage 4 /Left posterior hip - stage 4)    42-year-old white male, a quadriplegia, who is here for follow up of the left hip stage IV pressure injury, along with a stage IV smaller sacral injury.  He has been using a wound VAC on the left hip ulcer, but some particles of bone were found on the white foam today.  He recently treated for acute osteomyelitis, with 6 weeks of IV antibiotics.  He is scheduled to see Dr. Delgado next week.  On July 3rd, it was noted that his wound appears similar to the previous visit.  I reviewed the bone biopsy results, which showed evidence of acute osteomyelitis on June 21st.  He has seen Dr. Delgado since that biopsy.  He has mentioned total hip disarticulation as an option.  He would like to speak to his infectious disease doctor in 2 days, and Ms. Coto next week.  He has received IV antibiotics in the past.    Review of Systems   Constitutional: Negative.    Respiratory: Negative.     Cardiovascular: Negative.    Skin:         As documented in the HPI.   All other systems reviewed and are negative.      Objective:      Vitals:    07/03/23 1121   BP: 126/78   BP Location: Right arm   Patient Position: Lying   Pulse: 87   Resp: 17   Weight: 74.8 kg (165 lb)   Height: 5' 10" (1.778 m)        Physical Exam  Vitals and nursing note reviewed. Exam conducted with a chaperone present.   Constitutional:       Appearance: Normal appearance.   Cardiovascular:      Rate and Rhythm: Normal rate.   Pulmonary:      Effort: Pulmonary effort is normal.   Skin:     General: Skin is warm and dry.      Comments: The left hip open wound has some tunneling in 3 places.  I do not palpate bone at this time.  There is no evidence of secondary infection.  The sacral ulcer is very small, " and I did a mechanical debridement on both open wounds.  On July 3rd, the wound appears similar with regard to measurements.  He has much tunneling.  I did a mechanical debridement.   Neurological:      Mental Status: He is alert.     Sacrum      Left hip         Altered Skin Integrity 12/14/22 1430 midline Sacral spine #1 Ulceration (Active)   12/14/22 1430   Altered Skin Integrity Present on Admission - Did Patient arrive to the hospital with altered skin?: yes   Side:    Orientation: midline   Location: Sacral spine   Wound Number: #1   Is this injury device related?:    Primary Wound Type: Ulceration   Description of Altered Skin Integrity:    Ankle-Brachial Index:    Pulses:    Removal Indication and Assessment:    Wound Outcome:    (Retired) Wound Length (cm):    (Retired) Wound Width (cm):    (Retired) Depth (cm):    Wound Description (Comments):    Removal Indications:    Dressing Appearance Moist drainage 07/03/23 1122   Drainage Amount Moderate 07/03/23 1122   Drainage Characteristics/Odor Serosanguineous 07/03/23 1122   Appearance Moist;Tan;Pink;Granulating 07/03/23 1122   Tissue loss description Full thickness 07/03/23 1122   Periwound Area Intact;Moist 07/03/23 1122   Wound Edges Open 07/03/23 1122   Wound Length (cm) 0.2 cm 07/03/23 1122   Wound Width (cm) 0.2 cm 07/03/23 1122   Wound Depth (cm) 0.5 cm 07/03/23 1122   Wound Volume (cm^3) 0.02 cm^3 07/03/23 1122   Wound Surface Area (cm^2) 0.04 cm^2 07/03/23 1122   Care Cleansed with:;Wound cleanser 07/03/23 1122   Dressing Applied;Other (comment) 07/03/23 1122   Dressing Change Due 07/04/23 07/03/23 1122            Altered Skin Integrity 01/19/23 1211 Left Hip Other (comment) (Active)   01/19/23 1211   Altered Skin Integrity Present on Admission - Did Patient arrive to the hospital with altered skin?: yes   Side: Left   Orientation:    Location: Hip   Wound Number:    Is this injury device related?: No   Primary Wound Type: Other   Description of  Altered Skin Integrity:    Ankle-Brachial Index:    Pulses:    Removal Indication and Assessment:    Wound Outcome:    (Retired) Wound Length (cm):    (Retired) Wound Width (cm):    (Retired) Depth (cm):    Wound Description (Comments):    Removal Indications:    Dressing Appearance Moist drainage 07/03/23 1122   Drainage Amount Copious 07/03/23 1122   Drainage Characteristics/Odor Serosanguineous 07/03/23 1122   Appearance Moist;Bone;Muscle;Tendon;Slough;Tan;Fibrin;Pink 07/03/23 1122   Tissue loss description Full thickness 07/03/23 1122   Periwound Area Intact 07/03/23 1122   Wound Edges Open 07/03/23 1122   Wound Length (cm) 2.6 cm 07/03/23 1122   Wound Width (cm) 3.3 cm 07/03/23 1122   Wound Depth (cm) 2 cm 07/03/23 1122   Wound Volume (cm^3) 17.16 cm^3 07/03/23 1122   Wound Surface Area (cm^2) 8.58 cm^2 07/03/23 1122   Tunneling (depth (cm)/location) 5.3cm at 3 o'clock / 7.4cm at 3 o'clock 07/03/23 1122   Undermining (depth (cm)/location) 2.6cm from 5-6 o'clock / 5.2cm from 1-2 o'clock 07/03/23 1122   Care Cleansed with:;Wound cleanser 07/03/23 1122   Dressing Applied;Other (comment) 07/03/23 1122   Dressing Change Due 07/04/23 07/03/23 1122         Assessment:         ICD-10-CM ICD-9-CM   1. Pressure injury of left hip, stage 4  L89.224 707.04     707.24   2. Acute osteomyelitis of hip  M86.18 730.05         Procedures:   Excisional debridement performed:  [] Yes [x] No   Selective debridement performed:  [] Yes [x] No   Mechanical debridement performed:  [x] Yes [] No   Silver nitrate applied :    [] Yes [x] No   Labs ordered this visit:   [] Yes [x] No   Imaging ordered this visit:   [] Yes [x] No   Tissue pathology and/or culture taken:  [x] Yes [] No     Procedures:  HOME HEALTH AGENCY:  [facility] Team Member     Since 1/11/2023     [phone]       TIMES PER WEEK/DAYS:  ORDERS:  Sacral wound: Cleanse with wound cleanser, apply silver alginate, and 4x4 gentle foam border  "dressing to be changed daily   Left hip wound: Cleanse with wound cleanser, apply 2" kerlix moistened with hypertonic saline solution into tunneling and undermining area, cover with silver alginate, ABD pad, and tape to be changed daily     He is contemplating whether or not he wants his entire left leg removed.  He will speak with his providers over the next several days.  In the meantime, a culture was taken of some drainage of the left hip wound.               Follow up in 1 week (on 7/10/2023) for stretcher .                 "

## 2023-07-10 ENCOUNTER — HOSPITAL ENCOUNTER (OUTPATIENT)
Dept: WOUND CARE | Facility: HOSPITAL | Age: 43
Discharge: HOME | End: 2023-07-10
Attending: NURSE PRACTITIONER
Payer: MEDICARE

## 2023-07-10 VITALS
WEIGHT: 165 LBS | HEIGHT: 70 IN | SYSTOLIC BLOOD PRESSURE: 136 MMHG | HEART RATE: 73 BPM | DIASTOLIC BLOOD PRESSURE: 88 MMHG | RESPIRATION RATE: 18 BRPM | BODY MASS INDEX: 23.62 KG/M2

## 2023-07-10 DIAGNOSIS — M86.18: ICD-10-CM

## 2023-07-10 DIAGNOSIS — L89.154 PRESSURE INJURY OF SACRAL REGION, STAGE 4: ICD-10-CM

## 2023-07-10 DIAGNOSIS — L89.224 PRESSURE INJURY OF LEFT HIP, STAGE 4: Primary | ICD-10-CM

## 2023-07-10 DIAGNOSIS — S71.002D OPEN WOUND OF LEFT HIP, SUBSEQUENT ENCOUNTER: ICD-10-CM

## 2023-07-10 DIAGNOSIS — G82.50 QUADRIPLEGIA: ICD-10-CM

## 2023-07-10 PROCEDURE — 99212 OFFICE O/P EST SF 10 MIN: CPT

## 2023-07-10 PROCEDURE — 27000999 HC MEDICAL RECORD PHOTO DOCUMENTATION

## 2023-07-10 NOTE — PROGRESS NOTES
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    Review of patient's allergies indicates:   Allergen Reactions    Levofloxacin Rash       DIAGNOSTICS      Labs/Scans/Micro:    CBC:   Lab Results   Component Value Date    WBC 10.6 03/14/2023    HGB 12.0 (L) 03/14/2023    HCT 37.9 (L) 03/14/2023    MCV 81.5 03/14/2023     (H) 03/14/2023       Sedimentation rate:   Lab Results   Component Value Date    SEDRATE 22 (H) 12/15/2022      C-reactive protein:   Lab Results   Component Value Date    CRP 44.90 (H) 12/15/2022       Complete Home Health  ORDERS:  Sacral wound: Cleanse with wound cleanser, apply silver alginate, and 4x4 gentle foam border dressing to be changed daily   Left hip wound: Cleanse with wound cleanser, apply 2" kerlix moistened with hypertonic saline solution into tunneling and undermining area, cover with silver alginate, ABD pad, and tape to be changed daily       Electronically signed:  Ashley Coto NP            "

## 2023-07-14 NOTE — BRIEF OP NOTE
[facility] - Periop Services  Brief Operative Note    SUMMARY     Surgery Date: 12/14/2022     Surgeon(s) and Role:     * BARBARA Perze MD - Primary     * David Gutiérrez MD - Consult    Assisting Surgeon: None    Pre-op Diagnosis:  Open wound of hip and thigh with complication, left, subsequent encounter [S71.002D, S71.102D]    Post-op Diagnosis:  Post-Op Diagnosis Codes:     * Open wound of hip and thigh with complication, left, subsequent encounter [S71.002D, S71.102D]    Procedure(s) (LRB):  INCISION AND DRAINAGE (I&D debridement of left hip wound, left hip and femur) (Left)    Anesthesia: General/MAC    Operative Findings:  1) left hip wound total dimensions 10 cm x 8 cm.     2) Infected-appearing tissue overlying bone and involving bone - bone samples obtained using a drill.  Vycor culture/PCR samples obtained in addition to regular cultures of tissue and fluid.     Estimated Blood Loss: Minimal     between 12/14/2022  1:03 PM and 12/14/2022  1:34 PM *    Estimated Blood Loss has been documented.      [id number]       Pt has a follow up with you on 10/10/2023 for MNT. Thanks, Lauro

## 2023-07-24 ENCOUNTER — OFFICE VISIT (OUTPATIENT)
Dept: INFECTIOUS DISEASES | Facility: CLINIC | Age: 43
End: 2023-07-24
Payer: MEDICARE

## 2023-07-24 VITALS
DIASTOLIC BLOOD PRESSURE: 49 MMHG | SYSTOLIC BLOOD PRESSURE: 80 MMHG | RESPIRATION RATE: 18 BRPM | OXYGEN SATURATION: 97 % | WEIGHT: 165 LBS | HEART RATE: 104 BPM | HEIGHT: 70 IN | BODY MASS INDEX: 23.62 KG/M2

## 2023-07-24 DIAGNOSIS — M86.28 SUBACUTE OSTEOMYELITIS, OTHER SITE: ICD-10-CM

## 2023-07-24 DIAGNOSIS — M86.9 HIP OSTEOMYELITIS, LEFT: Primary | ICD-10-CM

## 2023-07-24 DIAGNOSIS — G82.20 PARAPLEGIA: ICD-10-CM

## 2023-07-24 DIAGNOSIS — Z72.0 TOBACCO USER: Chronic | ICD-10-CM

## 2023-07-24 PROCEDURE — 99214 PR OFFICE/OUTPT VISIT, EST, LEVL IV, 30-39 MIN: ICD-10-PCS | Mod: S$PBB,,,

## 2023-07-24 PROCEDURE — 99999 PR PBB SHADOW E&M-EST. PATIENT-LVL IV: ICD-10-PCS | Mod: PBBFAC,,,

## 2023-07-24 PROCEDURE — 99214 OFFICE O/P EST MOD 30 MIN: CPT | Mod: S$PBB,,,

## 2023-07-24 PROCEDURE — 99214 OFFICE O/P EST MOD 30 MIN: CPT | Mod: PBBFAC

## 2023-07-24 PROCEDURE — 99999 PR PBB SHADOW E&M-EST. PATIENT-LVL IV: CPT | Mod: PBBFAC,,,

## 2023-07-24 RX ORDER — BUSPIRONE HYDROCHLORIDE 10 MG/1
10 TABLET ORAL 2 TIMES DAILY
Status: ON HOLD | COMMUNITY
Start: 2023-07-13 | End: 2023-09-18 | Stop reason: SDUPTHER

## 2023-07-25 PROBLEM — M86.9 HIP OSTEOMYELITIS, LEFT: Status: ACTIVE | Noted: 2023-07-25

## 2023-08-01 ENCOUNTER — HOSPITAL ENCOUNTER (OUTPATIENT)
Dept: WOUND CARE | Facility: HOSPITAL | Age: 43
Discharge: HOME | End: 2023-08-01
Attending: NURSE PRACTITIONER
Payer: MEDICARE

## 2023-08-01 VITALS
RESPIRATION RATE: 18 BRPM | DIASTOLIC BLOOD PRESSURE: 78 MMHG | HEART RATE: 64 BPM | SYSTOLIC BLOOD PRESSURE: 153 MMHG | HEIGHT: 70 IN | WEIGHT: 165 LBS | BODY MASS INDEX: 23.62 KG/M2

## 2023-08-01 DIAGNOSIS — G82.50 QUADRIPLEGIA: ICD-10-CM

## 2023-08-01 DIAGNOSIS — L89.154 PRESSURE INJURY OF SACRAL REGION, STAGE 4: ICD-10-CM

## 2023-08-01 DIAGNOSIS — L89.224 PRESSURE INJURY OF LEFT HIP, STAGE 4: Primary | ICD-10-CM

## 2023-08-01 DIAGNOSIS — S71.002D OPEN WOUND OF LEFT HIP, SUBSEQUENT ENCOUNTER: ICD-10-CM

## 2023-08-01 DIAGNOSIS — M86.18: ICD-10-CM

## 2023-08-01 PROCEDURE — 27000999 HC MEDICAL RECORD PHOTO DOCUMENTATION

## 2023-08-01 PROCEDURE — 99213 OFFICE O/P EST LOW 20 MIN: CPT

## 2023-08-01 NOTE — PROGRESS NOTES
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 vitamin C, (VITAMIN C) 1000 MG tablet, Take 1,000 mg by mouth every evening., Disp: , Rfl:     bisacodyL (DULCOLAX) 10 mg Supp, Place 10 mg rectally daily as needed., Disp: , Rfl:     busPIRone (BUSPAR) 10 MG tablet, Take 10 mg by mouth 2 (two) times daily., Disp: , Rfl:     cetirizine (ZYRTEC) 10 MG tablet, Take 10 mg by mouth every evening., Disp: , Rfl:     DULoxetine (CYMBALTA) 30 MG capsule, Take 30 mg by mouth 2 (two) times daily., Disp: , Rfl:     fexofenadine (ALLEGRA) 180 MG tablet, Take 180 mg by mouth every morning., Disp: , Rfl:     meloxicam (MOBIC) 7.5 MG tablet, Take 7.5 mg by mouth 2 (two) times daily as needed for Pain., Disp: , Rfl:     multivitamin/iron/folic acid (CENTRUM ORAL), Take 1 tablet by mouth every morning., Disp: , Rfl:     NON FORMULARY MEDICATION, Take 3 drops by mouth 2 (two) times daily as needed. THC, Disp: , Rfl:     zinc gluconate 50 mg tablet, Take 50 mg by mouth every evening., Disp: , Rfl:    Review of patient's allergies indicates:   Allergen Reactions    Levofloxacin Rash       DIAGNOSTICS      Labs/Scans/Micro:    CBC:   Lab Results   Component Value Date    WBC 10.6 03/14/2023    WBC 10.6 03/14/2023    HGB 12.0 (L) 03/14/2023    HCT 37.9 (L) 03/14/2023    MCV 81.5 03/14/2023     (H) 03/14/2023 " "      Sedimentation rate:   Lab Results   Component Value Date    SEDRATE 22 (H) 12/15/2022      C-reactive protein:   Lab Results   Component Value Date    CRP 44.90 (H) 12/15/2022       Complete Home Health  ORDERS:  Sacral wound: Cleanse with wound cleanser, apply silver alginate, and 4x4 gentle foam border dressing to be changed twice weekly on Mondays and Thursdays  Left hip wound: Cleanse with wound cleanser, apply dry 2" kerlix into tunneling and undermining area, cover with ABD pad and tape, change daily       Electronically signed:  Ashley Coto NP            "

## 2023-08-15 ENCOUNTER — CLINICAL SUPPORT (OUTPATIENT)
Dept: RESPIRATORY THERAPY | Facility: HOSPITAL | Age: 43
End: 2023-08-15
Attending: ANESTHESIOLOGY
Payer: MEDICARE

## 2023-08-15 ENCOUNTER — HOSPITAL ENCOUNTER (OUTPATIENT)
Dept: WOUND CARE | Facility: HOSPITAL | Age: 43
Discharge: HOME | End: 2023-08-15
Attending: ANESTHESIOLOGY
Payer: MEDICARE

## 2023-08-15 VITALS
HEIGHT: 70 IN | HEART RATE: 95 BPM | SYSTOLIC BLOOD PRESSURE: 109 MMHG | BODY MASS INDEX: 23.62 KG/M2 | DIASTOLIC BLOOD PRESSURE: 58 MMHG | WEIGHT: 165 LBS | RESPIRATION RATE: 18 BRPM

## 2023-08-15 DIAGNOSIS — Z01.818 PREOP EXAMINATION: Primary | ICD-10-CM

## 2023-08-15 DIAGNOSIS — G82.50 QUADRIPLEGIA: ICD-10-CM

## 2023-08-15 DIAGNOSIS — M86.18: ICD-10-CM

## 2023-08-15 DIAGNOSIS — L89.224 PRESSURE INJURY OF LEFT HIP, STAGE 4: Primary | ICD-10-CM

## 2023-08-15 DIAGNOSIS — L89.154 PRESSURE INJURY OF SACRAL REGION, STAGE 4: ICD-10-CM

## 2023-08-15 DIAGNOSIS — Z01.818 PREOP EXAMINATION: ICD-10-CM

## 2023-08-15 DIAGNOSIS — S71.002D OPEN WOUND OF LEFT HIP, SUBSEQUENT ENCOUNTER: ICD-10-CM

## 2023-08-15 PROCEDURE — 27000999 HC MEDICAL RECORD PHOTO DOCUMENTATION

## 2023-08-15 PROCEDURE — 93005 ELECTROCARDIOGRAM TRACING: CPT

## 2023-08-15 PROCEDURE — 99212 OFFICE O/P EST SF 10 MIN: CPT

## 2023-08-15 RX ORDER — ZINC SULFATE 50(220)MG
1 CAPSULE ORAL
COMMUNITY
End: 2023-11-27

## 2023-08-15 NOTE — PROGRESS NOTES
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 cleanser 08/15/23 1124   Dressing Applied 08/15/23 1124   Dressing Change Due 08/16/23 08/15/23 1124     08/15/23    Sacrum   (Silver alginate, gentle border)     Left hip   (2" kerlix, ABD pads, tape)     Right hip - monitoring ONLY        Assessment:         ICD-10-CM ICD-9-CM   1. Pressure injury of left hip, stage 4  L89.224 707.04     707.24   2. Acute osteomyelitis of hip  M86.18 730.05   3. Open wound of left hip, subsequent encounter  S71.002D V58.89     890.0   4. Pressure injury of sacral region, stage 4  L89.154 707.03     707.24   5. Quadriplegia  G82.50 344.00     Mechanical debridement only      MEDICATIONS    Current Outpatient Medications:     acetaminophen (TYLENOL) 325 MG tablet, Take 650 mg by mouth every 6 (six) hours as needed for Pain., Disp: , Rfl:     ascorbic acid, vitamin C, (VITAMIN C) 1000 MG tablet, Take 1,000 mg by mouth every evening., Disp: , Rfl:     bisacodyL (DULCOLAX) 10 mg Supp, Place 10 mg rectally daily as needed., Disp: , Rfl:     busPIRone (BUSPAR) 10 MG tablet, Take 10 mg by mouth 2 (two) times daily., Disp: , Rfl:     cetirizine (ZYRTEC) 10 MG tablet, Take 10 mg by mouth 2 (two) times a day., Disp: , Rfl:     DULoxetine (CYMBALTA) 30 MG capsule, Take 30 mg by mouth 2 (two) " "times daily., Disp: , Rfl:     fexofenadine (ALLEGRA) 180 MG tablet, Take 180 mg by mouth every morning., Disp: , Rfl:     meloxicam (MOBIC) 7.5 MG tablet, Take 7.5 mg by mouth 2 (two) times daily as needed for Pain., Disp: , Rfl:     multivitamin/iron/folic acid (CENTRUM ORAL), Take 1 tablet by mouth every morning., Disp: , Rfl:     NON FORMULARY MEDICATION, Take 3 drops by mouth 2 (two) times daily as needed. THC, Disp: , Rfl:     zinc sulfate (ZINCATE) 50 mg zinc (220 mg) capsule, 1 capsule., Disp: , Rfl:    Review of patient's allergies indicates:   Allergen Reactions    Levofloxacin Rash       DIAGNOSTICS      Labs/Scans/Micro:    CBC:   Lab Results   Component Value Date    WBC 12.56 (H) 08/15/2023    HGB 8.7 (L) 08/15/2023    HCT 29.5 (L) 08/15/2023    MCV 73.6 (L) 08/15/2023     (H) 08/15/2023       Sedimentation rate:   Lab Results   Component Value Date    SEDRATE 22 (H) 12/15/2022      C-reactive protein:   Lab Results   Component Value Date    CRP 44.90 (H) 12/15/2022       Complete Home Health  ORDERS:  Sacral wound: Cleanse with wound cleanser, apply silver alginate, and 4x4 gentle foam border dressing to be changed twice weekly on Mondays and Thursdays  Left hip wound: Cleanse with wound cleanser, apply dry 2" kerlix into tunneling and undermining area, cover with ABD pad and tape, change daily       Electronically signed:  Ashley Coto NP  "

## 2023-08-18 ENCOUNTER — ANESTHESIA EVENT (OUTPATIENT)
Dept: SURGERY | Facility: HOSPITAL | Age: 43
DRG: 475 | End: 2023-08-18
Payer: MEDICARE

## 2023-08-21 ENCOUNTER — ANESTHESIA (OUTPATIENT)
Dept: SURGERY | Facility: HOSPITAL | Age: 43
DRG: 475 | End: 2023-08-21
Payer: MEDICARE

## 2023-08-21 ENCOUNTER — HOSPITAL ENCOUNTER (INPATIENT)
Facility: HOSPITAL | Age: 43
LOS: 3 days | Discharge: NURSING, REHAB OR LONG-TERM CARE | DRG: 475 | End: 2023-08-24
Attending: SURGERY | Admitting: SURGERY
Payer: MEDICARE

## 2023-08-21 VITALS — SYSTOLIC BLOOD PRESSURE: 88 MMHG | DIASTOLIC BLOOD PRESSURE: 64 MMHG | OXYGEN SATURATION: 100 % | HEART RATE: 110 BPM

## 2023-08-21 DIAGNOSIS — S73.025D: ICD-10-CM

## 2023-08-21 DIAGNOSIS — S71.109A: ICD-10-CM

## 2023-08-21 DIAGNOSIS — S71.002D: ICD-10-CM

## 2023-08-21 DIAGNOSIS — S71.009A: ICD-10-CM

## 2023-08-21 DIAGNOSIS — L89.154 PRESSURE INJURY OF SACRAL REGION, STAGE 4: Primary | Chronic | ICD-10-CM

## 2023-08-21 LAB
ALBUMIN SERPL-MCNC: 1.7 G/DL (ref 3.5–5)
ALBUMIN/GLOB SERPL: 0.4 RATIO (ref 1.1–2)
ALP SERPL-CCNC: 89 UNIT/L (ref 40–150)
ALT SERPL-CCNC: 8 UNIT/L (ref 0–55)
AST SERPL-CCNC: 16 UNIT/L (ref 5–34)
BASOPHILS # BLD AUTO: 0.01 X10(3)/MCL
BASOPHILS NFR BLD AUTO: 0.1 %
BILIRUB SERPL-MCNC: 0.4 MG/DL
BUN SERPL-MCNC: 9 MG/DL (ref 8.9–20.6)
CALCIUM SERPL-MCNC: 7.8 MG/DL (ref 8.4–10.2)
CHLORIDE SERPL-SCNC: 94 MMOL/L (ref 98–107)
CO2 SERPL-SCNC: 27 MMOL/L (ref 22–29)
CREAT SERPL-MCNC: 0.54 MG/DL (ref 0.73–1.18)
EOSINOPHIL # BLD AUTO: 0 X10(3)/MCL (ref 0–0.9)
EOSINOPHIL NFR BLD AUTO: 0 %
ERYTHROCYTE [DISTWIDTH] IN BLOOD BY AUTOMATED COUNT: 17.6 % (ref 11.5–17)
GFR SERPLBLD CREATININE-BSD FMLA CKD-EPI: >60 MLS/MIN/1.73/M2
GLOBULIN SER-MCNC: 4 GM/DL (ref 2.4–3.5)
GLUCOSE SERPL-MCNC: 157 MG/DL (ref 74–100)
GROUP & RH: NORMAL
HCT VFR BLD AUTO: 21.2 % (ref 42–52)
HGB BLD-MCNC: 6.3 G/DL (ref 14–18)
IMM GRANULOCYTES # BLD AUTO: 0.06 X10(3)/MCL (ref 0–0.04)
IMM GRANULOCYTES NFR BLD AUTO: 0.5 %
INDIRECT COOMBS GEL: NORMAL
LYMPHOCYTES # BLD AUTO: 0.64 X10(3)/MCL (ref 0.6–4.6)
LYMPHOCYTES NFR BLD AUTO: 5.9 %
MCH RBC QN AUTO: 21.7 PG (ref 27–31)
MCHC RBC AUTO-ENTMCNC: 29.7 G/DL (ref 33–36)
MCV RBC AUTO: 73.1 FL (ref 80–94)
MONOCYTES # BLD AUTO: 0.54 X10(3)/MCL (ref 0.1–1.3)
MONOCYTES NFR BLD AUTO: 4.9 %
NEUTROPHILS # BLD AUTO: 9.68 X10(3)/MCL (ref 2.1–9.2)
NEUTROPHILS NFR BLD AUTO: 88.6 %
PLATELET # BLD AUTO: 482 X10(3)/MCL (ref 130–400)
PMV BLD AUTO: 8.3 FL (ref 7.4–10.4)
POTASSIUM SERPL-SCNC: 3.4 MMOL/L (ref 3.5–5.1)
PROT SERPL-MCNC: 5.7 GM/DL (ref 6.4–8.3)
RBC # BLD AUTO: 2.9 X10(6)/MCL (ref 4.7–6.1)
SODIUM SERPL-SCNC: 134 MMOL/L (ref 136–145)
SPECIMEN OUTDATE: NORMAL
WBC # SPEC AUTO: 10.93 X10(3)/MCL (ref 4.5–11.5)

## 2023-08-21 PROCEDURE — 11000001 HC ACUTE MED/SURG PRIVATE ROOM

## 2023-08-21 PROCEDURE — 87075 CULTR BACTERIA EXCEPT BLOOD: CPT | Performed by: SURGERY

## 2023-08-21 PROCEDURE — 63600175 PHARM REV CODE 636 W HCPCS

## 2023-08-21 PROCEDURE — 80053 COMPREHEN METABOLIC PANEL: CPT | Performed by: SURGERY

## 2023-08-21 PROCEDURE — 86901 BLOOD TYPING SEROLOGIC RH(D): CPT | Performed by: NURSE ANESTHETIST, CERTIFIED REGISTERED

## 2023-08-21 PROCEDURE — 63600175 PHARM REV CODE 636 W HCPCS: Performed by: SURGERY

## 2023-08-21 PROCEDURE — 87102 FUNGUS ISOLATION CULTURE: CPT | Performed by: SURGERY

## 2023-08-21 PROCEDURE — 27201423 OPTIME MED/SURG SUP & DEVICES STERILE SUPPLY: Performed by: SURGERY

## 2023-08-21 PROCEDURE — 36000711: Performed by: SURGERY

## 2023-08-21 PROCEDURE — 71000033 HC RECOVERY, INTIAL HOUR: Performed by: SURGERY

## 2023-08-21 PROCEDURE — 25000003 PHARM REV CODE 250: Performed by: NURSE ANESTHETIST, CERTIFIED REGISTERED

## 2023-08-21 PROCEDURE — P9016 RBC LEUKOCYTES REDUCED: HCPCS | Performed by: NURSE ANESTHETIST, CERTIFIED REGISTERED

## 2023-08-21 PROCEDURE — 85025 COMPLETE CBC W/AUTO DIFF WBC: CPT | Performed by: SURGERY

## 2023-08-21 PROCEDURE — D9220A PRA ANESTHESIA: Mod: ,,, | Performed by: NURSE ANESTHETIST, CERTIFIED REGISTERED

## 2023-08-21 PROCEDURE — 87181 SC STD AGAR DILUTION PER AGT: CPT | Performed by: SURGERY

## 2023-08-21 PROCEDURE — 63600175 PHARM REV CODE 636 W HCPCS: Performed by: NURSE ANESTHETIST, CERTIFIED REGISTERED

## 2023-08-21 PROCEDURE — 25000003 PHARM REV CODE 250: Performed by: SURGERY

## 2023-08-21 PROCEDURE — 86920 COMPATIBILITY TEST SPIN: CPT | Performed by: NURSE ANESTHETIST, CERTIFIED REGISTERED

## 2023-08-21 PROCEDURE — 88307 TISSUE EXAM BY PATHOLOGIST: CPT | Performed by: SURGERY

## 2023-08-21 PROCEDURE — 87205 SMEAR GRAM STAIN: CPT | Performed by: SURGERY

## 2023-08-21 PROCEDURE — 88311 DECALCIFY TISSUE: CPT

## 2023-08-21 PROCEDURE — 20000000 HC ICU ROOM

## 2023-08-21 PROCEDURE — 36000710: Performed by: SURGERY

## 2023-08-21 PROCEDURE — 63600175 PHARM REV CODE 636 W HCPCS: Performed by: ANESTHESIOLOGY

## 2023-08-21 PROCEDURE — 37000009 HC ANESTHESIA EA ADD 15 MINS: Performed by: SURGERY

## 2023-08-21 PROCEDURE — 94799 UNLISTED PULMONARY SVC/PX: CPT

## 2023-08-21 PROCEDURE — 94761 N-INVAS EAR/PLS OXIMETRY MLT: CPT

## 2023-08-21 PROCEDURE — D9220A PRA ANESTHESIA: ICD-10-PCS | Mod: ,,, | Performed by: NURSE ANESTHETIST, CERTIFIED REGISTERED

## 2023-08-21 PROCEDURE — 87070 CULTURE OTHR SPECIMN AEROBIC: CPT | Performed by: SURGERY

## 2023-08-21 PROCEDURE — 37000008 HC ANESTHESIA 1ST 15 MINUTES: Performed by: SURGERY

## 2023-08-21 PROCEDURE — 36430 TRANSFUSION BLD/BLD COMPNT: CPT

## 2023-08-21 RX ORDER — ACETAMINOPHEN 10 MG/ML
1000 INJECTION, SOLUTION INTRAVENOUS EVERY 8 HOURS
Status: COMPLETED | OUTPATIENT
Start: 2023-08-21 | End: 2023-08-22

## 2023-08-21 RX ORDER — BUSPIRONE HYDROCHLORIDE 5 MG/1
10 TABLET ORAL 2 TIMES DAILY
Status: DISCONTINUED | OUTPATIENT
Start: 2023-08-21 | End: 2023-08-24 | Stop reason: HOSPADM

## 2023-08-21 RX ORDER — SODIUM CHLORIDE 9 MG/ML
INJECTION, SOLUTION INTRAVENOUS CONTINUOUS
Status: DISCONTINUED | OUTPATIENT
Start: 2023-08-21 | End: 2023-08-23

## 2023-08-21 RX ORDER — ACETAMINOPHEN 500 MG
1000 TABLET ORAL
Status: ACTIVE | OUTPATIENT
Start: 2023-08-21 | End: 2023-08-21

## 2023-08-21 RX ORDER — ONDANSETRON 4 MG/1
8 TABLET, ORALLY DISINTEGRATING ORAL EVERY 8 HOURS PRN
Status: DISCONTINUED | OUTPATIENT
Start: 2023-08-21 | End: 2023-08-24 | Stop reason: HOSPADM

## 2023-08-21 RX ORDER — MUPIROCIN 20 MG/G
OINTMENT TOPICAL 2 TIMES DAILY
Status: DISCONTINUED | OUTPATIENT
Start: 2023-08-21 | End: 2023-08-24 | Stop reason: HOSPADM

## 2023-08-21 RX ORDER — FENTANYL CITRATE 50 UG/ML
INJECTION, SOLUTION INTRAMUSCULAR; INTRAVENOUS
Status: DISCONTINUED | OUTPATIENT
Start: 2023-08-21 | End: 2023-08-21

## 2023-08-21 RX ORDER — SODIUM CHLORIDE, SODIUM LACTATE, POTASSIUM CHLORIDE, CALCIUM CHLORIDE 600; 310; 30; 20 MG/100ML; MG/100ML; MG/100ML; MG/100ML
INJECTION, SOLUTION INTRAVENOUS CONTINUOUS
Status: DISCONTINUED | OUTPATIENT
Start: 2023-08-21 | End: 2023-08-21

## 2023-08-21 RX ORDER — HYDROCODONE BITARTRATE AND ACETAMINOPHEN 500; 5 MG/1; MG/1
TABLET ORAL
Status: DISCONTINUED | OUTPATIENT
Start: 2023-08-21 | End: 2023-08-24 | Stop reason: HOSPADM

## 2023-08-21 RX ORDER — ROCURONIUM BROMIDE 10 MG/ML
INJECTION, SOLUTION INTRAVENOUS
Status: DISCONTINUED | OUTPATIENT
Start: 2023-08-21 | End: 2023-08-21

## 2023-08-21 RX ORDER — FENTANYL CITRATE 50 UG/ML
25 INJECTION, SOLUTION INTRAMUSCULAR; INTRAVENOUS EVERY 5 MIN PRN
Status: DISCONTINUED | OUTPATIENT
Start: 2023-08-21 | End: 2023-08-21

## 2023-08-21 RX ORDER — METRONIDAZOLE 500 MG/100ML
500 INJECTION, SOLUTION INTRAVENOUS
Status: DISCONTINUED | OUTPATIENT
Start: 2023-08-21 | End: 2023-08-24 | Stop reason: HOSPADM

## 2023-08-21 RX ORDER — HYDROMORPHONE HYDROCHLORIDE 2 MG/ML
INJECTION, SOLUTION INTRAMUSCULAR; INTRAVENOUS; SUBCUTANEOUS
Status: DISCONTINUED | OUTPATIENT
Start: 2023-08-21 | End: 2023-08-21

## 2023-08-21 RX ORDER — DEXAMETHASONE SODIUM PHOSPHATE 4 MG/ML
INJECTION, SOLUTION INTRA-ARTICULAR; INTRALESIONAL; INTRAMUSCULAR; INTRAVENOUS; SOFT TISSUE
Status: DISCONTINUED | OUTPATIENT
Start: 2023-08-21 | End: 2023-08-21

## 2023-08-21 RX ORDER — DOCUSATE SODIUM 100 MG/1
100 CAPSULE, LIQUID FILLED ORAL 2 TIMES DAILY
Status: DISCONTINUED | OUTPATIENT
Start: 2023-08-21 | End: 2023-08-24 | Stop reason: HOSPADM

## 2023-08-21 RX ORDER — HYDROMORPHONE HYDROCHLORIDE 2 MG/ML
1 INJECTION, SOLUTION INTRAMUSCULAR; INTRAVENOUS; SUBCUTANEOUS EVERY 4 HOURS PRN
Status: DISCONTINUED | OUTPATIENT
Start: 2023-08-21 | End: 2023-08-21

## 2023-08-21 RX ORDER — HYDROMORPHONE HYDROCHLORIDE 2 MG/ML
0.2 INJECTION, SOLUTION INTRAMUSCULAR; INTRAVENOUS; SUBCUTANEOUS EVERY 5 MIN PRN
Status: DISCONTINUED | OUTPATIENT
Start: 2023-08-21 | End: 2023-08-21

## 2023-08-21 RX ORDER — PROPOFOL 10 MG/ML
VIAL (ML) INTRAVENOUS
Status: DISCONTINUED | OUTPATIENT
Start: 2023-08-21 | End: 2023-08-21

## 2023-08-21 RX ORDER — SODIUM CHLORIDE 0.9 % (FLUSH) 0.9 %
10 SYRINGE (ML) INJECTION
Status: DISCONTINUED | OUTPATIENT
Start: 2023-08-21 | End: 2023-08-24

## 2023-08-21 RX ORDER — ONDANSETRON 2 MG/ML
4 INJECTION INTRAMUSCULAR; INTRAVENOUS EVERY 12 HOURS PRN
Status: DISCONTINUED | OUTPATIENT
Start: 2023-08-21 | End: 2023-08-24 | Stop reason: HOSPADM

## 2023-08-21 RX ORDER — HYDROMORPHONE HYDROCHLORIDE 2 MG/ML
0.5 INJECTION, SOLUTION INTRAMUSCULAR; INTRAVENOUS; SUBCUTANEOUS EVERY 4 HOURS PRN
Status: DISCONTINUED | OUTPATIENT
Start: 2023-08-21 | End: 2023-08-24 | Stop reason: HOSPADM

## 2023-08-21 RX ORDER — GABAPENTIN 300 MG/1
600 CAPSULE ORAL
Status: ACTIVE | OUTPATIENT
Start: 2023-08-21 | End: 2023-08-21

## 2023-08-21 RX ORDER — DIPHENHYDRAMINE HYDROCHLORIDE 50 MG/ML
25 INJECTION INTRAMUSCULAR; INTRAVENOUS EVERY 4 HOURS PRN
Status: DISCONTINUED | OUTPATIENT
Start: 2023-08-21 | End: 2023-08-24 | Stop reason: HOSPADM

## 2023-08-21 RX ORDER — LIDOCAINE HYDROCHLORIDE 20 MG/ML
INJECTION INTRAVENOUS
Status: DISCONTINUED | OUTPATIENT
Start: 2023-08-21 | End: 2023-08-21

## 2023-08-21 RX ORDER — FAMOTIDINE 20 MG/1
20 TABLET, FILM COATED ORAL 2 TIMES DAILY
Status: DISCONTINUED | OUTPATIENT
Start: 2023-08-21 | End: 2023-08-24 | Stop reason: HOSPADM

## 2023-08-21 RX ORDER — OXYCODONE HYDROCHLORIDE 5 MG/1
5 TABLET ORAL EVERY 4 HOURS PRN
Status: DISCONTINUED | OUTPATIENT
Start: 2023-08-21 | End: 2023-08-24 | Stop reason: HOSPADM

## 2023-08-21 RX ORDER — DULOXETIN HYDROCHLORIDE 30 MG/1
30 CAPSULE, DELAYED RELEASE ORAL 2 TIMES DAILY
Status: DISCONTINUED | OUTPATIENT
Start: 2023-08-21 | End: 2023-08-24 | Stop reason: HOSPADM

## 2023-08-21 RX ORDER — SODIUM CHLORIDE, SODIUM LACTATE, POTASSIUM CHLORIDE, CALCIUM CHLORIDE 600; 310; 30; 20 MG/100ML; MG/100ML; MG/100ML; MG/100ML
INJECTION, SOLUTION INTRAVENOUS CONTINUOUS
Status: DISCONTINUED | OUTPATIENT
Start: 2023-08-21 | End: 2023-08-23

## 2023-08-21 RX ORDER — FAMOTIDINE 10 MG/ML
INJECTION INTRAVENOUS
Status: DISCONTINUED | OUTPATIENT
Start: 2023-08-21 | End: 2023-08-21

## 2023-08-21 RX ORDER — PHENYLEPHRINE HYDROCHLORIDE 10 MG/ML
INJECTION INTRAVENOUS
Status: DISCONTINUED | OUTPATIENT
Start: 2023-08-21 | End: 2023-08-21

## 2023-08-21 RX ORDER — ONDANSETRON HYDROCHLORIDE 2 MG/ML
INJECTION, SOLUTION INTRAMUSCULAR; INTRAVENOUS
Status: DISCONTINUED | OUTPATIENT
Start: 2023-08-21 | End: 2023-08-21

## 2023-08-21 RX ORDER — TRAMADOL HYDROCHLORIDE 50 MG/1
50 TABLET ORAL
Status: ACTIVE | OUTPATIENT
Start: 2023-08-21 | End: 2023-08-21

## 2023-08-21 RX ADMIN — SODIUM CHLORIDE 1000 ML: 9 INJECTION, SOLUTION INTRAVENOUS at 04:08

## 2023-08-21 RX ADMIN — CEFEPIME 1 G: 1 INJECTION, POWDER, FOR SOLUTION INTRAMUSCULAR; INTRAVENOUS at 10:08

## 2023-08-21 RX ADMIN — METRONIDAZOLE 500 MG: 500 INJECTION, SOLUTION INTRAVENOUS at 03:08

## 2023-08-21 RX ADMIN — ROCURONIUM BROMIDE 50 MG: 50 INJECTION INTRAVENOUS at 09:08

## 2023-08-21 RX ADMIN — SODIUM CHLORIDE, POTASSIUM CHLORIDE, SODIUM LACTATE AND CALCIUM CHLORIDE: 600; 310; 30; 20 INJECTION, SOLUTION INTRAVENOUS at 12:08

## 2023-08-21 RX ADMIN — HYDROMORPHONE HYDROCHLORIDE 1 MG: 2 INJECTION, SOLUTION INTRAMUSCULAR; INTRAVENOUS; SUBCUTANEOUS at 02:08

## 2023-08-21 RX ADMIN — FAMOTIDINE 20 MG: 20 TABLET, FILM COATED ORAL at 09:08

## 2023-08-21 RX ADMIN — BUSPIRONE HYDROCHLORIDE 10 MG: 5 TABLET ORAL at 09:08

## 2023-08-21 RX ADMIN — VANCOMYCIN HYDROCHLORIDE 1000 MG: 1 INJECTION, POWDER, LYOPHILIZED, FOR SOLUTION INTRAVENOUS at 11:08

## 2023-08-21 RX ADMIN — PHENYLEPHRINE HYDROCHLORIDE 100 MCG: 10 INJECTION INTRAVENOUS at 11:08

## 2023-08-21 RX ADMIN — ACETAMINOPHEN 1000 MG: 10 INJECTION INTRAVENOUS at 09:08

## 2023-08-21 RX ADMIN — PHENYLEPHRINE HYDROCHLORIDE 100 MCG: 10 INJECTION INTRAVENOUS at 12:08

## 2023-08-21 RX ADMIN — FAMOTIDINE 20 MG: 10 INJECTION INTRAVENOUS at 09:08

## 2023-08-21 RX ADMIN — HYDROMORPHONE HYDROCHLORIDE 1 MG: 2 INJECTION INTRAMUSCULAR; INTRAVENOUS; SUBCUTANEOUS at 12:08

## 2023-08-21 RX ADMIN — LIDOCAINE HYDROCHLORIDE 50 MG: 20 INJECTION, SOLUTION INTRAVENOUS at 09:08

## 2023-08-21 RX ADMIN — SUGAMMADEX 100 MG: 100 INJECTION, SOLUTION INTRAVENOUS at 12:08

## 2023-08-21 RX ADMIN — PROPOFOL 150 MG: 10 INJECTION, EMULSION INTRAVENOUS at 09:08

## 2023-08-21 RX ADMIN — SODIUM CHLORIDE, POTASSIUM CHLORIDE, SODIUM LACTATE AND CALCIUM CHLORIDE: 600; 310; 30; 20 INJECTION, SOLUTION INTRAVENOUS at 09:08

## 2023-08-21 RX ADMIN — ONDANSETRON HYDROCHLORIDE 4 MG: 2 INJECTION, SOLUTION INTRAMUSCULAR; INTRAVENOUS at 09:08

## 2023-08-21 RX ADMIN — ACETAMINOPHEN 1000 MG: 10 INJECTION INTRAVENOUS at 02:08

## 2023-08-21 RX ADMIN — DULOXETINE HYDROCHLORIDE 30 MG: 30 CAPSULE, DELAYED RELEASE ORAL at 09:08

## 2023-08-21 RX ADMIN — VANCOMYCIN HYDROCHLORIDE 1000 MG: 1 INJECTION, POWDER, LYOPHILIZED, FOR SOLUTION INTRAVENOUS at 04:08

## 2023-08-21 RX ADMIN — DEXAMETHASONE SODIUM PHOSPHATE 8 MG: 4 INJECTION, SOLUTION INTRA-ARTICULAR; INTRALESIONAL; INTRAMUSCULAR; INTRAVENOUS; SOFT TISSUE at 09:08

## 2023-08-21 RX ADMIN — FENTANYL CITRATE 100 MCG: 50 INJECTION, SOLUTION INTRAMUSCULAR; INTRAVENOUS at 09:08

## 2023-08-21 RX ADMIN — SODIUM CHLORIDE: 9 INJECTION, SOLUTION INTRAVENOUS at 02:08

## 2023-08-21 RX ADMIN — METRONIDAZOLE 500 MG: 500 INJECTION, SOLUTION INTRAVENOUS at 09:08

## 2023-08-21 NOTE — TRANSFER OF CARE
"Anesthesia Transfer of Care Note    Patient: Werner Jarrett    Procedure(s) Performed: Procedure(s) (LRB):  AMPUTATION, ABOVE KNEE (left hip diarticulation/amputation) (Left)    Patient location: PACU    Anesthesia Type: general    Transport from OR: Transported from OR on room air with adequate spontaneous ventilation    Post pain: adequate analgesia    Post assessment: no apparent anesthetic complications    Post vital signs: stable    Level of consciousness: responds to stimulation and sedated    Nausea/Vomiting: no nausea/vomiting    Complications: none    Transfer of care protocol was followed      Last vitals:   Visit Vitals  BP 97/66   Pulse 96   Temp 36.7 °C (98.1 °F)   Resp 20   Ht 5' 10" (1.778 m)   Wt 74.8 kg (165 lb)   SpO2 99%   BMI 23.68 kg/m²     "

## 2023-08-21 NOTE — NURSING
Notified Dr. SIN Jara that patient's blood pressure was better after the bolus, at 137/81, and that patients H/H was 6.3/21.2. MD order to transfuse 1 additional unit of PRBC.

## 2023-08-21 NOTE — ANESTHESIA PREPROCEDURE EVALUATION
08/21/2023  Werner Jarrett is a 42 y.o., male.      Pre-op Assessment    I have reviewed the Patient Summary Reports.    I have reviewed the NPO Status.   I have reviewed the Medications.     Review of Systems  Anesthesia Hx:  No problems with previous Anesthesia  Neg history of prior surgery. Denies Family Hx of Anesthesia complications.   Denies Personal Hx of Anesthesia complications.   Social:  Smoker    Hematology/Oncology:  Hematology Normal   Oncology Normal     EENT/Dental:EENT/Dental Normal   Cardiovascular:   Hypertension    Pulmonary:   COPD    Renal/:  Renal/ Normal     Hepatic/GI:  Hepatic/GI Normal    Musculoskeletal:   Quadriplegic Spine Disorders: Chronic Pain    Neurological:   Chronic Pain Syndrome  Peripheral Neuropathy    Endocrine:  Endocrine Normal    Psych:   Psychiatric History depression          Physical Exam  General: Cooperative and Alert    Airway:  Mallampati: II   Mouth Opening: Normal  TM Distance: Normal  Tongue: Normal  Neck ROM: Normal ROM    Dental:  Intact        Anesthesia Plan  Type of Anesthesia, risks & benefits discussed:    Anesthesia Type: Gen ETT  Intra-op Monitoring Plan: Standard ASA Monitors  Post Op Pain Control Plan: multimodal analgesia  Induction:  IV  Airway Plan: Direct  Informed Consent: Informed consent signed with the Patient and all parties understand the risks and agree with anesthesia plan.  All questions answered. Patient consented to blood products? Yes  ASA Score: 3  Day of Surgery Review of History & Physical: I have interviewed and examined the patient. I have reviewed the patient's H&P dated: There are no significant changes.     Ready For Surgery From Anesthesia Perspective.     .

## 2023-08-21 NOTE — ANESTHESIA POSTPROCEDURE EVALUATION
Anesthesia Post Evaluation    Patient: Werner Jarrett    Procedure(s) Performed: Procedure(s) (LRB):  AMPUTATION, ABOVE KNEE (left hip diarticulation/amputation) (Left)    Final Anesthesia Type: general      Patient location during evaluation: PACU  Patient participation: Yes- Able to Participate  Level of consciousness: awake and alert  Post-procedure vital signs: reviewed and stable  Pain management: adequate  Airway patency: patent  ESPINOZA mitigation strategies: Multimodal analgesia, Verification of full reversal of neuromuscular block and Extubation and recovery carried out in lateral, semiupright, or other nonsupine position  PONV status at discharge: No PONV  Anesthetic complications: no      Cardiovascular status: hemodynamically stable  Respiratory status: unassisted, spontaneous ventilation and room air  Hydration status: euvolemic  Follow-up not needed.          Vitals Value Taken Time   /95 08/21/23 1611   Temp 36.3 °C (97.4 °F) 08/21/23 1630   Pulse 91 08/21/23 1630   Resp 18 08/21/23 1435   SpO2 100 % 08/21/23 1630         Event Time   Out of Recovery 13:30:00         Pain/Alexx Score: Pain Rating Prior to Med Admin: 9 (8/21/2023  2:35 PM)  Alexx Score: 9 (8/21/2023  1:19 PM)

## 2023-08-21 NOTE — BRIEF OP NOTE
[facility] General - Periop Services  Brief Operative Note    SUMMARY     Surgery Date: 8/21/2023     Surgeon(s) and Role:     * BARBARA Perez MD - Primary    Assisting Surgeon: None    Pre-op Diagnosis:  Open obturator dislocation of left hip, subsequent encounter [S73.025D, S71.002D]    Post-op Diagnosis:  Post-Op Diagnosis Codes:     * Open obturator dislocation of left hip, subsequent encounter [S73.025D, S71.002D]    Procedure(s) (LRB):  AMPUTATION, ABOVE KNEE (left hip diarticulation/amputation) (Left)    Anesthesia: General    Operative Findings:   1) Wound to left greater trochanter extending into hip joint capsule.  Friable tissue within joint capsule removed and also debrided bluntly with curet.  Cultures taken of the wound and the hip joint separately.   2) Brisk doppler signal to remaining muscular flap  3) Muscular flap brought into the joint capsule and secured  4) 19 Hungarian round henrique drain placed within hip joint for drainage    Estimated Blood Loss: 240 mL    Estimated Blood Loss has been documented.         Specimens:   Specimen (24h ago, onward)       Start     Ordered    08/21/23 1141  Specimen to Pathology  RELEASE UPON ORDERING        References:    Click here for ordering Quick Tip   Question:  Release to patient  Answer:  Immediate    08/21/23 1141                    [id number]

## 2023-08-21 NOTE — NURSING
Notified Dr. SIN Jara via phone that patient's blood pressure was 70/40 manually. MD gave an order to give a 1 Liter bolus of NS, transfuse 1 Units of PRBC's, place patient on telemetry, and order a stat cbc and cmp

## 2023-08-21 NOTE — ANESTHESIA PROCEDURE NOTES
Intubation    Date/Time: 8/21/2023 9:37 AM    Performed by: Junior Guerrero CRNA  Authorized by: Junior Guerrero CRNA    Intubation:     Induction:  Intravenous    Intubated:  Postinduction    Mask Ventilation:  Easy mask    Attempts:  1    Attempted By:  CRNA    Method of Intubation:  Direct    Blade:  Castelan 2    Laryngeal View Grade: Grade I - full view of cords      Difficult Airway Encountered?: No      Complications:  None    Airway Device:  Oral endotracheal tube    Airway Device Size:  7.5    Style/Cuff Inflation:  Cuffed (inflated to minimal occlusive pressure)    Secured at:  The lips    Placement Verified By:  Capnometry and Colorimetric ETCO2 device    Complicating Factors:  None    Findings Post-Intubation:  BS equal bilateral and atraumatic/condition of teeth unchanged

## 2023-08-21 NOTE — PROGRESS NOTES
"Pharmacokinetic Initial Assessment: IV Vancomycin    Assessment/Plan:    Initiate intravenous vancomycin with no loading dose  followed by a maintenance dose of vancomycin 1000mg IV every 8 hours  Desired empiric serum trough concentration is 15 to 20 mcg/mL  Draw vancomycin trough level 60 min prior to fourth dose on 8/22 at approximately 1400  Pharmacy will continue to follow and monitor vancomycin.      Please contact pharmacy at extension [phone] with any questions regarding this assessment.     Thank you for the consult,   Charley West       Patient brief summary:  Werner Jarrett is a 42 y.o. male initiated on antimicrobial therapy with IV Vancomycin for treatment of suspected bone/joint infection    Drug Allergies:   Review of patient's allergies indicates:   Allergen Reactions    Levofloxacin Rash       Actual Body Weight:   74.8    Renal Function:   Estimated Creatinine Clearance: 149 mL/min (A) (based on SCr of 0.66 mg/dL (L)).,     Dialysis Method (if applicable):  N/A    CBC (last 72 hours):  No results for input(s): "WHITE BLOOD CELL COUNT", "HEMOGLOBIN", "HEMATOCRIT", "PLATELETS", "GRAN%", "LYMPH%", "MONO%", "EOSINOPHIL%", "BASOPHIL%", "DIFFERENTIAL METHOD" in the last 72 hours.    Metabolic Panel (last 72 hours):  No results for input(s): "SODIUM", "POTASSIUM", "CHLORIDE", "CO2", "GLUCOSE", "BUN BLD", "CREATININE", "ALBUMIN", "BILIRUBIN TOTAL", "ALK PHOS", "AST", "ALT", "MAGNESIUM", "PHOSPHORUS" in the last 72 hours.    Drug levels (last 3 results):  No results for input(s): "VANCOMYCINRA", "VANCORANDOM", "VANCOMYCINPE", "VANCOPEAK", "VANCOMYCINTR", "VANCOTROUGH" in the last 72 hours.    Microbiologic Results:  Microbiology Results (last 7 days)       Procedure Component Value Units Date/Time    Anaerobic Culture [[id number]] Collected: 08/21/23 1150    Order Status: Sent Specimen: Wound from Leg, Left     Fungal Culture [[id number]] Collected: 08/21/23 1150    Order Status: Sent Specimen: Wound from Leg, " Left     Gram Stain [[id number]] Collected: 08/21/23 1150    Order Status: Sent Specimen: Wound from Leg, Left     Wound Culture [[id number]] Collected: 08/21/23 1150    Order Status: Sent Specimen: Wound from Leg, Left     Anaerobic Culture [[id number]] Collected: 08/21/23 1149    Order Status: Sent Specimen: Wound from Hip, Left     Fungal Culture [[id number]] Collected: 08/21/23 1149    Order Status: Sent Specimen: Wound from Hip, Left     Gram Stain [[id number]] Collected: 08/21/23 1149    Order Status: Sent Specimen: Wound from Hip, Left     Wound Culture [[id number]] Collected: 08/21/23 1149    Order Status: Sent Specimen: Wound from Hip, Left

## 2023-08-22 LAB
ABO + RH BLD: NORMAL
ABO + RH BLD: NORMAL
ANION GAP SERPL CALC-SCNC: 7 MEQ/L
APTT PPP: 45.7 SECONDS (ref 23.2–33.7)
BLD PROD TYP BPU: NORMAL
BLD PROD TYP BPU: NORMAL
BLOOD UNIT EXPIRATION DATE: NORMAL
BLOOD UNIT EXPIRATION DATE: NORMAL
BLOOD UNIT TYPE CODE: 9500
BLOOD UNIT TYPE CODE: 9500
BUN SERPL-MCNC: 6 MG/DL (ref 8.9–20.6)
CALCIUM SERPL-MCNC: 7.7 MG/DL (ref 8.4–10.2)
CHLORIDE SERPL-SCNC: 97 MMOL/L (ref 98–107)
CO2 SERPL-SCNC: 29 MMOL/L (ref 22–29)
CREAT SERPL-MCNC: 0.45 MG/DL (ref 0.73–1.18)
CREAT/UREA NIT SERPL: 13
CROSSMATCH INTERPRETATION: NORMAL
CROSSMATCH INTERPRETATION: NORMAL
DISPENSE STATUS: NORMAL
DISPENSE STATUS: NORMAL
ERYTHROCYTE [DISTWIDTH] IN BLOOD BY AUTOMATED COUNT: 18.6 % (ref 11.5–17)
GFR SERPLBLD CREATININE-BSD FMLA CKD-EPI: >60 MLS/MIN/1.73/M2
GLUCOSE SERPL-MCNC: 98 MG/DL (ref 74–100)
GRAM STN SPEC: NORMAL
HCT VFR BLD AUTO: 26.3 % (ref 42–52)
HGB BLD-MCNC: 8.3 G/DL (ref 14–18)
INR PPP: 1.1
MCH RBC QN AUTO: 24.4 PG (ref 27–31)
MCHC RBC AUTO-ENTMCNC: 31.6 G/DL (ref 33–36)
MCV RBC AUTO: 77.4 FL (ref 80–94)
PLATELET # BLD AUTO: 480 X10(3)/MCL (ref 130–400)
PMV BLD AUTO: 8 FL (ref 7.4–10.4)
POTASSIUM SERPL-SCNC: 3.1 MMOL/L (ref 3.5–5.1)
PROTHROMBIN TIME: 14.7 SECONDS (ref 12.5–14.5)
RBC # BLD AUTO: 3.4 X10(6)/MCL (ref 4.7–6.1)
SODIUM SERPL-SCNC: 133 MMOL/L (ref 136–145)
UNIT NUMBER: NORMAL
UNIT NUMBER: NORMAL
VANCOMYCIN TROUGH SERPL-MCNC: 16.7 UG/ML (ref 15–20)
WBC # SPEC AUTO: 12.17 X10(3)/MCL (ref 4.5–11.5)

## 2023-08-22 PROCEDURE — 63600175 PHARM REV CODE 636 W HCPCS: Performed by: SURGERY

## 2023-08-22 PROCEDURE — 25000003 PHARM REV CODE 250: Performed by: SURGERY

## 2023-08-22 PROCEDURE — 85610 PROTHROMBIN TIME: CPT | Performed by: SURGERY

## 2023-08-22 PROCEDURE — P9016 RBC LEUKOCYTES REDUCED: HCPCS | Performed by: NURSE ANESTHETIST, CERTIFIED REGISTERED

## 2023-08-22 PROCEDURE — 21400001 HC TELEMETRY ROOM

## 2023-08-22 PROCEDURE — 85730 THROMBOPLASTIN TIME PARTIAL: CPT | Performed by: SURGERY

## 2023-08-22 PROCEDURE — 80048 BASIC METABOLIC PNL TOTAL CA: CPT | Performed by: SURGERY

## 2023-08-22 PROCEDURE — 80202 ASSAY OF VANCOMYCIN: CPT | Performed by: SURGERY

## 2023-08-22 PROCEDURE — 85027 COMPLETE CBC AUTOMATED: CPT | Performed by: SURGERY

## 2023-08-22 PROCEDURE — 94761 N-INVAS EAR/PLS OXIMETRY MLT: CPT

## 2023-08-22 PROCEDURE — 11000001 HC ACUTE MED/SURG PRIVATE ROOM

## 2023-08-22 PROCEDURE — 99900035 HC TECH TIME PER 15 MIN (STAT)

## 2023-08-22 RX ADMIN — METRONIDAZOLE 500 MG: 500 INJECTION, SOLUTION INTRAVENOUS at 06:08

## 2023-08-22 RX ADMIN — FAMOTIDINE 20 MG: 20 TABLET, FILM COATED ORAL at 08:08

## 2023-08-22 RX ADMIN — VANCOMYCIN HYDROCHLORIDE 1000 MG: 1 INJECTION, POWDER, LYOPHILIZED, FOR SOLUTION INTRAVENOUS at 07:08

## 2023-08-22 RX ADMIN — CEFEPIME 1 G: 1 INJECTION, POWDER, FOR SOLUTION INTRAMUSCULAR; INTRAVENOUS at 10:08

## 2023-08-22 RX ADMIN — BUSPIRONE HYDROCHLORIDE 10 MG: 5 TABLET ORAL at 09:08

## 2023-08-22 RX ADMIN — OXYCODONE HYDROCHLORIDE 5 MG: 5 TABLET ORAL at 05:08

## 2023-08-22 RX ADMIN — METRONIDAZOLE 500 MG: 500 INJECTION, SOLUTION INTRAVENOUS at 10:08

## 2023-08-22 RX ADMIN — VANCOMYCIN HYDROCHLORIDE 1000 MG: 1 INJECTION, POWDER, LYOPHILIZED, FOR SOLUTION INTRAVENOUS at 11:08

## 2023-08-22 RX ADMIN — MUPIROCIN 1 G: 20 OINTMENT TOPICAL at 09:08

## 2023-08-22 RX ADMIN — ACETAMINOPHEN 1000 MG: 10 INJECTION INTRAVENOUS at 05:08

## 2023-08-22 RX ADMIN — DULOXETINE HYDROCHLORIDE 30 MG: 30 CAPSULE, DELAYED RELEASE ORAL at 08:08

## 2023-08-22 RX ADMIN — SODIUM CHLORIDE: 9 INJECTION, SOLUTION INTRAVENOUS at 03:08

## 2023-08-22 RX ADMIN — BUSPIRONE HYDROCHLORIDE 10 MG: 5 TABLET ORAL at 08:08

## 2023-08-22 RX ADMIN — OXYCODONE HYDROCHLORIDE 5 MG: 5 TABLET ORAL at 01:08

## 2023-08-22 RX ADMIN — VANCOMYCIN HYDROCHLORIDE 1000 MG: 1 INJECTION, POWDER, LYOPHILIZED, FOR SOLUTION INTRAVENOUS at 03:08

## 2023-08-22 RX ADMIN — OXYCODONE HYDROCHLORIDE 5 MG: 5 TABLET ORAL at 09:08

## 2023-08-22 RX ADMIN — METRONIDAZOLE 500 MG: 500 INJECTION, SOLUTION INTRAVENOUS at 01:08

## 2023-08-22 RX ADMIN — CEFEPIME 1 G: 1 INJECTION, POWDER, FOR SOLUTION INTRAMUSCULAR; INTRAVENOUS at 09:08

## 2023-08-22 RX ADMIN — DULOXETINE HYDROCHLORIDE 30 MG: 30 CAPSULE, DELAYED RELEASE ORAL at 09:08

## 2023-08-22 RX ADMIN — OXYCODONE HYDROCHLORIDE 5 MG: 5 TABLET ORAL at 12:08

## 2023-08-22 RX ADMIN — FAMOTIDINE 20 MG: 20 TABLET, FILM COATED ORAL at 09:08

## 2023-08-22 RX ADMIN — OXYCODONE HYDROCHLORIDE 5 MG: 5 TABLET ORAL at 08:08

## 2023-08-22 RX ADMIN — DOCUSATE SODIUM 100 MG: 100 CAPSULE, LIQUID FILLED ORAL at 08:08

## 2023-08-22 NOTE — PROGRESS NOTES
"Inpatient Nutrition Evaluation    Admit Date: 8/21/2023   Total duration of encounter: 1 day    Nutrition Recommendation/Prescription     Rec'd continue Regular diet as tolerated.   Monitor lytes and replete as needed.   Pt may benefit from adding 500 mg Vitamin C + 220 mg Zinc Sulfate Daily to aid in wound healing.   Paul, BID, to assist with healing.   Monitor intake, weight, and labs.     RD following and available as needed. Thank you.     Nutrition Assessment     Chart Review    Reason Seen: continuous nutrition monitoring    Malnutrition Screening Tool Results                Diagnosis:  S/P AKA Left Hip Disarticulation/amputation.     Relevant Medical History:   Anxiety      Arthritis      C5 vertebral fracture  Paralyzed nipple line down    COPD (chronic obstructive pulmonary disease)      Depression      Heart murmur      Insomnia      Osteomyelitis hip          Nutrition-Related Medications:   IV Fluids: 0.9%NaCl@125mL/hr.     Nutrition-Related Labs:  8/22: Na+ 133(L); K+ 3.1(L); Cl 97(L); BUN/Crea 6.0/0.45(L); Ca+ 7.7(L).    Diet Order: Diet Adult Regular  Oral Supplement Order: none  Appetite/Oral Intake: good/% of meals  Factors Affecting Nutritional Intake: none identified  Food/Baptism/Cultural Preferences: none reported  Food Allergies: none reported    Skin Integrity: incision  Wound(s):       Comments    8/22: Pt is S/P AKA Left Hip Disarticulation/amputation. Weight loss noted 2/2 amputation. No recent weight loss reported prior to amputation.     Anthropometrics    Height: 5' 10" (177.8 cm)    Last Weight: 63.6 kg (140 lb 3.4 oz) (08/22/23 0543)    BMI (Calculated): 20.1  BMI Classification: normal (BMI 18.5-24.9)        Ideal Body Weight (IBW), Male: 166 lb     % Ideal Body Weight, Male (lb): 99.4 %                          Usual Weight Provided By: EMR weight history    Wt Readings from Last 5 Encounters:   08/22/23 63.6 kg (140 lb 3.4 oz)   08/15/23 74.8 kg (165 lb)   08/01/23 74.8 kg " (165 lb)   07/24/23 74.8 kg (165 lb)   07/10/23 74.8 kg (165 lb)     Weight Change(s) Since Admission:  Admit Weight: 74.8 kg (165 lb) (08/15/23 1040)  S/P AKA Left Hip Disarticulation/amputation.     Patient Education    Not applicable.    Monitoring & Evaluation     Dietitian will monitor food and beverage intake, energy intake, weight, weight change, electrolyte/renal panel, glucose/endocrine profile, and gastrointestinal profile.  Nutrition Risk/Follow-Up: low (follow-up in 5-7 days)  Patients assigned 'low nutrition risk' status do not qualify for a full nutritional assessment but will be monitored and re-evaluated in a 5-7 day time period. Please consult if re-evaluation needed sooner.

## 2023-08-22 NOTE — PROGRESS NOTES
[facility] General - ICU  Wound Care    Patient Name: Werner Jarrett  MRN: [id number]  Date: 8/22/2023  Diagnosis: Open wound of hip and thigh      Subjective:           Patient ID: Werner Jarrett is a 42 y.o. male.    Chief Complaint: No chief complaint on file.      HPI      Past Medical History:     1. Pressure injury of sacral region, stage 4    2. Open wound of hip and thigh    3. Open obturator dislocation of left hip, subsequent encounter      Wound Assessment:           Altered Skin Integrity 12/14/22 1430 midline Sacral spine #1 Ulceration (Active)   12/14/22 1430   Altered Skin Integrity Present on Admission - Did Patient arrive to the hospital with altered skin?: yes   Side:    Orientation: midline   Location: Sacral spine   Wound Number: #1   Is this injury device related?:    Primary Wound Type: Ulceration   Description of Altered Skin Integrity:    Ankle-Brachial Index:    Pulses:    Removal Indication and Assessment:    Wound Outcome:    (Retired) Wound Length (cm):    (Retired) Wound Width (cm):    (Retired) Depth (cm):    Wound Description (Comments):    Removal Indications:    Wound Image   08/22/23 1225   Dressing Appearance Open to air 08/22/23 1225   Drainage Amount Scant 08/22/23 1225   Drainage Characteristics/Odor Serosanguineous 08/22/23 1225   Appearance Pink;[place];Dry 08/22/23 1225   Periwound Area Dry;[place] 08/22/23 1225   Wound Edges Defined 08/22/23 1225   Wound Length (cm) 0.8 cm 08/22/23 1225   Wound Width (cm) 0.5 cm 08/22/23 1225   Wound Depth (cm) 0.7 cm 08/22/23 1225   Wound Volume (cm^3) 0.28 cm^3 08/22/23 1225   Wound Surface Area (cm^2) 0.4 cm^2 08/22/23 1225   Care Cleansed with: 08/22/23 1225   Dressing Applied 08/22/23 1225            Altered Skin Integrity 08/21/23 0906 Left lateral Greater trochanter #1 Other (comment) (Active)   08/21/23 0906   Altered Skin Integrity Present on Admission - Did Patient arrive to the hospital with altered skin?:    Side: Left   Orientation:  lateral   Location: Greater trochanter   Wound Number: #1   Is this injury device related?:    Primary Wound Type: Other   Description of Altered Skin Integrity:    Ankle-Brachial Index:    Pulses:    Removal Indication and Assessment:    Wound Outcome: Amputation   (Retired) Wound Length (cm):    (Retired) Wound Width (cm):    (Retired) Depth (cm):    Wound Description (Comments):    Removal Indications:    Dressing Appearance Dry;Intact;Clean 08/22/23 0733   Drainage Amount None 08/22/23 0733   Dressing Gauze 08/22/23 0733            Altered Skin Integrity 08/22/23 1229 Right Greater trochanter (Active)   08/22/23 1229   Altered Skin Integrity Present on Admission - Did Patient arrive to the hospital with altered skin?: yes   Side: Right   Orientation:    Location: Greater trochanter   Wound Number:    Is this injury device related?:    Primary Wound Type:    Description of Altered Skin Integrity:    Ankle-Brachial Index:    Pulses:    Removal Indication and Assessment:    Wound Outcome:    (Retired) Wound Length (cm):    (Retired) Wound Width (cm):    (Retired) Depth (cm):    Wound Description (Comments):    Removal Indications:    Wound Image   08/22/23 1230   Dressing Appearance Moist drainage 08/22/23 1230   Drainage Amount Scant 08/22/23 1230   Drainage Characteristics/Odor Serosanguineous 08/22/23 1230   Appearance Pink;Tan;Moist 08/22/23 1230   Periwound Area Dry;[place] 08/22/23 1230   Wound Edges Defined 08/22/23 1230   Wound Length (cm) 0.9 cm 08/22/23 1230   Wound Width (cm) 0.5 cm 08/22/23 1230   Wound Depth (cm) 0.4 cm 08/22/23 1230   Wound Volume (cm^3) 0.18 cm^3 08/22/23 1230   Wound Surface Area (cm^2) 0.45 cm^2 08/22/23 1230   Care Cleansed with:;Sterile normal saline 08/22/23 1230   Dressing Applied 08/22/23 1230            Incision/Site 08/21/23 1247 Left Leg (Active)   08/21/23 1247   Present Prior to Hospital Arrival?:    Side: Left   Location: Leg   Orientation:    Incision Type:    Closure  Method:    Additional Comments:    Removal Indication and Assessment:    Wound Outcome:    Removal Indications:    Incision WDL WDL 08/22/23 0733   Dressing Appearance Dry;Intact;Clean 08/22/23 0733   Drainage Amount None 08/22/23 0733   Appearance Dressing in place, unable to visualize 08/22/23 0733   Dressing Gauze;Rolled gauze 08/22/23 0733           Plan:       Daily dressing changes per nursing staff, re-evaluation per wound care in 5-7 days  Please take pictures of left leg/hip surgical site on first dressing change, call clinic if physician wishes for wound care to manage left leg post-op      Recommendations:   Sacrum: cleanse with wound cleanser, apply silver alginate to wound, cover with bordered foam dressing, change every other day or prn soilage.  Right hip: cleanse with wound cleanser, apply mesalt to wound bed, cover with silver alginate and bordered foam dressing, change daily      Time spent in room:     Jeanette Sloan RN

## 2023-08-22 NOTE — PLAN OF CARE
08/22/23 0917   Discharge Assessment   Assessment Type Discharge Planning Assessment   Source of Information patient   Do you expect to return to your current living situation? No   Do you have help at home or someone to help you manage your care at home? No   Prior to hospitilization cognitive status: Alert/Oriented;No Deficits   Current cognitive status: Alert/Oriented;No Deficits   Equipment Currently Used at Home wheelchair;lift device;hospital bed;feeding device   Do you take prescription medications? Yes   Do you have prescription coverage? Yes   Do you have any problems affording any of your prescribed medications? No   Is the patient taking medications as prescribed? yes   Are you on dialysis? No   Do you take coumadin? No   DME Needed Upon Discharge  none   Transition of Care Barriers None   Discharge Plan A Long-term acute care facility (LTAC)   Discharge Plan B Skilled Nursing Facility   Physical Activity   On average, how many days per week do you engage in moderate to strenuous exercise (like a brisk walk)? Patient refu   On average, how many minutes do you engage in exercise at this level? Patient refu   Financial Resource Strain   How hard is it for you to pay for the very basics like food, housing, medical care, and heating? Patient refu   Housing Stability   In the last 12 months, was there a time when you were not able to pay the mortgage or rent on time? [place]   In the last 12 months, was there a time when you did not have a steady place to sleep or slept in a shelter (including now)? AK   Transportation Needs   In the past 12 months, has lack of transportation kept you from medical appointments or from getting medications? Patient refu   In the past 12 months, has lack of transportation kept you from meetings, work, or from getting things needed for daily living? Patient refu   Food Insecurity   Within the past 12 months, you worried that your food would run out before you got the money to buy  more. Patient refu   Within the past 12 months, the food you bought just didn't last and you didn't have money to get more. Patient refu   Stress   Do you feel stress - tense, restless, nervous, or anxious, or unable to sleep at night because your mind is troubled all the time - these days? Patient refu   Social Connections   In a typical week, how many times do you talk on the phone with family, friends, or neighbors? Patient refu   How often do you get together with friends or relatives? Patient refu   How often do you attend Cheondoism or Protestant services? Patient refu   Do you belong to any clubs or organizations such as Cheondoism groups, unions, fraternal or athletic groups, or school groups? Patient refu   How often do you attend meetings of the clubs or organizations you belong to? Patient refu   Are you , , , , never , or living with a partner? Patient refu   Alcohol Use   Q1: How often do you have a drink containing alcohol? Patient refu   Q2: How many drinks containing alcohol do you have on a typical day when you are drinking? Patient refu   Q3: How often do you have six or more drinks on one occasion? Patient refu     Referral sent to [facility] for eval. Pt agreed.

## 2023-08-22 NOTE — NURSING
Received pt from Floor via bed to monitor BP. Pt AAO, C/O some pain to left stump. Initial BP 76/49 with O2 sat 92%. Upon retaking in several minutes during cycle pt states my BP just went up. Reading showed 124/77 and O2 sat 100%. Pt states BP usually runs 80-90s but can tell when it fluctuates. See flow sheet for assessment

## 2023-08-22 NOTE — PLAN OF CARE
Problem: Infection  Goal: Absence of Infection Signs and Symptoms  Outcome: Ongoing, Progressing     Problem: Adult Inpatient Plan of Care  Goal: Plan of Care Review  Outcome: Ongoing, Progressing  Goal: Patient-Specific Goal (Individualized)  Outcome: Ongoing, Progressing  Goal: Absence of Hospital-Acquired Illness or Injury  Outcome: Ongoing, Progressing  Goal: Optimal Comfort and Wellbeing  Outcome: Ongoing, Progressing  Goal: Readiness for Transition of Care  Outcome: Ongoing, Progressing     Problem: Impaired Wound Healing  Goal: Optimal Wound Healing  Outcome: Ongoing, Progressing     Problem: Skin Injury Risk Increased  Goal: Skin Health and Integrity  Outcome: Ongoing, Progressing     Problem: Fall Injury Risk  Goal: Absence of Fall and Fall-Related Injury  Outcome: Ongoing, Progressing     Problem: COPD (Chronic Obstructive Pulmonary Disease) Comorbidity  Goal: Maintenance of COPD Symptom Control  Outcome: Ongoing, Progressing     Problem: Osteoarthritis Comorbidity  Goal: Maintenance of Osteoarthritis Symptom Control  Outcome: Ongoing, Progressing

## 2023-08-22 NOTE — PROGRESS NOTES
Surgery Date: 8/21/2023      Surgeon(s) and Role:     * BARBARA Perez MD - Primary     Assisting Surgeon: None     Pre-op Diagnosis:  Open obturator dislocation of left hip, subsequent encounter [S73.025D, S71.002D]     Post-op Diagnosis:  Post-Op Diagnosis Codes:     * Open obturator dislocation of left hip, subsequent encounter [S73.025D, S71.002D]     Procedure(s) (LRB):  AMPUTATION, ABOVE KNEE (left hip diarticulation/amputation) (Left)     Anesthesia: General     Operative Findings:   1) Wound to left greater trochanter extending into hip joint capsule.  Friable tissue within joint capsule removed and also debrided bluntly with curet.  Cultures taken of the wound and the hip joint separately.   2) Brisk doppler signal to remaining muscular flap  3) Muscular flap brought into the joint capsule and secured  4) 19 Brazilian round henrique drain placed within hip joint for drainage     Estimated Blood Loss: 240     08/22/2023  Patient is postop day 1  Vital signs are stable patient is afebrile   No family members were in the room at the time my evaluation   Patient is awake and alert appears to be responsive without any complaints or problems   Patient's wounds are clean and dry CLAUDETTE outputs are minimal my intention will be to go ahead and transferred to the floor today  I have no other recommendations at this point in time patient seems to be progressing appropriately   Patient does have 24 hour care at home and I think a probably go home in the next day or so for further care and treatment and then follow up in the office as an outpatient

## 2023-08-22 NOTE — NURSING
Patient transferred to ICU room #3 via hospital bed. Report given to Abdirashid Guzmán RN over the phone.

## 2023-08-22 NOTE — PLAN OF CARE
Problem: Infection  Goal: Absence of Infection Signs and Symptoms  Outcome: Ongoing, Progressing     Problem: Adult Inpatient Plan of Care  Goal: Plan of Care Review  Outcome: Ongoing, Progressing  Goal: Patient-Specific Goal (Individualized)  Outcome: Ongoing, Progressing  Goal: Absence of Hospital-Acquired Illness or Injury  Outcome: Ongoing, Progressing  Goal: Optimal Comfort and Wellbeing  Outcome: Ongoing, Progressing  Goal: Readiness for Transition of Care  Outcome: Ongoing, Progressing     Problem: Impaired Wound Healing  Goal: Optimal Wound Healing  Outcome: Ongoing, Progressing     Problem: Skin Injury Risk Increased  Goal: Skin Health and Integrity  Outcome: Ongoing, Progressing     Problem: Fall Injury Risk  Goal: Absence of Fall and Fall-Related Injury  Outcome: Ongoing, Progressing     Problem: Osteoarthritis Comorbidity  Goal: Maintenance of Osteoarthritis Symptom Control  Outcome: Ongoing, Progressing

## 2023-08-23 LAB — PSYCHE PATHOLOGY RESULT: NORMAL

## 2023-08-23 PROCEDURE — 63600175 PHARM REV CODE 636 W HCPCS: Performed by: SURGERY

## 2023-08-23 PROCEDURE — 94761 N-INVAS EAR/PLS OXIMETRY MLT: CPT

## 2023-08-23 PROCEDURE — 21400001 HC TELEMETRY ROOM

## 2023-08-23 PROCEDURE — 25000003 PHARM REV CODE 250: Performed by: SURGERY

## 2023-08-23 PROCEDURE — 94799 UNLISTED PULMONARY SVC/PX: CPT

## 2023-08-23 PROCEDURE — 99900035 HC TECH TIME PER 15 MIN (STAT)

## 2023-08-23 RX ADMIN — DULOXETINE HYDROCHLORIDE 30 MG: 30 CAPSULE, DELAYED RELEASE ORAL at 08:08

## 2023-08-23 RX ADMIN — OXYCODONE HYDROCHLORIDE 5 MG: 5 TABLET ORAL at 12:08

## 2023-08-23 RX ADMIN — DULOXETINE HYDROCHLORIDE 30 MG: 30 CAPSULE, DELAYED RELEASE ORAL at 09:08

## 2023-08-23 RX ADMIN — BUSPIRONE HYDROCHLORIDE 10 MG: 5 TABLET ORAL at 08:08

## 2023-08-23 RX ADMIN — HYDROMORPHONE HYDROCHLORIDE 0.5 MG: 2 INJECTION, SOLUTION INTRAMUSCULAR; INTRAVENOUS; SUBCUTANEOUS at 05:08

## 2023-08-23 RX ADMIN — VANCOMYCIN HYDROCHLORIDE 1000 MG: 1 INJECTION, POWDER, LYOPHILIZED, FOR SOLUTION INTRAVENOUS at 08:08

## 2023-08-23 RX ADMIN — HYDROMORPHONE HYDROCHLORIDE 0.5 MG: 2 INJECTION, SOLUTION INTRAMUSCULAR; INTRAVENOUS; SUBCUTANEOUS at 08:08

## 2023-08-23 RX ADMIN — FAMOTIDINE 20 MG: 20 TABLET, FILM COATED ORAL at 08:08

## 2023-08-23 RX ADMIN — VANCOMYCIN HYDROCHLORIDE 1000 MG: 1 INJECTION, POWDER, LYOPHILIZED, FOR SOLUTION INTRAVENOUS at 05:08

## 2023-08-23 RX ADMIN — OXYCODONE HYDROCHLORIDE 5 MG: 5 TABLET ORAL at 01:08

## 2023-08-23 RX ADMIN — OXYCODONE HYDROCHLORIDE 5 MG: 5 TABLET ORAL at 05:08

## 2023-08-23 RX ADMIN — FAMOTIDINE 20 MG: 20 TABLET, FILM COATED ORAL at 09:08

## 2023-08-23 RX ADMIN — HYDROMORPHONE HYDROCHLORIDE 0.5 MG: 2 INJECTION, SOLUTION INTRAMUSCULAR; INTRAVENOUS; SUBCUTANEOUS at 09:08

## 2023-08-23 RX ADMIN — METRONIDAZOLE 500 MG: 500 INJECTION, SOLUTION INTRAVENOUS at 05:08

## 2023-08-23 RX ADMIN — CEFEPIME 1 G: 1 INJECTION, POWDER, FOR SOLUTION INTRAMUSCULAR; INTRAVENOUS at 09:08

## 2023-08-23 RX ADMIN — ONDANSETRON 8 MG: 4 TABLET, ORALLY DISINTEGRATING ORAL at 03:08

## 2023-08-23 RX ADMIN — METRONIDAZOLE 500 MG: 500 INJECTION, SOLUTION INTRAVENOUS at 02:08

## 2023-08-23 RX ADMIN — BUSPIRONE HYDROCHLORIDE 10 MG: 5 TABLET ORAL at 09:08

## 2023-08-23 RX ADMIN — CEFEPIME 1 G: 1 INJECTION, POWDER, FOR SOLUTION INTRAMUSCULAR; INTRAVENOUS at 10:08

## 2023-08-23 RX ADMIN — METRONIDAZOLE 500 MG: 500 INJECTION, SOLUTION INTRAVENOUS at 10:08

## 2023-08-23 RX ADMIN — MUPIROCIN 1 G: 20 OINTMENT TOPICAL at 10:08

## 2023-08-23 NOTE — PLAN OF CARE
Problem: Infection  Goal: Absence of Infection Signs and Symptoms  Outcome: Ongoing, Progressing     Problem: Adult Inpatient Plan of Care  Goal: Plan of Care Review  Outcome: Ongoing, Progressing  Goal: Patient-Specific Goal (Individualized)  Outcome: Ongoing, Progressing  Goal: Absence of Hospital-Acquired Illness or Injury  Outcome: Ongoing, Progressing  Goal: Optimal Comfort and Wellbeing  Outcome: Ongoing, Progressing  Goal: Readiness for Transition of Care  Outcome: Ongoing, Progressing     Problem: Impaired Wound Healing  Goal: Optimal Wound Healing  Outcome: Ongoing, Progressing     Problem: Skin Injury Risk Increased  Goal: Skin Health and Integrity  Outcome: Ongoing, Progressing     Problem: Fall Injury Risk  Goal: Absence of Fall and Fall-Related Injury  Outcome: Ongoing, Progressing     Problem: COPD (Chronic Obstructive Pulmonary Disease) Comorbidity  Goal: Maintenance of COPD Symptom Control  Outcome: Ongoing, Progressing     Problem: Osteoarthritis Comorbidity  Goal: Maintenance of Osteoarthritis Symptom Control  Outcome: Ongoing, Progressing     Problem: Pain Acute  Goal: Acceptable Pain Control and Functional Ability  Outcome: Ongoing, Progressing

## 2023-08-23 NOTE — PLAN OF CARE
Post Acute LOC Review Decision: Agree w/ LTAC LOC.       IQ Criteria  IQ Criteria Review Completed By: Sakshi Leos  Level Of Care Reviewed For: LTAC Preadmission  Review Status: InterQual Criteria Met          Presently, this patient meets InterQual® criteria for LTAC placement.  Please note, if there is a change in the patient's clinical status or treatments needed, a new review will be necessary.    *These review findings are based on InterQual® guidelines and information documented in the patient's Epic EMR.  They do not substitute the provider's judgement for medical necessity.

## 2023-08-23 NOTE — PROGRESS NOTES
Patient feels well  He is beter with better pain control using dilaudid  He is out of ICU (had been in ICU due to some hypotension immediately after surgery with volume and blood loss noted - this recovered with transfusion and hydration).    Vs ok  A+ox3, nad  Abd soft, nondistended  Left leg wound healing well without complication, CLAUDETTE drain serosanguinous in nature    Cultures of hip joint notable for:  Few GNR, Few Staph aureus  Moderate staph aureus    42M pod2 from left AKA with hip disarticulation - overall doing well.  Plan for:  1) Followup cultures  2) Likely dc to home in 1-2 days once culture results are finalized to ensure appropriate coverage upon dc

## 2023-08-24 VITALS
WEIGHT: 140.19 LBS | OXYGEN SATURATION: 98 % | RESPIRATION RATE: 20 BRPM | TEMPERATURE: 98 F | SYSTOLIC BLOOD PRESSURE: 96 MMHG | HEIGHT: 70 IN | HEART RATE: 93 BPM | DIASTOLIC BLOOD PRESSURE: 67 MMHG | BODY MASS INDEX: 20.07 KG/M2

## 2023-08-24 LAB
BACTERIA SPEC ANAEROBE CULT: NORMAL
BACTERIA SPEC ANAEROBE CULT: NORMAL
BACTERIA SPEC CULT: ABNORMAL
SARS-COV-2 RDRP RESP QL NAA+PROBE: NEGATIVE

## 2023-08-24 PROCEDURE — 25000003 PHARM REV CODE 250: Performed by: SURGERY

## 2023-08-24 PROCEDURE — 87635 SARS-COV-2 COVID-19 AMP PRB: CPT | Performed by: SURGERY

## 2023-08-24 PROCEDURE — 94799 UNLISTED PULMONARY SVC/PX: CPT

## 2023-08-24 PROCEDURE — 94761 N-INVAS EAR/PLS OXIMETRY MLT: CPT

## 2023-08-24 PROCEDURE — 63600175 PHARM REV CODE 636 W HCPCS: Performed by: SURGERY

## 2023-08-24 RX ORDER — OXYCODONE HYDROCHLORIDE 5 MG/1
5 TABLET ORAL EVERY 4 HOURS PRN
Status: CANCELLED | OUTPATIENT
Start: 2023-08-24

## 2023-08-24 RX ORDER — ONDANSETRON 2 MG/ML
4 INJECTION INTRAMUSCULAR; INTRAVENOUS EVERY 12 HOURS PRN
Status: CANCELLED | OUTPATIENT
Start: 2023-08-24

## 2023-08-24 RX ORDER — SODIUM CHLORIDE 9 MG/ML
INJECTION, SOLUTION INTRAVENOUS
Status: DISCONTINUED | OUTPATIENT
Start: 2023-08-24 | End: 2023-08-24 | Stop reason: HOSPADM

## 2023-08-24 RX ORDER — BUSPIRONE HYDROCHLORIDE 5 MG/1
10 TABLET ORAL 2 TIMES DAILY
Status: CANCELLED | OUTPATIENT
Start: 2023-08-24

## 2023-08-24 RX ORDER — SODIUM CHLORIDE 0.9 % (FLUSH) 0.9 %
10 SYRINGE (ML) INJECTION
Status: CANCELLED | OUTPATIENT
Start: 2023-08-24

## 2023-08-24 RX ORDER — HYDROCODONE BITARTRATE AND ACETAMINOPHEN 500; 5 MG/1; MG/1
TABLET ORAL
Status: CANCELLED | OUTPATIENT
Start: 2023-08-24

## 2023-08-24 RX ORDER — MUPIROCIN 20 MG/G
OINTMENT TOPICAL 2 TIMES DAILY
Status: CANCELLED | OUTPATIENT
Start: 2023-08-24 | End: 2023-08-26

## 2023-08-24 RX ORDER — DOCUSATE SODIUM 100 MG/1
100 CAPSULE, LIQUID FILLED ORAL 2 TIMES DAILY
Status: CANCELLED | OUTPATIENT
Start: 2023-08-24

## 2023-08-24 RX ORDER — METRONIDAZOLE 500 MG/100ML
500 INJECTION, SOLUTION INTRAVENOUS
Status: CANCELLED | OUTPATIENT
Start: 2023-08-24

## 2023-08-24 RX ORDER — ONDANSETRON 4 MG/1
8 TABLET, ORALLY DISINTEGRATING ORAL EVERY 8 HOURS PRN
Status: CANCELLED | OUTPATIENT
Start: 2023-08-24

## 2023-08-24 RX ORDER — DIPHENHYDRAMINE HYDROCHLORIDE 50 MG/ML
25 INJECTION INTRAMUSCULAR; INTRAVENOUS EVERY 4 HOURS PRN
Status: CANCELLED | OUTPATIENT
Start: 2023-08-24

## 2023-08-24 RX ORDER — HYDROMORPHONE HYDROCHLORIDE 2 MG/ML
0.5 INJECTION, SOLUTION INTRAMUSCULAR; INTRAVENOUS; SUBCUTANEOUS EVERY 4 HOURS PRN
Status: CANCELLED | OUTPATIENT
Start: 2023-08-24

## 2023-08-24 RX ORDER — DULOXETIN HYDROCHLORIDE 30 MG/1
30 CAPSULE, DELAYED RELEASE ORAL 2 TIMES DAILY
Status: CANCELLED | OUTPATIENT
Start: 2023-08-24

## 2023-08-24 RX ORDER — SODIUM CHLORIDE 9 MG/ML
INJECTION, SOLUTION INTRAVENOUS
Status: CANCELLED | OUTPATIENT
Start: 2023-08-24

## 2023-08-24 RX ORDER — FAMOTIDINE 20 MG/1
20 TABLET, FILM COATED ORAL 2 TIMES DAILY
Status: CANCELLED | OUTPATIENT
Start: 2023-08-24

## 2023-08-24 RX ADMIN — HYDROMORPHONE HYDROCHLORIDE 0.5 MG: 2 INJECTION, SOLUTION INTRAMUSCULAR; INTRAVENOUS; SUBCUTANEOUS at 02:08

## 2023-08-24 RX ADMIN — METRONIDAZOLE 500 MG: 500 INJECTION, SOLUTION INTRAVENOUS at 06:08

## 2023-08-24 RX ADMIN — DULOXETINE HYDROCHLORIDE 30 MG: 30 CAPSULE, DELAYED RELEASE ORAL at 09:08

## 2023-08-24 RX ADMIN — BUSPIRONE HYDROCHLORIDE 10 MG: 5 TABLET ORAL at 09:08

## 2023-08-24 RX ADMIN — HYDROMORPHONE HYDROCHLORIDE 0.5 MG: 2 INJECTION, SOLUTION INTRAMUSCULAR; INTRAVENOUS; SUBCUTANEOUS at 06:08

## 2023-08-24 RX ADMIN — VANCOMYCIN HYDROCHLORIDE 1000 MG: 1 INJECTION, POWDER, LYOPHILIZED, FOR SOLUTION INTRAVENOUS at 09:08

## 2023-08-24 RX ADMIN — VANCOMYCIN HYDROCHLORIDE 1000 MG: 1 INJECTION, POWDER, LYOPHILIZED, FOR SOLUTION INTRAVENOUS at 12:08

## 2023-08-24 RX ADMIN — MUPIROCIN 1 G: 20 OINTMENT TOPICAL at 09:08

## 2023-08-24 RX ADMIN — OXYCODONE HYDROCHLORIDE 5 MG: 5 TABLET ORAL at 10:08

## 2023-08-24 RX ADMIN — OXYCODONE HYDROCHLORIDE 5 MG: 5 TABLET ORAL at 01:08

## 2023-08-24 RX ADMIN — SODIUM CHLORIDE: 9 INJECTION, SOLUTION INTRAVENOUS at 06:08

## 2023-08-24 RX ADMIN — FAMOTIDINE 20 MG: 20 TABLET, FILM COATED ORAL at 09:08

## 2023-08-24 RX ADMIN — METRONIDAZOLE 500 MG: 500 INJECTION, SOLUTION INTRAVENOUS at 02:08

## 2023-08-24 RX ADMIN — CEFEPIME 1 G: 1 INJECTION, POWDER, FOR SOLUTION INTRAMUSCULAR; INTRAVENOUS at 09:08

## 2023-08-24 NOTE — PLAN OF CARE
D/c orders to LTAC noted. Neg COVID results noted. Messaged Mignon on LTAC to ask her if they are ready for nurse to call report.

## 2023-08-24 NOTE — DISCHARGE SUMMARY
[facility] - Medical Surgical Unit  General Surgery  Discharge Summary      Patient Name: Werner Jarrett  MRN: [id number]  Admission Date: 8/21/2023  Hospital Length of Stay: 3 days  Discharge Date and Time: No discharge date for patient encounter.  Attending Physician: Navjot Jara MD   Discharging Provider: Jez Jara MD  Primary Care Provider: Kosta Sidhu MD    HPI:   No notes on file    Procedure(s) (LRB):  AMPUTATION, ABOVE KNEE (left hip diarticulation/amputation) (Left)      Indwelling Lines/Drains at time of discharge:   Lines/Drains/Airways     Central Venous Catheter Line  Duration           Port A Cath Single Lumen 08/21/23 0904 Subclavian Right 3 days          Drain  Duration                Suprapubic Catheter 03/08/10 1200 4916 days         Closed/Suction Drain 08/21/23 1210 Distal;Right;Posterior Thigh Bulb 19 Fr. 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  He remains on abx for infection within the hip joint - cultures pending.  He is ready for transfer to LTAC.        Goals of Care Treatment Preferences:  Code Status: Full Code      Consults:   Consults (From admission, onward)        Status Ordering Provider     Inpatient consult to Social Work/Case Management  Once        Provider:  (Not yet assigned)    BARBARA Jauregui     Pharmacy to dose Vancomycin consult  Once        Provider:  (Not yet assigned)   [facility] for [facility].    Acknowledged BARBARA CARTER          Significant Diagnostic Studies: Labs: All labs within the past 24 hours have been reviewed    Pending Diagnostic Studies:     Procedure Component Value Units Date/Time    VANCOMYCIN, TROUGH [[id number]]     Order Status: Sent Lab Status: No result     Specimen: Blood         Final Active Diagnoses:      Problems Resolved During this  Admission:    Diagnosis Date Noted Date Resolved POA    PRINCIPAL PROBLEM:  Open wound of hip and thigh [S71.009A, S71.109A] 08/21/2023 08/21/2023 Yes      Discharged Condition: good    Disposition: Long Term Care    Follow Up:   Follow-up Information     BARBARA Perez MD Follow up on 8/29/2023.    Specialty: General Surgery  Why: @ 2:00  Contact information:  [address]  [address]  [facility] [address]  [phone]                       Patient Instructions:   No discharge procedures on file.  Medications:  Reconciled Home Medications:      Medication List      ASK your doctor about these medications    acetaminophen 325 MG tablet  Commonly known as: TYLENOL  Take 650 mg by mouth every 6 (six) hours as needed for Pain.     ascorbic acid (vitamin C) 1000 MG tablet  Commonly known as: VITAMIN C  Take 1,000 mg by mouth every evening.     bisacodyL 10 mg Supp  Commonly known as: DULCOLAX  Place 10 mg rectally daily as needed.     busPIRone 10 MG tablet  Commonly known as: BUSPAR  Take 10 mg by mouth 2 (two) times daily.     CENTRUM ORAL  Take 1 tablet by mouth every morning.     cetirizine 10 MG tablet  Commonly known as: ZYRTEC  Take 10 mg by mouth 2 (two) times a day.     DULoxetine 30 MG capsule  Commonly known as: CYMBALTA  Take 30 mg by mouth 2 (two) times daily.     fexofenadine 180 MG tablet  Commonly known as: ALLEGRA  Take 180 mg by mouth every morning.     meloxicam 7.5 MG tablet  Commonly known as: MOBIC  Take 7.5 mg by mouth 2 (two) times daily as needed for Pain.     NON FORMULARY MEDICATION  Take 3 drops by mouth 2 (two) times daily as needed. THC     zinc sulfate 50 mg zinc (220 mg) capsule  Commonly known as: ZINCATE  1 capsule.          Time spent on the discharge of patient: 5 minutes    Jez Jara MD  General Surgery  [facility]  Medical Surgical Unit

## 2023-08-24 NOTE — HOSPITAL COURSE
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  He remains on abx for infection within the hip joint - cultures pending.  He is ready for transfer to LTAC.

## 2023-08-24 NOTE — PLAN OF CARE
Problem: Infection  Goal: Absence of Infection Signs and Symptoms  Outcome: Met     Problem: Adult Inpatient Plan of Care  Goal: Plan of Care Review  Outcome: Met  Goal: Patient-Specific Goal (Individualized)  Outcome: Met  Goal: Absence of Hospital-Acquired Illness or Injury  Outcome: Met  Goal: Optimal Comfort and Wellbeing  Outcome: Met  Goal: Readiness for Transition of Care  Outcome: Met     Problem: Impaired Wound Healing  Goal: Optimal Wound Healing  Outcome: Met     Problem: Skin Injury Risk Increased  Goal: Skin Health and Integrity  Outcome: Met     Problem: Fall Injury Risk  Goal: Absence of Fall and Fall-Related Injury  Outcome: Met     Problem: COPD (Chronic Obstructive Pulmonary Disease) Comorbidity  Goal: Maintenance of COPD Symptom Control  Outcome: Met     Problem: Osteoarthritis Comorbidity  Goal: Maintenance of Osteoarthritis Symptom Control  Outcome: Met     Problem: Pain Acute  Goal: Acceptable Pain Control and Functional Ability  Outcome: Met

## 2023-08-24 NOTE — PROGRESS NOTES
"Discussed culture sensitivities with Dr. Ceron - he notes to treat MRSA as normal, but also "Would be concerned about the Acinetobacter unfortunately it is very resistant. would ask micro to check Bactrim, Minocycline and Unasyn susceptibilities to try and avoid Aminoglycosides."    I have reached out to Pao, and she will ask lab to check these susceptibilities.      Appreciate all help with Mr. Jarrett's care.   "

## 2023-08-25 LAB
BACTERIA SPEC CULT: ABNORMAL

## 2023-08-25 NOTE — PHYSICIAN QUERY
PT Name: Werner Jarrett  MR #: [id number]    DOCUMENTATION CLARIFICATION      CDS/: OSVALDO Prabhakar, RN               Contact information:[email or web address]    This form is a permanent document in the medical record.      Query Date: August 25, 2023    By submitting this query, we are merely seeking further clarification of documentation. Please utilize your independent clinical judgment when addressing the question(s) below.    The Medical Record contains the following:   Indicators  Supporting Clinical Findings Location in Medical Record    Anemia documented      X  H&H  6.3 & 21.2 on 8/21/2023     8.3 & 26.3 on 8/22/2023   Lab Results 8/21 & 8/22/2023    X BP                    HR  BP: ()/()  Pulse: [] on 08/21/23      BP:()/(35-90)    Pulse: [] on 08/22/23     Vital Signs Flow Sheet 08/21 & 08/22/2023    Bleeding      X Procedure/Surgery Performed/EBL AMPUTATION, ABOVE KNEE (left hip diarticulation/amputation) (Left)    Estimated Blood Loss: 240     General Surgery Progress Notes 08/22/2023    X Transfusion(s) Transfuse 1 Unit PRBCs      MD order to transfuse 1 additional unit of PRBC. Licensed Nurse Nursing Note   Note Time: 08/21/2023 4:14 PM    Licensed Nurse Nursing Note  Note Time: 08/21/2023 5:29 PM      X Acute/Chronic illness Open obturator dislocation of left hip, subsequent encounter     Left above knee amputation with hip disarticulation for chronic infection. He remains on abx for infection within the hip joint     Open wound of hip and thigh    General Surgery Progress Notes 08/22/2023    General Surgery Discharge Summary 08/24/2023      X Treatments Notified Dr. SIN Jara via phone that patient's blood pressure was 70/40 manually. MD gave an order to give a 1 Liter bolus of NS, transfuse 1 Units of PRBC's, place patient on telemetry, and order a stat cbc and cmp    Notified Dr. SIN Jara that patient's blood pressure was better after the bolus, at 137/81,  and that patients H/H was 6.3/21.2. MD order to transfuse 1 additional unit of PRBC.    BP noted 83/48. MD notified and new order to transfer was given. @2114    Patient transferred to ICU     (had been in ICU due to some hypotension immediately after surgery with volume and blood loss noted - this recovered with transfusion and hydration).      Licensed Nurse Nursing Note   Note Time: 08/21/2023 4:14 PM          Licensed Nurse Nursing Note  Note Time: 08/21/2023 5:29 PM        Licensed Nurse Nursing Note  Note Time:08/22/2023 12:44 AM      General Surgery Progress Notes 08/23/2023    X Other Received pt from Floor via bed to monitor BP  Initial BP 76/49 with O2 sat 92%. Upon retaking in several minutes during cycle pt states my BP just went up. Reading showed 124/77 and O2 sat 100%. Pt states BP usually runs 80-90s but can tell when it fluctuates. Registered Nurse Nursing Note  Note Time:08/21/2023 11:50 PM     Provider, please specify diagnosis or diagnoses associated with above clinical findings.   [   ] Acute blood loss anemia    [x   ] Acute blood loss anemia expected post-operatively    [   ] Anemia, unspecified    [   ] Other Hematological Diagnosis (please specify): _________________   [   ] Clinically Undetermined     Present on admission (POA) status:   [   ] Yes (Y)   [ x  ] No (N)   [   ] Documentation insufficient to determine if condition is POA (U)   [  ] Clinically Undetermined (W)          Please document in your progress notes daily for the duration of treatment, until resolved, and include in your discharge summary.    Form No. [id number]

## 2023-08-25 NOTE — PHYSICIAN QUERY
PT Name: Werner Jarrett  MR #: [id number]    DOCUMENTATION CLARIFICATION     CDS/: Yarely Sesay               Contact information:[email or web address]  This form is a permanent document in the medical record.     Query Date: August 25, 2023    By submitting this query, we are merely seeking further clarification of documentation.  Please utilize your independent clinical judgment when addressing the question(s) below.    The medical record contains the following:  Pathology Findings Location in Medical Record   FINAL DIAGNOSIS       LEFT LEG, ABOVE-KNEE AMPUTATION:       A) SECTIONS OF UPPER THIGH SKIN WITH EXTENSIVE ULCERATION.       B) SECTION OF FEMORAL HEAD WITH EXTENSIVE ACUTE OSTEOMYELITIS.       C) SECTIONS OF SKELETAL MUSCLE WITH NECROSIS (UPPER THIGH).       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   He remains on abx for infection within the hip joint     PRINCIPAL PROBLEM: Open wound of hip and thigh  Surgical Pathology Results 08/23/2023                              General Surgery Discharge Summary 08/24/2023       Please clarify the pathology findings.    [  x] Pathology findings noted above are ruled in/confirmed as diagnoses     [  ] Pathology findings noted above are not confirmed as diagnoses     [  ] Pathology findings noted above are incidental     [  ] Other diagnosis (please specify): ___________     [  ] Clinically Undetermined     Please document in your progress notes daily for the duration of treatment until resolved and include in your discharge summary.    Form No. [id number]

## 2023-09-18 PROBLEM — M86.9 HIP OSTEOMYELITIS, LEFT: Status: RESOLVED | Noted: 2023-07-25 | Resolved: 2023-09-18

## 2023-09-18 PROBLEM — S71.002A OPEN WOUND OF LEFT HIP: Status: RESOLVED | Noted: 2022-05-24 | Resolved: 2023-09-18

## 2023-09-20 NOTE — OP NOTE
[facility]                     [address] [address]    PATIENT NAME:      TERENCE CONNELL  YOB: 1980  CSN:               [id number]  MRN:               [id number]  ADMIT DATE:        08/21/2023 08:27:00  PHYSICIAN:         Sandy Jara MD                          OPERATIVE REPORT      DATE OF SURGERY:    8/24/23    SURGEON:  Sandy Castellano MD    PREOPERATIVE DIAGNOSES:    1. Chronic wound to the left femur/greater trochanter over at least 2 years.    2. Ongoing tobacco abuse, 1 pack per day smoking.  3. Paralysis, status post C5 fracture.  4. Sacral wound.    POSTOPERATIVE DIAGNOSES:    1. Chronic wound to the left femur/greater trochanter over at least 2 years.    2. Ongoing tobacco abuse, 1 pack per day smoking.  3. Paralysis, status post C5 fracture.  4. Sacral wound.    PROCEDURE:  Left hip disarticulation/above knee amputation.    ASSISTANT:  OR staff.    ANESTHESIA:  General endotracheal anesthetic.    FINDINGS:    1. Wound to the left greater trochanter, extending into the hip joint capsule.    Friable tissue within the joint capsule removed and also debrided bluntly with a   curette.  Cultures taken of the wound and the hip joint separately.  2. Brisk Doppler signal to the remaining muscular flap.  3. Muscular flap brought into the joint capsule and secured.   4. 19-Maltese round Otoniel drain placed within the hip joint for drainage.    BLOOD LOSS:  240 mL.    COMPLICATIONS:  None.    OPERATIVE REPORT:  The patient was brought to the OR, placed in supine position.    Anesthesia was induced.  Airway was controlled with general endotracheal   anesthetic.  The patient was placed in partial decubitus position with the left   side up.  Incision was created along the lateral aspect of the leg encircling   the wound for complete excision.  Subcutaneous tissue was dissected down to   muscular fascia,  which was entered and dissected down to the bone.  The area of   bone that was involved was identified and tissue surrounding this was included   within the specimen.  Dissection was carried out and the flap was encircled   distally along the thigh.  The medial and posterior musculature was preserved.    The anterior compartment was sacrificed distally along the flap.  The femoral   vessels and femoral nerve were ligated and divided.  Division of perforators   posteriorly were addressed with the LigaSure and/or sutures.  Eventually the hip   joint was encountered.  The hip was externally rotated and ultimately   disarticulation was carried out.  Muscular attachments were dissected free.    Completion dissection was carried out and specimen was submitted to pathology.    The infection was noted to go from the wound into the hip joint itself.  There   was some friable material within the hip joint.  This was removed with cautery   with blunt dissection.  Culture was taken as well to ensure appropriate   coverage.  Blood flow within the muscular flaps was ensured using a Doppler.    Part of the medial musculature was returned and secured within the hip joint   itself to fill the space.  A 19-Vietnamese drain was placed within this region to   help drain the hip space as well.  This was brought out posteriorly.  Otherwise,   the remaining muscular and skin flap was brought to close the wound.  Removal   of some of the skin and subcutaneous tissue was necessary for appropriate fit.    A 0 Vicryl suture in a simple inverted interrupted manner was used to close the   fascia.  The dermis was reapproximated with 3-0 Vicryl suture in a simple   inverted interrupted manner.  The skin was closed with staples.  The CLAUDETTE was   secured with a 2-0 nylon stitch.  The patient tolerated the procedure well.    There were no complications.        ______________________________  MD SASHA Lara/JACQUIE  DD:  09/20/2023  Time:   08:53AM  DT:  09/20/2023  Time:  10:24AM  Job #:  [id number]    cc:   Kosta Spangler MD        OPERATIVE REPORT

## 2023-09-25 ENCOUNTER — HOSPITAL ENCOUNTER (OUTPATIENT)
Dept: WOUND CARE | Facility: HOSPITAL | Age: 43
Discharge: HOME | End: 2023-09-25
Attending: NURSE PRACTITIONER
Payer: MEDICARE

## 2023-09-25 VITALS
RESPIRATION RATE: 18 BRPM | WEIGHT: 134.5 LBS | HEART RATE: 73 BPM | HEIGHT: 70 IN | BODY MASS INDEX: 19.26 KG/M2 | SYSTOLIC BLOOD PRESSURE: 110 MMHG | DIASTOLIC BLOOD PRESSURE: 60 MMHG

## 2023-09-25 DIAGNOSIS — G82.50 QUADRIPLEGIA: ICD-10-CM

## 2023-09-25 DIAGNOSIS — M86.18: Primary | ICD-10-CM

## 2023-09-25 DIAGNOSIS — L89.154 PRESSURE INJURY OF SACRAL REGION, STAGE 4: ICD-10-CM

## 2023-09-25 DIAGNOSIS — S71.002D OPEN WOUND OF LEFT HIP, SUBSEQUENT ENCOUNTER: ICD-10-CM

## 2023-09-25 LAB
FUNGUS SPEC CULT: NORMAL
FUNGUS SPEC CULT: NORMAL

## 2023-09-25 PROCEDURE — 99213 OFFICE O/P EST LOW 20 MIN: CPT

## 2023-09-25 PROCEDURE — 27000999 HC MEDICAL RECORD PHOTO DOCUMENTATION

## 2023-09-25 RX ORDER — ASPIRIN 325 MG
TABLET ORAL
COMMUNITY
Start: 2023-09-21
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
## 2023-10-09 ENCOUNTER — HOSPITAL ENCOUNTER (OUTPATIENT)
Dept: WOUND CARE | Facility: HOSPITAL | Age: 43
Discharge: HOME | End: 2023-10-09
Attending: NURSE PRACTITIONER
Payer: MEDICARE

## 2023-10-09 VITALS
DIASTOLIC BLOOD PRESSURE: 78 MMHG | WEIGHT: 134 LBS | HEIGHT: 70 IN | SYSTOLIC BLOOD PRESSURE: 136 MMHG | RESPIRATION RATE: 18 BRPM | HEART RATE: 86 BPM | BODY MASS INDEX: 19.18 KG/M2

## 2023-10-09 DIAGNOSIS — S71.002D OPEN WOUND OF LEFT HIP, SUBSEQUENT ENCOUNTER: Primary | ICD-10-CM

## 2023-10-09 DIAGNOSIS — G82.50 QUADRIPLEGIA: ICD-10-CM

## 2023-10-09 DIAGNOSIS — L89.154 PRESSURE INJURY OF SACRAL REGION, STAGE 4: ICD-10-CM

## 2023-10-09 DIAGNOSIS — L89.224 PRESSURE INJURY OF LEFT HIP, STAGE 4: ICD-10-CM

## 2023-10-09 PROCEDURE — 27000999 HC MEDICAL RECORD PHOTO DOCUMENTATION

## 2023-10-09 PROCEDURE — 99212 OFFICE O/P EST SF 10 MIN: CPT

## 2023-10-09 RX ORDER — LOPERAMIDE HCL 2 MG
2 TABLET ORAL 3 TIMES DAILY
Qty: 30 TABLET | Refills: 0 | Status: SHIPPED | OUTPATIENT
Start: 2023-10-09 | End: 2023-10-19
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
## 2023-10-16 ENCOUNTER — HOSPITAL ENCOUNTER (OUTPATIENT)
Dept: WOUND CARE | Facility: HOSPITAL | Age: 43
Discharge: HOME | End: 2023-10-16
Attending: NURSE PRACTITIONER
Payer: MEDICARE

## 2023-10-16 VITALS
BODY MASS INDEX: 19.18 KG/M2 | DIASTOLIC BLOOD PRESSURE: 72 MMHG | SYSTOLIC BLOOD PRESSURE: 131 MMHG | RESPIRATION RATE: 16 BRPM | HEIGHT: 70 IN | WEIGHT: 134 LBS | HEART RATE: 81 BPM

## 2023-10-16 DIAGNOSIS — G82.50 QUADRIPLEGIA: ICD-10-CM

## 2023-10-16 DIAGNOSIS — S76.001D OPEN WOUND OF HIP WITH TENDON INVOLVEMENT, RIGHT, SUBSEQUENT ENCOUNTER: Primary | ICD-10-CM

## 2023-10-16 DIAGNOSIS — S71.001D OPEN WOUND OF HIP WITH TENDON INVOLVEMENT, RIGHT, SUBSEQUENT ENCOUNTER: Primary | ICD-10-CM

## 2023-10-16 DIAGNOSIS — L89.154 PRESSURE INJURY OF SACRAL REGION, STAGE 4: ICD-10-CM

## 2023-10-16 PROBLEM — S71.009A: Status: ACTIVE | Noted: 2023-10-16

## 2023-10-16 PROBLEM — S76.009A: Status: ACTIVE | Noted: 2023-10-16

## 2023-10-16 PROCEDURE — 27000999 HC MEDICAL RECORD PHOTO DOCUMENTATION

## 2023-10-16 PROCEDURE — 99212 OFFICE O/P EST SF 10 MIN: CPT
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
## 2023-10-30 ENCOUNTER — HOSPITAL ENCOUNTER (OUTPATIENT)
Dept: WOUND CARE | Facility: HOSPITAL | Age: 43
Discharge: HOME | End: 2023-10-30
Attending: NURSE PRACTITIONER
Payer: MEDICARE

## 2023-10-30 VITALS
HEIGHT: 70 IN | BODY MASS INDEX: 19.18 KG/M2 | DIASTOLIC BLOOD PRESSURE: 54 MMHG | RESPIRATION RATE: 18 BRPM | SYSTOLIC BLOOD PRESSURE: 86 MMHG | HEART RATE: 68 BPM | WEIGHT: 134 LBS

## 2023-10-30 DIAGNOSIS — L89.154 PRESSURE INJURY OF SACRAL REGION, STAGE 4: ICD-10-CM

## 2023-10-30 DIAGNOSIS — S71.001D OPEN WOUND OF HIP WITH TENDON INVOLVEMENT, RIGHT, SUBSEQUENT ENCOUNTER: Primary | ICD-10-CM

## 2023-10-30 DIAGNOSIS — G82.50 QUADRIPLEGIA: ICD-10-CM

## 2023-10-30 DIAGNOSIS — S76.001D OPEN WOUND OF HIP WITH TENDON INVOLVEMENT, RIGHT, SUBSEQUENT ENCOUNTER: Primary | ICD-10-CM

## 2023-10-30 PROCEDURE — 99213 OFFICE O/P EST LOW 20 MIN: CPT

## 2023-10-30 PROCEDURE — 27000999 HC MEDICAL RECORD PHOTO DOCUMENTATION
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
## 2023-11-06 ENCOUNTER — HOSPITAL ENCOUNTER (OUTPATIENT)
Dept: WOUND CARE | Facility: HOSPITAL | Age: 43
Discharge: HOME | End: 2023-11-06
Attending: NURSE PRACTITIONER
Payer: MEDICARE

## 2023-11-06 ENCOUNTER — EXTERNAL HOME HEALTH (OUTPATIENT)
Dept: HOME HEALTH SERVICES | Facility: HOSPITAL | Age: 43
End: 2023-11-06
Payer: MEDICARE

## 2023-11-06 VITALS
HEART RATE: 62 BPM | RESPIRATION RATE: 18 BRPM | HEIGHT: 70 IN | BODY MASS INDEX: 19.18 KG/M2 | DIASTOLIC BLOOD PRESSURE: 90 MMHG | SYSTOLIC BLOOD PRESSURE: 155 MMHG | WEIGHT: 134 LBS

## 2023-11-06 DIAGNOSIS — S71.001D OPEN WOUND OF HIP WITH TENDON INVOLVEMENT, RIGHT, SUBSEQUENT ENCOUNTER: Primary | ICD-10-CM

## 2023-11-06 DIAGNOSIS — S76.001D OPEN WOUND OF RIGHT HIP AND THIGH WITH TENDON INVOLVEMENT, SUBSEQUENT ENCOUNTER: ICD-10-CM

## 2023-11-06 DIAGNOSIS — S71.001D OPEN WOUND OF RIGHT HIP AND THIGH WITH TENDON INVOLVEMENT, SUBSEQUENT ENCOUNTER: ICD-10-CM

## 2023-11-06 DIAGNOSIS — L89.154 PRESSURE INJURY OF SACRAL REGION, STAGE 4: ICD-10-CM

## 2023-11-06 DIAGNOSIS — S71.101D OPEN WOUND OF RIGHT HIP AND THIGH WITH TENDON INVOLVEMENT, SUBSEQUENT ENCOUNTER: ICD-10-CM

## 2023-11-06 DIAGNOSIS — S76.901D OPEN WOUND OF RIGHT HIP AND THIGH WITH TENDON INVOLVEMENT, SUBSEQUENT ENCOUNTER: ICD-10-CM

## 2023-11-06 DIAGNOSIS — G82.50 QUADRIPLEGIA: ICD-10-CM

## 2023-11-06 DIAGNOSIS — S76.001D OPEN WOUND OF HIP WITH TENDON INVOLVEMENT, RIGHT, SUBSEQUENT ENCOUNTER: Primary | ICD-10-CM

## 2023-11-06 PROCEDURE — 27000999 HC MEDICAL RECORD PHOTO DOCUMENTATION

## 2023-11-06 PROCEDURE — 99213 OFFICE O/P EST LOW 20 MIN: CPT
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
## 2023-11-09 ENCOUNTER — DOCUMENTATION ONLY (OUTPATIENT)
Dept: WOUND CARE | Facility: HOSPITAL | Age: 43
End: 2023-11-09
Payer: MEDICARE

## 2023-11-09 NOTE — PROGRESS NOTES
Spoke to patient by phone to discuss cx results - which show 98% pseudomonas a, <1% staph a.    He does have a sensitivity to Levofloxacin and would like to try alternates before IV medications.    He will begin daily soak/rinse well with vinegar/NS until his return visit on Monday to see if there is improvement.  If no improvement we will move forward with consideration of IV abx.

## 2023-11-20 ENCOUNTER — HOSPITAL ENCOUNTER (OUTPATIENT)
Dept: WOUND CARE | Facility: HOSPITAL | Age: 43
Discharge: HOME | End: 2023-11-20
Attending: NURSE PRACTITIONER
Payer: MEDICARE

## 2023-11-20 VITALS
BODY MASS INDEX: 19.18 KG/M2 | SYSTOLIC BLOOD PRESSURE: 104 MMHG | HEIGHT: 70 IN | WEIGHT: 134 LBS | HEART RATE: 71 BPM | DIASTOLIC BLOOD PRESSURE: 63 MMHG | RESPIRATION RATE: 18 BRPM

## 2023-11-20 DIAGNOSIS — L89.154 PRESSURE INJURY OF SACRAL REGION, STAGE 4: ICD-10-CM

## 2023-11-20 DIAGNOSIS — G82.50 QUADRIPLEGIA: ICD-10-CM

## 2023-11-20 DIAGNOSIS — S76.001D OPEN WOUND OF HIP WITH TENDON INVOLVEMENT, RIGHT, SUBSEQUENT ENCOUNTER: Primary | ICD-10-CM

## 2023-11-20 DIAGNOSIS — S71.001D OPEN WOUND OF HIP WITH TENDON INVOLVEMENT, RIGHT, SUBSEQUENT ENCOUNTER: Primary | ICD-10-CM

## 2023-11-20 PROCEDURE — 27000999 HC MEDICAL RECORD PHOTO DOCUMENTATION

## 2023-11-20 PROCEDURE — 99213 OFFICE O/P EST LOW 20 MIN: CPT
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
## 2023-11-27 ENCOUNTER — HOSPITAL ENCOUNTER (OUTPATIENT)
Dept: WOUND CARE | Facility: HOSPITAL | Age: 43
Discharge: HOME | End: 2023-11-27
Attending: NURSE PRACTITIONER
Payer: MEDICARE

## 2023-11-27 VITALS
DIASTOLIC BLOOD PRESSURE: 76 MMHG | HEART RATE: 79 BPM | WEIGHT: 134 LBS | RESPIRATION RATE: 18 BRPM | HEIGHT: 70 IN | SYSTOLIC BLOOD PRESSURE: 120 MMHG | BODY MASS INDEX: 19.18 KG/M2

## 2023-11-27 DIAGNOSIS — S76.901D OPEN WOUND OF RIGHT HIP AND THIGH WITH TENDON INVOLVEMENT, SUBSEQUENT ENCOUNTER: ICD-10-CM

## 2023-11-27 DIAGNOSIS — L89.154 PRESSURE INJURY OF SACRAL REGION, STAGE 4: ICD-10-CM

## 2023-11-27 DIAGNOSIS — S76.001D OPEN WOUND OF RIGHT HIP AND THIGH WITH TENDON INVOLVEMENT, SUBSEQUENT ENCOUNTER: ICD-10-CM

## 2023-11-27 DIAGNOSIS — S71.101D OPEN WOUND OF RIGHT HIP AND THIGH WITH TENDON INVOLVEMENT, SUBSEQUENT ENCOUNTER: ICD-10-CM

## 2023-11-27 DIAGNOSIS — S71.001D OPEN WOUND OF RIGHT HIP AND THIGH WITH TENDON INVOLVEMENT, SUBSEQUENT ENCOUNTER: ICD-10-CM

## 2023-11-27 DIAGNOSIS — S76.001D OPEN WOUND OF HIP WITH TENDON INVOLVEMENT, RIGHT, SUBSEQUENT ENCOUNTER: Primary | ICD-10-CM

## 2023-11-27 DIAGNOSIS — S71.001D OPEN WOUND OF HIP WITH TENDON INVOLVEMENT, RIGHT, SUBSEQUENT ENCOUNTER: Primary | ICD-10-CM

## 2023-11-27 DIAGNOSIS — G82.50 QUADRIPLEGIA: ICD-10-CM

## 2023-11-27 PROCEDURE — 99212 OFFICE O/P EST SF 10 MIN: CPT

## 2023-11-27 PROCEDURE — 27000999 HC MEDICAL RECORD PHOTO DOCUMENTATION
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
## 2023-12-04 ENCOUNTER — HOSPITAL ENCOUNTER (OUTPATIENT)
Dept: WOUND CARE | Facility: HOSPITAL | Age: 43
Discharge: HOME | End: 2023-12-04
Attending: NURSE PRACTITIONER
Payer: MEDICARE

## 2023-12-04 VITALS
SYSTOLIC BLOOD PRESSURE: 98 MMHG | WEIGHT: 134 LBS | BODY MASS INDEX: 19.18 KG/M2 | HEIGHT: 70 IN | HEART RATE: 82 BPM | DIASTOLIC BLOOD PRESSURE: 76 MMHG | RESPIRATION RATE: 20 BRPM

## 2023-12-04 DIAGNOSIS — S71.101D OPEN WOUND OF RIGHT HIP AND THIGH WITH TENDON INVOLVEMENT, SUBSEQUENT ENCOUNTER: ICD-10-CM

## 2023-12-04 DIAGNOSIS — S76.001D OPEN WOUND OF HIP WITH TENDON INVOLVEMENT, RIGHT, SUBSEQUENT ENCOUNTER: Primary | ICD-10-CM

## 2023-12-04 DIAGNOSIS — S71.001D OPEN WOUND OF RIGHT HIP AND THIGH WITH TENDON INVOLVEMENT, SUBSEQUENT ENCOUNTER: ICD-10-CM

## 2023-12-04 DIAGNOSIS — S76.001D OPEN WOUND OF RIGHT HIP AND THIGH WITH TENDON INVOLVEMENT, SUBSEQUENT ENCOUNTER: ICD-10-CM

## 2023-12-04 DIAGNOSIS — S71.001D OPEN WOUND OF HIP WITH TENDON INVOLVEMENT, RIGHT, SUBSEQUENT ENCOUNTER: Primary | ICD-10-CM

## 2023-12-04 DIAGNOSIS — G82.50 QUADRIPLEGIA: ICD-10-CM

## 2023-12-04 DIAGNOSIS — L89.154 PRESSURE INJURY OF SACRAL REGION, STAGE 4: ICD-10-CM

## 2023-12-04 DIAGNOSIS — S76.901D OPEN WOUND OF RIGHT HIP AND THIGH WITH TENDON INVOLVEMENT, SUBSEQUENT ENCOUNTER: ICD-10-CM

## 2023-12-04 PROCEDURE — 99212 OFFICE O/P EST SF 10 MIN: CPT

## 2023-12-04 PROCEDURE — 27000999 HC MEDICAL RECORD PHOTO DOCUMENTATION

## 2023-12-04 NOTE — PROGRESS NOTES
Subjective:       Patient ID: Werner Jarrett 42 y.o.     Chief Complaint:   Chief Complaint   Patient presents with    3MNTH F/U Subacute osteomyelitis, other site        HPI:  03/14/2023 Hospital evaluation:  42-year-old incomplete quadriplegic male presenting with drainage from left hip wound and subsequently found to have concern for osteomyelitis and left hip septic arthritis, status post I&D.  Cultures with Enterobacter.  Patient currently on cefepime.  ID consulted for assistance with antimicrobials.     Left hip septic arthritis / OM, s/p I&D  Incomplete quadriplegia  Depression      PLAN:  Continue cefepime - end date: 4/21.  Weekly CBC, CMP, CRP, ESR while on abx.  Fax results to us at [phone].  F/u with us as scheduled.  CM consulted for assistance with OPAT.   Discussed with patient.     04/04/2023:  Wound is healing well, no fevers, no chills, tolerating his Cefepime well, receiving it through port. No current complications. He reports his wound has been healing well with secondary intention, he is following with wound care.     05/08/23 telephone encounter:   Surgeon spoke with Dr. Ceron and had some concerns for worsening infection of R hip wound. Will extend Cefepime 2g IV q8 for an additional two weeks. If wound remains concerning for infection after completion of additional two week he will need additional debridement    07/24/2023:   Mr. Jarrett presents for follow-up today accompanied by his nurse. He reports ongoing issues with his left hip wound. On 6/19 wound care noticed bone fragments on his dressing. Fragments were sent to pathology with ongoing OM noted. He denies having any fevers or chills. He does report having night sweats 2-3x daily while he was on antibiotics, however he hasn't had any in about a month. He is seeing wound care every other week. They recommend daily dressing changes. Dry dressing with kerlix and silver cell with abd and tape.   His wound is draining serosanguinous  "drainage and completely saturates in a 24 hour period.       Past Medical History:   Diagnosis Date    Anxiety     Arthritis     C5 vertebral fracture     Paralyzed nipple line down    COPD (chronic obstructive pulmonary disease)     Depression     Heart murmur     Insomnia     Osteomyelitis hip         Past Surgical History:   Procedure Laterality Date    INCISION AND DRAINAGE HIP Left 12/14/2022    Procedure: Incision and drainage Hip (I&D debridement of left hip wound, left hip and femur);  Surgeon: BARBARA Perez MD;  Location: [facility] OR;  Service: General;  Laterality: Left;    INCISION AND DRAINAGE HIP Left 3/10/2023    Procedure: INCISION AND DRAINAGE, HIP;  Surgeon: BARBARA Perez MD;  Location: [facility] OR;  Service: General;  Laterality: Left;  LEFT HIP    MEDIPORT INSERTION, SINGLE      Rt Chest wall    SPINE SURGERY      wound debridements      Multiple        Social History     Socioeconomic History    Marital status: Single   Tobacco Use    Smoking status: Every Day     Packs/day: 1.00     Types: Cigarettes    Smokeless tobacco: Never   Substance and Sexual Activity    Alcohol use: Yes     Comment: 3-5 times week    Drug use: Not Currently    Sexual activity: Not Currently        Family History   Problem Relation Age of Onset    Lung cancer Mother     Brain cancer Mother         Review of patient's allergies indicates:   Allergen Reactions    Levofloxacin Rash          There is no immunization history on file for this patient.     Review of Systems   All other systems reviewed and are negative.       Objective:      BP (!) 80/49 (BP Location: Right arm)   Pulse 104   Resp 18   Ht 5' 10" (1.778 m)   Wt 74.8 kg (165 lb)   SpO2 97%   BMI 23.68 kg/m²      Physical Exam  Constitutional:       Appearance: Normal appearance.   HENT:      Head: Normocephalic and atraumatic.      Mouth/Throat:      Pharynx: No oropharyngeal exudate or posterior oropharyngeal erythema.   Eyes:      Extraocular " Movements: Extraocular movements intact.      Pupils: Pupils are equal, round, and reactive to light.   Cardiovascular:      Rate and Rhythm: Normal rate and regular rhythm.      Heart sounds: No murmur heard.  Pulmonary:      Effort: No respiratory distress.      Breath sounds: No wheezing, rhonchi or rales.      Comments: LLL crackles   Abdominal:      General: Bowel sounds are normal. There is no distension.      Palpations: Abdomen is soft.      Tenderness: There is no abdominal tenderness. There is no right CVA tenderness or left CVA tenderness.   Musculoskeletal:         General: No swelling or tenderness.      Cervical back: Neck supple. No rigidity or tenderness.   Lymphadenopathy:      Cervical: No cervical adenopathy.   Skin:     Findings: No lesion or rash.      Comments: L hip wound; sacral wound-improving; as pictured below    Neurological:      Mental Status: He is alert and oriented to person, place, and time. Mental status is at baseline.      Cranial Nerves: No cranial nerve deficit.      Motor: Weakness present.      Comments: Incomplete paraplegia at baseline   Psychiatric:         Mood and Affect: Mood normal.         Behavior: Behavior normal.     Pictures are from previous wound care visit on 7/10/23:              Labs: Reviewed most recent relevant labs available, notable results highlighted in this note    Imaging: Reviewed most recent relevant imaging studies available, notable results highlighted in this note    Assessment:       Problem List Items Addressed This Visit          Neuro    Paraplegia       ID    Subacute osteomyelitis, other site    Hip osteomyelitis, left - Primary       Other    Tobacco user (Chronic)            Plan:       -Enterobacter cloacae in L hip tissue intraoperatively found in 3/10/2023  -received a 6 week course of IV cefepime through April 2023 and then an additional 2 week course at the end of May 2023.   -Initially it was noted that the wound was healing while  on antibiotic therapy. Once therapy was competed the wound started deteriorating again.   -He has failed previous course of IV therapy.   -Unlikely that empiric antimicrobial treatment would benefit wound healing.   -In order to treat ongoing OM with antimicrobial therapy we will need deep intraoperative cultures to guide antimicrobial therapy.   -Surgery is recommending leg amputation with hip disarticulation.   -Recommend to continue aggressive wound care as an outpatient as well as in wound care clinic.   -Patient continues to smoke. I counseling him on smoking cessation for greater than 5 minutes about the benefits of cessation on wound healing. He says that he has been told this time and time again, but he continues to smoke.   -Will follow-up in 3 months or sooner if needed.        Follow up in about 3 months (around 10/24/2023).       same name as above
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
## 2023-12-11 ENCOUNTER — HOSPITAL ENCOUNTER (OUTPATIENT)
Dept: WOUND CARE | Facility: HOSPITAL | Age: 43
Discharge: HOME | End: 2023-12-11
Attending: NURSE PRACTITIONER
Payer: MEDICARE

## 2023-12-11 VITALS
BODY MASS INDEX: 19.18 KG/M2 | HEART RATE: 85 BPM | DIASTOLIC BLOOD PRESSURE: 85 MMHG | HEIGHT: 70 IN | SYSTOLIC BLOOD PRESSURE: 127 MMHG | WEIGHT: 134 LBS | RESPIRATION RATE: 18 BRPM

## 2023-12-11 DIAGNOSIS — S71.001D OPEN WOUND OF HIP WITH TENDON INVOLVEMENT, RIGHT, SUBSEQUENT ENCOUNTER: Primary | ICD-10-CM

## 2023-12-11 DIAGNOSIS — S76.901D OPEN WOUND OF RIGHT HIP AND THIGH WITH TENDON INVOLVEMENT, SUBSEQUENT ENCOUNTER: ICD-10-CM

## 2023-12-11 DIAGNOSIS — S71.001D OPEN WOUND OF RIGHT HIP AND THIGH WITH TENDON INVOLVEMENT, SUBSEQUENT ENCOUNTER: ICD-10-CM

## 2023-12-11 DIAGNOSIS — S71.101D OPEN WOUND OF RIGHT HIP AND THIGH WITH TENDON INVOLVEMENT, SUBSEQUENT ENCOUNTER: ICD-10-CM

## 2023-12-11 DIAGNOSIS — S76.001D OPEN WOUND OF RIGHT HIP AND THIGH WITH TENDON INVOLVEMENT, SUBSEQUENT ENCOUNTER: ICD-10-CM

## 2023-12-11 DIAGNOSIS — L89.154 PRESSURE INJURY OF SACRAL REGION, STAGE 4: ICD-10-CM

## 2023-12-11 DIAGNOSIS — S76.001D OPEN WOUND OF HIP WITH TENDON INVOLVEMENT, RIGHT, SUBSEQUENT ENCOUNTER: Primary | ICD-10-CM

## 2023-12-11 DIAGNOSIS — G82.50 QUADRIPLEGIA: ICD-10-CM

## 2023-12-11 PROCEDURE — 99213 OFFICE O/P EST LOW 20 MIN: CPT

## 2023-12-11 PROCEDURE — 27000999 HC MEDICAL RECORD PHOTO DOCUMENTATION
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
## 2023-12-26 ENCOUNTER — HOSPITAL ENCOUNTER (OUTPATIENT)
Dept: WOUND CARE | Facility: HOSPITAL | Age: 43
Discharge: HOME | End: 2023-12-26
Attending: FAMILY MEDICINE
Payer: MEDICARE

## 2023-12-26 VITALS
WEIGHT: 134 LBS | HEART RATE: 68 BPM | DIASTOLIC BLOOD PRESSURE: 60 MMHG | HEIGHT: 70 IN | RESPIRATION RATE: 18 BRPM | BODY MASS INDEX: 19.18 KG/M2 | SYSTOLIC BLOOD PRESSURE: 100 MMHG

## 2023-12-26 DIAGNOSIS — L89.214: ICD-10-CM

## 2023-12-26 DIAGNOSIS — L89.154 PRESSURE INJURY OF SACRAL REGION, STAGE 4: Primary | ICD-10-CM

## 2023-12-26 PROCEDURE — 97597 DBRDMT OPN WND 1ST 20 CM/<: CPT

## 2023-12-26 PROCEDURE — 27000999 HC MEDICAL RECORD PHOTO DOCUMENTATION

## 2023-12-26 PROCEDURE — 99212 OFFICE O/P EST SF 10 MIN: CPT | Mod: 25

## 2023-12-26 RX ORDER — CLONIDINE HYDROCHLORIDE 0.1 MG/1
0.1 TABLET ORAL DAILY PRN
COMMUNITY
Start: 2023-12-21

## 2023-12-26 RX ORDER — ASPIRIN 325 MG
1 TABLET, DELAYED RELEASE (ENTERIC COATED) ORAL DAILY
COMMUNITY
Start: 2023-12-21 | End: 2024-03-14

## 2023-12-26 RX ORDER — AMOXICILLIN AND CLAVULANATE POTASSIUM 875; 125 MG/1; MG/1
1 TABLET, FILM COATED ORAL 2 TIMES DAILY
COMMUNITY
Start: 2023-12-21 | End: 2023-12-28

## 2023-12-26 NOTE — PROCEDURES
"Debridement    Date/Time: 12/26/2023 11:00 AM    Performed by: Duarte Serra MD  Authorized by: Duarte Serra MD    Time out: Immediately prior to procedure a "time out" was called to verify the correct patient, procedure, equipment, support staff and site/side marked as required.    Consent Done?:  Yes (Verbal)    Preparation: Patient was prepped and draped with aseptic technique    Local anesthesia used?: No      Wound Details:    Location:  Right hip    Type of Debridement:  Non-excisional       Length (cm):  1.5       Area (sq cm):  1.8       Width (cm):  1.2       Percent Debrided (%):  100       Depth (cm):  0.8       Total Area Debrided (sq cm):  1.8    Depth of debridement:  Epidermis/Dermis    Tissue debrided:  Epidermis    Devitalized tissue debrided:  Biofilm, Exudate, Slough and Fibrin    Instruments:  Curette (freer)  Bleeding:  None    Additional wounds:  1    2nd Wound Details:     Location:  Back (Sacrum)    Type of Debridement:  Non-excisional       Length (cm):  0.6       Area (sq cm):  0.36       Width (cm):  0.6       Percent Debrided (%):  100       Depth (cm):  0.5       Total Area Debrided (sq cm):  0.36    Depth of debridement:  Epidermis/Dermis    Tissue debrided:  Epidermis    Devitalized tissue debrided:  Biofilm, Exudate and Slough    Instruments:  Curette (freer)  Bleeding:  None  Patient tolerance:  Patient tolerated the procedure well with no immediate complications  1st Wound Pain Assessment: 0  2nd Wound Pain Assessment: 0    "
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
## 2024-01-18 ENCOUNTER — EXTERNAL HOME HEALTH (OUTPATIENT)
Dept: HOME HEALTH SERVICES | Facility: HOSPITAL | Age: 44
End: 2024-01-18
Payer: MEDICARE

## 2024-01-23 ENCOUNTER — HOSPITAL ENCOUNTER (OUTPATIENT)
Dept: WOUND CARE | Facility: HOSPITAL | Age: 44
Discharge: HOME | End: 2024-01-23
Attending: NURSE PRACTITIONER
Payer: MEDICARE

## 2024-01-23 VITALS
SYSTOLIC BLOOD PRESSURE: 116 MMHG | BODY MASS INDEX: 19.18 KG/M2 | WEIGHT: 134 LBS | HEIGHT: 70 IN | RESPIRATION RATE: 18 BRPM | DIASTOLIC BLOOD PRESSURE: 64 MMHG | HEART RATE: 78 BPM

## 2024-01-23 DIAGNOSIS — S76.001D OPEN WOUND OF RIGHT HIP AND THIGH WITH TENDON INVOLVEMENT, SUBSEQUENT ENCOUNTER: ICD-10-CM

## 2024-01-23 DIAGNOSIS — S76.001D OPEN WOUND OF HIP WITH TENDON INVOLVEMENT, RIGHT, SUBSEQUENT ENCOUNTER: ICD-10-CM

## 2024-01-23 DIAGNOSIS — L89.214: ICD-10-CM

## 2024-01-23 DIAGNOSIS — G82.50 QUADRIPLEGIA: ICD-10-CM

## 2024-01-23 DIAGNOSIS — L89.154 PRESSURE INJURY OF SACRAL REGION, STAGE 4: Primary | ICD-10-CM

## 2024-01-23 DIAGNOSIS — S71.101D OPEN WOUND OF RIGHT HIP AND THIGH WITH TENDON INVOLVEMENT, SUBSEQUENT ENCOUNTER: ICD-10-CM

## 2024-01-23 DIAGNOSIS — S71.001D OPEN WOUND OF RIGHT HIP AND THIGH WITH TENDON INVOLVEMENT, SUBSEQUENT ENCOUNTER: ICD-10-CM

## 2024-01-23 DIAGNOSIS — S71.001D OPEN WOUND OF HIP WITH TENDON INVOLVEMENT, RIGHT, SUBSEQUENT ENCOUNTER: ICD-10-CM

## 2024-01-23 DIAGNOSIS — S76.901D OPEN WOUND OF RIGHT HIP AND THIGH WITH TENDON INVOLVEMENT, SUBSEQUENT ENCOUNTER: ICD-10-CM

## 2024-01-23 PROCEDURE — 27000999 HC MEDICAL RECORD PHOTO DOCUMENTATION

## 2024-01-23 PROCEDURE — 97597 DBRDMT OPN WND 1ST 20 CM/<: CPT

## 2024-01-23 PROCEDURE — 99213 OFFICE O/P EST LOW 20 MIN: CPT | Mod: 25

## 2024-01-23 PROCEDURE — 15002 WOUND PREP TRK/ARM/LEG: CPT

## 2024-01-23 RX ORDER — MUPIROCIN 20 MG/G
OINTMENT TOPICAL DAILY
Qty: 30 G | Refills: 1 | Status: SHIPPED | OUTPATIENT
Start: 2024-01-23

## 2024-01-23 RX ORDER — TESTOSTERONE 20.25 MG/1.25G
GEL TOPICAL
COMMUNITY
Start: 2023-12-21 | End: 2024-04-02

## 2024-01-23 NOTE — PROCEDURES
"Debridement    Date/Time: 1/23/2024 11:04 AM    Performed by: Ashley Coto NP  Authorized by: Ashley Coto NP    Time out: Immediately prior to procedure a "time out" was called to verify the correct patient, procedure, equipment, support staff and site/side marked as required.    Consent Done?:  Yes (Verbal)    Preparation: Patient was prepped and draped with clean technique    Local anesthesia used?: No      Wound Details:    Location:  Right hip    Type of Debridement:  Non-excisional       Length (cm):  1.3       Area (sq cm):  1.56       Width (cm):  1.2       Percent Debrided (%):  100       Depth (cm):  0.6       Total Area Debrided (sq cm):  1.56    Depth of debridement:  Epidermis/Dermis    Devitalized tissue debrided:  Biofilm, Exudate, Fibrin and Slough    Instruments:  Ultrasound Probe (curette probe)  Bleeding:  Minimal  Hemostasis Achieved: Yes  Method Used:  Pressure    2nd Wound Details:     Debridement - 2nd Wound - General Location: sacrum.    Type of Debridement:  Non-excisional       Length (cm):  0.5       Area (sq cm):  0.15       Width (cm):  0.3       Percent Debrided (%):  100       Depth (cm):  0.4       Total Area Debrided (sq cm):  0.15    Depth of debridement:  Epidermis/Dermis    Devitalized tissue debrided:  Biofilm, Callus, Exudate, Fibrin and Slough  Bleeding:  None  Patient tolerance:  Patient tolerated the procedure well with no immediate complications    "
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
## 2024-01-24 ENCOUNTER — DOCUMENT SCAN (OUTPATIENT)
Dept: HOME HEALTH SERVICES | Facility: HOSPITAL | Age: 44
End: 2024-01-24
Payer: MEDICARE

## 2024-01-30 ENCOUNTER — HOSPITAL ENCOUNTER (OUTPATIENT)
Dept: WOUND CARE | Facility: HOSPITAL | Age: 44
Discharge: HOME | End: 2024-01-30
Attending: FAMILY MEDICINE
Payer: MEDICARE

## 2024-01-30 VITALS
HEIGHT: 70 IN | WEIGHT: 134 LBS | SYSTOLIC BLOOD PRESSURE: 126 MMHG | DIASTOLIC BLOOD PRESSURE: 60 MMHG | BODY MASS INDEX: 19.18 KG/M2 | RESPIRATION RATE: 18 BRPM | HEART RATE: 85 BPM

## 2024-01-30 DIAGNOSIS — L89.154 PRESSURE INJURY OF SACRAL REGION, STAGE 4: Primary | ICD-10-CM

## 2024-01-30 DIAGNOSIS — L89.214: ICD-10-CM

## 2024-01-30 PROCEDURE — 99213 OFFICE O/P EST LOW 20 MIN: CPT

## 2024-01-30 PROCEDURE — 27000999 HC MEDICAL RECORD PHOTO DOCUMENTATION
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
## 2024-02-06 ENCOUNTER — HOSPITAL ENCOUNTER (OUTPATIENT)
Dept: WOUND CARE | Facility: HOSPITAL | Age: 44
Discharge: HOME | End: 2024-02-06
Attending: NURSE PRACTITIONER
Payer: MEDICARE

## 2024-02-06 VITALS
WEIGHT: 134 LBS | SYSTOLIC BLOOD PRESSURE: 146 MMHG | DIASTOLIC BLOOD PRESSURE: 99 MMHG | BODY MASS INDEX: 19.18 KG/M2 | HEART RATE: 60 BPM | RESPIRATION RATE: 18 BRPM | HEIGHT: 70 IN

## 2024-02-06 DIAGNOSIS — G82.50 QUADRIPLEGIA: ICD-10-CM

## 2024-02-06 DIAGNOSIS — S71.001D OPEN WOUND OF RIGHT HIP AND THIGH WITH TENDON INVOLVEMENT, SUBSEQUENT ENCOUNTER: ICD-10-CM

## 2024-02-06 DIAGNOSIS — L89.214: ICD-10-CM

## 2024-02-06 DIAGNOSIS — S76.001D OPEN WOUND OF RIGHT HIP AND THIGH WITH TENDON INVOLVEMENT, SUBSEQUENT ENCOUNTER: ICD-10-CM

## 2024-02-06 DIAGNOSIS — S71.101D OPEN WOUND OF RIGHT HIP AND THIGH WITH TENDON INVOLVEMENT, SUBSEQUENT ENCOUNTER: ICD-10-CM

## 2024-02-06 DIAGNOSIS — L89.154 PRESSURE INJURY OF SACRAL REGION, STAGE 4: Primary | ICD-10-CM

## 2024-02-06 DIAGNOSIS — S76.901D OPEN WOUND OF RIGHT HIP AND THIGH WITH TENDON INVOLVEMENT, SUBSEQUENT ENCOUNTER: ICD-10-CM

## 2024-02-06 PROCEDURE — 99213 OFFICE O/P EST LOW 20 MIN: CPT | Mod: 25

## 2024-02-06 PROCEDURE — 27000999 HC MEDICAL RECORD PHOTO DOCUMENTATION

## 2024-02-06 PROCEDURE — 15271 SKIN SUB GRAFT TRNK/ARM/LEG: CPT
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
## 2024-02-06 NOTE — PROCEDURES
Skin substitute    Date/Time: 2/6/2024 11:00 AM    Performed by: Ashley Coto NP  Authorized by: Ashley Coto NP    Consent:     Consent obtained:  Verbal and written    Consent given by:  Patient  Procedure details:     Location:  trunk/arms/legs    Total wound surface area (cm^2):  2    Product applied:  PuraPly    Product lot #:  [id number]    Product expiration:  2/19/2026    Amount used (cm^2):  4    Amount wasted (cm^2):  0    Secured: Yes      Secured with:  Adaptic  Dressing:     Dressing applied:  Adaptic dressing and Steri-Strips (Medipore tape)    Wrapped with:  Conform bandage 4 inch  Post-procedure details:     Patient tolerance of procedure:  Tolerated well, no immediate complications

## 2024-02-07 NOTE — ADDENDUM NOTE
Encounter addended by: Jeanette Sloan RN on: 2/7/2024 12:56 PM   Actions taken: Charge Capture section accepted

## 2024-02-12 ENCOUNTER — HOSPITAL ENCOUNTER (OUTPATIENT)
Dept: WOUND CARE | Facility: HOSPITAL | Age: 44
Discharge: HOME | End: 2024-02-12
Attending: NURSE PRACTITIONER
Payer: MEDICARE

## 2024-02-12 VITALS
WEIGHT: 134 LBS | SYSTOLIC BLOOD PRESSURE: 116 MMHG | DIASTOLIC BLOOD PRESSURE: 85 MMHG | HEIGHT: 70 IN | BODY MASS INDEX: 19.18 KG/M2 | HEART RATE: 86 BPM | RESPIRATION RATE: 18 BRPM

## 2024-02-12 DIAGNOSIS — L89.154 PRESSURE INJURY OF SACRAL REGION, STAGE 4: Primary | ICD-10-CM

## 2024-02-12 DIAGNOSIS — S76.001D OPEN WOUND OF HIP WITH TENDON INVOLVEMENT, RIGHT, SUBSEQUENT ENCOUNTER: ICD-10-CM

## 2024-02-12 DIAGNOSIS — S71.001D OPEN WOUND OF HIP WITH TENDON INVOLVEMENT, RIGHT, SUBSEQUENT ENCOUNTER: ICD-10-CM

## 2024-02-12 DIAGNOSIS — S76.901D OPEN WOUND OF RIGHT HIP AND THIGH WITH TENDON INVOLVEMENT, SUBSEQUENT ENCOUNTER: ICD-10-CM

## 2024-02-12 DIAGNOSIS — S71.001D OPEN WOUND OF RIGHT HIP AND THIGH WITH TENDON INVOLVEMENT, SUBSEQUENT ENCOUNTER: ICD-10-CM

## 2024-02-12 DIAGNOSIS — G82.50 QUADRIPLEGIA: ICD-10-CM

## 2024-02-12 DIAGNOSIS — S71.101D OPEN WOUND OF RIGHT HIP AND THIGH WITH TENDON INVOLVEMENT, SUBSEQUENT ENCOUNTER: ICD-10-CM

## 2024-02-12 DIAGNOSIS — S76.001D OPEN WOUND OF RIGHT HIP AND THIGH WITH TENDON INVOLVEMENT, SUBSEQUENT ENCOUNTER: ICD-10-CM

## 2024-02-12 PROCEDURE — 27000999 HC MEDICAL RECORD PHOTO DOCUMENTATION

## 2024-02-12 PROCEDURE — 99212 OFFICE O/P EST SF 10 MIN: CPT | Mod: 25

## 2024-02-12 PROCEDURE — 15271 SKIN SUB GRAFT TRNK/ARM/LEG: CPT

## 2024-02-12 PROCEDURE — 97597 DBRDMT OPN WND 1ST 20 CM/<: CPT
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
## 2024-02-12 NOTE — PROCEDURES
"Debridement    Date/Time: 2/12/2024 12:47 PM    Performed by: Ashley Coto NP  Authorized by: Ashley Coto NP    Time out: Immediately prior to procedure a "time out" was called to verify the correct patient, procedure, equipment, support staff and site/side marked as required.    Consent Done?:  Yes (Verbal)    Preparation: Patient was prepped and draped with clean technique    Local anesthesia used?: No      Wound Details:    Location:  Right hip    Type of Debridement:  Excisional       Length (cm):  1.5       Area (sq cm):  1.05       Width (cm):  0.7       Percent Debrided (%):  100       Depth (cm):  0.6       Total Area Debrided (sq cm):  1.05    Depth of debridement:  Muscle/fascia/tendon    Tissue debrided:  Ligament    Devitalized tissue debrided:  Biofilm, Exudate and Fibrin    Instruments:  Curette  Bleeding:  None    2nd Wound Details:     Debridement - 2nd Wound - General Location: Sacrum.    Type of Debridement:  Non-excisional       Length (cm):  0.4       Area (sq cm):  0.12       Width (cm):  0.3       Percent Debrided (%):  100       Depth (cm):  0.3       Total Area Debrided (sq cm):  0.12    Depth of debridement:  Epidermis/Dermis    Devitalized tissue debrided:  Biofilm, Exudate and Fibrin    Instruments:  Curette  Bleeding:  Minimal  Hemostasis Achieved: Yes    Method Used:  Pressure  Patient tolerance:  Patient tolerated the procedure well with no immediate complications  Skin substitute    Date/Time: 2/12/2024 12:47 PM    Performed by: Ashley Coto NP  Authorized by: Ashley Coto NP    Consent:     Consent obtained:  Verbal and written    Consent given by:  Patient  Anesthesia (see MAR for exact dosages):     Anesthesia method:  None  Procedure details:     Location:  trunk/arms/legs    Total wound surface area (cm^2):  1    Product applied:  PuraPly    Product lot #:  [id number]    Product expiration:  2/26/2026    Amount used (cm^2):  4    Amount wasted (cm^2):  " 0    Secured with:  Benzoin  Dressing:     Dressing applied:  Adaptic dressing (Medipore)    Wrapped with:  Conform bandage 2 inch  Post-procedure details:     Patient tolerance of procedure:  Tolerated well, no immediate complications

## 2024-02-20 ENCOUNTER — HOSPITAL ENCOUNTER (OUTPATIENT)
Dept: WOUND CARE | Facility: HOSPITAL | Age: 44
Discharge: HOME | End: 2024-02-20
Attending: NURSE PRACTITIONER
Payer: MEDICARE

## 2024-02-20 VITALS
HEART RATE: 84 BPM | SYSTOLIC BLOOD PRESSURE: 98 MMHG | RESPIRATION RATE: 18 BRPM | WEIGHT: 134 LBS | HEIGHT: 70 IN | BODY MASS INDEX: 19.18 KG/M2 | DIASTOLIC BLOOD PRESSURE: 63 MMHG

## 2024-02-20 DIAGNOSIS — G82.50 QUADRIPLEGIA: ICD-10-CM

## 2024-02-20 DIAGNOSIS — S76.001D OPEN WOUND OF RIGHT HIP AND THIGH WITH TENDON INVOLVEMENT, SUBSEQUENT ENCOUNTER: ICD-10-CM

## 2024-02-20 DIAGNOSIS — L89.154 PRESSURE INJURY OF SACRAL REGION, STAGE 4: Primary | ICD-10-CM

## 2024-02-20 DIAGNOSIS — S76.001D OPEN WOUND OF HIP WITH TENDON INVOLVEMENT, RIGHT, SUBSEQUENT ENCOUNTER: ICD-10-CM

## 2024-02-20 DIAGNOSIS — S71.001D OPEN WOUND OF HIP WITH TENDON INVOLVEMENT, RIGHT, SUBSEQUENT ENCOUNTER: ICD-10-CM

## 2024-02-20 DIAGNOSIS — S71.101D OPEN WOUND OF RIGHT HIP AND THIGH WITH TENDON INVOLVEMENT, SUBSEQUENT ENCOUNTER: ICD-10-CM

## 2024-02-20 DIAGNOSIS — S76.901D OPEN WOUND OF RIGHT HIP AND THIGH WITH TENDON INVOLVEMENT, SUBSEQUENT ENCOUNTER: ICD-10-CM

## 2024-02-20 DIAGNOSIS — S71.001D OPEN WOUND OF RIGHT HIP AND THIGH WITH TENDON INVOLVEMENT, SUBSEQUENT ENCOUNTER: ICD-10-CM

## 2024-02-20 PROCEDURE — 99212 OFFICE O/P EST SF 10 MIN: CPT | Mod: 25

## 2024-02-20 PROCEDURE — 97597 DBRDMT OPN WND 1ST 20 CM/<: CPT

## 2024-02-20 PROCEDURE — 27000999 HC MEDICAL RECORD PHOTO DOCUMENTATION

## 2024-02-20 PROCEDURE — 15271 SKIN SUB GRAFT TRNK/ARM/LEG: CPT
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
## 2024-02-20 NOTE — PROCEDURES
Skin substitute    Date/Time: 2/20/2024 1:36 PM    Performed by: Ashley Coto NP  Authorized by: Ashley Coto NP    Consent:     Consent obtained:  Verbal and written    Consent given by:  Patient  Procedure details:     Product applied:  Sonarworks    Product lot #:  [id number]    Product expiration:  7/24/2028    Amount used (cm^2):  2    Secured: Yes      Secured with:  Benzoin  Dressing:     Dressing applied:  Adaptic dressing    Wrapped with:  Coban 2 inch  Post-procedure details:     Patient tolerance of procedure:  Tolerated well, no immediate complications

## 2024-02-27 ENCOUNTER — HOSPITAL ENCOUNTER (OUTPATIENT)
Dept: WOUND CARE | Facility: HOSPITAL | Age: 44
Discharge: HOME | End: 2024-02-27
Attending: NURSE PRACTITIONER
Payer: MEDICARE

## 2024-02-27 VITALS
HEART RATE: 68 BPM | DIASTOLIC BLOOD PRESSURE: 79 MMHG | WEIGHT: 134 LBS | BODY MASS INDEX: 19.18 KG/M2 | SYSTOLIC BLOOD PRESSURE: 137 MMHG | RESPIRATION RATE: 18 BRPM | HEIGHT: 70 IN

## 2024-02-27 DIAGNOSIS — G82.50 QUADRIPLEGIA: ICD-10-CM

## 2024-02-27 DIAGNOSIS — S71.001D OPEN WOUND OF HIP WITH TENDON INVOLVEMENT, RIGHT, SUBSEQUENT ENCOUNTER: ICD-10-CM

## 2024-02-27 DIAGNOSIS — L89.154 PRESSURE INJURY OF SACRAL REGION, STAGE 4: ICD-10-CM

## 2024-02-27 DIAGNOSIS — L89.44 PRESSURE INJURY OF CONTIGUOUS REGION INVOLVING BACK AND RIGHT HIP, STAGE 4: Primary | ICD-10-CM

## 2024-02-27 DIAGNOSIS — S76.001D OPEN WOUND OF HIP WITH TENDON INVOLVEMENT, RIGHT, SUBSEQUENT ENCOUNTER: ICD-10-CM

## 2024-02-27 PROCEDURE — 27000999 HC MEDICAL RECORD PHOTO DOCUMENTATION

## 2024-02-27 PROCEDURE — 15271 SKIN SUB GRAFT TRNK/ARM/LEG: CPT

## 2024-02-27 PROCEDURE — 99212 OFFICE O/P EST SF 10 MIN: CPT

## 2024-02-27 PROCEDURE — 97597 DBRDMT OPN WND 1ST 20 CM/<: CPT

## 2024-02-27 NOTE — PROCEDURES
"Skin substitute    Date/Time: 2/27/2024 10:20 AM    Performed by: Ashley Coto NP  Authorized by: Ashley Coto NP    Consent:     Consent obtained:  Verbal    Consent given by:  Patient  Procedure details:     Product applied:  Chelly    Product lot #:  [id number]    Product expiration:  11/12/2028    Amount used (cm^2):  2    Secured: Yes      Secured with:  Mastisol  Dressing:     Dressing applied:  Adaptic dressing and Steri-Strips    Wrapped with:  Conform bandage 2 inch  Debridement    Date/Time: 2/27/2024 10:20 AM    Performed by: Ashley Coto NP  Authorized by: Ashley Coto NP    Time out: Immediately prior to procedure a "time out" was called to verify the correct patient, procedure, equipment, support staff and site/side marked as required.    Consent Done?:  Yes (Verbal)    Preparation: Patient was prepped and draped with clean technique    Local anesthesia used?: No      Wound Details:    Location:  Right elbow    Type of Debridement:  Non-excisional       Length (cm):  1.5       Area (sq cm):  2.7       Width (cm):  1.8       Percent Debrided (%):  100       Depth (cm):  0.2       Total Area Debrided (sq cm):  2.7    Depth of debridement:  Epidermis/Dermis    Devitalized tissue debrided:  Biofilm, Callus, Exudate and Fibrin    Instruments:  Curette (Dermal)  Bleeding:  Minimal  Hemostasis Achieved: Yes  Method Used:  Pressure  Patient tolerance:  Patient tolerated the procedure well with no immediate complications    "

## 2024-02-27 NOTE — PROGRESS NOTES
Referred by: Dr. Sidhu  Low air loss mattress [x] Yes [] No   Is the patient eligible for a graft, and/or currently grafting?  [x] Yes [] No  (right hip - nushield)     Subjective:         Patient ID: Werner Jarrett is a 43 y.o. male.    Chief Complaint: Pressure Ulcer (Sacrum - stage 4 /Right hip - stage 4 /Right elbow - stage 3 (NEW) )    2/27/24 - Mr. Jarrett returns to clinic today for followup on 3 areas of concern.  The pressure injury remains stable with no significant change.  He has a new wound to the right elbow.  This has developed due to dryness and cracking which has opened into a wound.  The elbow was selectively debrided using a dermal curette.  We will begin applying MediHoney to the elbow, covering with 4 x 4 gauze, Cover and with a Tubigrip with the hopes of softening this callused tissue further.  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  We are currently grafting using NuShield grafts.  Today we applied graft #4 after the wound margins were crosshatched to try to allow for growth epithelial tissue.  Again this week, there was no significant change in that wound.  Ligament is still fully exposed.  Graft #5 is being ordered today.    2/20/24 - the patient returns to clinic today for followup on a small pressure injury to the sacrum as well as a large stage IV pressure injury to the right hip.  We are currently going through the grafting process.  Today we applied graft #3, which is the 1st of the NuShield grafts.  So far we have not seen any significant change in the wound and the ligament is still fully exposed.  A # 15 scalpel was used to cross keen the wound margin due to rolling of that margin in the need to stimulate the growth of epithelial tissue as well as granular tissue.  The sacrum remains stable and basically the same size.      2/12/24 - Mr. Jarrett is seen today in follow up of two wounds, both of which remain stable.  The left hip is being grafted, and today  PuraPly #2 was applied.  There is no significant change since graft #1 was applied.  Today graft #3 was ordered.  We will change from PuraPly to Epifix at this time.  The sacrum remains stable.  We have applied Endoform into that wound and it will be treated the same as a graft, with the hope that the patient does not have BMs which necessitate removal of the dressing. The patient is eligible and is in need of a new power wheelchair.      2/6/24 - Mr. Jarrett returns to clinic today with 2 wounds.  The very small stage IV pressure injury at the sacrum remains stable.  Today, the wound at the right hip, stage IV pressure injury, will receive the 1st graft application.  We are applying PuraPly grafts at this time and then we will change to NuShield after two PuraPly.  Of note, he is waiting on a demo to be shown to him for a possible new wheelchair.  PuraPly #2 ordered.    January 30, 2024:  43-year-old white male, with paraplegia, is here for follow up of the right hip pressure injury, and the sacral pressure injury.  The wounds are looking similar to the last visit.  He is getting ready to have skin substitute applied to the right hip wound.  There is white tissue in the wound bed, presumably tendon.  I do not see pink granulation tissue.  He has no longer using topical antibiotics.    1/23/24 - Mr. Jarrett returns today for followup on 2 wounds.  Today the sacral pressure injury remains stable and clean.  We have been using topical antibiotics on both of these wounds for several weeks.  That has been discontinued today.  The pressure injury at the right hip was selectively debrided today using the ultrasonic debrider and graft prep was performed.  We will begin applying a small amount of mupirocin ointment on to this wound daily in preparation beginning the grafting process next week.  We will be use PuraPly/NuShield grafts.  Of note, the patient is responsible for contacting aleida at the [employer] [facility] [employer] to have his  "measurements taken.  He has not done that as of today.    December 26, 2023:  43-year-old white male, with paraplegia, is here for follow up of a right hip pressure injury, and a sacral pressure injury.  He has been using topical antibiotics.  The measurements are about the same as last visit.  The nurse practitioner has discussed a possible skin substitute on the right hip.    12/11/23 - Mr. Jarrett for followup on the pressure ulcers to the right hip and sacrum.  We started using topical antibiotics on both wounds on 11/21/2023.  Additionally, the patient has been using the vinegar washes for sometime.  He was instructed to discontinue the use of the vinegar washes today and use only the topical antibiotics.  He has been approved for grafts (Darwin and Chelly).  We will plan to do graft prep in two weeks (once abx are complete) and then begin grafting.    Of note, we discussed today the need for proper support in his wheelchair to offload the pressure.  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  Also, he does have a low air loss mattress, but that mattress is on top of a "regular" mattress and he reports springs are protruding.  He needs a proper bedframe for the low airloss mattress.     12/4/23 - Mr. Jarrett returns for followup on 2 wounds.  He continues to use topical abx on both wounds.  The right hip continues to be concerning.  The tensor fascia ronny is till exposed in the wound bed.  We are mixing the topical abx with basal gel to prevent drying.  The sacral wound is stable but has had a slight increase in size.  Abx are being used on this wound as well. We are attempting to authorize grafts on the right hip wound.    11/27/23 - the patient returns today for followup on pressure injuries to the right hip and the sacrum.  He has received his topical antibiotics (Linezolid/Gentamincin) " and started using the antibiotics on Saturday, 11/25/23.  He did not receive basal gel with that order so we have contacted [facility] to send the patient that product as the antibiotics are very drying and they are being applied directly to his tendon.  He must have the moisture of the basal gel.  In the time being, we will mix his topical antibiotics with mupirocin antibiotic.  Today both wounds remains stable.  He was instructed to use the antibiotics on both wounds to avoid cross contamination.    11/20/23 - Mr. Jarrett returns for f/up on the wound to the right hip and the sacrum.  At his last visit the right hip was cultured and it was positive to have Pseudomonas.  He has been using Vinegar washes since 11/06/2023.  He does have a sensitivity to levofloxacin and so can not take that medication.  We have requested a topical recommendation and he is being prescribed linezolid/gentamicin to be applied directly into the wound.  If we do not see improvement from that topical medication we will be moving forward with an IV antibiotic.  He does have exposed ligament (possibly ischiofemoral ligament) in this wound.  The patient does still have a mediport so that would be the next best solution for him due to his sensitivity.  The wound at the sacrum remains stable and we are currently using silver alginate on both wounds.    11/6/23 - the patient seen today for followup on the right hip and sacral pressure wounds.  Although the sacrum is stable, the right hip does continue to deteriorate.  The right hip was cultured today.  We have been using Endoform and/or collagen on these wounds and unfortunately these products or causing excessive moisture and deterioration to the wounds.  We will discontinue both products and use only silver alginate for the time being.  The right hip does have tendon exposed at this time.  The patient was reminded and encouraged to continue to offload both the right hip in the  sacrum which he reports that he is wearing.  However, on the deterioration of the wounds it is concerning.    10/30/23 - Mr. Jarrett to clinic today for followup on the wound to the sacrum as well as the right hip.  Today, both have deteriorated.  We have been using collagen to the wound beds and it appears is though they are stain to wet.  There is still tendon present in the wound bed of the right hip.  We will discontinue the collagen and begin application Endoform to both wounds, changing every other day, covered with silver alginate.  He was reminded to offload as much as possible to prevent further deterioration.  Of note, reported that is blood pressure has been low, and it was very low today, 86/54.  He states that he noticed it last week that he has stopped taking Tylenol, aspirin, and probiotics.  We discussed today that it is unlikely that any of these medications would impact blood pressure but he prefers to remain off of them.    10/16/23 - The patient returns today for followup on the pressure injury to the right hip as well as the sacrum.  Today, there is now tendon exposed in the wound bed the right hip pressure injury.  We discussed today that it is imperative that the patient offload to allow this wound to heal so that he does not develop osteomyelitis in this hip as well.  The pressure injury to the sacrum has also deteriorated.  We discussed that now that the amputation to the left hip has fully healed, he must take the opportunity to begin offloading that right hip to allow it to heal.  We will begin applying collagen every other day to both wound beds.    10/9/23 The patient returns today for the open wound to the right hip and sacrum.  The surgical incision to the left hip is now resolved and will no longer be followed.  Both the right hip and sacrum are wounds that have reopened.  We will begin application of collagen every third day to both wounds.  Neither wound has any signs and symptoms of  infection.  He will return in two weeks. Of note, the patient reports that he has had diarrhea for several days, up to about two weeks.  He has had no treatment and is currently using only probiotics.  He has been prescribed imodium and advised to use that medication no more than 4 days.  He is to contact his PCP if the diarrhea does not resolve within that time period.     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 Sandy Owusu on 8/21/23.  This wound has progress except personally well and is remaining closed.  The patient is using only iodine along the surgical incision line.  He does have a follow up with his surgeon tomorrow, 09/26/2023.  He was advised today that he can begin application coconut oil with vitamin C along the closed incision line which is fully approximated.  We will follow this incision line for one additional visit then d/c if no issues develop.    He has had a long term wound at the sacrum which today is open again.  It was mechanically debrided.  Endoform as applied to the wound bed, which will be left in place until Friday at which time he will change to silver alginate.     Review of Systems   Constitutional: Negative.    Respiratory: Negative.     Cardiovascular: Negative.    Skin:         As documented in the HPI.   All other systems reviewed and are negative.        Objective:      Vitals:    02/27/24 1158   BP: 137/79   Pulse: 68   Resp: 18     Physical Exam  Vitals and nursing note reviewed. Exam conducted with a chaperone present.   Constitutional:       Appearance: Normal appearance.   Cardiovascular:      Rate and Rhythm: Normal rate.   Pulmonary:      Effort: Pulmonary effort is normal.   Skin:     General: Skin is warm and dry.      Comments: sacral pressure injury, right hip, trochanteric, pressure injury   Neurological:      Mental Status: He is alert.            Altered Skin Integrity 09/25/23 1153 Sacral spine #1 (Active)    09/25/23 1153   Altered Skin Integrity Present on Admission - Did Patient arrive to the hospital with altered skin?: yes   Side:    Orientation:    Location: Sacral spine   Wound Number: #1   Is this injury device related?:    Primary Wound Type:    Description of Altered Skin Integrity:    Ankle-Brachial Index:    Pulses:    Removal Indication and Assessment:    Wound Outcome:    (Retired) Wound Length (cm):    (Retired) Wound Width (cm):    (Retired) Depth (cm):    Wound Description (Comments):    Removal Indications:    Dressing Appearance Dried drainage 02/27/24 1200   Drainage Amount Moderate 02/27/24 1200   Drainage Characteristics/Odor Serosanguineous 02/27/24 1200   Appearance Pink 02/27/24 1200   Tissue loss description Full thickness 02/27/24 1200   Periwound Area Intact 02/27/24 1200   Wound Edges Open 02/27/24 1200   Wound Length (cm) 0.5 cm 02/27/24 1200   Wound Width (cm) 0.5 cm 02/27/24 1200   Wound Depth (cm) 0.4 cm 02/27/24 1200   Wound Volume (cm^3) 0.1 cm^3 02/27/24 1200   Wound Surface Area (cm^2) 0.25 cm^2 02/27/24 1200   Care Cleansed with:;Wound cleanser 02/27/24 1200   Dressing Applied 02/27/24 1311   Dressing Change Due 02/28/24 02/27/24 1311            Altered Skin Integrity 10/09/23 1141 Right Hip #2 (Active)   10/09/23 1141   Altered Skin Integrity Present on Admission - Did Patient arrive to the hospital with altered skin?: yes   Side: Right   Orientation:    Location: Hip   Wound Number: #2   Is this injury device related?: No   Primary Wound Type:    Description of Altered Skin Integrity:    Ankle-Brachial Index:    Pulses:    Removal Indication and Assessment:    Wound Outcome:    (Retired) Wound Length (cm):    (Retired) Wound Width (cm):    (Retired) Depth (cm):    Wound Description (Comments):    Removal Indications:    Dressing Appearance Moist drainage 02/27/24 1200   Drainage Amount Moderate 02/27/24 1200   Drainage Characteristics/Odor Serosanguineous 02/27/24 1200    Appearance Pink;Moist 02/27/24 1200   Tissue loss description Full thickness 02/27/24 1200   Periwound Area Intact 02/27/24 1200   Wound Edges Open 02/27/24 1200   Wound Length (cm) 1.7 cm 02/27/24 1200   Wound Width (cm) 1.3 cm 02/27/24 1200   Wound Depth (cm) 0.8 cm 02/27/24 1200   Wound Volume (cm^3) 1.768 cm^3 02/27/24 1200   Wound Surface Area (cm^2) 2.21 cm^2 02/27/24 1200   Care Cleansed with:;Wound cleanser 02/27/24 1200   Dressing Applied 02/27/24 1311   Dressing Change Due 02/28/24 02/27/24 1311            Altered Skin Integrity 02/27/24 1311 Right Elbow #3 (Active)   02/27/24 1311   Altered Skin Integrity Present on Admission - Did Patient arrive to the hospital with altered skin?: yes   Side: Right   Orientation:    Location: Elbow   Wound Number: #3   Is this injury device related?: No   Primary Wound Type:    Description of Altered Skin Integrity:    Ankle-Brachial Index:    Pulses:    Removal Indication and Assessment:    Wound Outcome:    (Retired) Wound Length (cm):    (Retired) Wound Width (cm):    (Retired) Depth (cm):    Wound Description (Comments):    Removal Indications:    Dressing Appearance Moist drainage 02/27/24 1311   Drainage Amount Moderate 02/27/24 1311   Drainage Characteristics/Odor Serosanguineous 02/27/24 1311   Appearance Red;Moist 02/27/24 1311   Tissue loss description Full thickness 02/27/24 1311   Periwound Area Intact 02/27/24 1311   Wound Edges Open 02/27/24 1311   Wound Length (cm) 1.5 cm 02/27/24 1311   Wound Width (cm) 1.8 cm 02/27/24 1311   Wound Depth (cm) 0.2 cm 02/27/24 1311   Wound Volume (cm^3) 0.54 cm^3 02/27/24 1311   Wound Surface Area (cm^2) 2.7 cm^2 02/27/24 1311   Care Cleansed with:;Wound cleanser 02/27/24 1311   Dressing Applied 02/27/24 1311   Dressing Change Due 02/28/24 02/27/24 1311     02/27/24      Right hip - pre aden.                                        Right hip - post aden.   (Nushield, mastisol, versatel, medipore tape, silver alginate, 4x4  "gauze, gentle border)     Sacrum    (silver alginate, 4x4 gauze, gentle border)       Right elbow - pre aden.     Post debridement  (Medihoney, 4x4 gauze, tape, tubigrip)   ML  Assessment:           ICD-10-CM ICD-9-CM   1. 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 Pressure injury of sacral region, stage 4  L89.154 707.03     707.24   3. Open wound of hip with tendon involvement, right, subsequent encounter  S71.001D V58.89    S76.001D 890.2   4. Quadriplegia  G82.50 344.00         Procedures:     Skin substitute     Date/Time: 2/27/2024 10:20 AM     Performed by: Ashley Coto NP  Authorized by: Ashley Coto NP    Consent:     Consent obtained:  Verbal    Consent given by:  Patient  Procedure details:     Product applied:  MAG Interactive    Product lot #:  [id number]    Product expiration:  11/12/2028    Amount used (cm^2):  2    Secured: Yes      Secured with:  Mastisol  Dressing:     Dressing applied:  Adaptic dressing and Steri-Strips    Wrapped with:  Conform bandage 2 inch  Debridement     Date/Time: 2/27/2024 10:20 AM     Performed by: Ashley Coto NP  Authorized by: Ashley Coto NP    Time out: Immediately prior to procedure a "time out" was called to verify the correct patient, procedure, equipment, support staff and site/side marked as required.     Consent Done?:  Yes (Verbal)     Preparation: Patient was prepped and draped with clean technique    Local anesthesia used?: No       Wound Details:    Location:  Right elbow    Type of Debridement:  Non-excisional       Length (cm):  1.5       Area (sq cm):  2.7       Width (cm):  1.8       Percent Debrided (%):  100       Depth (cm):  0.2       Total Area Debrided (sq cm):  2.7    Depth of debridement:  Epidermis/Dermis    Devitalized tissue debrided:  Biofilm, Callus, Exudate and Fibrin    Instruments:  Curette (Dermal)  Bleeding:  Minimal  Hemostasis Achieved: Yes  Method Used:  " Pressure  Patient tolerance:  Patient tolerated the procedure well with no immediate complications       [] Yes [] No   I & D performed  [] Yes [] No   Excisional debridement performed  [x] Yes [] No   Selective debridement performed           [] Yes [] No   Mechanical debridement performed         [] Yes [] No  Silver nitrate applied                                     [] Yes [] No  Labs ordered this visit                                  [] Yes [] No   Imaging ordered this visit                           [] Yes [] No   Tissue pathology and/or culture taken           MEDICATIONS    Current Outpatient Medications:     acetaminophen (TYLENOL) 325 MG tablet, Take 650 mg by mouth every 6 (six) hours as needed for Pain., Disp: , Rfl:     ascorbic acid, vitamin C, (VITAMIN C) 1000 MG tablet, Take 1,000 mg by mouth every evening., Disp: , Rfl:     aspirin 325 MG tablet, 1 tablet Orally Once a day for 30 days, Disp: , Rfl:     baclofen (LIORESAL) 5 mg Tab tablet, Take 1 tablet (5 mg total) by mouth 3 (three) times daily., Disp: 90 tablet, Rfl: 0    bisacodyL (DULCOLAX) 10 mg Supp, Place 10 mg rectally daily as needed., Disp: , Rfl:     busPIRone (BUSPAR) 10 MG tablet, Take 1 tablet (10 mg total) by mouth 2 (two) times daily., Disp: 60 tablet, Rfl: 5    cetirizine (ZYRTEC) 10 MG tablet, Take 10 mg by mouth 2 (two) times a day., Disp: , Rfl:     cholecalciferol, vitamin D3, (DECARA) 1,250 mcg (50,000 unit) capsule, Take 1 capsule by mouth once daily., Disp: , Rfl:     cloNIDine (CATAPRES) 0.1 MG tablet, Take 0.1 mg by mouth daily as needed., Disp: , Rfl:     DULoxetine (CYMBALTA) 30 MG capsule, Take 1 capsule (30 mg total) by mouth 2 (two) times daily., Disp: 60 capsule, Rfl: 1    ferrous sulfate (SLOW RELEASE IRON) 142 mg (45 mg iron) TbSR, Take 324 mg by mouth once daily., Disp: , Rfl:     fexofenadine (ALLEGRA) 180 MG tablet, Take 180 mg by mouth every morning., Disp: , Rfl:     meloxicam (MOBIC) 7.5 MG tablet, Take 7.5  "mg by mouth 2 (two) times daily as needed for Pain., Disp: , Rfl:     multivitamin/iron/folic acid (CENTRUM ORAL), Take 1 tablet by mouth every morning., Disp: , Rfl:     NON FORMULARY MEDICATION, Take 3 drops by mouth 2 (two) times daily as needed. THC, Disp: , Rfl:     testosterone (ANDROGEL) 20.25 mg/1.25 gram (1.62 %) GlPm, 1 pump to skin in the morning to shoulder, upper arms or abdomen Transdermal Once a day for 30 days, Disp: , Rfl:     zinc gluconate 50 mg tablet, Take 1 tablet (50 mg total) by mouth once daily., Disp: 30 tablet, Rfl: 1    mupirocin (BACTROBAN) 2 % ointment, Apply topically once daily. (Patient not taking: Reported on 2/27/2024), Disp: 30 g, Rfl: 1   Review of patient's allergies indicates:   Allergen Reactions    Levofloxacin Rash       DIAGNOSTICS      Labs/Scans/Micro:    CBC:   Lab Results   Component Value Date    WBC 4.89 09/05/2023    HGB 10.1 (L) 09/05/2023    HCT 33.9 (L) 09/05/2023    MCV 80.0 09/05/2023     09/05/2023       Sedimentation rate:   Lab Results   Component Value Date    SEDRATE 22 (H) 12/15/2022    C-reactive protein:   Lab Results   Component Value Date    CRP 44.90 (H) 12/15/2022    Chemistry: [unfilled]  HBA1C: No components found for: "HBA1C"    Ankle Brachial Index: No results found for this or any previous visit.    Imaging:    Plain film: No results found for this or any previous visit.    MRI: No results found for this or any previous visit.    Bone Scan: No results found for this or any previous visit.    Vascular studies:    Microbiology: No results found for: "MICRO"        HOME HEALTH AGENCY: [facility]    TIMES PER WEEK/DAYS:    WOUND CARE ORDERS:  Sacrum: **Do NOT remove past versatel/medipore tape, Nushield graft in place** remove and reapply silver alginate, 4x4 gauze and gentle border  - to be changed daily.   Right hip: Cleanse with wound cleanser and apply silver alginate to the wound bed, cover with 4x4 gauze and a gentle " border - to be changed daily.   Right elbow: Cleanse with wound cleanser an dapply medihoney to the wound bed, cover with 4x4 gauze and wrap lightly in kerlix using tape to secure, apply tubigrip over - to be changed dily       Follow up in about 1 week (around 3/5/2024).

## 2024-03-05 ENCOUNTER — HOSPITAL ENCOUNTER (OUTPATIENT)
Dept: WOUND CARE | Facility: HOSPITAL | Age: 44
Discharge: HOME | End: 2024-03-05
Attending: NURSE PRACTITIONER
Payer: MEDICARE

## 2024-03-05 VITALS
DIASTOLIC BLOOD PRESSURE: 84 MMHG | WEIGHT: 134 LBS | SYSTOLIC BLOOD PRESSURE: 127 MMHG | HEIGHT: 70 IN | BODY MASS INDEX: 19.18 KG/M2 | HEART RATE: 70 BPM | RESPIRATION RATE: 16 BRPM

## 2024-03-05 DIAGNOSIS — S76.001D OPEN WOUND OF HIP WITH TENDON INVOLVEMENT, RIGHT, SUBSEQUENT ENCOUNTER: ICD-10-CM

## 2024-03-05 DIAGNOSIS — S71.001D OPEN WOUND OF HIP WITH TENDON INVOLVEMENT, RIGHT, SUBSEQUENT ENCOUNTER: ICD-10-CM

## 2024-03-05 DIAGNOSIS — L89.44 PRESSURE INJURY OF CONTIGUOUS REGION INVOLVING BACK AND RIGHT HIP, STAGE 4: Primary | ICD-10-CM

## 2024-03-05 DIAGNOSIS — G82.50 QUADRIPLEGIA: ICD-10-CM

## 2024-03-05 DIAGNOSIS — L89.154 PRESSURE INJURY OF SACRAL REGION, STAGE 4: ICD-10-CM

## 2024-03-05 PROCEDURE — 15271 SKIN SUB GRAFT TRNK/ARM/LEG: CPT

## 2024-03-05 PROCEDURE — 97597 DBRDMT OPN WND 1ST 20 CM/<: CPT

## 2024-03-05 PROCEDURE — 27000999 HC MEDICAL RECORD PHOTO DOCUMENTATION

## 2024-03-05 PROCEDURE — 99212 OFFICE O/P EST SF 10 MIN: CPT | Mod: 25

## 2024-03-05 NOTE — PROCEDURES
"Debridement    Date/Time: 3/5/2024 11:10 AM    Performed by: Ashley Coto NP  Authorized by: Ashley Coto NP    Time out: Immediately prior to procedure a "time out" was called to verify the correct patient, procedure, equipment, support staff and site/side marked as required.    Consent Done?:  Yes (Verbal)    Preparation: Patient was prepped and draped with clean technique    Local anesthesia used?: No      Wound Details:    Location:  Right elbow    Type of Debridement:  Non-excisional       Length (cm):  0.5       Area (sq cm):  0.2       Width (cm):  0.4       Percent Debrided (%):  100       Depth (cm):  0.2       Total Area Debrided (sq cm):  0.2    Depth of debridement:  Epidermis/Dermis    Devitalized tissue debrided:  Biofilm, Callus, Exudate and Fibrin    Instruments:  Curette (Dermal)  Bleeding:  None  Skin substitute    Date/Time: 3/5/2024 11:10 AM    Performed by: Ashley Coto NP  Authorized by: Ashley Coto NP    Consent:     Consent obtained:  Verbal and written    Consent given by:  Patient  Procedure details:     Product applied:  [email or web address]    Product lot #:  [id number]    Product expiration:  11/12/2028    Amount used (cm^2):  2    Secured: Yes      Secured with:  Adaptic  Dressing:     Dressing applied:  Adaptic dressing    Wrapped with:  Conform bandage 2 inch  Post-procedure details:     Patient tolerance of procedure:  Tolerated well, no immediate complications    "

## 2024-03-05 NOTE — PROGRESS NOTES
Referred by: Dr. Sidhu  Low air loss mattress [x] Yes [] No   Is the patient eligible for a graft, and/or currently grafting?  [x] Yes [] No  (right hip - nushield)     Subjective:         Patient ID: Werner Jarrett is a 43 y.o. male.    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 Jarrett is seen today for followup on 3 wounds.  The wound to the right elbow was selectively debrided and it does show considerable improvement.  The wound to the sacrum is stable.  We will begin application of moistened Endoform to both of these wounds to be changed on Friday to silver alginate.  The wound to the right hip is slightly moist with a little bit of drainage.  This is to be expected, however, due to the fact that we are grafting that wound.  There has been no significant change to the size of the wound, unfortunately, in spite of grafting multiple times.  We will continue the process with the hope of making some progress.    2/27/24 - Mr. Jarrett returns to clinic today for followup on 3 areas of concern.  The pressure injury remains stable with no significant change.  He has a new wound to the right elbow.  This has developed due to dryness and cracking which has opened into a wound.  The elbow was selectively debrided using a dermal curette.  We will begin applying MediHoney to the elbow, covering with 4 x 4 gauze, Cover and with a Tubigrip with the hopes of softening this callused tissue further.  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  We are currently grafting using NuShield grafts.  Today we applied graft #4 after the wound margins were crosshatched to try to allow for growth epithelial tissue.  Again this week, there was no significant change in that wound.  Ligament is still fully exposed.  Graft #5 is being ordered today.    2/20/24 - the patient returns to clinic today for followup on a small pressure injury to the sacrum  as well as a large stage IV pressure injury to the right hip.  We are currently going through the grafting process.  Today we applied graft #3, which is the 1st of the Nuield grafts.  So far we have not seen any significant change in the wound and the ligament is still fully exposed.  A # 15 scalpel was used to cross keen the wound margin due to rolling of that margin in the need to stimulate the growth of epithelial tissue as well as granular tissue.  The sacrum remains stable and basically the same size.      2/12/24 - Mr. Jarrett is seen today in follow up of two wounds, both of which remain stable.  The left hip is being grafted, and today PuraPly #2 was applied.  There is no significant change since graft #1 was applied.  Today graft #3 was ordered.  We will change from PuraPly to Epifix at this time.  The sacrum remains stable.  We have applied Endoform into that wound and it will be treated the same as a graft, with the hope that the patient does not have BMs which necessitate removal of the dressing. The patient is eligible and is in need of a new power wheelchair.      2/6/24 - Mr. Jarrett returns to clinic today with 2 wounds.  The very small stage IV pressure injury at the sacrum remains stable.  Today, the wound at the right hip, stage IV pressure injury, will receive the 1st graft application.  We are applying PuraPly grafts at this time and then we will change to NuShield after two PuraPly.  Of note, he is waiting on a demo to be shown to him for a possible new wheelchair.  PuraPly #2 ordered.    January 30, 2024:  43-year-old white male, with paraplegia, is here for follow up of the right hip pressure injury, and the sacral pressure injury.  The wounds are looking similar to the last visit.  He is getting ready to have skin substitute applied to the right hip wound.  There is white tissue in the wound bed, presumably tendon.  I do not see pink granulation tissue.  He has no longer using topical  "antibiotics.    1/23/24 - Mr. Jarrett returns today for followup on 2 wounds.  Today the sacral pressure injury remains stable and clean.  We have been using topical antibiotics on both of these wounds for several weeks.  That has been discontinued today.  The pressure injury at the right hip was selectively debrided today using the ultrasonic debrider and graft prep was performed.  We will begin applying a small amount of mupirocin ointment on to this wound daily in preparation beginning the grafting process next week.  We will be use PuraPly/NuShield grafts.  Of note, the patient is responsible for contacting calor at the [employer] to have his measurements taken.  He has not done that as of today.    December 26, 2023:  43-year-old white male, with paraplegia, is here for follow up of a right hip pressure injury, and a sacral pressure injury.  He has been using topical antibiotics.  The measurements are about the same as last visit.  The nurse practitioner has discussed a possible skin substitute on the right hip.    12/11/23 - Mr. Jarrett for followup on the pressure ulcers to the right hip and sacrum.  We started using topical antibiotics on both wounds on 11/21/2023.  Additionally, the patient has been using the vinegar washes for sometime.  He was instructed to discontinue the use of the vinegar washes today and use only the topical antibiotics.  He has been approved for grafts (Puraply and Nushield).  We will plan to do graft prep in two weeks (once abx are complete) and then begin grafting.    Of note, we discussed today the need for proper support in his wheelchair to offload the pressure.  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  Also, he does have a low air loss mattress, but that mattress is on top of a "regular" mattress and he reports springs are protruding.  He needs a " proper bedframe for the low airloss mattress.     12/4/23 - Mr. Jarrett returns for followup on 2 wounds.  He continues to use topical abx on both wounds.  The right hip continues to be concerning.  The tensor fascia ronny is till exposed in the wound bed.  We are mixing the topical abx with basal gel to prevent drying.  The sacral wound is stable but has had a slight increase in size.  Abx are being used on this wound as well. We are attempting to authorize grafts on the right hip wound.    11/27/23 - the patient returns today for followup on pressure injuries to the right hip and the sacrum.  He has received his topical antibiotics (Linezolid/Gentamincin) and started using the antibiotics on Saturday, 11/25/23.  He did not receive basal gel with that order so we have contacted [facility] to send the patient that product as the antibiotics are very drying and they are being applied directly to his tendon.  He must have the moisture of the basal gel.  In the time being, we will mix his topical antibiotics with mupirocin antibiotic.  Today both wounds remains stable.  He was instructed to use the antibiotics on both wounds to avoid cross contamination.    11/20/23 - Mr. Jarrett returns for f/up on the wound to the right hip and the sacrum.  At his last visit the right hip was cultured and it was positive to have Pseudomonas.  He has been using Vinegar washes since 11/06/2023.  He does have a sensitivity to levofloxacin and so can not take that medication.  We have requested a topical recommendation and he is being prescribed linezolid/gentamicin to be applied directly into the wound.  If we do not see improvement from that topical medication we will be moving forward with an IV antibiotic.  He does have exposed ligament (possibly ischiofemoral ligament) in this wound.  The patient does still have a mediport so that would be the next best solution for him due to his sensitivity.  The wound at the sacrum  remains stable and we are currently using silver alginate on both wounds.    11/6/23 - the patient seen today for followup on the right hip and sacral pressure wounds.  Although the sacrum is stable, the right hip does continue to deteriorate.  The right hip was cultured today.  We have been using Endoform and/or collagen on these wounds and unfortunately these products or causing excessive moisture and deterioration to the wounds.  We will discontinue both products and use only silver alginate for the time being.  The right hip does have tendon exposed at this time.  The patient was reminded and encouraged to continue to offload both the right hip in the sacrum which he reports that he is wearing.  However, on the deterioration of the wounds it is concerning.    10/30/23 - Mr. Jarrett to clinic today for followup on the wound to the sacrum as well as the right hip.  Today, both have deteriorated.  We have been using collagen to the wound beds and it appears is though they are stain to wet.  There is still tendon present in the wound bed of the right hip.  We will discontinue the collagen and begin application Endoform to both wounds, changing every other day, covered with silver alginate.  He was reminded to offload as much as possible to prevent further deterioration.  Of note, reported that is blood pressure has been low, and it was very low today, 86/54.  He states that he noticed it last week that he has stopped taking Tylenol, aspirin, and probiotics.  We discussed today that it is unlikely that any of these medications would impact blood pressure but he prefers to remain off of them.    10/16/23 - The patient returns today for followup on the pressure injury to the right hip as well as the sacrum.  Today, there is now tendon exposed in the wound bed the right hip pressure injury.  We discussed today that it is imperative that the patient offload to allow this wound to heal so that he does not develop  osteomyelitis in this hip as well.  The pressure injury to the sacrum has also deteriorated.  We discussed that now that the amputation to the left hip has fully healed, he must take the opportunity to begin offloading that right hip to allow it to heal.  We will begin applying collagen every other day to both wound beds.    10/9/23 The patient returns today for the open wound to the right hip and sacrum.  The surgical incision to the left hip is now resolved and will no longer be followed.  Both the right hip and sacrum are wounds that have reopened.  We will begin application of collagen every third day to both wounds.  Neither wound has any signs and symptoms of infection.  He will return in two weeks. Of note, the patient reports that he has had diarrhea for several days, up to about two weeks.  He has had no treatment and is currently using only probiotics.  He has been prescribed imodium and advised to use that medication no more than 4 days.  He is to contact his PCP if the diarrhea does not resolve within that time period.     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 Sandy Owusu on 8/21/23.  This wound has progress except personally well and is remaining closed.  The patient is using only iodine along the surgical incision line.  He does have a follow up with his surgeon tomorrow, 09/26/2023.  He was advised today that he can begin application coconut oil with vitamin C along the closed incision line which is fully approximated.  We will follow this incision line for one additional visit then d/c if no issues develop.    He has had a long term wound at the sacrum which today is open again.  It was mechanically debrided.  Endoform as applied to the wound bed, which will be left in place until Friday at which time he will change to silver alginate.     Review of Systems   Constitutional: Negative.    Respiratory: Negative.     Cardiovascular: Negative.     Skin:         As documented in the HPI.   All other systems reviewed and are negative.        Objective:      Vitals:    03/05/24 1313   BP: 127/84   Pulse: 70   Resp: 16     Physical Exam  Vitals and nursing note reviewed. Exam conducted with a chaperone present.   Constitutional:       Appearance: Normal appearance.   Cardiovascular:      Rate and Rhythm: Normal rate.   Pulmonary:      Effort: Pulmonary effort is normal.   Skin:     General: Skin is warm and dry.      Comments: sacral pressure injury, right hip, trochanteric, pressure injury   Neurological:      Mental Status: He is alert.            Altered Skin Integrity 09/25/23 1153 Sacral spine #1 (Active)   09/25/23 1153   Altered Skin Integrity Present on Admission - Did Patient arrive to the hospital with altered skin?: yes   Side:    Orientation:    Location: Sacral spine   Wound Number: #1   Is this injury device related?:    Primary Wound Type:    Description of Altered Skin Integrity:    Ankle-Brachial Index:    Pulses:    Removal Indication and Assessment:    Wound Outcome:    (Retired) Wound Length (cm):    (Retired) Wound Width (cm):    (Retired) Depth (cm):    Wound Description (Comments):    Removal Indications:    Dressing Appearance Intact;Moist drainage 03/05/24 1118   Drainage Amount Moderate 03/05/24 1118   Drainage Characteristics/Odor Serosanguineous;Bleeding controlled 03/05/24 1118   Appearance Intact;Slough;Moist;Epithelialization;Granulating 03/05/24 1118   Tissue loss description Full thickness 03/05/24 1118   Periwound Area Intact 03/05/24 1118   Wound Edges Open 03/05/24 1118   Wound Length (cm) 0.4 cm 03/05/24 1118   Wound Width (cm) 0.2 cm 03/05/24 1118   Wound Depth (cm) 0.2 cm 03/05/24 1118   Wound Volume (cm^3) 0.016 cm^3 03/05/24 1118   Wound Surface Area (cm^2) 0.08 cm^2 03/05/24 1118   Care Cleansed with:;Wound cleanser 03/05/24 1118   Dressing Applied;Other (comment) 03/05/24 1118            Altered Skin Integrity 10/09/23  1141 Right Hip #2 (Active)   10/09/23 1141   Altered Skin Integrity Present on Admission - Did Patient arrive to the hospital with altered skin?: yes   Side: Right   Orientation:    Location: Hip   Wound Number: #2   Is this injury device related?: No   Primary Wound Type:    Description of Altered Skin Integrity:    Ankle-Brachial Index:    Pulses:    Removal Indication and Assessment:    Wound Outcome:    (Retired) Wound Length (cm):    (Retired) Wound Width (cm):    (Retired) Depth (cm):    Wound Description (Comments):    Removal Indications:    Description of Altered Skin Integrity Full thickness tissue loss with exposed bone, tendon, or muscle. Often includes undermining and tunneling. May extend into muscle and/or supporting structures. 03/05/24 1118   Dressing Appearance Intact 03/05/24 1118   Drainage Amount Moderate 03/05/24 1118   Drainage Characteristics/Odor Serous;Serosanguineous;Clear;Yellow 03/05/24 1118   Appearance Intact;Slough;Yellow;Wet;Epithelialization 03/05/24 1118   Tissue loss description Full thickness 03/05/24 1118   Periwound Area Intact;Moist 03/05/24 1118   Wound Edges Open 03/05/24 1118   Wound Length (cm) 1.7 cm 03/05/24 1118   Wound Width (cm) 1.3 cm 03/05/24 1118   Wound Depth (cm) 0.7 cm 03/05/24 1118   Wound Volume (cm^3) 1.547 cm^3 03/05/24 1118   Wound Surface Area (cm^2) 2.21 cm^2 03/05/24 1118   Care Cleansed with:;Wound cleanser 03/05/24 1118   Dressing Applied;Other (comment) 03/05/24 1118   Dressing Change Due 03/12/24 03/05/24 1118            Altered Skin Integrity 02/27/24 1311 Right Elbow #3 (Active)   02/27/24 1311   Altered Skin Integrity Present on Admission - Did Patient arrive to the hospital with altered skin?: yes   Side: Right   Orientation:    Location: Elbow   Wound Number: #3   Is this injury device related?: No   Primary Wound Type:    Description of Altered Skin Integrity:    Ankle-Brachial Index:    Pulses:    Removal Indication and Assessment:    Wound  "Outcome:    (Retired) Wound Length (cm):    (Retired) Wound Width (cm):    (Retired) Depth (cm):    Wound Description (Comments):    Removal Indications:    Dressing Appearance Intact 03/05/24 1118   Drainage Amount Moderate 03/05/24 1118   Drainage Characteristics/Odor Serosanguineous 03/05/24 1118   Appearance Intact;Granulating;Epithelialization 03/05/24 1118   Tissue loss description Full thickness 03/05/24 1118   Periwound Area Intact 03/05/24 1118   Wound Edges Open 03/05/24 1118   Wound Length (cm) 0.5 cm 03/05/24 1118   Wound Width (cm) 0.4 cm 03/05/24 1118   Wound Depth (cm) 0.2 cm 03/05/24 1118   Wound Volume (cm^3) 0.04 cm^3 03/05/24 1118   Wound Surface Area (cm^2) 0.2 cm^2 03/05/24 1118   Care Cleansed with:;Other (see comments) 03/05/24 1118   Dressing Applied;Other (comment) 03/05/24 1118   Dressing Change Due 03/08/24 03/05/24 1118     3/5/24      Right elbow - pre aden.     Post debridement  (moisten endoform, silver alginate, medihoney,   island dressing, tape, F tubigrip )                  PIC WOULD NOT SAVE  Sacrum- Pre  (moisten endoform, silver alginate, sacral foam border)            PIC WOULD NOT SAVE  Right hip - pre aden.                                        Right hip - post aden.   ([facility] (1.6) #5, mastisol, versatel, medipore tape, silver alginate, gentle border )   TR  Assessment:           ICD-10-CM ICD-9-CM   1. 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 Pressure injury of sacral region, stage 4  L89.154 707.03     707.24   3. Open wound of hip with tendon involvement, right, subsequent encounter  S71.001D V58.89    S76.001D 890.2   4. Quadriplegia  G82.50 344.00           Procedures:     Debridement     Date/Time: 3/5/2024 11:10 AM     Performed by: Ashley Coto NP  Authorized by: Ashley Coto NP    Time out: Immediately prior to procedure a "time out" was called to verify the correct patient, procedure, " equipment, support staff and site/side marked as required.     Consent Done?:  Yes (Verbal)     Preparation: Patient was prepped and draped with clean technique    Local anesthesia used?: No       Wound Details:    Location:  Right elbow    Type of Debridement:  Non-excisional       Length (cm):  0.5       Area (sq cm):  0.2       Width (cm):  0.4       Percent Debrided (%):  100       Depth (cm):  0.2       Total Area Debrided (sq cm):  0.2    Depth of debridement:  Epidermis/Dermis    Devitalized tissue debrided:  Biofilm, Callus, Exudate and Fibrin    Instruments:  Curette (Dermal)  Bleeding:  None  Skin substitute     Date/Time: 3/5/2024 11:10 AM     Performed by: Ashley Coto NP  Authorized by: Ashley Coto NP    Consent:     Consent obtained:  Verbal and written    Consent given by:  Patient  Procedure details:     Product applied:  Isentropic    Product lot #:  [id number]    Product expiration:  11/12/2028    Amount used (cm^2):  2    Secured: Yes      Secured with:  Adaptic  Dressing:     Dressing applied:  Adaptic dressing    Wrapped with:  Conform bandage 2 inch  Post-procedure details:     Patient tolerance of procedure:  Tolerated well, no immediate complications      [] Yes [] No   I & D performed  [] Yes [] No   Excisional debridement performed  [x] Yes [] No   Selective debridement performed        [] Yes [] No   Mechanical debridement performed         [] Yes [] No  Silver nitrate applied                                     [] Yes [] No  Labs ordered this visit                                  [] Yes [] No   Imaging ordered this visit                           [] Yes [] No   Tissue pathology and/or culture taken           MEDICATIONS    Current Outpatient Medications:     acetaminophen (TYLENOL) 325 MG tablet, Take 650 mg by mouth every 6 (six) hours as needed for Pain., Disp: , Rfl:     ascorbic acid, vitamin C, (VITAMIN C) 1000 MG tablet, Take 1,000 mg by mouth every evening., Disp: ,  Rfl:     aspirin 325 MG tablet, 1 tablet Orally Once a day for 30 days, Disp: , Rfl:     baclofen (LIORESAL) 5 mg Tab tablet, Take 1 tablet (5 mg total) by mouth 3 (three) times daily., Disp: 90 tablet, Rfl: 0    bisacodyL (DULCOLAX) 10 mg Supp, Place 10 mg rectally daily as needed., Disp: , Rfl:     busPIRone (BUSPAR) 10 MG tablet, Take 1 tablet (10 mg total) by mouth 2 (two) times daily., Disp: 60 tablet, Rfl: 5    cetirizine (ZYRTEC) 10 MG tablet, Take 10 mg by mouth 2 (two) times a day., Disp: , Rfl:     cholecalciferol, vitamin D3, (DECARA) 1,250 mcg (50,000 unit) capsule, Take 1 capsule by mouth once daily., Disp: , Rfl:     cloNIDine (CATAPRES) 0.1 MG tablet, Take 0.1 mg by mouth daily as needed., Disp: , Rfl:     DULoxetine (CYMBALTA) 30 MG capsule, Take 1 capsule (30 mg total) by mouth 2 (two) times daily., Disp: 60 capsule, Rfl: 1    ferrous sulfate (SLOW RELEASE IRON) 142 mg (45 mg iron) TbSR, Take 324 mg by mouth once daily., Disp: , Rfl:     fexofenadine (ALLEGRA) 180 MG tablet, Take 180 mg by mouth every morning., Disp: , Rfl:     meloxicam (MOBIC) 7.5 MG tablet, Take 7.5 mg by mouth 2 (two) times daily as needed for Pain., Disp: , Rfl:     multivitamin/iron/folic acid (CENTRUM ORAL), Take 1 tablet by mouth every morning., Disp: , Rfl:     mupirocin (BACTROBAN) 2 % ointment, Apply topically once daily. (Patient not taking: Reported on 2/27/2024), Disp: 30 g, Rfl: 1    NON FORMULARY MEDICATION, Take 3 drops by mouth 2 (two) times daily as needed. THC, Disp: , Rfl:     testosterone (ANDROGEL) 20.25 mg/1.25 gram (1.62 %) GlPm, 1 pump to skin in the morning to shoulder, upper arms or abdomen Transdermal Once a day for 30 days, Disp: , Rfl:     zinc gluconate 50 mg tablet, Take 1 tablet (50 mg total) by mouth once daily., Disp: 30 tablet, Rfl: 1   Review of patient's allergies indicates:   Allergen Reactions    Levofloxacin Rash       DIAGNOSTICS      Labs/Scans/Micro:    CBC:   Lab Results   Component  "Value Date    WBC 4.89 09/05/2023    HGB 10.1 (L) 09/05/2023    HCT 33.9 (L) 09/05/2023    MCV 80.0 09/05/2023     09/05/2023       Sedimentation rate:   Lab Results   Component Value Date    SEDRATE 22 (H) 12/15/2022    C-reactive protein:   Lab Results   Component Value Date    CRP 44.90 (H) 12/15/2022    Chemistry: [unfilled]  HBA1C: No components found for: "HBA1C"    Ankle Brachial Index: No results found for this or any previous visit.    Imaging:    Plain film: No results found for this or any previous visit.    MRI: No results found for this or any previous visit.    Bone Scan: No results found for this or any previous visit.    Vascular studies:    Microbiology: No results found for: "MICRO"        HOME HEALTH AGENCY: [facility]    TIMES PER WEEK/DAYS:    WOUND CARE ORDERS:  Sacrum: DO NOT  remove dressing, On Friday 3/8, cleanse wound with wound cleanser and apply silver alginate, 4x4 gauze and gentle border  to be changed daily.   Right elbow: DO NOT  remove dressing, On Friday 3/8, cleanse wound with wound cleanser and apply silver alginate to wound bed  cover with a dry dressing, apply tubigrip over bandage - to be changed daily   Right hip:  **Do NOT remove past versatel/medipore tape, Nushield graft in place** remove and reapply silver alginate, 4x4 gauze and gentle border  - to be changed daily.     Follow up in about 1 week (around 3/12/2024) for Stretcher.          "

## 2024-03-07 ENCOUNTER — EXTERNAL HOME HEALTH (OUTPATIENT)
Dept: HOME HEALTH SERVICES | Facility: HOSPITAL | Age: 44
End: 2024-03-07
Payer: MEDICARE

## 2024-03-12 ENCOUNTER — HOSPITAL ENCOUNTER (OUTPATIENT)
Dept: WOUND CARE | Facility: HOSPITAL | Age: 44
Discharge: HOME | End: 2024-03-12
Attending: NURSE PRACTITIONER | Admitting: NURSE PRACTITIONER
Payer: MEDICARE

## 2024-03-12 VITALS
SYSTOLIC BLOOD PRESSURE: 89 MMHG | DIASTOLIC BLOOD PRESSURE: 52 MMHG | RESPIRATION RATE: 18 BRPM | WEIGHT: 134 LBS | BODY MASS INDEX: 19.18 KG/M2 | HEART RATE: 84 BPM | HEIGHT: 70 IN

## 2024-03-12 DIAGNOSIS — L89.154 PRESSURE INJURY OF SACRAL REGION, STAGE 4: ICD-10-CM

## 2024-03-12 DIAGNOSIS — G82.50 QUADRIPLEGIA: ICD-10-CM

## 2024-03-12 DIAGNOSIS — S76.001D OPEN WOUND OF HIP WITH TENDON INVOLVEMENT, RIGHT, SUBSEQUENT ENCOUNTER: ICD-10-CM

## 2024-03-12 DIAGNOSIS — S71.001D OPEN WOUND OF HIP WITH TENDON INVOLVEMENT, RIGHT, SUBSEQUENT ENCOUNTER: ICD-10-CM

## 2024-03-12 DIAGNOSIS — L89.44 PRESSURE INJURY OF CONTIGUOUS REGION INVOLVING BACK AND RIGHT HIP, STAGE 4: Primary | ICD-10-CM

## 2024-03-12 PROCEDURE — 97597 DBRDMT OPN WND 1ST 20 CM/<: CPT

## 2024-03-12 PROCEDURE — 27000999 HC MEDICAL RECORD PHOTO DOCUMENTATION

## 2024-03-12 PROCEDURE — 99212 OFFICE O/P EST SF 10 MIN: CPT | Mod: 25

## 2024-03-12 NOTE — PROGRESS NOTES
Referred by: Dr. Sidhu  Low air loss mattress [x] Yes [] No   Is the patient eligible for a graft, and/or currently grafting?  [x] Yes [] No  (right hip - nushield)     Subjective:         Patient ID: Werner Jarrett is a 43 y.o. male.    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  Today graft #6 was applied.  There has been no significant change to this wound since grafting.  Graft #7 has been ordered.  However, the patient was advised to scheduled with his surgeon, Dr. Sandy Jara, for evaluation to determine if she will need to do further surgery to assist this wound with closure since the margins are rolled.  They were again scored with a scaple to attempt to stimulate the growth of epithelial tissue, which has been done several times to no avail.  The right elbow is progressing well.  The sacrum remains stable with no S & S of infection.  He has received his new power wheelchair.  We are hopeful that this device might contribute to progress with his wounds.     3/5/24 - Mr. Jarrett is seen today for followup on 3 wounds.  The wound to the right elbow was selectively debrided and it does show considerable improvement.  The wound to the sacrum is stable.  We will begin application of moistened Endoform to both of these wounds to be changed on Friday to silver alginate.  The wound to the right hip is slightly moist with a little bit of drainage.  This is to be expected, however, due to the fact that we are grafting that wound.  There has been no significant change to the size of the wound, unfortunately, in spite of grafting multiple times.  We will continue the process with the hope of making some progress.    2/27/24 - Mr. Jarrett returns to clinic today for followup on 3 areas of concern.  The pressure injury remains stable with no significant change.  He has a  new wound to the right elbow.  This has developed due to dryness and cracking which has opened into a wound.  The elbow was selectively debrided using a dermal curette.  We will begin applying MediHoney to the elbow, covering with 4 x 4 gauze, Cover and with a Tubigrip with the hopes of softening this callused tissue further.  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  We are currently grafting using NuShield grafts.  Today we applied graft #4 after the wound margins were crosshatched to try to allow for growth epithelial tissue.  Again this week, there was no significant change in that wound.  Ligament is still fully exposed.  Graft #5 is being ordered today.    2/20/24 - the patient returns to clinic today for followup on a small pressure injury to the sacrum as well as a large stage IV pressure injury to the right hip.  We are currently going through the grafting process.  Today we applied graft #3, which is the 1st of the NuShield grafts.  So far we have not seen any significant change in the wound and the ligament is still fully exposed.  A # 15 scalpel was used to cross keen the wound margin due to rolling of that margin in the need to stimulate the growth of epithelial tissue as well as granular tissue.  The sacrum remains stable and basically the same size.      2/12/24 - Mr. Jarrett is seen today in follow up of two wounds, both of which remain stable.  The left hip is being grafted, and today PuraPly #2 was applied.  There is no significant change since graft #1 was applied.  Today graft #3 was ordered.  We will change from PuraPly to Epifix at this time.  The sacrum remains stable.  We have applied Endoform into that wound and it will be treated the same as a graft, with the hope that the patient does not have BMs which necessitate removal of the dressing. The patient is eligible and is in need of a new power wheelchair.      2/6/24 - Mr. Jarrett returns to clinic today with 2 wounds.  The  very small stage IV pressure injury at the sacrum remains stable.  Today, the wound at the right hip, stage IV pressure injury, will receive the 1st graft application.  We are applying PuraPly grafts at this time and then we will change to NuShield after two PuraPly.  Of note, he is waiting on a demo to be shown to him for a possible new wheelchair.  PuraPly #2 ordered.    January 30, 2024:  43-year-old white male, with paraplegia, is here for follow up of the right hip pressure injury, and the sacral pressure injury.  The wounds are looking similar to the last visit.  He is getting ready to have skin substitute applied to the right hip wound.  There is white tissue in the wound bed, presumably tendon.  I do not see pink granulation tissue.  He has no longer using topical antibiotics.    1/23/24 - Mr. Jarrett returns today for followup on 2 wounds.  Today the sacral pressure injury remains stable and clean.  We have been using topical antibiotics on both of these wounds for several weeks.  That has been discontinued today.  The pressure injury at the right hip was selectively debrided today using the ultrasonic debrider and graft prep was performed.  We will begin applying a small amount of mupirocin ointment on to this wound daily in preparation beginning the grafting process next week.  We will be use PuraPly/NuShield grafts.  Of note, the patient is responsible for contacting calor at the [facility] to have his measurements taken.  He has not done that as of today.    December 26, 2023:  43-year-old white male, with paraplegia, is here for follow up of a right hip pressure injury, and a sacral pressure injury.  He has been using topical antibiotics.  The measurements are about the same as last visit.  The nurse practitioner has discussed a possible skin substitute on the right hip.    12/11/23 - Mr. Jarrett for followup on the pressure ulcers to the right hip and sacrum.  We started using topical antibiotics  "on both wounds on 11/21/2023.  Additionally, the patient has been using the vinegar washes for sometime.  He was instructed to discontinue the use of the vinegar washes today and use only the topical antibiotics.  He has been approved for grafts ([facility]).  We will plan to do graft prep in two weeks (once abx are complete) and then begin grafting.    Of note, we discussed today the need for proper support in his wheelchair to offload the pressure.  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  Also, he does have a low air loss mattress, but that mattress is on top of a "regular" mattress and he reports springs are protruding.  He needs a proper bedframe for the low airloss mattress.     12/4/23 - Mr. Jarrett returns for followup on 2 wounds.  He continues to use topical abx on both wounds.  The right hip continues to be concerning.  The tensor fascia ronny is till exposed in the wound bed.  We are mixing the topical abx with basal gel to prevent drying.  The sacral wound is stable but has had a slight increase in size.  Abx are being used on this wound as well. We are attempting to authorize grafts on the right hip wound.    11/27/23 - the patient returns today for followup on pressure injuries to the right hip and the sacrum.  He has received his topical antibiotics (Linezolid/Gentamincin) and started using the antibiotics on Saturday, 11/25/23.  He did not receive basal gel with that order so we have contacted [facility] to send the patient that product as the antibiotics are very drying and they are being applied directly to his tendon.  He must have the moisture of the basal gel.  In the time being, we will mix his topical antibiotics with mupirocin antibiotic.  Today both wounds remains stable.  He was instructed to use the antibiotics on both wounds to avoid cross " contamination.    11/20/23 - Mr. Jarrett returns for f/up on the wound to the right hip and the sacrum.  At his last visit the right hip was cultured and it was positive to have Pseudomonas.  He has been using Vinegar washes since 11/06/2023.  He does have a sensitivity to levofloxacin and so can not take that medication.  We have requested a topical recommendation and he is being prescribed linezolid/gentamicin to be applied directly into the wound.  If we do not see improvement from that topical medication we will be moving forward with an IV antibiotic.  He does have exposed ligament (possibly ischiofemoral ligament) in this wound.  The patient does still have a mediport so that would be the next best solution for him due to his sensitivity.  The wound at the sacrum remains stable and we are currently using silver alginate on both wounds.    11/6/23 - the patient seen today for followup on the right hip and sacral pressure wounds.  Although the sacrum is stable, the right hip does continue to deteriorate.  The right hip was cultured today.  We have been using Endoform and/or collagen on these wounds and unfortunately these products or causing excessive moisture and deterioration to the wounds.  We will discontinue both products and use only silver alginate for the time being.  The right hip does have tendon exposed at this time.  The patient was reminded and encouraged to continue to offload both the right hip in the sacrum which he reports that he is wearing.  However, on the deterioration of the wounds it is concerning.    10/30/23 - Mr. Jarrett to clinic today for followup on the wound to the sacrum as well as the right hip.  Today, both have deteriorated.  We have been using collagen to the wound beds and it appears is though they are stain to wet.  There is still tendon present in the wound bed of the right hip.  We will discontinue the collagen and begin application Endoform to both wounds, changing every other  day, covered with silver alginate.  He was reminded to offload as much as possible to prevent further deterioration.  Of note, reported that is blood pressure has been low, and it was very low today, 86/54.  He states that he noticed it last week that he has stopped taking Tylenol, aspirin, and probiotics.  We discussed today that it is unlikely that any of these medications would impact blood pressure but he prefers to remain off of them.    10/16/23 - The patient returns today for followup on the pressure injury to the right hip as well as the sacrum.  Today, there is now tendon exposed in the wound bed the right hip pressure injury.  We discussed today that it is imperative that the patient offload to allow this wound to heal so that he does not develop osteomyelitis in this hip as well.  The pressure injury to the sacrum has also deteriorated.  We discussed that now that the amputation to the left hip has fully healed, he must take the opportunity to begin offloading that right hip to allow it to heal.  We will begin applying collagen every other day to both wound beds.    10/9/23 The patient returns today for the open wound to the right hip and sacrum.  The surgical incision to the left hip is now resolved and will no longer be followed.  Both the right hip and sacrum are wounds that have reopened.  We will begin application of collagen every third day to both wounds.  Neither wound has any signs and symptoms of infection.  He will return in two weeks. Of note, the patient reports that he has had diarrhea for several days, up to about two weeks.  He has had no treatment and is currently using only probiotics.  He has been prescribed imodium and advised to use that medication no more than 4 days.  He is to contact his PCP if the diarrhea does not resolve within that time period.     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 Sandy Owusu on 8/21/23.  This  wound has progress except personally well and is remaining closed.  The patient is using only iodine along the surgical incision line.  He does have a follow up with his surgeon tomorrow, 09/26/2023.  He was advised today that he can begin application coconut oil with vitamin C along the closed incision line which is fully approximated.  We will follow this incision line for one additional visit then d/c if no issues develop.    He has had a long term wound at the sacrum which today is open again.  It was mechanically debrided.  Endoform as applied to the wound bed, which will be left in place until Friday at which time he will change to silver alginate.     Review of Systems   Constitutional: Negative.    Respiratory: Negative.     Cardiovascular: Negative.    Skin:         As documented in the HPI.   All other systems reviewed and are negative.        Objective:      Vitals:    03/12/24 1137   BP: (!) 89/52   Pulse: 84   Resp: 18     Physical Exam  Vitals and nursing note reviewed. Exam conducted with a chaperone present.   Constitutional:       Appearance: Normal appearance.   Cardiovascular:      Rate and Rhythm: Normal rate.   Pulmonary:      Effort: Pulmonary effort is normal.   Skin:     General: Skin is warm and dry.      Comments: sacral pressure injury, right hip, trochanteric, pressure injury   Neurological:      Mental Status: He is alert.            Altered Skin Integrity 09/25/23 1153 Sacral spine #1 (Active)   09/25/23 1153   Altered Skin Integrity Present on Admission - Did Patient arrive to the hospital with altered skin?: yes   Side:    Orientation:    Location: Sacral spine   Wound Number: #1   Is this injury device related?:    Primary Wound Type:    Description of Altered Skin Integrity:    Ankle-Brachial Index:    Pulses:    Removal Indication and Assessment:    Wound Outcome:    (Retired) Wound Length (cm):    (Retired) Wound Width (cm):    (Retired) Depth (cm):    Wound Description  (Comments):    Removal Indications:    Dressing Appearance Moist drainage 03/12/24 1130   Drainage Amount Moderate 03/12/24 1130   Drainage Characteristics/Odor Serosanguineous 03/12/24 1130   Appearance Pink;Moist 03/12/24 1130   Tissue loss description Full thickness 03/12/24 1130   Periwound Area Intact 03/12/24 1130   Wound Edges Open 03/12/24 1130   Wound Length (cm) 0.6 cm 03/12/24 1130   Wound Width (cm) 0.7 cm 03/12/24 1130   Wound Depth (cm) 0.6 cm 03/12/24 1130   Wound Volume (cm^3) 0.252 cm^3 03/12/24 1130   Wound Surface Area (cm^2) 0.42 cm^2 03/12/24 1130   Care Cleansed with:;Wound cleanser 03/12/24 1130   Dressing Applied 03/12/24 1232   Dressing Change Due 03/15/24 03/12/24 1232            Altered Skin Integrity 10/09/23 1141 Right Hip #2 (Active)   10/09/23 1141   Altered Skin Integrity Present on Admission - Did Patient arrive to the hospital with altered skin?: yes   Side: Right   Orientation:    Location: Hip   Wound Number: #2   Is this injury device related?: No   Primary Wound Type:    Description of Altered Skin Integrity:    Ankle-Brachial Index:    Pulses:    Removal Indication and Assessment:    Wound Outcome:    (Retired) Wound Length (cm):    (Retired) Wound Width (cm):    (Retired) Depth (cm):    Wound Description (Comments):    Removal Indications:    Dressing Appearance Moist drainage 03/12/24 1130   Drainage Amount Moderate 03/12/24 1130   Drainage Characteristics/Odor Serosanguineous 03/12/24 1130   Appearance Pink;Slough;Moist 03/12/24 1130   Tissue loss description Full thickness 03/12/24 1130   Periwound Area Intact 03/12/24 1130   Wound Edges Open 03/12/24 1130   Wound Length (cm) 1.6 cm 03/12/24 1130   Wound Width (cm) 1.4 cm 03/12/24 1130   Wound Depth (cm) 1.1 cm 03/12/24 1130   Wound Volume (cm^3) 2.464 cm^3 03/12/24 1130   Wound Surface Area (cm^2) 2.24 cm^2 03/12/24 1130   Undermining (depth (cm)/location) 0.5cm circumferential 03/12/24 1130   Care Cleansed with:;Wound  cleanser 03/12/24 1130   Dressing Applied 03/12/24 1232   Dressing Change Due 03/13/24 03/12/24 1232            Altered Skin Integrity 02/27/24 1311 Right Elbow #3 (Active)   02/27/24 1311   Altered Skin Integrity Present on Admission - Did Patient arrive to the hospital with altered skin?: yes   Side: Right   Orientation:    Location: Elbow   Wound Number: #3   Is this injury device related?: No   Primary Wound Type:    Description of Altered Skin Integrity:    Ankle-Brachial Index:    Pulses:    Removal Indication and Assessment:    Wound Outcome:    (Retired) Wound Length (cm):    (Retired) Wound Width (cm):    (Retired) Depth (cm):    Wound Description (Comments):    Removal Indications:    Dressing Appearance Moist drainage 03/12/24 1130   Drainage Amount Moderate 03/12/24 1130   Drainage Characteristics/Odor Serosanguineous 03/12/24 1130   Appearance Pink 03/12/24 1130   Tissue loss description Full thickness 03/12/24 1130   Periwound Area Dry 03/12/24 1130   Wound Edges Callused;Open 03/12/24 1130   Wound Length (cm) 0.7 cm 03/12/24 1130   Wound Width (cm) 0.6 cm 03/12/24 1130   Wound Depth (cm) 0.2 cm 03/12/24 1130   Wound Volume (cm^3) 0.084 cm^3 03/12/24 1130   Wound Surface Area (cm^2) 0.42 cm^2 03/12/24 1130   Care Cleansed with:;Wound cleanser 03/12/24 1130   Dressing Applied 03/12/24 1232   Dressing Change Due 03/15/24 03/12/24 1232       03/12/24      Right elbow - pre aden.                                  Right elbow - post aden.     NO DEBRIDEMENT  Sacrum       Right hip - pre aden.                                       Right hip - post aden.   ML  Assessment:           ICD-10-CM ICD-9-CM   1. 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 Pressure injury of sacral region, stage 4  L89.154 707.03     707.24   3. Open wound of hip with tendon involvement, right, subsequent encounter  S71.001D V58.89    S76.001D 890.2   4. Quadriplegia  G82.50  "344.00           Procedures:       Debridement     Date/Time: 3/12/2024 11:00 AM     Performed by: Ashley Coto NP  Authorized by: Ashley Coto NP    Time out: Immediately prior to procedure a "time out" was called to verify the correct patient, procedure, equipment, support staff and site/side marked as required.     Consent Done?:  Yes (Verbal)     Preparation: Patient was prepped and draped with clean technique    Local anesthesia used?: No       Wound Details:    Location:  Right elbow    Type of Debridement:  Non-excisional       Length (cm):  0.7       Area (sq cm):  0.42       Width (cm):  0.6       Percent Debrided (%):  100       Depth (cm):  0.2       Total Area Debrided (sq cm):  0.42    Depth of debridement:  Epidermis/Dermis    Devitalized tissue debrided:  Biofilm, Callus and Exudate    Instruments:  Curette (Dermal)  Bleeding:  None  Patient tolerance:  Patient tolerated the procedure well with no immediate complications  Skin substitute     Date/Time: 3/12/2024 11:00 AM     Performed by: Ashley Coto NP  Authorized by: Ashley Coto NP    Consent:     Consent obtained:  Verbal and written    Consent given by:  Patient  Procedure details:     Product applied:  Wavii    Product lot #:  [id number]    Product expiration:  11/26/2028    Amount used (cm^2):  2    Secured: Yes      Secured with:  Adaptic  Dressing:     Dressing applied:  Adaptic dressing, 4x4 and Steri-Strips (Medipore tape)    Wrapped with:  Coban 2 inch  Post-procedure details:     Patient tolerance of procedure:  Tolerated well, no immediate complications    [] Yes [] No   I & D performed  [] Yes [] No   Excisional debridement performed  [x] Yes [] No   Selective debridement performed        [] Yes [] No   Mechanical debridement performed         [] Yes [] No  Silver nitrate applied                                     [] Yes [] No  Labs ordered this visit                                  [] Yes [] No   Imaging " ordered this visit                           [] Yes [] No   Tissue pathology and/or culture taken           MEDICATIONS    Current Outpatient Medications:     acetaminophen (TYLENOL) 325 MG tablet, Take 650 mg by mouth every 6 (six) hours as needed for Pain., Disp: , Rfl:     ascorbic acid, vitamin C, (VITAMIN C) 1000 MG tablet, Take 1,000 mg by mouth every evening., Disp: , Rfl:     aspirin 325 MG tablet, 1 tablet Orally Once a day for 30 days, Disp: , Rfl:     baclofen (LIORESAL) 5 mg Tab tablet, Take 1 tablet (5 mg total) by mouth 3 (three) times daily., Disp: 90 tablet, Rfl: 0    bisacodyL (DULCOLAX) 10 mg Supp, Place 10 mg rectally daily as needed., Disp: , Rfl:     busPIRone (BUSPAR) 10 MG tablet, Take 1 tablet (10 mg total) by mouth 2 (two) times daily., Disp: 60 tablet, Rfl: 5    cetirizine (ZYRTEC) 10 MG tablet, Take 10 mg by mouth 2 (two) times a day., Disp: , Rfl:     cholecalciferol, vitamin D3, (DECARA) 1,250 mcg (50,000 unit) capsule, Take 1 capsule by mouth once daily., Disp: , Rfl:     cloNIDine (CATAPRES) 0.1 MG tablet, Take 0.1 mg by mouth daily as needed., Disp: , Rfl:     DULoxetine (CYMBALTA) 30 MG capsule, Take 1 capsule (30 mg total) by mouth 2 (two) times daily., Disp: 60 capsule, Rfl: 1    ferrous sulfate (SLOW RELEASE IRON) 142 mg (45 mg iron) TbSR, Take 324 mg by mouth once daily., Disp: , Rfl:     fexofenadine (ALLEGRA) 180 MG tablet, Take 180 mg by mouth every morning., Disp: , Rfl:     meloxicam (MOBIC) 7.5 MG tablet, Take 7.5 mg by mouth 2 (two) times daily as needed for Pain., Disp: , Rfl:     multivitamin/iron/folic acid (CENTRUM ORAL), Take 1 tablet by mouth every morning., Disp: , Rfl:     NON FORMULARY MEDICATION, Take 3 drops by mouth 2 (two) times daily as needed. THC, Disp: , Rfl:     testosterone (ANDROGEL) 20.25 mg/1.25 gram (1.62 %) GlPm, 1 pump to skin in the morning to shoulder, upper arms or abdomen Transdermal Once a day for 30 days, Disp: , Rfl:     zinc gluconate 50 mg  "tablet, Take 1 tablet (50 mg total) by mouth once daily., Disp: 30 tablet, Rfl: 1    mupirocin (BACTROBAN) 2 % ointment, Apply topically once daily. (Patient not taking: Reported on 3/12/2024), Disp: 30 g, Rfl: 1   Review of patient's allergies indicates:   Allergen Reactions    Levofloxacin Rash       DIAGNOSTICS      Labs/Scans/Micro:    CBC:   Lab Results   Component Value Date    WBC 4.89 09/05/2023    HGB 10.1 (L) 09/05/2023    HCT 33.9 (L) 09/05/2023    MCV 80.0 09/05/2023     09/05/2023       Sedimentation rate:   Lab Results   Component Value Date    SEDRATE 22 (H) 12/15/2022    C-reactive protein:   Lab Results   Component Value Date    CRP 44.90 (H) 12/15/2022    Chemistry: [unfilled]  HBA1C: No components found for: "HBA1C"    Ankle Brachial Index: No results found for this or any previous visit.    Imaging:    Plain film: No results found for this or any previous visit.    MRI: No results found for this or any previous visit.    Bone Scan: No results found for this or any previous visit.    Vascular studies:    Microbiology: No results found for: "MICRO"        HOME HEALTH AGENCY: [facility]     WOUND CARE ORDERS:  Sacrum: DO NOT  remove dressing, On Friday 3/15, cleanse wound with wound cleanser and apply silver alginate, 4x4 gauze and gentle border  to be changed daily.   Right elbow: DO NOT  remove dressing, On Friday 3/15, cleanse wound with wound cleanser and apply medihjoney to wound bed  cover with a dry dressing, apply tubigrip over bandage - to be changed daily.  Right hip: **Do NOT remove past versatel/medipore tape, Nushield graft in place** remove and reapply silver alginate, 4x4 gauze and gentle border  - to be changed daily.         Follow up in about 1 week (around 3/19/2024).        "

## 2024-03-12 NOTE — PROCEDURES
"Debridement    Date/Time: 3/12/2024 11:00 AM    Performed by: Ashley Coto NP  Authorized by: Ashley Coto NP    Time out: Immediately prior to procedure a "time out" was called to verify the correct patient, procedure, equipment, support staff and site/side marked as required.    Consent Done?:  Yes (Verbal)    Preparation: Patient was prepped and draped with clean technique    Local anesthesia used?: No      Wound Details:    Location:  Right elbow    Type of Debridement:  Non-excisional       Length (cm):  0.7       Area (sq cm):  0.42       Width (cm):  0.6       Percent Debrided (%):  100       Depth (cm):  0.2       Total Area Debrided (sq cm):  0.42    Depth of debridement:  Epidermis/Dermis    Devitalized tissue debrided:  Biofilm, Callus and Exudate    Instruments:  Curette (Dermal)  Bleeding:  None  Patient tolerance:  Patient tolerated the procedure well with no immediate complications  Skin substitute    Date/Time: 3/12/2024 11:00 AM    Performed by: Ashley Coto NP  Authorized by: Ashley Coto NP    Consent:     Consent obtained:  Verbal and written    Consent given by:  Patient  Procedure details:     Product applied:  Within3    Product lot #:  [id number]    Product expiration:  11/26/2028    Amount used (cm^2):  2    Secured: Yes      Secured with:  Adaptic  Dressing:     Dressing applied:  Adaptic dressing, 4x4 and Steri-Strips (Medipore tape)    Wrapped with:  Coban 2 inch  Post-procedure details:     Patient tolerance of procedure:  Tolerated well, no immediate complications    "

## 2024-03-19 ENCOUNTER — HOSPITAL ENCOUNTER (OUTPATIENT)
Dept: WOUND CARE | Facility: HOSPITAL | Age: 44
Discharge: HOME | End: 2024-03-19
Attending: NURSE PRACTITIONER
Payer: MEDICARE

## 2024-03-19 DIAGNOSIS — L89.154 PRESSURE INJURY OF SACRAL REGION, STAGE 4: ICD-10-CM

## 2024-03-19 DIAGNOSIS — L89.44 PRESSURE INJURY OF CONTIGUOUS REGION INVOLVING BACK AND RIGHT HIP, STAGE 4: Primary | ICD-10-CM

## 2024-03-19 DIAGNOSIS — S76.901D OPEN WOUND OF RIGHT HIP AND THIGH WITH TENDON INVOLVEMENT, SUBSEQUENT ENCOUNTER: ICD-10-CM

## 2024-03-19 DIAGNOSIS — S71.001D OPEN WOUND OF RIGHT HIP AND THIGH WITH TENDON INVOLVEMENT, SUBSEQUENT ENCOUNTER: ICD-10-CM

## 2024-03-19 DIAGNOSIS — S71.101D OPEN WOUND OF RIGHT HIP AND THIGH WITH TENDON INVOLVEMENT, SUBSEQUENT ENCOUNTER: ICD-10-CM

## 2024-03-19 DIAGNOSIS — G82.50 QUADRIPLEGIA: ICD-10-CM

## 2024-03-19 DIAGNOSIS — S76.001D OPEN WOUND OF RIGHT HIP AND THIGH WITH TENDON INVOLVEMENT, SUBSEQUENT ENCOUNTER: ICD-10-CM

## 2024-03-19 PROBLEM — S76.001A: Status: ACTIVE | Noted: 2023-10-16

## 2024-03-19 PROBLEM — S76.901A: Status: ACTIVE | Noted: 2023-10-16

## 2024-03-19 PROBLEM — S71.001A: Status: ACTIVE | Noted: 2023-10-16

## 2024-03-19 PROBLEM — S71.101A: Status: ACTIVE | Noted: 2023-10-16

## 2024-03-19 PROCEDURE — 27000999 HC MEDICAL RECORD PHOTO DOCUMENTATION

## 2024-03-19 PROCEDURE — 97597 DBRDMT OPN WND 1ST 20 CM/<: CPT

## 2024-03-19 PROCEDURE — 99212 OFFICE O/P EST SF 10 MIN: CPT | Mod: 25

## 2024-03-19 NOTE — PROCEDURES
"Debridement    Date/Time: 3/19/2024 11:13 AM    Performed by: Ashley Coto NP  Authorized by: Ashley Coto NP    Time out: Immediately prior to procedure a "time out" was called to verify the correct patient, procedure, equipment, support staff and site/side marked as required.    Consent Done?:  Yes (Verbal)    Preparation: Patient was prepped and draped with clean technique    Local anesthesia used?: No      Wound Details:    Location:  Right elbow    Type of Debridement:  Non-excisional       Length (cm):  0.5       Area (sq cm):  0.2       Width (cm):  0.4       Percent Debrided (%):  100       Depth (cm):  0.2       Total Area Debrided (sq cm):  0.2    Depth of debridement:  Epidermis/Dermis    Devitalized tissue debrided:  Biofilm, Callus, Exudate and Fibrin    Instruments:  Curette (Dermal)  Bleeding:  None  Patient tolerance:  Patient tolerated the procedure well with no immediate complications    "

## 2024-03-19 NOTE — PROGRESS NOTES
Referred by: Dr. Sidhu  Low air loss mattress [x] Yes [] No   Is the patient eligible for a graft, and/or currently grafting?  [x] Yes [] No  (right hip - nushield)     Subjective:         Patient ID: Werner Jarrett is a 43 y.o. male.    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  Tendon remains exposed in the wound bed, the margins are nereida, epibole and closed.  Through a series of several weeks the wound margins have been scored with a scalpel in an attempt to open these closed margins, to no avail.  Today the graft was held, and the patient will be seen by his surgeon, Dr. Sandy Jara next Tuesday, 3/26/24, for consultation regarding options to possible reopen the margins which might bring a successful closure to this wound based on how it is currently healed.  For the time being we will apply adaptic over the tendon, with silver alginate dressing on top.  Both the right elbow and the sacrum remain stable.  We are currently using Endoform on both.  Today the elbow was selectively debrided.     3/12/24 - The pressure injury to the right hip is being grafted using NuShield grafts.  Today graft #6 was applied.  There has been no significant change to this wound since grafting.  Graft #7 has been ordered.  However, the patient was advised to scheduled with his surgeon, Dr. Sandy Jara, for evaluation to determine if she will need to do further surgery to assist this wound with closure since the margins are rolled.  They were again scored with a scaple to attempt to stimulate the growth of epithelial tissue, which has been done several times to no avail.  The right elbow is progressing well.  The sacrum remains stable with no S & S of infection.  He has received his new power wheelchair.  We are hopeful that this device might contribute to  progress with his wounds.     3/5/24 - Mr. Jarrett is seen today for followup on 3 wounds.  The wound to the right elbow was selectively debrided and it does show considerable improvement.  The wound to the sacrum is stable.  We will begin application of moistened Endoform to both of these wounds to be changed on Friday to silver alginate.  The wound to the right hip is slightly moist with a little bit of drainage.  This is to be expected, however, due to the fact that we are grafting that wound.  There has been no significant change to the size of the wound, unfortunately, in spite of grafting multiple times.  We will continue the process with the hope of making some progress.    2/27/24 - Mr. Jarrett returns to clinic today for followup on 3 areas of concern.  The pressure injury remains stable with no significant change.  He has a new wound to the right elbow.  This has developed due to dryness and cracking which has opened into a wound.  The elbow was selectively debrided using a dermal curette.  We will begin applying MediHoney to the elbow, covering with 4 x 4 gauze, Cover and with a Tubigrip with the hopes of softening this callused tissue further.  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  We are currently grafting using NuShield grafts.  Today we applied graft #4 after the wound margins were crosshatched to try to allow for growth epithelial tissue.  Again this week, there was no significant change in that wound.  Ligament is still fully exposed.  Graft #5 is being ordered today.    2/20/24 - the patient returns to clinic today for followup on a small pressure injury to the sacrum as well as a large stage IV pressure injury to the right hip.  We are currently going through the grafting process.  Today we applied graft #3, which is the 1st of the NuShield grafts.  So far we have not seen any significant change in the wound and the ligament is still fully exposed.  A # 15 scalpel was used to  cross keen the wound margin due to rolling of that margin in the need to stimulate the growth of epithelial tissue as well as granular tissue.  The sacrum remains stable and basically the same size.      2/12/24 - Mr. Jarrett is seen today in follow up of two wounds, both of which remain stable.  The left hip is being grafted, and today PuraPly #2 was applied.  There is no significant change since graft #1 was applied.  Today graft #3 was ordered.  We will change from PuraPly to Epifix at this time.  The sacrum remains stable.  We have applied Endoform into that wound and it will be treated the same as a graft, with the hope that the patient does not have BMs which necessitate removal of the dressing. The patient is eligible and is in need of a new power wheelchair.      2/6/24 - Mr. Jarrett returns to clinic today with 2 wounds.  The very small stage IV pressure injury at the sacrum remains stable.  Today, the wound at the right hip, stage IV pressure injury, will receive the 1st graft application.  We are applying PuraPly grafts at this time and then we will change to [facility] after two PuraPly.  Of note, he is waiting on a demo to be shown to him for a possible new wheelchair.  PuraPly #2 ordered.    January 30, 2024:  43-year-old white male, with paraplegia, is here for follow up of the right hip pressure injury, and the sacral pressure injury.  The wounds are looking similar to the last visit.  He is getting ready to have skin substitute applied to the right hip wound.  There is white tissue in the wound bed, presumably tendon.  I do not see pink granulation tissue.  He has no longer using topical antibiotics.    1/23/24 - Mr. Jarrett returns today for followup on 2 wounds.  Today the sacral pressure injury remains stable and clean.  We have been using topical antibiotics on both of these wounds for several weeks.  That has been discontinued today.  The pressure injury at the right hip was selectively debrided today  "using the ultrasonic debrider and graft prep was performed.  We will begin applying a small amount of mupirocin ointment on to this wound daily in preparation beginning the grafting process next week.  We will be use PuraPly/NuShield grafts.  Of note, the patient is responsible for contacting calor at the [employer] to have his measurements taken.  He has not done that as of today.    December 26, 2023:  43-year-old white male, with paraplegia, is here for follow up of a right hip pressure injury, and a sacral pressure injury.  He has been using topical antibiotics.  The measurements are about the same as last visit.  The nurse practitioner has discussed a possible skin substitute on the right hip.    12/11/23 - Mr. Jarrett for followup on the pressure ulcers to the right hip and sacrum.  We started using topical antibiotics on both wounds on 11/21/2023.  Additionally, the patient has been using the vinegar washes for sometime.  He was instructed to discontinue the use of the vinegar washes today and use only the topical antibiotics.  He has been approved for grafts (Puraply and Nushield).  We will plan to do graft prep in two weeks (once abx are complete) and then begin grafting.    Of note, we discussed today the need for proper support in his wheelchair to offload the pressure.  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  Also, he does have a low air loss mattress, but that mattress is on top of a "regular" mattress and he reports springs are protruding.  He needs a proper bedframe for the low airloss mattress.     12/4/23 - Mr. Jarrett returns for followup on 2 wounds.  He continues to use topical abx on both wounds.  The right hip continues to be concerning.  The tensor fascia ronny is till exposed in the wound bed.  We are mixing the topical abx with basal gel to prevent drying.  The " sacral wound is stable but has had a slight increase in size.  Abx are being used on this wound as well. We are attempting to authorize grafts on the right hip wound.    11/27/23 - the patient returns today for followup on pressure injuries to the right hip and the sacrum.  He has received his topical antibiotics (Linezolid/Gentamincin) and started using the antibiotics on Saturday, 11/25/23.  He did not receive basal gel with that order so we have contacted [facility] to send the patient that product as the antibiotics are very drying and they are being applied directly to his tendon.  He must have the moisture of the basal gel.  In the time being, we will mix his topical antibiotics with mupirocin antibiotic.  Today both wounds remains stable.  He was instructed to use the antibiotics on both wounds to avoid cross contamination.    11/20/23 - Mr. Jarrett returns for f/up on the wound to the right hip and the sacrum.  At his last visit the right hip was cultured and it was positive to have Pseudomonas.  He has been using Vinegar washes since 11/06/2023.  He does have a sensitivity to levofloxacin and so can not take that medication.  We have requested a topical recommendation and he is being prescribed linezolid/gentamicin to be applied directly into the wound.  If we do not see improvement from that topical medication we will be moving forward with an IV antibiotic.  He does have exposed ligament (possibly ischiofemoral ligament) in this wound.  The patient does still have a mediport so that would be the next best solution for him due to his sensitivity.  The wound at the sacrum remains stable and we are currently using silver alginate on both wounds.    11/6/23 - the patient seen today for followup on the right hip and sacral pressure wounds.  Although the sacrum is stable, the right hip does continue to deteriorate.  The right hip was cultured today.  We have been using Endoform and/or collagen on  these wounds and unfortunately these products or causing excessive moisture and deterioration to the wounds.  We will discontinue both products and use only silver alginate for the time being.  The right hip does have tendon exposed at this time.  The patient was reminded and encouraged to continue to offload both the right hip in the sacrum which he reports that he is wearing.  However, on the deterioration of the wounds it is concerning.    10/30/23 - Mr. Jarrett to clinic today for followup on the wound to the sacrum as well as the right hip.  Today, both have deteriorated.  We have been using collagen to the wound beds and it appears is though they are stain to wet.  There is still tendon present in the wound bed of the right hip.  We will discontinue the collagen and begin application Endoform to both wounds, changing every other day, covered with silver alginate.  He was reminded to offload as much as possible to prevent further deterioration.  Of note, reported that is blood pressure has been low, and it was very low today, 86/54.  He states that he noticed it last week that he has stopped taking Tylenol, aspirin, and probiotics.  We discussed today that it is unlikely that any of these medications would impact blood pressure but he prefers to remain off of them.    10/16/23 - The patient returns today for followup on the pressure injury to the right hip as well as the sacrum.  Today, there is now tendon exposed in the wound bed the right hip pressure injury.  We discussed today that it is imperative that the patient offload to allow this wound to heal so that he does not develop osteomyelitis in this hip as well.  The pressure injury to the sacrum has also deteriorated.  We discussed that now that the amputation to the left hip has fully healed, he must take the opportunity to begin offloading that right hip to allow it to heal.  We will begin applying collagen every other day to both wound beds.    10/9/23  The patient returns today for the open wound to the right hip and sacrum.  The surgical incision to the left hip is now resolved and will no longer be followed.  Both the right hip and sacrum are wounds that have reopened.  We will begin application of collagen every third day to both wounds.  Neither wound has any signs and symptoms of infection.  He will return in two weeks. Of note, the patient reports that he has had diarrhea for several days, up to about two weeks.  He has had no treatment and is currently using only probiotics.  He has been prescribed imodium and advised to use that medication no more than 4 days.  He is to contact his PCP if the diarrhea does not resolve within that time period.     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 Sandy Owusu on 8/21/23.  This wound has progress except personally well and is remaining closed.  The patient is using only iodine along the surgical incision line.  He does have a follow up with his surgeon tomorrow, 09/26/2023.  He was advised today that he can begin application coconut oil with vitamin C along the closed incision line which is fully approximated.  We will follow this incision line for one additional visit then d/c if no issues develop.    He has had a long term wound at the sacrum which today is open again.  It was mechanically debrided.  Endoform as applied to the wound bed, which will be left in place until Friday at which time he will change to silver alginate.     Review of Systems   Constitutional: Negative.    Respiratory: Negative.     Cardiovascular: Negative.    Skin:         As documented in the HPI.   All other systems reviewed and are negative.        Objective:      There were no vitals filed for this visit.    Physical Exam  Vitals and nursing note reviewed. Exam conducted with a chaperone present.   Constitutional:       Appearance: Normal appearance.   Cardiovascular:      Rate and  Rhythm: Normal rate.   Pulmonary:      Effort: Pulmonary effort is normal.   Skin:     General: Skin is warm and dry.      Comments: sacral pressure injury, right hip, trochanteric, pressure injury   Neurological:      Mental Status: He is alert.            Altered Skin Integrity 09/25/23 1153 Sacral spine #1 (Active)   09/25/23 1153   Altered Skin Integrity Present on Admission - Did Patient arrive to the hospital with altered skin?: yes   Side:    Orientation:    Location: Sacral spine   Wound Number: #1   Is this injury device related?:    Primary Wound Type:    Description of Altered Skin Integrity:    Ankle-Brachial Index:    Pulses:    Removal Indication and Assessment:    Wound Outcome:    (Retired) Wound Length (cm):    (Retired) Wound Width (cm):    (Retired) Depth (cm):    Wound Description (Comments):    Removal Indications:    Dressing Appearance Dried drainage 03/19/24 1138   Drainage Amount Moderate 03/19/24 1138   Drainage Characteristics/Odor Serosanguineous 03/19/24 1138   Appearance Pink;Red 03/19/24 1138   Tissue loss description Full thickness 03/19/24 1138   Periwound Area Intact 03/19/24 1138   Wound Edges Open 03/19/24 1138   Wound Length (cm) 0.8 cm 03/19/24 1138   Wound Width (cm) 0.5 cm 03/19/24 1138   Wound Depth (cm) 0.4 cm 03/19/24 1138   Wound Volume (cm^3) 0.16 cm^3 03/19/24 1138   Wound Surface Area (cm^2) 0.4 cm^2 03/19/24 1138   Care Cleansed with:;Wound cleanser 03/19/24 1138   Dressing Applied 03/19/24 1211   Dressing Change Due 03/22/24 03/19/24 1211            Altered Skin Integrity 10/09/23 1141 Right Hip #2 (Active)   10/09/23 1141   Altered Skin Integrity Present on Admission - Did Patient arrive to the hospital with altered skin?: yes   Side: Right   Orientation:    Location: Hip   Wound Number: #2   Is this injury device related?: No   Primary Wound Type:    Description of Altered Skin Integrity:    Ankle-Brachial Index:    Pulses:    Removal Indication and Assessment:     Wound Outcome:    (Retired) Wound Length (cm):    (Retired) Wound Width (cm):    (Retired) Depth (cm):    Wound Description (Comments):    Removal Indications:    Dressing Appearance Moist drainage 03/19/24 1138   Drainage Amount Moderate 03/19/24 1138   Drainage Characteristics/Odor Serosanguineous 03/19/24 1138   Appearance Tendon;Moist;Pink;Slough 03/19/24 1138   Tissue loss description Full thickness 03/19/24 1138   Periwound Area Intact 03/19/24 1138   Wound Edges Open 03/19/24 1138   Wound Length (cm) 1.7 cm 03/19/24 1138   Wound Width (cm) 1.3 cm 03/19/24 1138   Wound Depth (cm) 1 cm 03/19/24 1138   Wound Volume (cm^3) 2.21 cm^3 03/19/24 1138   Wound Surface Area (cm^2) 2.21 cm^2 03/19/24 1138   Undermining (depth (cm)/location) 0.9cm circumferential 03/19/24 1138   Care Cleansed with:;Wound cleanser 03/19/24 1138   Dressing Applied 03/19/24 1211   Dressing Change Due 03/21/24 03/19/24 1211            Altered Skin Integrity 02/27/24 1311 Right Elbow #3 (Active)   02/27/24 1311   Altered Skin Integrity Present on Admission - Did Patient arrive to the hospital with altered skin?: yes   Side: Right   Orientation:    Location: Elbow   Wound Number: #3   Is this injury device related?: No   Primary Wound Type:    Description of Altered Skin Integrity:    Ankle-Brachial Index:    Pulses:    Removal Indication and Assessment:    Wound Outcome:    (Retired) Wound Length (cm):    (Retired) Wound Width (cm):    (Retired) Depth (cm):    Wound Description (Comments):    Removal Indications:    Dressing Appearance Dried drainage 03/19/24 1138   Drainage Amount Moderate 03/19/24 1138   Drainage Characteristics/Odor Serosanguineous 03/19/24 1138   Appearance Pink;Dry 03/19/24 1138   Tissue loss description Full thickness 03/19/24 1138   Periwound Area Intact;Dry 03/19/24 1138   Wound Edges Open 03/19/24 1138   Wound Length (cm) 0.5 cm 03/19/24 1138   Wound Width (cm) 0.4 cm 03/19/24 1138   Wound Depth (cm) 0.2 cm 03/19/24  "1138   Wound Volume (cm^3) 0.04 cm^3 03/19/24 1138   Wound Surface Area (cm^2) 0.2 cm^2 03/19/24 1138   Care Cleansed with:;Wound cleanser 03/19/24 1138   Dressing Applied 03/19/24 1211   Dressing Change Due 03/22/24 03/19/24 1211     03/19/24      Right elbow - pre aden.                                  Right elbow - post aden.       Right hip                                                       Sacrum   ML  Assessment:           ICD-10-CM ICD-9-CM   1. 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 Open wound of right hip and thigh with tendon involvement, subsequent encounter  S71.001D V58.89    S71.101D 890.2    S76.001D     S76.901D    3. Pressure injury of sacral region, stage 4  L89.154 707.03     707.24   4. Quadriplegia  G82.50 344.00           Procedures:     Debridement     Date/Time: 3/19/2024 11:13 AM     Performed by: Ashley Coto NP  Authorized by: Ashley Coto NP    Time out: Immediately prior to procedure a "time out" was called to verify the correct patient, procedure, equipment, support staff and site/side marked as required.     Consent Done?:  Yes (Verbal)     Preparation: Patient was prepped and draped with clean technique    Local anesthesia used?: No       Wound Details:    Location:  Right elbow    Type of Debridement:  Non-excisional       Length (cm):  0.5       Area (sq cm):  0.2       Width (cm):  0.4       Percent Debrided (%):  100       Depth (cm):  0.2       Total Area Debrided (sq cm):  0.2    Depth of debridement:  Epidermis/Dermis    Devitalized tissue debrided:  Biofilm, Callus, Exudate and Fibrin    Instruments:  Curette (Dermal)  Bleeding:  None  Patient tolerance:  Patient tolerated the procedure well with no immediate complications          [] Yes [] No   I & D performed  [] Yes [] No   Excisional debridement performed  [x] Yes [] No   Selective debridement performed        [] Yes [] No   Mechanical " debridement performed         [] Yes [] No  Silver nitrate applied                                     [] Yes [] No  Labs ordered this visit                                  [] Yes [] No   Imaging ordered this visit                           [] Yes [] No   Tissue pathology and/or culture taken           MEDICATIONS    Current Outpatient Medications:     acetaminophen (TYLENOL) 325 MG tablet, Take 650 mg by mouth every 6 (six) hours as needed for Pain., Disp: , Rfl:     ascorbic acid, vitamin C, (VITAMIN C) 1000 MG tablet, Take 1,000 mg by mouth every evening., Disp: , Rfl:     aspirin 325 MG tablet, 1 tablet Orally Once a day for 30 days, Disp: , Rfl:     baclofen (LIORESAL) 5 mg Tab tablet, Take 1 tablet (5 mg total) by mouth 3 (three) times daily., Disp: 90 tablet, Rfl: 0    bisacodyL (DULCOLAX) 10 mg Supp, Place 10 mg rectally daily as needed., Disp: , Rfl:     busPIRone (BUSPAR) 10 MG tablet, Take 1 tablet (10 mg total) by mouth 2 (two) times daily., Disp: 60 tablet, Rfl: 5    cetirizine (ZYRTEC) 10 MG tablet, Take 10 mg by mouth 2 (two) times a day., Disp: , Rfl:     cloNIDine (CATAPRES) 0.1 MG tablet, Take 0.1 mg by mouth daily as needed., Disp: , Rfl:     DULoxetine (CYMBALTA) 30 MG capsule, Take 1 capsule (30 mg total) by mouth 2 (two) times daily., Disp: 60 capsule, Rfl: 1    ferrous sulfate (SLOW RELEASE IRON) 142 mg (45 mg iron) TbSR, Take 324 mg by mouth once daily., Disp: , Rfl:     fexofenadine (ALLEGRA) 180 MG tablet, Take 180 mg by mouth every morning., Disp: , Rfl:     meloxicam (MOBIC) 7.5 MG tablet, Take 7.5 mg by mouth 2 (two) times daily as needed for Pain., Disp: , Rfl:     multivitamin/iron/folic acid (CENTRUM ORAL), Take 1 tablet by mouth every morning., Disp: , Rfl:     mupirocin (BACTROBAN) 2 % ointment, Apply topically once daily., Disp: 30 g, Rfl: 1    NON FORMULARY MEDICATION, Take 3 drops by mouth 2 (two) times daily as needed. THC, Disp: , Rfl:     testosterone (ANDROGEL) 20.25 mg/1.25  "gram (1.62 %) GlPm, 1 pump to skin in the morning to shoulder, upper arms or abdomen Transdermal Once a day for 30 days, Disp: , Rfl:     zinc gluconate 50 mg tablet, Take 1 tablet (50 mg total) by mouth once daily., Disp: 30 tablet, Rfl: 1   Review of patient's allergies indicates:   Allergen Reactions    Levofloxacin Rash       DIAGNOSTICS      Labs/Scans/Micro:    CBC:   Lab Results   Component Value Date    WBC 4.89 09/05/2023    HGB 10.1 (L) 09/05/2023    HCT 33.9 (L) 09/05/2023    MCV 80.0 09/05/2023     09/05/2023       Sedimentation rate:   Lab Results   Component Value Date    SEDRATE 22 (H) 12/15/2022    C-reactive protein:   Lab Results   Component Value Date    CRP 44.90 (H) 12/15/2022    Chemistry: [unfilled]  HBA1C: No components found for: "HBA1C"    Ankle Brachial Index: No results found for this or any previous visit.    Imaging:    Plain film: No results found for this or any previous visit.    MRI: No results found for this or any previous visit.    Bone Scan: No results found for this or any previous visit.    Vascular studies:    Microbiology: No results found for: "MICRO"        HOME [facility]ALTH AGENCY: [facility]     WOUND CARE ORDERS:  Sacrum: DO NOT  remove dressing, On Friday 3/22, cleanse wound with wound cleanser and apply silver alginate, 4x4 gauze and gentle border - to be changed daily.   Right elbow: DO NOT  remove dressing, On Friday 3/22, cleanse wound with wound cleanser and apply medihjoney to wound bed  cover with a dry dressing, apply tubigrip over bandage - to be changed daily.  Right hip: Cleanse with wound cleanser, cover tendon with Vaseline gauze, apply silver alginate over - to be changed daily.         Follow up in about 1 week (around 3/26/2024) for Provider Visit.        "

## 2024-04-02 ENCOUNTER — HOSPITAL ENCOUNTER (OUTPATIENT)
Dept: WOUND CARE | Facility: HOSPITAL | Age: 44
Discharge: HOME | End: 2024-04-02
Attending: NURSE PRACTITIONER
Payer: MEDICARE

## 2024-04-02 VITALS
RESPIRATION RATE: 16 BRPM | BODY MASS INDEX: 19.19 KG/M2 | DIASTOLIC BLOOD PRESSURE: 83 MMHG | HEART RATE: 68 BPM | SYSTOLIC BLOOD PRESSURE: 140 MMHG | WEIGHT: 134.06 LBS | HEIGHT: 70 IN

## 2024-04-02 DIAGNOSIS — S71.101D OPEN WOUND OF RIGHT HIP AND THIGH WITH TENDON INVOLVEMENT, SUBSEQUENT ENCOUNTER: Primary | ICD-10-CM

## 2024-04-02 DIAGNOSIS — S76.001D OPEN WOUND OF RIGHT HIP AND THIGH WITH TENDON INVOLVEMENT, SUBSEQUENT ENCOUNTER: Primary | ICD-10-CM

## 2024-04-02 DIAGNOSIS — S71.001D OPEN WOUND OF RIGHT HIP AND THIGH WITH TENDON INVOLVEMENT, SUBSEQUENT ENCOUNTER: Primary | ICD-10-CM

## 2024-04-02 DIAGNOSIS — G82.50 QUADRIPLEGIA: ICD-10-CM

## 2024-04-02 DIAGNOSIS — L89.44 PRESSURE INJURY OF CONTIGUOUS REGION INVOLVING BACK AND RIGHT HIP, STAGE 4: ICD-10-CM

## 2024-04-02 DIAGNOSIS — S76.901D OPEN WOUND OF RIGHT HIP AND THIGH WITH TENDON INVOLVEMENT, SUBSEQUENT ENCOUNTER: Primary | ICD-10-CM

## 2024-04-02 DIAGNOSIS — L89.154 PRESSURE INJURY OF SACRAL REGION, STAGE 4: ICD-10-CM

## 2024-04-02 PROCEDURE — 97597 DBRDMT OPN WND 1ST 20 CM/<: CPT

## 2024-04-02 PROCEDURE — 27000999 HC MEDICAL RECORD PHOTO DOCUMENTATION

## 2024-04-02 PROCEDURE — 99212 OFFICE O/P EST SF 10 MIN: CPT | Mod: 25

## 2024-04-02 RX ORDER — DULOXETIN HYDROCHLORIDE 30 MG/1
1 CAPSULE, DELAYED RELEASE ORAL 2 TIMES DAILY
COMMUNITY

## 2024-04-02 RX ORDER — FLUTICASONE PROPIONATE 50 MCG
1 SPRAY, SUSPENSION (ML) NASAL DAILY PRN
COMMUNITY

## 2024-04-02 RX ORDER — MULTIVIT-MIN/IRON/FOLIC ACID/K 18-600-40
CAPSULE ORAL
COMMUNITY
End: 2024-05-14

## 2024-04-02 NOTE — PROCEDURES
"Debridement    Date/Time: 4/2/2024 11:20 AM    Performed by: Ashley Coto NP  Authorized by: Ashley Coto NP    Time out: Immediately prior to procedure a "time out" was called to verify the correct patient, procedure, equipment, support staff and site/side marked as required.    Consent Done?:  Yes (Verbal)    Preparation: Patient was prepped and draped with clean technique    Local anesthesia used?: No      Wound Details:    Location:  Right elbow    Type of Debridement:  Non-excisional       Length (cm):  0.4       Area (sq cm):  0.12       Width (cm):  0.3       Percent Debrided (%):  100       Depth (cm):  0.3       Total Area Debrided (sq cm):  0.12    Depth of debridement:  Epidermis/Dermis    Devitalized tissue debrided:  Biofilm, Callus and Exudate    Instruments:  Curette (Dermal)  Bleeding:  None  Patient tolerance:  Patient tolerated the procedure well with no immediate complications  Specimen Collected: Specimen sent to microbiology    "

## 2024-04-02 NOTE — PROGRESS NOTES
Referred by: Dr. Sidhu  Low air loss mattress [x] Yes [] No   Is the patient eligible for a graft, and/or currently grafting?  [x] Yes [] No  (right hip - nushield)     Subjective:         Patient ID: Werner Jarrett is a 43 y.o. male.    Chief Complaint: Pressure Ulcer ((Sacrum - stage 4 /Right hip - stage 4 /Right elbow - stage 3)    4/2/24 - The patient was referred back to his surgeon, Dr. Macie Carbajal, who he saw on 3/26/24.  He reports that she is concerned over possible abscess in the space, as well as infection.  A culture was obtained today.  She did prescribe Augmentin, to begin tomorrow, as a prophylaxis for the hip. He is expecting to have an MRI schedule prior to the follow up with her on 4/9/24 at which time they will discuss the MRI results, and options. Today the right hip wound has increasing moisture and bogginess.  The sacrum remains stable.  The right elbow as debrided and is nearly closed.     3/19/24 - We have been using NuShield grafts on the open pressure ulcer at the patient's right hip.  Tendon remains exposed in the wound bed, the margins are nereida, epibole and closed.  Through a series of several weeks the wound margins have been scored with a scalpel in an attempt to open these closed margins, to no avail.  Today the graft was held, and the patient will be seen by his surgeon, Dr. Sandy Jara next Tuesday, 3/26/24, for consultation regarding options to possible reopen the margins which might bring a successful closure to this wound based on how it is currently healed.  For the time being we will apply adaptic over the tendon, with silver alginate dressing on top.  Both the right elbow and the sacrum remain stable.  We are currently using Endoform on both.  Today the elbow was selectively debrided.     3/12/24 - The pressure injury to the right hip is being grafted using NuShield grafts.  Today graft #6 was applied.  There has been no significant change to this wound  since grafting.  Graft #7 has been ordered.  However, the patient was advised to scheduled with his surgeon, Dr. Sandy Jara, for evaluation to determine if she will need to do further surgery to assist this wound with closure since the margins are rolled.  They were again scored with a scaple to attempt to stimulate the growth of epithelial tissue, which has been done several times to no avail.  The right elbow is progressing well.  The sacrum remains stable with no S & S of infection.  He has received his new power wheelchair.  We are hopeful that this device might contribute to progress with his wounds.     3/5/24 - Mr. Jarrett is seen today for followup on 3 wounds.  The wound to the right elbow was selectively debrided and it does show considerable improvement.  The wound to the sacrum is stable.  We will begin application of moistened Endoform to both of these wounds to be changed on Friday to silver alginate.  The wound to the right hip is slightly moist with a little bit of drainage.  This is to be expected, however, due to the fact that we are grafting that wound.  There has been no significant change to the size of the wound, unfortunately, in spite of grafting multiple times.  We will continue the process with the hope of making some progress.    2/27/24 - Mr. Jarrett returns to clinic today for followup on 3 areas of concern.  The pressure injury remains stable with no significant change.  He has a new wound to the right elbow.  This has developed due to dryness and cracking which has opened into a wound.  The elbow was selectively debrided using a dermal curette.  We will begin applying MediHoney to the elbow, covering with 4 x 4 gauze, Cover and with a Tubigrip with the hopes of softening this callused tissue further.  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  [facility].  Today we applied graft #4 after the wound margins were crosshatched to  try to allow for growth epithelial tissue.  Again this week, there was no significant change in that wound.  Ligament is still fully exposed.  Graft #5 is being ordered today.    2/20/24 - the patient returns to clinic today for followup on a small pressure injury to the sacrum as well as a large stage IV pressure injury to the right hip.  We are currently going through the grafting process.  Today we applied graft #3, which is the 1st of the [facility] grafts.  So far we have not seen any significant change in the wound and the ligament is still fully exposed.  A # 15 scalpel was used to cross keen the wound margin due to rolling of that margin in the need to stimulate the growth of epithelial tissue as well as granular tissue.  The sacrum remains stable and basically the same size.      2/12/24 - Mr. Jarrett is seen today in follow up of two wounds, both of which remain stable.  The left hip is being grafted, and today PuraPly #2 was applied.  There is no significant change since graft #1 was applied.  Today graft #3 was ordered.  We will change from PuraPly to Epifix at this time.  The sacrum remains stable.  We have applied Endoform into that wound and it will be treated the same as a graft, with the hope that the patient does not have BMs which necessitate removal of the dressing. The patient is eligible and is in need of a new power wheelchair.      2/6/24 - Mr. Jarrett returns to clinic today with 2 wounds.  The very small stage IV pressure injury at the sacrum remains stable.  Today, the wound at the right hip, stage IV pressure injury, will receive the 1st graft application.  We are applying PuraPly grafts at this time and then we will change to NuShield after two PuraPly.  Of note, he is waiting on a demo to be shown to him for a possible new wheelchair.  PuraPly #2 ordered.    January 30, 2024:  43-year-old white male, with paraplegia, is here for follow up of the right hip pressure injury, and the sacral  pressure injury.  The wounds are looking similar to the last visit.  He is getting ready to have skin substitute applied to the right hip wound.  There is white tissue in the wound bed, presumably tendon.  I do not see pink granulation tissue.  He has no longer using topical antibiotics.    1/23/24 - Mr. Jarrett returns today for followup on 2 wounds.  Today the sacral pressure injury remains stable and clean.  We have been using topical antibiotics on both of these wounds for several weeks.  That has been discontinued today.  The pressure injury at the right hip was selectively debrided today using the ultrasonic debrider and graft prep was performed.  We will begin applying a small amount of mupirocin ointment on to this wound daily in preparation beginning the grafting process next week.  We will be use PuraPly/NuShield grafts.  Of note, the patient is responsible for contacting calor at the [facility] to have his measurements taken.  He has not done that as of today.    December 26, 2023:  43-year-old white male, with paraplegia, is here for follow up of a right hip pressure injury, and a sacral pressure injury.  He has been using topical antibiotics.  The measurements are about the same as last visit.  The nurse practitioner has discussed a possible skin substitute on the right hip.    12/11/23 - Mr. Jarrett for followup on the pressure ulcers to the right hip and sacrum.  We started using topical antibiotics on both wounds on 11/21/2023.  Additionally, the patient has been using the vinegar washes for sometime.  He was instructed to discontinue the use of the vinegar washes today and use only the topical antibiotics.  He has been approved for grafts (Puraply and Nushield).  We will plan to do graft prep in two weeks (once abx are complete) and then begin grafting.    Of note, we discussed today the need for proper support in his wheelchair to offload the pressure.  Since his amputation of the left leg he  "is sitting off balance and needs to have a cushion mapping completed as this is causing consistent pressure on the right hip.  Also, he does have a low air loss mattress, but that mattress is on top of a "regular" mattress and he reports springs are protruding.  He needs a proper bedframe for the low airloss mattress.     12/4/23 - Mr. Jarrett returns for followup on 2 wounds.  He continues to use topical abx on both wounds.  The right hip continues to be concerning.  The tensor fascia ronny is till exposed in the wound bed.  We are mixing the topical abx with basal gel to prevent drying.  The sacral wound is stable but has had a slight increase in size.  Abx are being used on this wound as well. We are attempting to authorize grafts on the right hip wound.    11/27/23 - the patient returns today for followup on pressure injuries to the right hip and the sacrum.  He has received his topical antibiotics (Linezolid/Gentamincin) and started using the antibiotics on Saturday, 11/25/23.  He did not receive basal gel with that order so we have contacted [facility] to send the patient that product as the antibiotics are very drying and they are being applied directly to his tendon.  He must have the moisture of the basal gel.  In the time being, we will mix his topical antibiotics with mupirocin antibiotic.  Today both wounds remains stable.  He was instructed to use the antibiotics on both wounds to avoid cross contamination.    11/20/23 - Mr. Jarrett returns for f/up on the wound to the right hip and the sacrum.  At his last visit the right hip was cultured and it was positive to have Pseudomonas.  He has been using Vinegar washes since 11/06/2023.  He does have a sensitivity to levofloxacin and so can not take that medication.  We have requested a topical recommendation and he is being prescribed linezolid/gentamicin to be applied directly into the wound.  If we do not see improvement from that topical " medication we will be moving forward with an IV antibiotic.  He does have exposed ligament (possibly ischiofemoral ligament) in this wound.  The patient does still have a mediport so that would be the next best solution for him due to his sensitivity.  The wound at the sacrum remains stable and we are currently using silver alginate on both wounds.    11/6/23 - the patient seen today for followup on the right hip and sacral pressure wounds.  Although the sacrum is stable, the right hip does continue to deteriorate.  The right hip was cultured today.  We have been using Endoform and/or collagen on these wounds and unfortunately these products or causing excessive moisture and deterioration to the wounds.  We will discontinue both products and use only silver alginate for the time being.  The right hip does have tendon exposed at this time.  The patient was reminded and encouraged to continue to offload both the right hip in the sacrum which he reports that he is wearing.  However, on the deterioration of the wounds it is concerning.    10/30/23 - Mr. Jarrett to clinic today for followup on the wound to the sacrum as well as the right hip.  Today, both have deteriorated.  We have been using collagen to the wound beds and it appears is though they are stain to wet.  There is still tendon present in the wound bed of the right hip.  We will discontinue the collagen and begin application Endoform to both wounds, changing every other day, covered with silver alginate.  He was reminded to offload as much as possible to prevent further deterioration.  Of note, reported that is blood pressure has been low, and it was very low today, 86/54.  He states that he noticed it last week that he has stopped taking Tylenol, aspirin, and probiotics.  We discussed today that it is unlikely that any of these medications would impact blood pressure but he prefers to remain off of them.    10/16/23 - The patient returns today for followup on  the pressure injury to the right hip as well as the sacrum.  Today, there is now tendon exposed in the wound bed the right hip pressure injury.  We discussed today that it is imperative that the patient offload to allow this wound to heal so that he does not develop osteomyelitis in this hip as well.  The pressure injury to the sacrum has also deteriorated.  We discussed that now that the amputation to the left hip has fully healed, he must take the opportunity to begin offloading that right hip to allow it to heal.  We will begin applying collagen every other day to both wound beds.    10/9/23 The patient returns today for the open wound to the right hip and sacrum.  The surgical incision to the left hip is now resolved and will no longer be followed.  Both the right hip and sacrum are wounds that have reopened.  We will begin application of collagen every third day to both wounds.  Neither wound has any signs and symptoms of infection.  He will return in two weeks. Of note, the patient reports that he has had diarrhea for several days, up to about two weeks.  He has had no treatment and is currently using only probiotics.  He has been prescribed imodium and advised to use that medication no more than 4 days.  He is to contact his PCP if the diarrhea does not resolve within that time period.     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 Sandy Owusu on 8/21/23.  This wound has progress except personally well and is remaining closed.  The patient is using only iodine along the surgical incision line.  He does have a follow up with his surgeon tomorrow, 09/26/2023.  He was advised today that he can begin application coconut oil with vitamin C along the closed incision line which is fully approximated.  We will follow this incision line for one additional visit then d/c if no issues develop.    He has had a long term wound at the sacrum which today is open again.   It was mechanically debrided.  Endoform as applied to the wound bed, which will be left in place until Friday at which time he will change to silver alginate.     Review of Systems   Constitutional: Negative.    Respiratory: Negative.     Cardiovascular: Negative.    Skin:         As documented in the HPI.   All other systems reviewed and are negative.        Objective:      Vitals:    04/02/24 1253   BP: (!) 140/83   Pulse: 68   Resp: 16       Physical Exam  Vitals and nursing note reviewed. Exam conducted with a chaperone present.   Constitutional:       Appearance: Normal appearance.   Cardiovascular:      Rate and Rhythm: Normal rate.   Pulmonary:      Effort: Pulmonary effort is normal.   Skin:     General: Skin is warm and dry.      Comments: sacral pressure injury, right hip, trochanteric, pressure injury   Neurological:      Mental Status: He is alert.            Altered Skin Integrity 09/25/23 1153 Sacral spine #1 (Active)   09/25/23 1153   Altered Skin Integrity Present on Admission - Did Patient arrive to the hospital with altered skin?: yes   Side:    Orientation:    Location: Sacral spine   Wound Number: #1   Is this injury device related?:    Primary Wound Type:    Description of Altered Skin Integrity:    Ankle-Brachial Index:    Pulses:    Removal Indication and Assessment:    Wound Outcome:    (Retired) Wound Length (cm):    (Retired) Wound Width (cm):    (Retired) Depth (cm):    Wound Description (Comments):    Removal Indications:    Description of Altered Skin Integrity Full thickness tissue loss. Subcutaneous fat may be visible but bone, tendon or muscle are not exposed 04/02/24 1139   Dressing Appearance Intact;Moist drainage 04/02/24 1139   Drainage Amount Moderate 04/02/24 1139   Drainage Characteristics/Odor Serosanguineous 04/02/24 1139   Appearance Intact;Slough;Moist;Epithelialization;Granulating 04/02/24 1139   Tissue loss description Full thickness 04/02/24 1139   Periwound Area Intact  04/02/24 1139   Wound Edges Open 04/02/24 1139   Wound Length (cm) 0.3 cm 04/02/24 1139   Wound Width (cm) 0.2 cm 04/02/24 1139   Wound Depth (cm) 0.2 cm 04/02/24 1139   Wound Volume (cm^3) 0.012 cm^3 04/02/24 1139   Wound Surface Area (cm^2) 0.06 cm^2 04/02/24 1139   Care Cleansed with:;Wound cleanser 04/02/24 1139   Dressing Applied 04/02/24 1139   Dressing Change Due 04/03/24 04/02/24 1139            Altered Skin Integrity 10/09/23 1141 Right Hip #2 (Active)   10/09/23 1141   Altered Skin Integrity Present on Admission - Did Patient arrive to the hospital with altered skin?: yes   Side: Right   Orientation:    Location: Hip   Wound Number: #2   Is this injury device related?: No   Primary Wound Type:    Description of Altered Skin Integrity:    Ankle-Brachial Index:    Pulses:    Removal Indication and Assessment:    Wound Outcome:    (Retired) Wound Length (cm):    (Retired) Wound Width (cm):    (Retired) Depth (cm):    Wound Description (Comments):    Removal Indications:    Description of Altered Skin Integrity Full thickness tissue loss. Subcutaneous fat may be visible but bone, tendon or muscle are not exposed 04/02/24 1139   Dressing Appearance Moist drainage;Intact 04/02/24 1139   Drainage Amount Moderate 04/02/24 1139   Drainage Characteristics/Odor Serous;Yellow 04/02/24 1139   Appearance Intact;Slough;Moist;Not granulating;Tendon 04/02/24 1139   Tissue loss description Full thickness 04/02/24 1139   Periwound Area Intact;Moist 04/02/24 1139   Wound Edges Open 04/02/24 1139   Wound Length (cm) 1.6 cm 04/02/24 1139   Wound Width (cm) 1 cm 04/02/24 1139   Wound Depth (cm) 1 cm 04/02/24 1139   Wound Volume (cm^3) 1.6 cm^3 04/02/24 1139   Wound Surface Area (cm^2) 1.6 cm^2 04/02/24 1139   Tunneling (depth (cm)/location) 3.0cm at 9 o'clock 04/02/24 1139   Undermining (depth (cm)/location) 0.9cm circumferential 04/02/24 1139   Care Cleansed with:;Wound cleanser 04/02/24 1139   Dressing Applied;Other (comment)  04/02/24 1139   Dressing Change Due 04/03/24 04/02/24 1139            Altered Skin Integrity 02/27/24 1311 Right Elbow #3 (Active)   02/27/24 1311   Altered Skin Integrity Present on Admission - Did Patient arrive to the hospital with altered skin?: yes   Side: Right   Orientation:    Location: Elbow   Wound Number: #3   Is this injury device related?: No   Primary Wound Type:    Description of Altered Skin Integrity:    Ankle-Brachial Index:    Pulses:    Removal Indication and Assessment:    Wound Outcome:    (Retired) Wound Length (cm):    (Retired) Wound Width (cm):    (Retired) Depth (cm):    Wound Description (Comments):    Removal Indications:    Dressing Appearance Intact;Dried drainage 04/02/24 1139   Drainage Amount Moderate 04/02/24 1139   Drainage Characteristics/Odor Serosanguineous 04/02/24 1139   Appearance Intact;Eschar;Epithelialization 04/02/24 1139   Tissue loss description Full thickness 04/02/24 1139   Periwound Area Intact 04/02/24 1139   Wound Edges Open 04/02/24 1139   Wound Length (cm) 0.4 cm 04/02/24 1139   Wound Width (cm) 0.3 cm 04/02/24 1139   Wound Depth (cm) 0.3 cm 04/02/24 1139   Wound Volume (cm^3) 0.036 cm^3 04/02/24 1139   Wound Surface Area (cm^2) 0.12 cm^2 04/02/24 1139   Care Cleansed with:;Other (see comments);Debrided 04/02/24 1139   Dressing Applied 04/02/24 1139   Dressing Change Due 04/03/24 04/02/24 1139     4/2/24    Right hip  Silver alginate, 4x4, island dressing        Sacrum  Silver alginate, 4x4, sacral foam border        Right elbow- Pre                                         Post  Island dressing  TR      Assessment:           ICD-10-CM ICD-9-CM   1. Open wound of right hip and thigh with tendon involvement, subsequent encounter  S71.001D V58.89    S71.101D 890.2    S76.001D     S76.901D    2. Pressure injury of contiguous region involving back and right hip, stage 4  L89.44 707.09     707.24   3. Pressure injury of sacral region, stage 4  L89.154 707.03      "707.24   4. Quadriplegia  G82.50 344.00             Procedures:     Debridement     Date/Time: 4/2/2024 11:20 AM     Performed by: Ashley Coto NP  Authorized by: Ashley Coto NP    Time out: Immediately prior to procedure a "time out" was called to verify the correct patient, procedure, equipment, support staff and site/side marked as required.     Consent Done?:  Yes (Verbal)     Preparation: Patient was prepped and draped with clean technique    Local anesthesia used?: No       Wound Details:    Location:  Right elbow    Type of Debridement:  Non-excisional       Length (cm):  0.4       Area (sq cm):  0.12       Width (cm):  0.3       Percent Debrided (%):  100       Depth (cm):  0.3       Total Area Debrided (sq cm):  0.12    Depth of debridement:  Epidermis/Dermis    Devitalized tissue debrided:  Biofilm, Callus and Exudate    Instruments:  Curette (Dermal)  Bleeding:  None  Patient tolerance:  Patient tolerated the procedure well with no immediate complications  Specimen Collected: Specimen sent to microbiology, right hip          [] Yes [] No   I & D performed  [] Yes [] No   Excisional debridement performed  [] Yes [] No   Selective debridement performed        [] Yes [] No   Mechanical debridement performed         [] Yes [] No  Silver nitrate applied                                     [] Yes [] No  Labs ordered this visit                                  [] Yes [] No   Imaging ordered this visit                           [x] Yes [] No   Tissue pathology and/or culture taken  (Right hip- 4/2/24)         MEDICATIONS    Current Outpatient Medications:     acetaminophen (TYLENOL) 325 MG tablet, Take 650 mg by mouth every 6 (six) hours as needed for Pain., Disp: , Rfl:     ascorbic acid, vitamin C, (VITAMIN C) 1000 MG tablet, Take 1,000 mg by mouth every evening., Disp: , Rfl:     ascorbic acid, vitamin C, 500 mg Cap, as directed Orally Once a day, Disp: , Rfl:     aspirin 325 MG tablet, 1 tablet " Orally Once a day for 30 days, Disp: , Rfl:     baclofen (LIORESAL) 5 mg Tab tablet, Take 1 tablet (5 mg total) by mouth 3 (three) times daily., Disp: 90 tablet, Rfl: 0    bisacodyL (DULCOLAX) 10 mg Supp, Place 10 mg rectally daily as needed., Disp: , Rfl:     busPIRone (BUSPAR) 10 MG tablet, Take 1 tablet (10 mg total) by mouth 2 (two) times daily., Disp: 60 tablet, Rfl: 5    cetirizine (ZYRTEC) 10 MG tablet, Take 10 mg by mouth 2 (two) times a day., Disp: , Rfl:     cloNIDine (CATAPRES) 0.1 MG tablet, Take 0.1 mg by mouth daily as needed., Disp: , Rfl:     DULoxetine (CYMBALTA) 30 MG capsule, 1 capsule., Disp: , Rfl:     ferrous sulfate (SLOW RELEASE IRON) 142 mg (45 mg iron) TbSR, Take 324 mg by mouth once daily., Disp: , Rfl:     fexofenadine (ALLEGRA) 180 MG tablet, Take 180 mg by mouth every morning., Disp: , Rfl:     fluticasone propionate (FLONASE ALLERGY RELIEF) 50 mcg/actuation nasal spray, 1 spray in each nostril Nasally Once a day, Disp: , Rfl:     meloxicam (MOBIC) 7.5 MG tablet, Take 7.5 mg by mouth 2 (two) times daily as needed for Pain., Disp: , Rfl:     multivitamin/iron/folic acid (CENTRUM ORAL), Take 1 tablet by mouth every morning., Disp: , Rfl:     mupirocin (BACTROBAN) 2 % ointment, Apply topically once daily., Disp: 30 g, Rfl: 1    NON FORMULARY MEDICATION, Take 3 drops by mouth 2 (two) times daily as needed. THC, Disp: , Rfl:     zinc gluconate 50 mg tablet, Take 1 tablet (50 mg total) by mouth once daily., Disp: 30 tablet, Rfl: 1   Review of patient's allergies indicates:   Allergen Reactions    Levofloxacin Rash       DIAGNOSTICS      Labs/Scans/Micro:    CBC:   Lab Results   Component Value Date    WBC 4.89 09/05/2023    HGB 10.1 (L) 09/05/2023    HCT 33.9 (L) 09/05/2023    MCV 80.0 09/05/2023     09/05/2023       Sedimentation rate:   Lab Results   Component Value Date    SEDRATE 22 (H) 12/15/2022    C-reactive protein:   Lab Results   Component Value Date    CRP 44.90 (H)  "12/15/2022    Chemistry: [unfilled]  HBA1C: No components found for: "HBA1C"    Ankle Brachial Index: No results found for this or any previous visit.    Imaging:    Plain film: No results found for this or any previous visit.    MRI: No results found for this or any previous visit.    Bone Scan: No results found for this or any previous visit.    Vascular studies:    Microbiology: Rt hip, 4/2/24        HOME [facility]ALTH [facility]: [facility]     WOUND CARE ORDERS:  Sacrum: Cleanser and apply silver alginate, 4x4 gauze and gentle border - to be changed daily.   Right elbow: Cleanse wound with wound cleanser and apply medihjoney to wound bed  cover with a dry dressing, apply tubigrip over bandage - to be changed daily.  Right hip: Cleanse with wound cleanser, cover tendon with Vaseline gauze, apply silver alginate over - to be changed daily.         Follow up in about 2 weeks (around 4/16/2024).        "

## 2024-04-04 ENCOUNTER — DOCUMENTATION ONLY (OUTPATIENT)
Dept: WOUND CARE | Facility: HOSPITAL | Age: 44
End: 2024-04-04
Payer: MEDICARE

## 2024-04-04 RX ORDER — SULFAMETHOXAZOLE AND TRIMETHOPRIM 800; 160 MG/1; MG/1
1 TABLET ORAL 2 TIMES DAILY
Qty: 20 TABLET | Refills: 0 | Status: SHIPPED | OUTPATIENT
Start: 2024-04-04 | End: 2024-04-14

## 2024-04-04 NOTE — PROGRESS NOTES
Werner Jarrett - please call him and let him know that he also needs to take Bactrim which I have sent to the pharmacy.  He is already on Augmentin which he also needs.  He will also need to come get the topical abx which is being ordered from [facility] (changed from [facility]), and that we are sending his culture to [facility] for review.  But he needs the Bactrim and the topical Gentamicin.  When the gentamicin comes, He will need to pack the wound with kerlix/ns/gentamicin.  No cream, no Basa gel.    (CT) - Spoke with Werner and he is aware of new medication and will pick it up and continue his current medication. He will also be waiting for a call from [facility] and understood how to dress the wound when the topicals arrive. Werner is currently getting his MRI done right now for [facility] and follows up with her on 4/9/24

## 2024-04-15 NOTE — ADDENDUM NOTE
Encounter addended by: Jeanette Sloan RN on: 4/15/2024 2:58 PM   Actions taken: Charge Capture section accepted

## 2024-04-16 ENCOUNTER — HOSPITAL ENCOUNTER (OUTPATIENT)
Dept: WOUND CARE | Facility: HOSPITAL | Age: 44
Discharge: HOME | End: 2024-04-16
Attending: NURSE PRACTITIONER
Payer: MEDICARE

## 2024-04-16 VITALS
DIASTOLIC BLOOD PRESSURE: 66 MMHG | RESPIRATION RATE: 19 BRPM | SYSTOLIC BLOOD PRESSURE: 111 MMHG | BODY MASS INDEX: 19.19 KG/M2 | HEART RATE: 83 BPM | HEIGHT: 70 IN | WEIGHT: 134.06 LBS

## 2024-04-16 DIAGNOSIS — M86.18: Primary | ICD-10-CM

## 2024-04-16 DIAGNOSIS — L89.44 PRESSURE INJURY OF CONTIGUOUS REGION INVOLVING BACK AND RIGHT HIP, STAGE 4: ICD-10-CM

## 2024-04-16 DIAGNOSIS — S71.101D OPEN WOUND OF RIGHT HIP AND THIGH WITH TENDON INVOLVEMENT, SUBSEQUENT ENCOUNTER: ICD-10-CM

## 2024-04-16 DIAGNOSIS — S76.901D OPEN WOUND OF RIGHT HIP AND THIGH WITH TENDON INVOLVEMENT, SUBSEQUENT ENCOUNTER: ICD-10-CM

## 2024-04-16 DIAGNOSIS — S71.001D OPEN WOUND OF RIGHT HIP AND THIGH WITH TENDON INVOLVEMENT, SUBSEQUENT ENCOUNTER: ICD-10-CM

## 2024-04-16 DIAGNOSIS — S76.001D OPEN WOUND OF RIGHT HIP AND THIGH WITH TENDON INVOLVEMENT, SUBSEQUENT ENCOUNTER: ICD-10-CM

## 2024-04-16 DIAGNOSIS — L89.154 PRESSURE INJURY OF SACRAL REGION, STAGE 4: ICD-10-CM

## 2024-04-16 DIAGNOSIS — G82.50 QUADRIPLEGIA: ICD-10-CM

## 2024-04-16 PROCEDURE — 99213 OFFICE O/P EST LOW 20 MIN: CPT | Mod: 25

## 2024-04-16 PROCEDURE — 97597 DBRDMT OPN WND 1ST 20 CM/<: CPT

## 2024-04-16 PROCEDURE — 27000999 HC MEDICAL RECORD PHOTO DOCUMENTATION

## 2024-04-16 RX ORDER — TESTOSTERONE UNDECANOATE 198 MG/1
1 CAPSULE, LIQUID FILLED ORAL EVERY MORNING
COMMUNITY
Start: 2024-04-11 | End: 2024-05-14

## 2024-04-16 RX ORDER — AMOXICILLIN AND CLAVULANATE POTASSIUM 875; 125 MG/1; MG/1
1 TABLET, FILM COATED ORAL 2 TIMES DAILY
COMMUNITY
Start: 2024-04-03 | End: 2024-04-23

## 2024-04-16 NOTE — PROCEDURES
"Debridement    Date/Time: 4/16/2024 11:00 AM    Performed by: Ashley Coto NP  Authorized by: Ashley Coto NP    Time out: Immediately prior to procedure a "time out" was called to verify the correct patient, procedure, equipment, support staff and site/side marked as required.    Consent Done?:  Yes (Verbal)    Preparation: Patient was prepped and draped with clean technique      Wound Details:    Location:  Sacrum    Type of Debridement:  Non-excisional       Length (cm):  0.5       Area (sq cm):  0.3       Width (cm):  0.6       Percent Debrided (%):  100       Depth (cm):  0.5       Total Area Debrided (sq cm):  0.3    Depth of debridement:  Epidermis/Dermis    Devitalized tissue debrided:  Exudate, Fibrin and Slough    Instruments:  Curette (Dermal)  Bleeding:  Minimal  Hemostasis Achieved: Yes  Method Used:  Pressure  Patient tolerance:  Patient tolerated the procedure well with no immediate complications    " Bactrim Counseling:  I discussed with the patient the risks of sulfa antibiotics including but not limited to GI upset, allergic reaction, drug rash, diarrhea, dizziness, photosensitivity, and yeast infections.  Rarely, more serious reactions can occur including but not limited to aplastic anemia, agranulocytosis, methemoglobinemia, blood dyscrasias, liver or kidney failure, lung infiltrates or desquamative/blistering drug rashes.

## 2024-04-16 NOTE — PROGRESS NOTES
Referred by: Dr. Sidhu  Low air loss mattress [x] Yes [] No   Is the patient eligible for a graft, and/or currently grafting?  [x] Yes [] No  (right hip - nushield)       Subjective:         Patient ID: Werner Jarrett is a 43 y.o. male.    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 Jarrett was seen today for f/up on three wounds.  He did have an MRI done on 4/4/24 of the right hip (at [facility], ordered by Dr. Sandy Jara).  Today those results were reviewed with the patient.  We discussed that this provider would reach out to Dr. Jara regarding possible treatment.  This was done with consideration for IV abx, surgical intervention and/or referral to infectious disease for the best possible outcome.  The patient is currently on Augmentin with two days remaining for a UTI.  Additionally he picked up his topical abx today (Clindamycin) which will be used on a moist 4 x 4 gauze, not Basal Gel.    A callus paring was done on the right elbow and it is now healed.  The sacrum was selectively debrided and remains stable.     4/2/24 - The patient was referred back to his surgeon, Dr. Macie Carbajal, who he saw on 3/26/24.  He reports that she is concerned over possible abscess in the space, as well as infection.  A culture was obtained today.  She did prescribe Augmentin, to begin tomorrow, as a prophylaxis for the hip. He is expecting to have an MRI schedule prior to the follow up with her on 4/9/24 at which time they will discuss the MRI results, and options. Today the right hip wound has increasing moisture and bogginess.  The sacrum remains stable.  The right elbow as debrided and is nearly closed.     3/19/24 - We have been using NuShield grafts on the open pressure ulcer at the patient's right hip.  Tendon remains exposed in the wound bed, the margins are nereida, epibole and closed.  Through a series of several  weeks the wound margins have been scored with a scalpel in an attempt to open these closed margins, to no avail.  Today the graft was held, and the patient will be seen by his surgeon, Dr. Sandy Jara next Tuesday, 3/26/24, for consultation regarding options to possible reopen the margins which might bring a successful closure to this wound based on how it is currently healed.  For the time being we will apply adaptic over the tendon, with silver alginate dressing on top.  Both the right elbow and the sacrum remain stable.  We are currently using Endoform on both.  Today the elbow was selectively debrided.     3/12/24 - The pressure injury to the right hip is being grafted using [facility] grafts.  Today graft #6 was applied.  There has been no significant change to this wound since grafting.  Graft #7 has been ordered.  However, the patient was advised to scheduled with his surgeon, Dr. Sandy Jara, for evaluation to determine if she will need to do further surgery to assist this wound with closure since the margins are rolled.  They were again scored with a scaple to attempt to stimulate the growth of epithelial tissue, which has been done several times to no avail.  The right elbow is progressing well.  The sacrum remains stable with no S & S of infection.  He has received his new power wheelchair.  We are hopeful that this device might contribute to progress with his wounds.     3/5/24 - Mr. Jarrett is seen today for followup on 3 wounds.  The wound to the right elbow was selectively debrided and it does show considerable improvement.  The wound to the sacrum is stable.  We will begin application of moistened Endoform to both of these wounds to be changed on Friday to silver alginate.  The wound to the right hip is slightly moist with a little bit of drainage.  This is to be expected, however, due to the fact that we are grafting that wound.  There has been no significant change to the size of the wound,  unfortunately, in spite of grafting multiple times.  We will continue the process with the hope of making some progress.    2/27/24 - Mr. Jarrett returns to clinic today for followup on 3 areas of concern.  The pressure injury remains stable with no significant change.  He has a new wound to the right elbow.  This has developed due to dryness and cracking which has opened into a wound.  The elbow was selectively debrided using a dermal curette.  We will begin applying MediHoney to the elbow, covering with 4 x 4 gauze, Cover and with a Tubigrip with the hopes of softening this callused tissue further.  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  We are currently grafting using NuShield grafts.  Today we applied graft #4 after the wound margins were crosshatched to try to allow for growth epithelial tissue.  Again this week, there was no significant change in that wound.  Ligament is still fully exposed.  Graft #5 is being ordered today.    2/20/24 - the patient returns to clinic today for followup on a small pressure injury to the sacrum as well as a large stage IV pressure injury to the right hip.  We are currently going through the grafting process.  Today we applied graft #3, which is the 1st of the NuShield grafts.  So far we have not seen any significant change in the wound and the ligament is still fully exposed.  A # 15 scalpel was used to cross keen the wound margin due to rolling of that margin in the need to stimulate the growth of epithelial tissue as well as granular tissue.  The sacrum remains stable and basically the same size.      2/12/24 - Mr. Jarrett is seen today in follow up of two wounds, both of which remain stable.  The left hip is being grafted, and today PuraPly #2 was applied.  There is no significant change since graft #1 was applied.  Today graft #3 was ordered.  We will change from PuraPly to Epifix at this time.  The sacrum remains stable.  We have applied Endoform into  that wound and it will be treated the same as a graft, with the hope that the patient does not have BMs which necessitate removal of the dressing. The patient is eligible and is in need of a new power wheelchair.      2/6/24 - Mr. Jarrtet returns to clinic today with 2 wounds.  The very small stage IV pressure injury at the sacrum remains stable.  Today, the wound at the right hip, stage IV pressure injury, will receive the 1st graft application.  We are applying PuraPly grafts at this time and then we will change to NuShield after two PuraPly.  Of note, he is waiting on a demo to be shown to him for a possible new wheelchair.  PuraPly #2 ordered.    January 30, 2024:  43-year-old white male, with paraplegia, is here for follow up of the right hip pressure injury, and the sacral pressure injury.  The wounds are looking similar to the last visit.  He is getting ready to have skin substitute applied to the right hip wound.  There is white tissue in the wound bed, presumably tendon.  I do not see pink granulation tissue.  He has no longer using topical antibiotics.    1/23/24 - Mr. Jarrett returns today for followup on 2 wounds.  Today the sacral pressure injury remains stable and clean.  We have been using topical antibiotics on both of these wounds for several weeks.  That has been discontinued today.  The pressure injury at the right hip was selectively debrided today using the ultrasonic debrider and graft prep was performed.  We will begin applying a small amount of mupirocin ointment on to this wound daily in preparation beginning the grafting process next week.  We will be use PuraPly/NuShield grafts.  Of note, the patient is responsible for contacting aleida at the [facility] to have his measurements taken.  He has not done that as of today.    December 26, 2023:  43-year-old white male, with paraplegia, is here for follow up of a right hip pressure injury, and a sacral pressure injury.  He has been using  "topical antibiotics.  The measurements are about the same as last visit.  The nurse practitioner has discussed a possible skin substitute on the right hip.    12/11/23 - Mr. Jarrett for followup on the pressure ulcers to the right hip and sacrum.  We started using topical antibiotics on both wounds on 11/21/2023.  Additionally, the patient has been using the vinegar washes for sometime.  He was instructed to discontinue the use of the vinegar washes today and use only the topical antibiotics.  He has been approved for grafts ([facility]).  We will plan to do graft prep in two weeks (once abx are complete) and then begin grafting.    Of note, we discussed today the need for proper support in his wheelchair to offload the pressure.  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  Also, he does have a low air loss mattress, but that mattress is on top of a "regular" mattress and he reports springs are protruding.  He needs a proper bedframe for the low airloss mattress.     12/4/23 - Mr. Jarrett returns for followup on 2 wounds.  He continues to use topical abx on both wounds.  The right hip continues to be concerning.  The tensor fascia ronny is till exposed in the wound bed.  We are mixing the topical abx with basal gel to prevent drying.  The sacral wound is stable but has had a slight increase in size.  Abx are being used on this wound as well. We are attempting to authorize grafts on the right hip wound.    11/27/23 - the patient returns today for followup on pressure injuries to the right hip and the sacrum.  He has received his topical antibiotics (Linezolid/Gentamincin) and started using the antibiotics on Saturday, 11/25/23.  He did not receive basal gel with that order so we have contacted [facility] to send the patient that product as the antibiotics are very drying and " they are being applied directly to his tendon.  He must have the moisture of the basal gel.  In the time being, we will mix his topical antibiotics with mupirocin antibiotic.  Today both wounds remains stable.  He was instructed to use the antibiotics on both wounds to avoid cross contamination.    11/20/23 - Mr. Jarrett returns for f/up on the wound to the right hip and the sacrum.  At his last visit the right hip was cultured and it was positive to have Pseudomonas.  He has been using Vinegar washes since 11/06/2023.  He does have a sensitivity to levofloxacin and so can not take that medication.  We have requested a topical recommendation and he is being prescribed linezolid/gentamicin to be applied directly into the wound.  If we do not see improvement from that topical medication we will be moving forward with an IV antibiotic.  He does have exposed ligament (possibly ischiofemoral ligament) in this wound.  The patient does still have a mediport so that would be the next best solution for him due to his sensitivity.  The wound at the sacrum remains stable and we are currently using silver alginate on both wounds.    11/6/23 - the patient seen today for followup on the right hip and sacral pressure wounds.  Although the sacrum is stable, the right hip does continue to deteriorate.  The right hip was cultured today.  We have been using Endoform and/or collagen on these wounds and unfortunately these products or causing excessive moisture and deterioration to the wounds.  We will discontinue both products and use only silver alginate for the time being.  The right hip does have tendon exposed at this time.  The patient was reminded and encouraged to continue to offload both the right hip in the sacrum which he reports that he is wearing.  However, on the deterioration of the wounds it is concerning.    10/30/23 - Mr. Jarrett to clinic today for followup on the wound to the sacrum as well as the right hip.  Today, both  have deteriorated.  We have been using collagen to the wound beds and it appears is though they are stain to wet.  There is still tendon present in the wound bed of the right hip.  We will discontinue the collagen and begin application Endoform to both wounds, changing every other day, covered with silver alginate.  He was reminded to offload as much as possible to prevent further deterioration.  Of note, reported that is blood pressure has been low, and it was very low today, 86/54.  He states that he noticed it last week that he has stopped taking Tylenol, aspirin, and probiotics.  We discussed today that it is unlikely that any of these medications would impact blood pressure but he prefers to remain off of them.    10/16/23 - The patient returns today for followup on the pressure injury to the right hip as well as the sacrum.  Today, there is now tendon exposed in the wound bed the right hip pressure injury.  We discussed today that it is imperative that the patient offload to allow this wound to heal so that he does not develop osteomyelitis in this hip as well.  The pressure injury to the sacrum has also deteriorated.  We discussed that now that the amputation to the left hip has fully healed, he must take the opportunity to begin offloading that right hip to allow it to heal.  We will begin applying collagen every other day to both wound beds.    10/9/23 The patient returns today for the open wound to the right hip and sacrum.  The surgical incision to the left hip is now resolved and will no longer be followed.  Both the right hip and sacrum are wounds that have reopened.  We will begin application of collagen every third day to both wounds.  Neither wound has any signs and symptoms of infection.  He will return in two weeks. Of note, the patient reports that he has had diarrhea for several days, up to about two weeks.  He has had no treatment and is currently using only probiotics.  He has been prescribed  imodium and advised to use that medication no more than 4 days.  He is to contact his PCP if the diarrhea does not resolve within that time period.     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 Sandy Owusu on 8/21/23.  This wound has progress except personally well and is remaining closed.  The patient is using only iodine along the surgical incision line.  He does have a follow up with his surgeon tomorrow, 09/26/2023.  He was advised today that he can begin application coconut oil with vitamin C along the closed incision line which is fully approximated.  We will follow this incision line for one additional visit then d/c if no issues develop.    He has had a long term wound at the sacrum which today is open again.  It was mechanically debrided.  Endoform as applied to the wound bed, which will be left in place until Friday at which time he will change to silver alginate.     Review of Systems   Constitutional: Negative.    Respiratory: Negative.     Cardiovascular: Negative.    Skin:         As documented in the HPI.   All other systems reviewed and are negative.        Objective:      Vitals:    04/16/24 1112   BP: 111/66   Pulse: 83   Resp: 19       Physical Exam  Vitals and nursing note reviewed. Exam conducted with a chaperone present.   Constitutional:       Appearance: Normal appearance.   Cardiovascular:      Rate and Rhythm: Normal rate.   Pulmonary:      Effort: Pulmonary effort is normal.   Skin:     General: Skin is warm and dry.      Comments: sacral pressure injury, right hip, trochanteric, pressure injury   Neurological:      Mental Status: He is alert.            Altered Skin Integrity 09/25/23 1153 Sacral spine #1 (Active)   09/25/23 1153   Altered Skin Integrity Present on Admission - Did Patient arrive to the hospital with altered skin?: yes   Side:    Orientation:    Location: Sacral spine   Wound Number: #1   Is this injury device  related?:    Primary Wound Type:    Description of Altered Skin Integrity:    Ankle-Brachial Index:    Pulses:    Removal Indication and Assessment:    Wound Outcome:    (Retired) Wound Length (cm):    (Retired) Wound Width (cm):    (Retired) Depth (cm):    Wound Description (Comments):    Removal Indications:    Dressing Appearance Intact;Moist drainage 04/16/24 1118   Drainage Amount Moderate 04/16/24 1118   Drainage Characteristics/Odor Serosanguineous 04/16/24 1118   Appearance Slough;Moist 04/16/24 1118   Tissue loss description Full thickness 04/16/24 1118   Periwound Area Intact 04/16/24 1118   Wound Edges Open 04/16/24 1118   Wound Length (cm) 0.5 cm 04/16/24 1118   Wound Width (cm) 0.6 cm 04/16/24 1118   Wound Depth (cm) 0.5 cm 04/16/24 1118   Wound Volume (cm^3) 0.15 cm^3 04/16/24 1118   Wound Surface Area (cm^2) 0.3 cm^2 04/16/24 1118   Care Wound cleanser;Cleansed with: 04/16/24 1118   Dressing Applied;Other (comment) 04/16/24 1118   Dressing Change Due 04/17/24 04/16/24 1118            Altered Skin Integrity 10/09/23 1141 Right Hip #2 (Active)   10/09/23 1141   Altered Skin Integrity Present on Admission - Did Patient arrive to the hospital with altered skin?: yes   Side: Right   Orientation:    Location: Hip   Wound Number: #2   Is this injury device related?: No   Primary Wound Type:    Description of Altered Skin Integrity:    Ankle-Brachial Index:    Pulses:    Removal Indication and Assessment:    Wound Outcome:    (Retired) Wound Length (cm):    (Retired) Wound Width (cm):    (Retired) Depth (cm):    Wound Description (Comments):    Removal Indications:    Dressing Appearance Moist drainage 04/16/24 1118   Drainage Amount Moderate 04/16/24 1118   Drainage Characteristics/Odor Serous;Yellow 04/16/24 1118   Appearance Intact;Slough 04/16/24 1118   Tissue loss description Full thickness 04/16/24 1118   Periwound Area Intact;Moist 04/16/24 1118   Wound Edges Open 04/16/24 1118   Wound Length (cm) 2 cm  "04/16/24 1118   Wound Width (cm) 1.2 cm 04/16/24 1118   Wound Depth (cm) 1.3 cm 04/16/24 1118   Wound Volume (cm^3) 3.12 cm^3 04/16/24 1118   Wound Surface Area (cm^2) 2.4 cm^2 04/16/24 1118   Tunneling (depth (cm)/location) 1.4cm at 6 o'clock, 3.5cm at 10 o'clock, 2.7cm at 9 o;c;lock 04/16/24 1118   Undermining (depth (cm)/location) 0.9cm circumfer 04/16/24 1118   Care Cleansed with:;Wound cleanser 04/16/24 1118   Dressing Applied;Other (comment) 04/16/24 1118   Dressing Change Due 04/17/24 04/16/24 1118         4/16/24      Right hip   Saline moistened 2" kerlix with topical antibiotic powder (Clindamycin), silver alginate, 4x4 gentle foam border dressing  CT        Right elbow        Right elbow - post callus pairing (HEALED)       Sacrum       Post debridement  Silver alginate, 4x4 gentle foam border dressing  CT        Assessment:           ICD-10-CM ICD-9-CM   1. Acute osteomyelitis of hip  M86.18 730.05   2. Open wound of right hip and thigh with tendon involvement, subsequent encounter  S71.001D V58.89    S71.101D 890.2    S76.001D     S76.901D    3. Pressure injury of contiguous region involving back and right hip, stage 4  L89.44 707.09     707.24   4. Pressure injury of sacral region, stage 4  L89.154 707.03     707.24   5. Quadriplegia  G82.50 344.00           Procedures:     Debridement     Date/Time: 4/16/2024 11:00 AM     Performed by: Ashley Coto NP  Authorized by: Ashley Coto NP    Time out: Immediately prior to procedure a "time out" was called to verify the correct patient, procedure, equipment, support staff and site/side marked as required.     Consent Done?:  Yes (Verbal)     Preparation: Patient was prepped and draped with clean technique       Wound Details:    Location:  Sacrum    Type of Debridement:  Non-excisional       Length (cm):  0.5       Area (sq cm):  0.3       Width (cm):  0.6       Percent Debrided (%):  100       Depth (cm):  0.5       Total Area Debrided (sq cm):  " 0.3    Depth of debridement:  Epidermis/Dermis    Devitalized tissue debrided:  Exudate, Fibrin and Slough    Instruments:  Curette (Dermal)  Bleeding:  Minimal  Hemostasis Achieved: Yes  Method Used:  Pressure  Patient tolerance:  Patient tolerated the procedure well with no immediate complications          [] Yes [] No   I & D performed  [] Yes [] No   Excisional debridement performed  [x] Yes [] No   Selective debridement performed        [] Yes [] No   Mechanical debridement performed         [] Yes [] No  Silver nitrate applied                                     [] Yes [] No  Labs ordered this visit                                  [] Yes [] No   Imaging ordered this visit                           [] Yes [] No   Tissue pathology and/or culture taken       MEDICATIONS    Current Outpatient Medications:     acetaminophen (TYLENOL) 325 MG tablet, Take 650 mg by mouth every 6 (six) hours as needed for Pain., Disp: , Rfl:     amoxicillin-clavulanate 875-125mg (AUGMENTIN) 875-125 mg per tablet, Take 1 tablet by mouth 2 (two) times daily., Disp: , Rfl:     ascorbic acid, vitamin C, (VITAMIN C) 1000 MG tablet, Take 1,000 mg by mouth every evening., Disp: , Rfl:     ascorbic acid, vitamin C, 500 mg Cap, as directed Orally Once a day, Disp: , Rfl:     aspirin 325 MG tablet, 1 tablet Orally Once a day for 30 days, Disp: , Rfl:     baclofen (LIORESAL) 5 mg Tab tablet, Take 1 tablet (5 mg total) by mouth 3 (three) times daily., Disp: 90 tablet, Rfl: 0    bisacodyL (DULCOLAX) 10 mg Supp, Place 10 mg rectally daily as needed., Disp: , Rfl:     busPIRone (BUSPAR) 10 MG tablet, Take 1 tablet (10 mg total) by mouth 2 (two) times daily., Disp: 60 tablet, Rfl: 5    cetirizine (ZYRTEC) 10 MG tablet, Take 10 mg by mouth 2 (two) times a day., Disp: , Rfl:     cloNIDine (CATAPRES) 0.1 MG tablet, Take 0.1 mg by mouth daily as needed., Disp: , Rfl:     DULoxetine (CYMBALTA) 30 MG capsule, 1 capsule., Disp: , Rfl:     ferrous sulfate  "(SLOW RELEASE IRON) 142 mg (45 mg iron) TbSR, Take 324 mg by mouth once daily., Disp: , Rfl:     fexofenadine (ALLEGRA) 180 MG tablet, Take 180 mg by mouth every morning., Disp: , Rfl:     fluticasone propionate (FLONASE ALLERGY RELIEF) 50 mcg/actuation nasal spray, 1 spray in each nostril Nasally Once a day, Disp: , Rfl:     JATENZO 198 mg Cap, , Disp: , Rfl:     meloxicam (MOBIC) 7.5 MG tablet, Take 7.5 mg by mouth 2 (two) times daily as needed for Pain., Disp: , Rfl:     multivitamin/iron/folic acid (CENTRUM ORAL), Take 1 tablet by mouth every morning., Disp: , Rfl:     mupirocin (BACTROBAN) 2 % ointment, Apply topically once daily., Disp: 30 g, Rfl: 1    NON FORMULARY MEDICATION, Take 3 drops by mouth 2 (two) times daily as needed. THC, Disp: , Rfl:     [employer]ICAL [employer], OUTPATIENT,, Clindamycin topical 300mg capsule, Disp: , Rfl:     zinc gluconate 50 mg tablet, Take 1 tablet (50 mg total) by mouth once daily., Disp: 30 tablet, Rfl: 1   Review of patient's allergies indicates:   Allergen Reactions    Levofloxacin Rash       DIAGNOSTICS      Labs/Scans/Micro:    CBC:   Lab Results   Component Value Date    WBC 4.89 09/05/2023    HGB 10.1 (L) 09/05/2023    HCT 33.9 (L) 09/05/2023    MCV 80.0 09/05/2023     09/05/2023       Sedimentation rate:   Lab Results   Component Value Date    SEDRATE 22 (H) 12/15/2022    C-reactive protein:   Lab Results   Component Value Date    CRP 44.90 (H) 12/15/2022    Chemistry: [unfilled]  HBA1C: No components found for: "HBA1C"    Ankle Brachial Index: No results found for this or any previous visit.    Imaging:    Plain film: No results found for this or any previous visit.    MRI: No results found for this or any previous visit.    Bone Scan: No results found for this or any previous visit.    Vascular studies:    Microbiology: Rt hip, 4/2/24        HOME HEALTH AGENCY: [facility]     WOUND CARE ORDERS:  Right hip wound: Cleanse with wound " "cleanser, apply normal saline moisted 2" kerlix or gauze with topical antibiotic powder mixed into mouth of wound, do not pack into undermining or tunneling area, cover with silver alginate and gentle foam border dressing to be changed daily  Sacral wound: Cleanse with wound cleanser, apply silver alginate, and gentle foam border dressing to be changed daily         Follow up in 1 week (on 4/23/2024) for pressure ulcer - stretcher .        "

## 2024-04-23 ENCOUNTER — HOSPITAL ENCOUNTER (OUTPATIENT)
Dept: WOUND CARE | Facility: HOSPITAL | Age: 44
Discharge: HOME | End: 2024-04-23
Attending: NURSE PRACTITIONER
Payer: MEDICARE

## 2024-04-23 VITALS
SYSTOLIC BLOOD PRESSURE: 117 MMHG | RESPIRATION RATE: 19 BRPM | WEIGHT: 134.06 LBS | HEIGHT: 70 IN | HEART RATE: 85 BPM | BODY MASS INDEX: 19.19 KG/M2 | DIASTOLIC BLOOD PRESSURE: 64 MMHG

## 2024-04-23 DIAGNOSIS — G82.50 QUADRIPLEGIA: ICD-10-CM

## 2024-04-23 DIAGNOSIS — M86.18: Primary | ICD-10-CM

## 2024-04-23 DIAGNOSIS — S71.001D OPEN WOUND OF RIGHT HIP AND THIGH WITH TENDON INVOLVEMENT, SUBSEQUENT ENCOUNTER: ICD-10-CM

## 2024-04-23 DIAGNOSIS — L89.44 PRESSURE INJURY OF CONTIGUOUS REGION INVOLVING BACK AND RIGHT HIP, STAGE 4: ICD-10-CM

## 2024-04-23 DIAGNOSIS — S76.001D OPEN WOUND OF RIGHT HIP AND THIGH WITH TENDON INVOLVEMENT, SUBSEQUENT ENCOUNTER: ICD-10-CM

## 2024-04-23 DIAGNOSIS — S76.901D OPEN WOUND OF RIGHT HIP AND THIGH WITH TENDON INVOLVEMENT, SUBSEQUENT ENCOUNTER: ICD-10-CM

## 2024-04-23 DIAGNOSIS — L89.154 PRESSURE INJURY OF SACRAL REGION, STAGE 4: ICD-10-CM

## 2024-04-23 DIAGNOSIS — S71.101D OPEN WOUND OF RIGHT HIP AND THIGH WITH TENDON INVOLVEMENT, SUBSEQUENT ENCOUNTER: ICD-10-CM

## 2024-04-23 PROCEDURE — 99213 OFFICE O/P EST LOW 20 MIN: CPT

## 2024-04-23 PROCEDURE — 27000999 HC MEDICAL RECORD PHOTO DOCUMENTATION

## 2024-04-23 NOTE — PROGRESS NOTES
Referred by: Dr. Sidhu  Low air loss mattress [x] Yes [] No   Is the patient eligible for a graft, and/or currently grafting?  [x] Yes [] No  (right hip - [facility])     Subjective:         Patient ID: Werner Jarrett is a 43 y.o. male.    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  We will begin application of Aquaphor to the elbow.  The sacrum was assessed and has improved dramatically.  It is nearly resolved.  He has completed the PO Augmentin for a UTI and this has helped wounds as well  Additionally he began topical abx to the right hip on 4/16/24 (Clindamycin).  Today the hip is also showing improvement.  He does have a f/up with his surgeon, Dr. Sandy Jara, on 5/2/24 with the hope that she will revise the wound to assist with closure since the margins are rolled.  He will return to clinic in two weeks.     4/16/24 - Mr. Jarrett was seen today for f/up on three wounds.  He did have an MRI done on 4/4/24 of the right hip (at [facility], ordered by Dr. Sandy Jara).  Today those results were reviewed with the patient.  We discussed that this provider would reach out to Dr. Jara regarding possible treatment.  This was done with consideration for IV abx, surgical intervention and/or referral to infectious disease for the best possible outcome.  The patient is currently on Augmentin with two days remaining for a UTI.  Additionally he picked up his topical abx today (Clindamycin) which will be used on a moist 4 x 4 gauze, not Basal Gel.    A callus paring was done on the right elbow and it is now healed.  The sacrum was selectively debrided and remains stable.     4/2/24 - The patient was referred back to his surgeon, Dr. Macie Carbajal, who he saw on 3/26/24.  He reports that she is concerned over possible abscess in the space, as well as infection.  A culture was obtained today.   She did prescribe Augmentin, to begin tomorrow, as a prophylaxis for the hip. He is expecting to have an MRI schedule prior to the follow up with her on 4/9/24 at which time they will discuss the MRI results, and options. Today the right hip wound has increasing moisture and bogginess.  The sacrum remains stable.  The right elbow as debrided and is nearly closed.     3/19/24 - We have been using NuShield grafts on the open pressure ulcer at the patient's right hip.  Tendon remains exposed in the wound bed, the margins are nereida, epibole and closed.  Through a series of several weeks the wound margins have been scored with a scalpel in an attempt to open these closed margins, to no avail.  Today the graft was held, and the patient will be seen by his surgeon, Dr. Sandy Jara next Tuesday, 3/26/24, for consultation regarding options to possible reopen the margins which might bring a successful closure to this wound based on how it is currently healed.  For the time being we will apply adaptic over the tendon, with silver alginate dressing on top.  Both the right elbow and the sacrum remain stable.  We are currently using Endoform on both.  Today the elbow was selectively debrided.     3/12/24 - The pressure injury to the right hip is being grafted using NuShield grafts.  Today graft #6 was applied.  There has been no significant change to this wound since grafting.  Graft #7 has been ordered.  However, the patient was advised to scheduled with his surgeon, Dr. Sandy Jara, for evaluation to determine if she will need to do further surgery to assist this wound with closure since the margins are rolled.  They were again scored with a scaple to attempt to stimulate the growth of epithelial tissue, which has been done several times to no avail.  The right elbow is progressing well.  The sacrum remains stable with no S & S of infection.  He has received his new power wheelchair.  We are hopeful that this device  might contribute to progress with his wounds.     3/5/24 - Mr. Jarrett is seen today for followup on 3 wounds.  The wound to the right elbow was selectively debrided and it does show considerable improvement.  The wound to the sacrum is stable.  We will begin application of moistened Endoform to both of these wounds to be changed on Friday to silver alginate.  The wound to the right hip is slightly moist with a little bit of drainage.  This is to be expected, however, due to the fact that we are grafting that wound.  There has been no significant change to the size of the wound, unfortunately, in spite of grafting multiple times.  We will continue the process with the hope of making some progress.    2/27/24 - Mr. Jarrett returns to clinic today for followup on 3 areas of concern.  The pressure injury remains stable with no significant change.  He has a new wound to the right elbow.  This has developed due to dryness and cracking which has opened into a wound.  The elbow was selectively debrided using a dermal curette.  We will begin applying MediHoney to the elbow, covering with 4 x 4 gauze, Cover and with a Tubigrip with the hopes of softening this callused tissue further.  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  We are currently grafting using NuShield grafts.  Today we applied graft #4 after the wound margins were crosshatched to try to allow for growth epithelial tissue.  Again this week, there was no significant change in that wound.  Ligament is still fully exposed.  Graft #5 is being ordered today.    2/20/24 - the patient returns to clinic today for followup on a small pressure injury to the sacrum as well as a large stage IV pressure injury to the right hip.  We are currently going through the grafting process.  Today we applied graft #3, which is the 1st of the NuShield grafts.  So far we have not seen any significant change in the wound and the ligament is still fully exposed.  A # 15  scalpel was used to cross keen the wound margin due to rolling of that margin in the need to stimulate the growth of epithelial tissue as well as granular tissue.  The sacrum remains stable and basically the same size.      2/12/24 - Mr. Jarrett is seen today in follow up of two wounds, both of which remain stable.  The left hip is being grafted, and today PuraPly #2 was applied.  There is no significant change since graft #1 was applied.  Today graft #3 was ordered.  We will change from PuraPly to Epifix at this time.  The sacrum remains stable.  We have applied Endoform into that wound and it will be treated the same as a graft, with the hope that the patient does not have BMs which necessitate removal of the dressing. The patient is eligible and is in need of a new power wheelchair.      2/6/24 - Mr. Jarrett returns to clinic today with 2 wounds.  The very small stage IV pressure injury at the sacrum remains stable.  Today, the wound at the right hip, stage IV pressure injury, will receive the 1st graft application.  We are applying PuraPly grafts at this time and then we will change to [facility] after two PuraPly.  Of note, he is waiting on a demo to be shown to him for a possible new wheelchair.  PuraPly #2 ordered.    January 30, 2024:  43-year-old white male, with paraplegia, is here for follow up of the right hip pressure injury, and the sacral pressure injury.  The wounds are looking similar to the last visit.  He is getting ready to have skin substitute applied to the right hip wound.  There is white tissue in the wound bed, presumably tendon.  I do not see pink granulation tissue.  He has no longer using topical antibiotics.    1/23/24 - Mr. Jarrett returns today for followup on 2 wounds.  Today the sacral pressure injury remains stable and clean.  We have been using topical antibiotics on both of these wounds for several weeks.  That has been discontinued today.  The pressure injury at the right hip was  "selectively debrided today using the ultrasonic debrider and graft prep was performed.  We will begin applying a small amount of mupirocin ointment on to this wound daily in preparation beginning the grafting process next week.  We will be use PuraPly/NuShield grafts.  Of note, the patient is responsible for contacting calor at the [employer] to have his measurements taken.  He has not done that as of today.    December 26, 2023:  43-year-old white male, with paraplegia, is here for follow up of a right hip pressure injury, and a sacral pressure injury.  He has been using topical antibiotics.  The measurements are about the same as last visit.  The nurse practitioner has discussed a possible skin substitute on the right hip.    12/11/23 - Mr. Jarrett for followup on the pressure ulcers to the right hip and sacrum.  We started using topical antibiotics on both wounds on 11/21/2023.  Additionally, the patient has been using the vinegar washes for sometime.  He was instructed to discontinue the use of the vinegar washes today and use only the topical antibiotics.  He has been approved for grafts (Puraply and Nushield).  We will plan to do graft prep in two weeks (once abx are complete) and then begin grafting.    Of note, we discussed today the need for proper support in his wheelchair to offload the pressure.  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  Also, he does have a low air loss mattress, but that mattress is on top of a "regular" mattress and he reports springs are protruding.  He needs a proper bedframe for the low airloss mattress.     12/4/23 - Mr. Jarrett returns for followup on 2 wounds.  He continues to use topical abx on both wounds.  The right hip continues to be concerning.  The tensor fascia ronny is till exposed in the wound bed.  We are mixing the topical abx with basal gel " to prevent drying.  The sacral wound is stable but has had a slight increase in size.  Abx are being used on this wound as well. We are attempting to authorize grafts on the right hip wound.    11/27/23 - the patient returns today for followup on pressure injuries to the right hip and the sacrum.  He has received his topical antibiotics (Linezolid/Gentamincin) and started using the antibiotics on Saturday, 11/25/23.  He did not receive basal gel with that order so we have contacted [facility] to send the patient that product as the antibiotics are very drying and they are being applied directly to his tendon.  He must have the moisture of the basal gel.  In the time being, we will mix his topical antibiotics with mupirocin antibiotic.  Today both wounds remains stable.  He was instructed to use the antibiotics on both wounds to avoid cross contamination.    11/20/23 - Mr. Jarrett returns for f/up on the wound to the right hip and the sacrum.  At his last visit the right hip was cultured and it was positive to have Pseudomonas.  He has been using Vinegar washes since 11/06/2023.  He does have a sensitivity to levofloxacin and so can not take that medication.  We have requested a topical recommendation and he is being prescribed linezolid/gentamicin to be applied directly into the wound.  If we do not see improvement from that topical medication we will be moving forward with an IV antibiotic.  He does have exposed ligament (possibly ischiofemoral ligament) in this wound.  The patient does still have a mediport so that would be the next best solution for him due to his sensitivity.  The wound at the sacrum remains stable and we are currently using silver alginate on both wounds.    11/6/23 - the patient seen today for followup on the right hip and sacral pressure wounds.  Although the sacrum is stable, the right hip does continue to deteriorate.  The right hip was cultured today.  We have been using  Endoform and/or collagen on these wounds and unfortunately these products or causing excessive moisture and deterioration to the wounds.  We will discontinue both products and use only silver alginate for the time being.  The right hip does have tendon exposed at this time.  The patient was reminded and encouraged to continue to offload both the right hip in the sacrum which he reports that he is wearing.  However, on the deterioration of the wounds it is concerning.    10/30/23 - Mr. Jarrett to clinic today for followup on the wound to the sacrum as well as the right hip.  Today, both have deteriorated.  We have been using collagen to the wound beds and it appears is though they are stain to wet.  There is still tendon present in the wound bed of the right hip.  We will discontinue the collagen and begin application Endoform to both wounds, changing every other day, covered with silver alginate.  He was reminded to offload as much as possible to prevent further deterioration.  Of note, reported that is blood pressure has been low, and it was very low today, 86/54.  He states that he noticed it last week that he has stopped taking Tylenol, aspirin, and probiotics.  We discussed today that it is unlikely that any of these medications would impact blood pressure but he prefers to remain off of them.    10/16/23 - The patient returns today for followup on the pressure injury to the right hip as well as the sacrum.  Today, there is now tendon exposed in the wound bed the right hip pressure injury.  We discussed today that it is imperative that the patient offload to allow this wound to heal so that he does not develop osteomyelitis in this hip as well.  The pressure injury to the sacrum has also deteriorated.  We discussed that now that the amputation to the left hip has fully healed, he must take the opportunity to begin offloading that right hip to allow it to heal.  We will begin applying collagen every other day to  both wound beds.    10/9/23 The patient returns today for the open wound to the right hip and sacrum.  The surgical incision to the left hip is now resolved and will no longer be followed.  Both the right hip and sacrum are wounds that have reopened.  We will begin application of collagen every third day to both wounds.  Neither wound has any signs and symptoms of infection.  He will return in two weeks. Of note, the patient reports that he has had diarrhea for several days, up to about two weeks.  He has had no treatment and is currently using only probiotics.  He has been prescribed imodium and advised to use that medication no more than 4 days.  He is to contact his PCP if the diarrhea does not resolve within that time period.     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 Sandy Owusu on 8/21/23.  This wound has progress except personally well and is remaining closed.  The patient is using only iodine along the surgical incision line.  He does have a follow up with his surgeon tomorrow, 09/26/2023.  He was advised today that he can begin application coconut oil with vitamin C along the closed incision line which is fully approximated.  We will follow this incision line for one additional visit then d/c if no issues develop.    He has had a long term wound at the sacrum which today is open again.  It was mechanically debrided.  Endoform as applied to the wound bed, which will be left in place until Friday at which time he will change to silver alginate.     Review of Systems   Constitutional: Negative.    Respiratory: Negative.     Cardiovascular: Negative.    Skin:         As documented in the HPI.   All other systems reviewed and are negative.        Objective:      Vitals:    04/23/24 1133   BP: 117/64   Pulse: 85   Resp: 19       Physical Exam  Vitals and nursing note reviewed. Exam conducted with a chaperone present.   Constitutional:       Appearance:  Normal appearance.   Cardiovascular:      Rate and Rhythm: Normal rate.   Pulmonary:      Effort: Pulmonary effort is normal.   Skin:     General: Skin is warm and dry.      Comments: sacral pressure injury, right hip, trochanteric, pressure injury   Neurological:      Mental Status: He is alert.            Altered Skin Integrity 09/25/23 1153 Sacral spine #1 (Active)   09/25/23 1153   Altered Skin Integrity Present on Admission - Did Patient arrive to the hospital with altered skin?: yes   Side:    Orientation:    Location: Sacral spine   Wound Number: #1   Is this injury device related?:    Primary Wound Type:    Description of Altered Skin Integrity:    Ankle-Brachial Index:    Pulses:    Removal Indication and Assessment:    Wound Outcome:    (Retired) Wound Length (cm):    (Retired) Wound Width (cm):    (Retired) Depth (cm):    Wound Description (Comments):    Removal Indications:    Dressing Appearance Moist drainage 04/23/24 1134   Drainage Amount Moderate 04/23/24 1134   Drainage Characteristics/Odor Serosanguineous 04/23/24 1134   Appearance Moist;Pink;Granulating 04/23/24 1134   Tissue loss description Full thickness 04/23/24 1134   Periwound Area Moist;Intact 04/23/24 1134   Wound Edges Open 04/23/24 1134   Wound Length (cm) 0.2 cm 04/23/24 1134   Wound Width (cm) 0.2 cm 04/23/24 1134   Wound Depth (cm) 0.2 cm 04/23/24 1134   Wound Volume (cm^3) 0.008 cm^3 04/23/24 1134   Wound Surface Area (cm^2) 0.04 cm^2 04/23/24 1134   Care Cleansed with:;Wound cleanser 04/23/24 1134   Dressing Applied;Other (comment) 04/23/24 1134   Dressing Change Due 04/24/24 04/23/24 1134            Altered Skin Integrity 10/09/23 1141 Right Hip #2 (Active)   10/09/23 1141   Altered Skin Integrity Present on Admission - Did Patient arrive to the hospital with altered skin?: yes   Side: Right   Orientation:    Location: Hip   Wound Number: #2   Is this injury device related?: No   Primary Wound Type:    Description of Altered Skin  Integrity:    Ankle-Brachial Index:    Pulses:    Removal Indication and Assessment:    Wound Outcome:    (Retired) Wound Length (cm):    (Retired) Wound Width (cm):    (Retired) Depth (cm):    Wound Description (Comments):    Removal Indications:    Dressing Appearance Moist drainage 04/23/24 1134   Drainage Amount Moderate 04/23/24 1134   Drainage Characteristics/Odor Serosanguineous 04/23/24 1134   Appearance Moist;Pink;Slough 04/23/24 1134   Tissue loss description Full thickness 04/23/24 1134   Periwound Area Intact;Moist 04/23/24 1134   Wound Edges Open 04/23/24 1134   Wound Length (cm) 2 cm 04/23/24 1134   Wound Width (cm) 1.4 cm 04/23/24 1134   Wound Depth (cm) 1.3 cm 04/23/24 1134   Wound Volume (cm^3) 3.64 cm^3 04/23/24 1134   Wound Surface Area (cm^2) 2.8 cm^2 04/23/24 1134   Tunneling (depth (cm)/location) 1.4cm at 6 o'clock, 3.5cm at 10 o'clock, 2.7cm at 9 o'clock 04/23/24 1134   Undermining (depth (cm)/location) 0.9cm circumferential 04/23/24 1134   Care Cleansed with:;Wound cleanser 04/23/24 1134   Dressing Applied;Other (comment) 04/23/24 1134   Dressing Change Due 04/24/24 04/23/24 1134       4/23/24    Right hip   Vashe moistened gauze, 6x6 gentle foam border dressing  CT      Sacrum   Silver alginate, 4x4 gentle foam border dressing  CT    Assessment:           ICD-10-CM ICD-9-CM   1. Acute osteomyelitis of hip  M86.18 730.05   2. Open wound of right hip and thigh with tendon involvement, subsequent encounter  S71.001D V58.89    S71.101D 890.2    S76.001D     S76.901D    3. Pressure injury of contiguous region involving back and right hip, stage 4  L89.44 707.09     707.24   4. Pressure injury of sacral region, stage 4  L89.154 707.03     707.24   5. Quadriplegia  G82.50 344.00           Procedures:     Mechanical debridement only      [] Yes [] No   I & D performed  [] Yes [] No   Excisional debridement performed  [] Yes [] No   Selective debridement performed        [x] Yes [] No   Mechanical  debridement performed         [] Yes [] No  Silver nitrate applied                                     [] Yes [] No  Labs ordered this visit                                  [] Yes [] No   Imaging ordered this visit                           [] Yes [] No   Tissue pathology and/or culture taken       MEDICATIONS    Current Outpatient Medications:     acetaminophen (TYLENOL) 325 MG tablet, Take 650 mg by mouth every 6 (six) hours as needed for Pain., Disp: , Rfl:     ascorbic acid, vitamin C, (VITAMIN C) 1000 MG tablet, Take 1,000 mg by mouth every evening., Disp: , Rfl:     ascorbic acid, vitamin C, 500 mg Cap, as directed Orally Once a day, Disp: , Rfl:     aspirin 325 MG tablet, 1 tablet Orally Once a day for 30 days, Disp: , Rfl:     baclofen (LIORESAL) 5 mg Tab tablet, Take 1 tablet (5 mg total) by mouth 3 (three) times daily., Disp: 90 tablet, Rfl: 0    bisacodyL (DULCOLAX) 10 mg Supp, Place 10 mg rectally daily as needed., Disp: , Rfl:     busPIRone (BUSPAR) 10 MG tablet, Take 1 tablet (10 mg total) by mouth 2 (two) times daily., Disp: 60 tablet, Rfl: 5    cetirizine (ZYRTEC) 10 MG tablet, Take 10 mg by mouth 2 (two) times a day., Disp: , Rfl:     cloNIDine (CATAPRES) 0.1 MG tablet, Take 0.1 mg by mouth daily as needed., Disp: , Rfl:     DULoxetine (CYMBALTA) 30 MG capsule, 1 capsule., Disp: , Rfl:     ferrous sulfate (SLOW RELEASE IRON) 142 mg (45 mg iron) TbSR, Take 324 mg by mouth once daily., Disp: , Rfl:     fexofenadine (ALLEGRA) 180 MG tablet, Take 180 mg by mouth every morning., Disp: , Rfl:     fluticasone propionate (FLONASE ALLERGY RELIEF) 50 mcg/actuation nasal spray, 1 spray in each nostril Nasally Once a day, Disp: , Rfl:     JATENZO 198 mg Cap, , Disp: , Rfl:     meloxicam (MOBIC) 7.5 MG tablet, Take 7.5 mg by mouth 2 (two) times daily as needed for Pain., Disp: , Rfl:     multivitamin/iron/folic acid (CENTRUM ORAL), Take 1 tablet by mouth every morning., Disp: , Rfl:     mupirocin (BACTROBAN) 2  "% ointment, Apply topically once daily., Disp: 30 g, Rfl: 1    NON FORMULARY MEDICATION, Take 3 drops by mouth 2 (two) times daily as needed. THC, Disp: , Rfl:     [employer], OUTPATIENT,, Clindamycin topical 300mg capsule, Disp: , Rfl:     zinc gluconate 50 mg tablet, Take 1 tablet (50 mg total) by mouth once daily., Disp: 30 tablet, Rfl: 1   Review of patient's allergies indicates:   Allergen Reactions    Levofloxacin Rash       DIAGNOSTICS      Labs/Scans/Micro:    CBC:   Lab Results   Component Value Date    WBC 4.89 09/05/2023    HGB 10.1 (L) 09/05/2023    HCT 33.9 (L) 09/05/2023    MCV 80.0 09/05/2023     09/05/2023       Sedimentation rate:   Lab Results   Component Value Date    SEDRATE 22 (H) 12/15/2022    C-reactive protein:   Lab Results   Component Value Date    CRP 44.90 (H) 12/15/2022    Chemistry: [unfilled]  HBA1C: No components found for: "HBA1C"    Ankle Brachial Index: No results found for this or any previous visit.    Imaging:    Plain film: No results found for this or any previous visit.    MRI: No results found for this or any previous visit.    Bone Scan: No results found for this or any previous visit.    Vascular studies:    Microbiology: Rt hip, 4/2/24        HOME HEALTH AGENCY: [facility]     WOUND CARE ORDERS:  Right hip wound: Cleanse with wound cleanser, apply normal saline moisted 2" kerlix or gauze with topical antibiotic powder mixed into mouth of wound, do not pack into undermining or tunneling area, cover with silver alginate and gentle foam border dressing to be changed daily  Sacral wound: Cleanse with wound cleanser, apply silver alginate, and gentle foam border dressing to be changed daily         Follow up in 2 weeks (on 5/7/2024) for right hip/sacrum .        "

## 2024-05-14 ENCOUNTER — HOSPITAL ENCOUNTER (OUTPATIENT)
Dept: WOUND CARE | Facility: HOSPITAL | Age: 44
Discharge: HOME | End: 2024-05-14
Attending: NURSE PRACTITIONER
Payer: MEDICARE

## 2024-05-14 ENCOUNTER — CLINICAL SUPPORT (OUTPATIENT)
Dept: RESPIRATORY THERAPY | Facility: HOSPITAL | Age: 44
End: 2024-05-14
Attending: ANESTHESIOLOGY
Payer: MEDICARE

## 2024-05-14 VITALS
SYSTOLIC BLOOD PRESSURE: 124 MMHG | HEIGHT: 70 IN | HEART RATE: 70 BPM | WEIGHT: 130.06 LBS | BODY MASS INDEX: 18.62 KG/M2 | DIASTOLIC BLOOD PRESSURE: 74 MMHG

## 2024-05-14 DIAGNOSIS — G82.50 QUADRIPLEGIA: ICD-10-CM

## 2024-05-14 DIAGNOSIS — M86.18: ICD-10-CM

## 2024-05-14 DIAGNOSIS — S76.001D OPEN WOUND OF HIP WITH TENDON INVOLVEMENT, RIGHT, SUBSEQUENT ENCOUNTER: ICD-10-CM

## 2024-05-14 DIAGNOSIS — L89.44 PRESSURE INJURY OF CONTIGUOUS REGION INVOLVING BACK AND RIGHT HIP, STAGE 4: ICD-10-CM

## 2024-05-14 DIAGNOSIS — Z01.818 PREOP EXAMINATION: Primary | ICD-10-CM

## 2024-05-14 DIAGNOSIS — L89.154 PRESSURE INJURY OF SACRAL REGION, STAGE 4: ICD-10-CM

## 2024-05-14 DIAGNOSIS — S76.901D OPEN WOUND OF RIGHT HIP AND THIGH WITH TENDON INVOLVEMENT, SUBSEQUENT ENCOUNTER: Primary | ICD-10-CM

## 2024-05-14 DIAGNOSIS — S71.001D OPEN WOUND OF RIGHT HIP AND THIGH WITH TENDON INVOLVEMENT, SUBSEQUENT ENCOUNTER: Primary | ICD-10-CM

## 2024-05-14 DIAGNOSIS — S76.001D OPEN WOUND OF RIGHT HIP AND THIGH WITH TENDON INVOLVEMENT, SUBSEQUENT ENCOUNTER: Primary | ICD-10-CM

## 2024-05-14 DIAGNOSIS — Z01.818 PREOP EXAMINATION: ICD-10-CM

## 2024-05-14 DIAGNOSIS — S71.101D OPEN WOUND OF RIGHT HIP AND THIGH WITH TENDON INVOLVEMENT, SUBSEQUENT ENCOUNTER: Primary | ICD-10-CM

## 2024-05-14 DIAGNOSIS — S71.001D OPEN WOUND OF HIP WITH TENDON INVOLVEMENT, RIGHT, SUBSEQUENT ENCOUNTER: ICD-10-CM

## 2024-05-14 PROCEDURE — 93010 ELECTROCARDIOGRAM REPORT: CPT | Mod: ,,, | Performed by: INTERNAL MEDICINE

## 2024-05-14 PROCEDURE — 27000999 HC MEDICAL RECORD PHOTO DOCUMENTATION

## 2024-05-14 PROCEDURE — 99212 OFFICE O/P EST SF 10 MIN: CPT | Mod: 25

## 2024-05-14 PROCEDURE — 93005 ELECTROCARDIOGRAM TRACING: CPT

## 2024-05-14 RX ORDER — TESTOSTERONE UNDECANOATE 198 MG/1
1 CAPSULE, LIQUID FILLED ORAL
COMMUNITY
Start: 2024-03-25

## 2024-05-15 LAB
OHS QRS DURATION: 72 MS
OHS QTC CALCULATION: 459 MS

## 2024-05-16 ENCOUNTER — ANESTHESIA EVENT (OUTPATIENT)
Dept: SURGERY | Facility: HOSPITAL | Age: 44
End: 2024-05-16
Payer: MEDICARE

## 2024-05-16 RX ORDER — CEFAZOLIN SODIUM 2 G/50ML
2 SOLUTION INTRAVENOUS
Status: CANCELLED | OUTPATIENT
Start: 2024-05-17 | End: 2024-05-17

## 2024-05-16 NOTE — ANESTHESIA PREPROCEDURE EVALUATION
05/16/2024  Werner Jarrett is a 43 y.o., male.      Pre-op Assessment    I have reviewed the Patient Summary Reports.     I have reviewed the Nursing Notes. I have reviewed the NPO Status.   I have reviewed the Medications.     Review of Systems  Anesthesia Hx:             Denies Family Hx of Anesthesia complications.    Denies Personal Hx of Anesthesia complications.                    Social:  Smoker, Alcohol Use       Hematology/Oncology:  Hematology Normal   Oncology Normal                                   EENT/Dental:  EENT/Dental Normal           Cardiovascular:  Cardiovascular Normal                                            Pulmonary:   COPD, moderate                     Renal/:  Renal/ Normal                 Hepatic/GI:  Hepatic/GI Normal                 Musculoskeletal:  Musculoskeletal Normal                Neurological:  Neurology Normal                                      Endocrine:  Endocrine Normal            Dermatological:  Skin Normal    Psych:  Psychiatric History                  Physical Exam  General: Cooperative, Alert and Oriented    Airway:  Mallampati: II   Mouth Opening: Normal  TM Distance: Normal  Tongue: Normal  Neck ROM: Normal ROM    Dental:  Intact        Anesthesia Plan  Type of Anesthesia, risks & benefits discussed:    Anesthesia Type: Gen Supraglottic Airway  Intra-op Monitoring Plan: Standard ASA Monitors  Post Op Pain Control Plan: multimodal analgesia  Induction:  IV  Informed Consent: Informed consent signed with the Patient and all parties understand the risks and agree with anesthesia plan.  All questions answered. Patient consented to blood products? Yes  ASA Score: 3    Ready For Surgery From Anesthesia Perspective.     .

## 2024-05-17 ENCOUNTER — ANESTHESIA (OUTPATIENT)
Dept: SURGERY | Facility: HOSPITAL | Age: 44
End: 2024-05-17
Payer: MEDICARE

## 2024-05-17 ENCOUNTER — HOSPITAL ENCOUNTER (OUTPATIENT)
Facility: HOSPITAL | Age: 44
Discharge: HOME | End: 2024-05-17
Attending: SURGERY | Admitting: SURGERY
Payer: MEDICARE

## 2024-05-17 VITALS
RESPIRATION RATE: 18 BRPM | OXYGEN SATURATION: 94 % | TEMPERATURE: 98 F | HEART RATE: 71 BPM | DIASTOLIC BLOOD PRESSURE: 83 MMHG | HEIGHT: 70 IN | WEIGHT: 130.06 LBS | BODY MASS INDEX: 18.62 KG/M2 | SYSTOLIC BLOOD PRESSURE: 122 MMHG

## 2024-05-17 DIAGNOSIS — S71.101A WOUND, OPEN, HIP OR THIGH, RIGHT, INITIAL ENCOUNTER: ICD-10-CM

## 2024-05-17 DIAGNOSIS — S71.001A WOUND, OPEN, HIP OR THIGH, RIGHT, INITIAL ENCOUNTER: ICD-10-CM

## 2024-05-17 DIAGNOSIS — S71.001A: ICD-10-CM

## 2024-05-17 LAB
GRAM STN SPEC: NORMAL
GRAM STN SPEC: NORMAL

## 2024-05-17 PROCEDURE — 87075 CULTR BACTERIA EXCEPT BLOOD: CPT | Performed by: SURGERY

## 2024-05-17 PROCEDURE — 63600175 PHARM REV CODE 636 W HCPCS: Performed by: SURGERY

## 2024-05-17 PROCEDURE — D9220A PRA ANESTHESIA: Mod: ,,, | Performed by: NURSE ANESTHETIST, CERTIFIED REGISTERED

## 2024-05-17 PROCEDURE — 25000003 PHARM REV CODE 250: Performed by: NURSE ANESTHETIST, CERTIFIED REGISTERED

## 2024-05-17 PROCEDURE — 37000008 HC ANESTHESIA 1ST 15 MINUTES: Performed by: SURGERY

## 2024-05-17 PROCEDURE — 87205 SMEAR GRAM STAIN: CPT | Performed by: SURGERY

## 2024-05-17 PROCEDURE — 36000707: Performed by: SURGERY

## 2024-05-17 PROCEDURE — 87070 CULTURE OTHR SPECIMN AEROBIC: CPT | Performed by: SURGERY

## 2024-05-17 PROCEDURE — 36000706: Performed by: SURGERY

## 2024-05-17 PROCEDURE — 63600175 PHARM REV CODE 636 W HCPCS: Performed by: NURSE ANESTHETIST, CERTIFIED REGISTERED

## 2024-05-17 PROCEDURE — 25000003 PHARM REV CODE 250: Performed by: SURGERY

## 2024-05-17 PROCEDURE — 88304 TISSUE EXAM BY PATHOLOGIST: CPT | Performed by: SURGERY

## 2024-05-17 PROCEDURE — 63600175 PHARM REV CODE 636 W HCPCS: Performed by: ANESTHESIOLOGY

## 2024-05-17 PROCEDURE — 71000016 HC POSTOP RECOV ADDL HR: Performed by: SURGERY

## 2024-05-17 PROCEDURE — 71000015 HC POSTOP RECOV 1ST HR: Performed by: SURGERY

## 2024-05-17 PROCEDURE — 37000009 HC ANESTHESIA EA ADD 15 MINS: Performed by: SURGERY

## 2024-05-17 RX ORDER — SODIUM CHLORIDE 0.9 % (FLUSH) 0.9 %
10 SYRINGE (ML) INJECTION
Status: DISCONTINUED | OUTPATIENT
Start: 2024-05-17 | End: 2024-05-17

## 2024-05-17 RX ORDER — PHENYLEPHRINE HYDROCHLORIDE 10 MG/ML
INJECTION INTRAVENOUS
Status: DISCONTINUED | OUTPATIENT
Start: 2024-05-17 | End: 2024-05-17

## 2024-05-17 RX ORDER — FENTANYL CITRATE 50 UG/ML
INJECTION, SOLUTION INTRAMUSCULAR; INTRAVENOUS
Status: DISCONTINUED | OUTPATIENT
Start: 2024-05-17 | End: 2024-05-17

## 2024-05-17 RX ORDER — LIDOCAINE HYDROCHLORIDE 20 MG/ML
INJECTION, SOLUTION EPIDURAL; INFILTRATION; INTRACAUDAL; PERINEURAL
Status: DISCONTINUED | OUTPATIENT
Start: 2024-05-17 | End: 2024-05-17

## 2024-05-17 RX ORDER — SODIUM CHLORIDE, SODIUM LACTATE, POTASSIUM CHLORIDE, CALCIUM CHLORIDE 600; 310; 30; 20 MG/100ML; MG/100ML; MG/100ML; MG/100ML
INJECTION, SOLUTION INTRAVENOUS CONTINUOUS
Status: ACTIVE | OUTPATIENT
Start: 2024-05-17

## 2024-05-17 RX ORDER — HEPARIN 100 UNIT/ML
300 SYRINGE INTRAVENOUS
Status: DISCONTINUED | OUTPATIENT
Start: 2024-05-17 | End: 2024-05-17 | Stop reason: HOSPADM

## 2024-05-17 RX ORDER — FENTANYL CITRATE 50 UG/ML
25 INJECTION, SOLUTION INTRAMUSCULAR; INTRAVENOUS EVERY 5 MIN PRN
Status: DISCONTINUED | OUTPATIENT
Start: 2024-05-17 | End: 2024-05-17

## 2024-05-17 RX ORDER — HYDROMORPHONE HYDROCHLORIDE 2 MG/ML
0.2 INJECTION, SOLUTION INTRAMUSCULAR; INTRAVENOUS; SUBCUTANEOUS EVERY 5 MIN PRN
Status: DISCONTINUED | OUTPATIENT
Start: 2024-05-17 | End: 2024-05-17

## 2024-05-17 RX ORDER — PROPOFOL 10 MG/ML
VIAL (ML) INTRAVENOUS
Status: DISCONTINUED | OUTPATIENT
Start: 2024-05-17 | End: 2024-05-17

## 2024-05-17 RX ORDER — ONDANSETRON HYDROCHLORIDE 2 MG/ML
INJECTION, SOLUTION INTRAVENOUS
Status: DISCONTINUED | OUTPATIENT
Start: 2024-05-17 | End: 2024-05-17

## 2024-05-17 RX ORDER — OXYCODONE HYDROCHLORIDE 5 MG/1
5 TABLET ORAL ONCE
Status: COMPLETED | OUTPATIENT
Start: 2024-05-17 | End: 2024-05-17

## 2024-05-17 RX ORDER — MIDAZOLAM HYDROCHLORIDE 1 MG/ML
INJECTION INTRAMUSCULAR; INTRAVENOUS
Status: DISCONTINUED | OUTPATIENT
Start: 2024-05-17 | End: 2024-05-17

## 2024-05-17 RX ADMIN — LIDOCAINE HYDROCHLORIDE 100 MG: 20 INJECTION, SOLUTION EPIDURAL; INFILTRATION; INTRACAUDAL; PERINEURAL at 08:05

## 2024-05-17 RX ADMIN — PROPOFOL 50 MG: 10 INJECTION, EMULSION INTRAVENOUS at 08:05

## 2024-05-17 RX ADMIN — PROPOFOL 30 MG: 10 INJECTION, EMULSION INTRAVENOUS at 08:05

## 2024-05-17 RX ADMIN — OXYCODONE 5 MG: 5 TABLET ORAL at 09:05

## 2024-05-17 RX ADMIN — PIPERACILLIN AND TAZOBACTAM 4.5 G: 4; .5 INJECTION, POWDER, LYOPHILIZED, FOR SOLUTION INTRAVENOUS; PARENTERAL at 07:05

## 2024-05-17 RX ADMIN — FENTANYL CITRATE 50 MCG: 50 INJECTION, SOLUTION INTRAMUSCULAR; INTRAVENOUS at 08:05

## 2024-05-17 RX ADMIN — ONDANSETRON 4 MG: 2 INJECTION INTRAMUSCULAR; INTRAVENOUS at 08:05

## 2024-05-17 RX ADMIN — SODIUM CHLORIDE, POTASSIUM CHLORIDE, SODIUM LACTATE AND CALCIUM CHLORIDE: 600; 310; 30; 20 INJECTION, SOLUTION INTRAVENOUS at 07:05

## 2024-05-17 RX ADMIN — MIDAZOLAM 2 MG: 1 INJECTION INTRAMUSCULAR; INTRAVENOUS at 08:05

## 2024-05-17 RX ADMIN — HEPARIN 300 UNITS: 100 SYRINGE at 10:05

## 2024-05-17 RX ADMIN — PHENYLEPHRINE HYDROCHLORIDE 100 MCG: 10 INJECTION INTRAVENOUS at 08:05

## 2024-05-17 NOTE — ANESTHESIA POSTPROCEDURE EVALUATION
Anesthesia Post Evaluation    Patient: Werner Jarertt    Procedure(s) Performed: Procedure(s) (LRB):  INCISION AND DRAINAGE, LOWER EXTREMITY (I&D right hip) (Right)  DEBRIDEMENT, WOUND (Right)    Final Anesthesia Type: general      Patient location during evaluation: OPS  Patient participation: Yes- Able to Participate  Level of consciousness: awake and alert  Post-procedure vital signs: reviewed and stable  Pain management: adequate  Airway patency: patent  ESPINOZA mitigation strategies: Multimodal analgesia  PONV status at discharge: No PONV  Anesthetic complications: no      Cardiovascular status: blood pressure returned to baseline  Respiratory status: unassisted  Hydration status: euvolemic  Follow-up not needed.              Vitals Value Taken Time   BP 90/41 05/17/24 0643   Temp 36.9 °C (98.4 °F) 05/17/24 0643   Pulse 97 05/17/24 0643   Resp 22 05/17/24 0643   SpO2 97 % 05/17/24 0643         No case tracking events are documented in the log.      Pain/Alexx Score: No data recorded

## 2024-05-17 NOTE — BRIEF OP NOTE
[facility] - Periop Services  Brief Operative Note    Surgery Date: 5/17/2024     Surgeons and Role:     * BARBARA Perez MD - Primary    Assisting Surgeon: None    Pre-op Diagnosis:  Wound, open, hip or thigh, right, initial encounter [S71.001A, S71.101A]    Post-op Diagnosis:  Post-Op Diagnosis Codes:     * Wound, open, hip or thigh, right, initial encounter [S71.001A, S71.101A]    Procedure  Excisional debridement right hip wound, dimension of debridement 1 cm circumferentially, total wound dimensions 3.5 cm x 3 cm x 1 cm deep.  Blunt debridement right hip wound    Anesthesia: General    Operative Findings:    Rolled edges of skin, completely removed by excisional debridement  Fibrinous material within the wound debrided bluntly and submitted to culture    Estimated Blood Loss: 10 mL         Specimens:   Specimen (24h ago, onward)      None              Discharge Note    OUTCOME: Patient tolerated treatment/procedure well without complication and is now ready for discharge.    DISPOSITION: Home or Self Care    FINAL DIAGNOSIS:  <principal problem not specified>    FOLLOWUP: In clinic    DISCHARGE INSTRUCTIONS:  No discharge procedures on file.

## 2024-05-17 NOTE — OP NOTE
DATE OF PROCEDURE 5/17/2024     PREOPERATIVE DIAGNOSIS -   Wound, open, hip or thigh, right, initial encounter [S71.001A, S71.101A]  Open wound of right hip [S71.001A]     POSTOPERATIVE DIAGNOSIS -   Wound, open, hip or thigh, right, initial encounter [S71.001A, S71.101A]  Open wound of right hip [S71.001A]     PROCEDURE -   Excisional debridement right hip wound, dimension of debridement 1 cm circumferentially, total wound dimensions 3.5 cm x 3 cm x 1 cm deep.  Blunt debridement right hip wound    SURGEON - Sandy Castellano MD    ANESTHESIA - GETA    FINDINGS -    Rolled edges of skin, completely removed by excisional debridement  Fibrinous material within the wound debrided bluntly and submitted to culture    SPECIMENS -   Wound for pathology  Wound for culture    OPERATIVE REPORT -   Patient was brought to the OR, placed in supine position.  Anesthesia was provided via IV.  The patient's hip was placed up.  The right hip was prepped and draped in sterile manner.  Time-out was carried out and agreed upon.  A 15 blade scalpel was used to excise the skin circumferentially for about a cm throughout.  Skin and subcutaneous tissue were removed.  There was some fibrinous material within the wound that was bluntly debrided with curette.  This material was submitted to culture.  The remainder of the wound was clean appearing.  Hemostasis was ensured.  Sterile dressings were applied.  New line    The plan at this time will be to consider re-application of VAC dressing.  We can also consider referral for reconstructive surgery for coverage of wound in the future.    The patient tolerated the procedure well without complication.

## 2024-05-20 LAB — BACTERIA SPEC ANAEROBE CULT: NORMAL

## 2024-05-21 LAB — PSYCHE PATHOLOGY RESULT: NORMAL

## 2024-05-22 LAB — BACTERIA TISS AEROBE CULT: NO GROWTH

## 2024-05-28 ENCOUNTER — HOSPITAL ENCOUNTER (OUTPATIENT)
Dept: WOUND CARE | Facility: HOSPITAL | Age: 44
Discharge: HOME | End: 2024-05-28
Attending: NURSE PRACTITIONER
Payer: MEDICARE

## 2024-05-28 VITALS
WEIGHT: 130.06 LBS | SYSTOLIC BLOOD PRESSURE: 85 MMHG | DIASTOLIC BLOOD PRESSURE: 62 MMHG | HEIGHT: 70 IN | BODY MASS INDEX: 18.62 KG/M2 | HEART RATE: 97 BPM | RESPIRATION RATE: 17 BRPM

## 2024-05-28 DIAGNOSIS — S71.001D OPEN WOUND OF RIGHT HIP AND THIGH WITH TENDON INVOLVEMENT, SUBSEQUENT ENCOUNTER: Primary | ICD-10-CM

## 2024-05-28 DIAGNOSIS — S76.901D OPEN WOUND OF RIGHT HIP AND THIGH WITH TENDON INVOLVEMENT, SUBSEQUENT ENCOUNTER: Primary | ICD-10-CM

## 2024-05-28 DIAGNOSIS — M86.18: ICD-10-CM

## 2024-05-28 DIAGNOSIS — G82.50 QUADRIPLEGIA: ICD-10-CM

## 2024-05-28 DIAGNOSIS — S71.101D OPEN WOUND OF RIGHT HIP AND THIGH WITH TENDON INVOLVEMENT, SUBSEQUENT ENCOUNTER: Primary | ICD-10-CM

## 2024-05-28 DIAGNOSIS — L89.154 PRESSURE INJURY OF SACRAL REGION, STAGE 4: ICD-10-CM

## 2024-05-28 DIAGNOSIS — S76.001D OPEN WOUND OF RIGHT HIP AND THIGH WITH TENDON INVOLVEMENT, SUBSEQUENT ENCOUNTER: Primary | ICD-10-CM

## 2024-05-28 DIAGNOSIS — L89.44 PRESSURE INJURY OF CONTIGUOUS REGION INVOLVING BACK AND RIGHT HIP, STAGE 4: ICD-10-CM

## 2024-05-28 PROCEDURE — 99212 OFFICE O/P EST SF 10 MIN: CPT | Mod: 25

## 2024-05-28 PROCEDURE — 97605 NEG PRS WND THER DME<=50SQCM: CPT

## 2024-05-28 PROCEDURE — 27000999 HC MEDICAL RECORD PHOTO DOCUMENTATION

## 2024-05-28 NOTE — PROGRESS NOTES
Referred by: Dr. Sidhu  Low air loss mattress [x] Yes [] No   Is the patient eligible for a graft, and/or currently grafting?  [x] Yes [] No  (right hip - [facility])     Subjective:         Patient ID: Werner Jarrett is a 43 y.o. male.    Chief Complaint: Pressure Ulcer (Sacrum - stage 4 /Right hip - stage 4)    5/28/24 - Mr. 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  He had excisional debridement of this right hip on 5/17/24 by Dr. Sandy Jara.  Her op note:   PROCEDURE -   Excisional debridement right hip wound, dimension of debridement 1 cm circumferentially, total wound dimensions 3.5 cm x 3 cm x 1 cm deep.  Blunt debridement right hip wound  FINDINGS -    Rolled edges of skin, completely removed by excisional debridement  Fibrinous material within the wound debrided bluntly and submitted to culture   Today that wound is clean with nice margins.  We have applied a wound vac using white foam covered with black foam.     He had a follow up with her scheduled for 5/23 but missed it and is now re-scheduled for 5/30.   He is expecting an appt with ID but has not been contacted as of today.   In addition to the right hip, he does have a wound at the sacrum that has reopened.  It is clean and we will apply collagen into this wound.  A callus paring was done on the right elbow.     5/14/24 - The Patient returns today for followup on the large pressure injury at the right hip as well as a small pressure injury to the sacrum.  He did have a followup appointment with his surgeon, Dr. Sandy Jara. He is scheduled for an I&D/debridment of his right hip with Dr. Quijano this Friday 5/17/24.  We had a lengthy discussion regarding possibilities such as a wound VAC or if she will leave the wound opened or close that wound.  He does not know at this time but this providers preference would be that she would close the wound.  This wound remains open at this time following another  procedure and we were unable to get the wound to close completely in bite of the use of a wound VAC.  He does still have ligament exposed in the wound has increased in size.  Bone is fully exposed in the wound bed.  He has been using topical antibiotics for 1 month and those will be discontinued as of today.  Until his procedure we will apply Adaptic over the exposed bone with silver alginate on top, with daily dressing changes.  He does expect to see an infectious disease doctor following his procedure and also mentioned that he expects to possibly have IV antibiotics in another short stay at [facility].    4/23/24 - Today the wound at the elbow is nearly resolved.  We will begin application of Aquaphor to the elbow.  The sacrum was assessed and has improved dramatically.  It is nearly resolved.  He has completed the PO Augmentin for a UTI and this has helped wounds as well  Additionally he began topical abx to the right hip on 4/16/24 (Clindamycin).  Today the hip is also showing improvement.  He does have a f/up with his surgeon, Dr. Sandy Jara, on 5/2/24 with the hope that she will revise the wound to assist with closure since the margins are rolled.  He will return to clinic in two weeks.     4/16/24 - Mr. Jarrett was seen today for f/up on three wounds.  He did have an MRI done on 4/4/24 of the right hip (at [facility], ordered by Dr. Sandy Jara).  Today those results were reviewed with the patient.  We discussed that this provider would reach out to Dr. Jara regarding possible treatment.  This was done with consideration for IV abx, surgical intervention and/or referral to infectious disease for the best possible outcome.  The patient is currently on Augmentin with two days remaining for a UTI.  Additionally he picked up his topical abx today (Clindamycin) which will be used on a moist 4 x 4 gauze, not Basal Gel.    A callus paring was done on the right elbow and it is now  healed.  The sacrum was selectively debrided and remains stable.     4/2/24 - The patient was referred back to his surgeon, Dr. Macie Carbajal, who he saw on 3/26/24.  He reports that she is concerned over possible abscess in the space, as well as infection.  A culture was obtained today.  She did prescribe Augmentin, to begin tomorrow, as a prophylaxis for the hip. He is expecting to have an MRI schedule prior to the follow up with her on 4/9/24 at which time they will discuss the MRI results, and options. Today the right hip wound has increasing moisture and bogginess.  The sacrum remains stable.  The right elbow as debrided and is nearly closed.     3/19/24 - We have been using NuShield grafts on the open pressure ulcer at the patient's right hip.  Tendon remains exposed in the wound bed, the margins are nereida, epibole and closed.  Through a series of several weeks the wound margins have been scored with a scalpel in an attempt to open these closed margins, to no avail.  Today the graft was held, and the patient will be seen by his surgeon, Dr. Sandy Jara next Tuesday, 3/26/24, for consultation regarding options to possible reopen the margins which might bring a successful closure to this wound based on how it is currently healed.  For the time being we will apply adaptic over the tendon, with silver alginate dressing on top.  Both the right elbow and the sacrum remain stable.  We are currently using Endoform on both.  Today the elbow was selectively debrided.     3/12/24 - The pressure injury to the right hip is being grafted using NuShield grafts.  Today graft #6 was applied.  There has been no significant change to this wound since grafting.  Graft #7 has been ordered.  However, the patient was advised to scheduled with his surgeon, Dr. Snady Jara, for evaluation to determine if she will need to do further surgery to assist this wound with closure since the margins are rolled.  They were again  scored with a scaple to attempt to stimulate the growth of epithelial tissue, which has been done several times to no avail.  The right elbow is progressing well.  The sacrum remains stable with no S & S of infection.  He has received his new power wheelchair.  We are hopeful that this device might contribute to progress with his wounds.     3/5/24 - Mr. Jarrett is seen today for followup on 3 wounds.  The wound to the right elbow was selectively debrided and it does show considerable improvement.  The wound to the sacrum is stable.  We will begin application of moistened Endoform to both of these wounds to be changed on Friday to silver alginate.  The wound to the right hip is slightly moist with a little bit of drainage.  This is to be expected, however, due to the fact that we are grafting that wound.  There has been no significant change to the size of the wound, unfortunately, in spite of grafting multiple times.  We will continue the process with the hope of making some progress.    2/27/24 - Mr. Jarrett returns to clinic today for followup on 3 areas of concern.  The pressure injury remains stable with no significant change.  He has a new wound to the right elbow.  This has developed due to dryness and cracking which has opened into a wound.  The elbow was selectively debrided using a dermal curette.  We will begin applying MediHoney to the elbow, covering with 4 x 4 gauze, Cover and with a Tubigrip with the hopes of softening this callused tissue further.  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  We are [facility] grafting [facility].  Today we applied graft #4 after the wound margins were crosshatched to try to allow for growth epithelial tissue.  Again this week, there was no significant change in that wound.  Ligament is still fully exposed.  Graft #5 is being ordered today.    2/20/24 - the patient returns to clinic today for followup on a small pressure injury to the sacrum as  well as a large stage IV pressure injury to the right hip.  We are currently going through the grafting process.  Today we applied graft #3, which is the 1st of the [facility] grafts.  So far we have not seen any significant change in the wound and the ligament is still fully exposed.  A # 15 scalpel was used to cross keen the wound margin due to rolling of that margin in the need to stimulate the growth of epithelial tissue as well as granular tissue.  The sacrum remains stable and basically the same size.      2/12/24 - Mr. Jarrett is seen today in follow up of two wounds, both of which remain stable.  The left hip is being grafted, and today PuraPly #2 was applied.  There is no significant change since graft #1 was applied.  Today graft #3 was ordered.  We will change from PuraPly to Epifix at this time.  The sacrum remains stable.  We have applied Endoform into that wound and it will be treated the same as a graft, with the hope that the patient does not have BMs which necessitate removal of the dressing. The patient is eligible and is in need of a new power wheelchair.      2/6/24 - Mr. Jarrett returns to clinic today with 2 wounds.  The very small stage IV pressure injury at the sacrum remains stable.  Today, the wound at the right hip, stage IV pressure injury, will receive the 1st graft application.  We are applying PuraPly grafts at this time and then we will change to NuShield after two PuraPly.  Of note, he is waiting on a demo to be shown to him for a possible new wheelchair.  PuraPly #2 ordered.    January 30, 2024:  43-year-old white male, with paraplegia, is here for follow up of the right hip pressure injury, and the sacral pressure injury.  The wounds are looking similar to the last visit.  He is getting ready to have skin substitute applied to the right hip wound.  There is white tissue in the wound bed, presumably tendon.  I do not see pink granulation tissue.  He has no longer using topical  "antibiotics.    1/23/24 - Mr. Jarrett returns today for followup on 2 wounds.  Today the sacral pressure injury remains stable and clean.  We have been using topical antibiotics on both of these wounds for several weeks.  That has been discontinued today.  The pressure injury at the right hip was selectively debrided today using the ultrasonic debrider and graft prep was performed.  We will begin applying a small amount of mupirocin ointment on to this wound daily in preparation beginning the grafting process next week.  We will be use PuraPly/NuShield grafts.  Of note, the patient is responsible for contacting calor at the [facility] to have his measurements taken.  He has not done that as of today.    December 26, 2023:  43-year-old white male, with paraplegia, is here for follow up of a right hip pressure injury, and a sacral pressure injury.  He has been using topical antibiotics.  The measurements are about the same as last visit.  The nurse practitioner has discussed a possible skin substitute on the right hip.    12/11/23 - Mr. Jarrett for followup on the pressure ulcers to the right hip and sacrum.  We started using topical antibiotics on both wounds on 11/21/2023.  Additionally, the patient has been using the vinegar washes for sometime.  He was instructed to discontinue the use of the vinegar washes today and use only the topical antibiotics.  He has been approved for grafts (Puraply and Nushield).  We will plan to do graft prep in two weeks (once abx are complete) and then begin grafting.    Of note, we discussed today the need for proper support in his wheelchair to offload the pressure.  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  Also, he does have a low air loss mattress, but that mattress is on top of a "regular" mattress and he reports springs are protruding.  He needs a " proper bedframe for the low airloss mattress.     12/4/23 - Mr. Jarrett returns for followup on 2 wounds.  He continues to use topical abx on both wounds.  The right hip continues to be concerning.  The tensor fascia ronny is till exposed in the wound bed.  We are mixing the topical abx with basal gel to prevent drying.  The sacral wound is stable but has had a slight increase in size.  Abx are being used on this wound as well. We are attempting to authorize grafts on the right hip wound.    11/27/23 - the patient returns today for followup on pressure injuries to the right hip and the sacrum.  He has received his topical antibiotics (Linezolid/Gentamincin) and started using the antibiotics on Saturday, 11/25/23.  He did not receive basal gel with that order so we have contacted [facility] to send the patient that product as the antibiotics are very drying and they are being applied directly to his tendon.  He must have the moisture of the basal gel.  In the time being, we will mix his topical antibiotics with mupirocin antibiotic.  Today both wounds remains stable.  He was instructed to use the antibiotics on both wounds to avoid cross contamination.    11/20/23 - Mr. Jarrett returns for f/up on the wound to the right hip and the sacrum.  At his last visit the right hip was cultured and it was positive to have Pseudomonas.  He has been using Vinegar washes since 11/06/2023.  He does have a sensitivity to levofloxacin and so can not take that medication.  We have requested a topical recommendation and he is being prescribed linezolid/gentamicin to be applied directly into the wound.  If we do not see improvement from that topical medication we will be moving forward with an IV antibiotic.  He does have exposed ligament (possibly ischiofemoral ligament) in this wound.  The patient does still have a mediport so that would be the next best solution for him due to his sensitivity.  The wound at the sacrum  remains stable and we are currently using silver alginate on both wounds.    11/6/23 - the patient seen today for followup on the right hip and sacral pressure wounds.  Although the sacrum is stable, the right hip does continue to deteriorate.  The right hip was cultured today.  We have been using Endoform and/or collagen on these wounds and unfortunately these products or causing excessive moisture and deterioration to the wounds.  We will discontinue both products and use only silver alginate for the time being.  The right hip does have tendon exposed at this time.  The patient was reminded and encouraged to continue to offload both the right hip in the sacrum which he reports that he is wearing.  However, on the deterioration of the wounds it is concerning.    10/30/23 - Mr. Jarrett to clinic today for followup on the wound to the sacrum as well as the right hip.  Today, both have deteriorated.  We have been using collagen to the wound beds and it appears is though they are stain to wet.  There is still tendon present in the wound bed of the right hip.  We will discontinue the collagen and begin application Endoform to both wounds, changing every other day, covered with silver alginate.  He was reminded to offload as much as possible to prevent further deterioration.  Of note, reported that is blood pressure has been low, and it was very low today, 86/54.  He states that he noticed it last week that he has stopped taking Tylenol, aspirin, and probiotics.  We discussed today that it is unlikely that any of these medications would impact blood pressure but he prefers to remain off of them.    10/16/23 - The patient returns today for followup on the pressure injury to the right hip as well as the sacrum.  Today, there is now tendon exposed in the wound bed the right hip pressure injury.  We discussed today that it is imperative that the patient offload to allow this wound to heal so that he does not develop  osteomyelitis in this hip as well.  The pressure injury to the sacrum has also deteriorated.  We discussed that now that the amputation to the left hip has fully healed, he must take the opportunity to begin offloading that right hip to allow it to heal.  We will begin applying collagen every other day to both wound beds.    10/9/23 The patient returns today for the open wound to the right hip and sacrum.  The surgical incision to the left hip is now resolved and will no longer be followed.  Both the right hip and sacrum are wounds that have reopened.  We will begin application of collagen every third day to both wounds.  Neither wound has any signs and symptoms of infection.  He will return in two weeks. Of note, the patient reports that he has had diarrhea for several days, up to about two weeks.  He has had no treatment and is currently using only probiotics.  He has been prescribed imodium and advised to use that medication no more than 4 days.  He is to contact his PCP if the diarrhea does not resolve within that time period.     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 Sadny Owusu on 8/21/23.  This wound has progress except personally well and is remaining closed.  The patient is using only iodine along the surgical incision line.  He does have a follow up with his surgeon tomorrow, 09/26/2023.  He was advised today that he can begin application coconut oil with vitamin C along the closed incision line which is fully approximated.  We will follow this incision line for one additional visit then d/c if no issues develop.    He has had a long term wound at the sacrum which today is open again.  It was mechanically debrided.  Endoform as applied to the wound bed, which will be left in place until Friday at which time he will change to silver alginate.     Review of Systems   Constitutional: Negative.    Respiratory: Negative.     Cardiovascular: Negative.     Skin:         As documented in the HPI.   All other systems reviewed and are negative.        Objective:      Vitals:    05/28/24 1305   BP: (!) 85/62   Pulse: 97   Resp: 17           Physical Exam  Vitals and nursing note reviewed. Exam conducted with a chaperone present.   Constitutional:       Appearance: Normal appearance.   Cardiovascular:      Rate and Rhythm: Normal rate.   Pulmonary:      Effort: Pulmonary effort is normal.   Skin:     General: Skin is warm and dry.      Comments: sacral pressure injury, right hip, trochanteric, pressure injury   Neurological:      Mental Status: He is alert.            Negative Pressure Wound Therapy  05/28/24 1308 Right (Active)   05/28/24 1308   Side: Right   Orientation:    Location: Hip   Additional Comments:    Removal Indication and Assessment:    Location:    SDO Location:    NPWT Type Vacuum Therapy 05/28/24 1202   Therapy Setting NPWT Continuous therapy 05/28/24 1202   Pressure Setting NPWT 125 mmHg 05/28/24 1202   Therapy Interventions NPWT other (see comments) 05/28/24 1202   Sponges Inserted NPWT 1;Black 05/28/24 1202   Sponges Removed NPWT 1;White 05/28/24 1202            Altered Skin Integrity 09/25/23 1153 Sacral spine #1 (Active)   09/25/23 1153   Altered Skin Integrity Present on Admission - Did Patient arrive to the hospital with altered skin?: yes   Side:    Orientation:    Location: Sacral spine   Wound Number: #1   Is this injury device related?:    Primary Wound Type:    Description of Altered Skin Integrity:    Ankle-Brachial Index:    Pulses:    Removal Indication and Assessment:    Wound Outcome:    (Retired) Wound Length (cm):    (Retired) Wound Width (cm):    (Retired) Depth (cm):    Wound Description (Comments):    Removal Indications:    Description of Altered Skin Integrity Full thickness tissue loss. Subcutaneous fat may be visible but bone, tendon or muscle are not exposed 05/28/24 1202   Dressing Appearance Intact;Moist drainage 05/28/24  1202   Drainage Amount Moderate 05/28/24 1202   Drainage Characteristics/Odor Serosanguineous 05/28/24 1202   Appearance Intact;Red;Moist;Not granulating 05/28/24 1202   Tissue loss description Full thickness 05/28/24 1202   Periwound Area Intact;Dry 05/28/24 1202   Wound Edges Open 05/28/24 1202   Wound Length (cm) 0.8 cm 05/28/24 1202   Wound Width (cm) 0.6 cm 05/28/24 1202   Wound Depth (cm) 0.4 cm 05/28/24 1202   Wound Volume (cm^3) 0.192 cm^3 05/28/24 1202   Wound Surface Area (cm^2) 0.48 cm^2 05/28/24 1202   Care Cleansed with:;Wound cleanser 05/28/24 1202   Dressing Applied;Other (comment) 05/28/24 1202   Periwound Care Absorptive dressing applied 05/28/24 1202   Dressing Change Due 05/30/24 05/28/24 1202            Altered Skin Integrity 10/09/23 1141 Right Hip #2 (Active)   10/09/23 1141   Altered Skin Integrity Present on Admission - Did Patient arrive to the hospital with altered skin?: yes   Side: Right   Orientation:    Location: Hip   Wound Number: #2   Is this injury device related?: No   Primary Wound Type:    Description of Altered Skin Integrity:    Ankle-Brachial Index:    Pulses:    Removal Indication and Assessment:    Wound Outcome:    (Retired) Wound Length (cm):    (Retired) Wound Width (cm):    (Retired) Depth (cm):    Wound Description (Comments):    Removal Indications:    Description of Altered Skin Integrity Full thickness tissue loss with exposed bone, tendon, or muscle. Often includes undermining and tunneling. May extend into muscle and/or supporting structures. 05/28/24 1202   Dressing Appearance Intact;Moist drainage 05/28/24 1202   Drainage Amount Moderate 05/28/24 1202   Drainage Characteristics/Odor Serosanguineous 05/28/24 1202   Appearance Intact;Moist;Slough 05/28/24 1202   Tissue loss description Full thickness 05/28/24 1202   Periwound Area Intact;Dry 05/28/24 1202   Wound Edges Open;Defined 05/28/24 1202   Wound Length (cm) 1.6 cm 05/28/24 1202   Wound Width (cm) 1.5 cm  05/28/24 1202   Wound Depth (cm) 0.9 cm 05/28/24 1202   Wound Volume (cm^3) 2.16 cm^3 05/28/24 1202   Wound Surface Area (cm^2) 2.4 cm^2 05/28/24 1202   Care Cleansed with:;Wound cleanser 05/28/24 1202   Dressing Applied;Other (comment) 05/28/24 1202   Dressing Change Due 05/31/24 05/28/24 1202         5/28/24    Right hip  wound vac, white foam, black foam,   negative pressure set @ 125mmHg continuous         Sacrum    Collagen, silver alginate, gentle border        Right elbow- Monitor  Aquaphor, Vaseline gauze, mepiiex foam,   Kerlix, tape  TR                 Assessment:           ICD-10-CM ICD-9-CM   1. Open wound of right hip and thigh with tendon involvement, subsequent encounter  S71.001D V58.89    S71.101D 890.2    S76.001D     S76.901D    2. Pressure injury of contiguous region involving back and right hip, stage 4  L89.44 707.09     707.24   3. Pressure injury of sacral region, stage 4  L89.154 707.03     707.24   4. Acute osteomyelitis of hip  M86.18 730.05   5. Quadriplegia  G82.50 344.00           Procedures:     Mechanical debridement only      [] Yes [] No   I & D performed  [] Yes [] No   Excisional debridement performed  [] Yes [] No   Selective debridement performed        [x] Yes [] No   Mechanical debridement performed         [] Yes [] No  Silver nitrate applied                                     [] Yes [] No  Labs ordered this visit                                  [] Yes [] No   Imaging ordered this visit                           [] Yes [] No   Tissue pathology and/or culture taken       MEDICATIONS    Current Outpatient Medications:     acetaminophen (TYLENOL) 325 MG tablet, Take 650 mg by mouth every 6 (six) hours as needed for Pain., Disp: , Rfl:     ascorbic acid, vitamin C, (VITAMIN C) 1000 MG tablet, Take 1,000 mg by mouth every evening., Disp: , Rfl:     aspirin 325 MG tablet, Take 325 mg by mouth every morning. Stopped x 1 month, Disp: , Rfl:     baclofen (LIORESAL) 5 mg Tab tablet,  Take 1 tablet (5 mg total) by mouth 3 (three) times daily., Disp: 90 tablet, Rfl: 0    bisacodyL (DULCOLAX) 10 mg Supp, Place 10 mg rectally daily as needed., Disp: , Rfl:     busPIRone (BUSPAR) 10 MG tablet, Take 1 tablet (10 mg total) by mouth 2 (two) times daily., Disp: 60 tablet, Rfl: 5    cetirizine (ZYRTEC) 10 MG tablet, Take 10 mg by mouth 2 (two) times a day., Disp: , Rfl:     cloNIDine (CATAPRES) 0.1 MG tablet, Take 0.1 mg by mouth daily as needed., Disp: , Rfl:     DULoxetine (CYMBALTA) 30 MG capsule, Take 1 capsule by mouth 2 (two) times daily., Disp: , Rfl:     ferrous sulfate (SLOW RELEASE IRON) 142 mg (45 mg iron) TbSR, Take 324 mg by mouth every morning., Disp: , Rfl:     ferrous sulfate (SLOW RELEASE IRON) 142 mg (45 mg iron) TbSR, 1 tablet Orally Three times a Week for 30 days, Disp: , Rfl:     fexofenadine (ALLEGRA) 180 MG tablet, Take 180 mg by mouth every morning., Disp: , Rfl:     fluticasone propionate (FLONASE ALLERGY RELIEF) 50 mcg/actuation nasal spray, 1 spray by Each Nostril route daily as needed., Disp: , Rfl:     meloxicam (MOBIC) 7.5 MG tablet, Take 7.5 mg by mouth 2 (two) times daily as needed for Pain., Disp: , Rfl:     multivitamin/iron/folic acid (CENTRUM ORAL), Take 1 tablet by mouth every morning., Disp: , Rfl:     mupirocin (BACTROBAN) 2 % ointment, Apply topically once daily., Disp: 30 g, Rfl: 1    NON FORMULARY MEDICATION, Take 3 drops by mouth 2 (two) times daily as needed. THC, Disp: , Rfl:     testosterone undecanoate (JATENZO) 198 mg Cap, 1 capsule., Disp: , Rfl:     [employer], OUTPATIENT,, once daily. Clindamycin topical 300mg capsule, Disp: , Rfl:     zinc gluconate 50 mg tablet, Take 1 tablet (50 mg total) by mouth once daily. (Patient taking differently: Take 50 mg by mouth every morning.), Disp: 30 tablet, Rfl: 1  No current facility-administered medications for this encounter.    Facility-Administered Medications Ordered in Other Encounters:      "lactated ringers infusion, , Intravenous, Continuous, Cr Garibay DO, Last Rate: 10 mL/hr at 05/17/24 0705, Restarted at 05/17/24 0859   Review of patient's allergies indicates:   Allergen Reactions    Levofloxacin Rash       DIAGNOSTICS      Labs/Scans/Micro:    CBC:   Lab Results   Component Value Date    WBC 10.42 05/14/2024    HGB 13.9 (L) 05/14/2024    HCT 44.3 05/14/2024    MCV 88.8 05/14/2024     05/14/2024       Sedimentation rate:   Lab Results   Component Value Date    SEDRATE 22 (H) 12/15/2022    C-reactive protein:   Lab Results   Component Value Date    CRP 44.90 (H) 12/15/2022    Chemistry: [unfilled]  HBA1C: No components found for: "HBA1C"    Ankle Brachial Index: No results found for this or any previous visit.    Imaging:    Plain film: No results found for this or any previous visit.    MRI: No results found for this or any previous visit.    Bone Scan: No results found for this or any previous visit.    Vascular studies:    Microbiology: Rt hip, 4/2/24        HOME HEALTH AGENCY: [facility]     WOUND CARE ORDERS:  Right hip wound: Cleanse with wound cleanser, cover exposed bone with white foam, layer with black foam with negative pressure set at 125mmHg continuous to be changed on Tuesdays and Fridays.   Sacrum- Cleanse with wound cleanser, apply collagen to wound bed, cover with silver alginate and a gentle border to be changed on Thursday and Saturday.  Right elbow- monitor, apply Aquaphor daily        Follow up in about 1 week (around 6/4/2024) for stretcher.        "

## 2024-05-31 LAB — FUNGUS SPEC CULT: NORMAL

## 2024-06-04 ENCOUNTER — HOSPITAL ENCOUNTER (OUTPATIENT)
Dept: WOUND CARE | Facility: HOSPITAL | Age: 44
Discharge: HOME | End: 2024-06-04
Attending: NURSE PRACTITIONER
Payer: MEDICARE

## 2024-06-04 VITALS
SYSTOLIC BLOOD PRESSURE: 146 MMHG | DIASTOLIC BLOOD PRESSURE: 79 MMHG | HEIGHT: 70 IN | WEIGHT: 130.06 LBS | BODY MASS INDEX: 18.62 KG/M2 | RESPIRATION RATE: 17 BRPM | HEART RATE: 54 BPM

## 2024-06-04 DIAGNOSIS — G82.50 QUADRIPLEGIA: ICD-10-CM

## 2024-06-04 DIAGNOSIS — S71.001D OPEN WOUND OF RIGHT HIP AND THIGH WITH TENDON INVOLVEMENT, SUBSEQUENT ENCOUNTER: Primary | ICD-10-CM

## 2024-06-04 DIAGNOSIS — S76.001D OPEN WOUND OF RIGHT HIP AND THIGH WITH TENDON INVOLVEMENT, SUBSEQUENT ENCOUNTER: Primary | ICD-10-CM

## 2024-06-04 DIAGNOSIS — L89.154 PRESSURE INJURY OF SACRAL REGION, STAGE 4: ICD-10-CM

## 2024-06-04 DIAGNOSIS — S71.101D OPEN WOUND OF RIGHT HIP AND THIGH WITH TENDON INVOLVEMENT, SUBSEQUENT ENCOUNTER: Primary | ICD-10-CM

## 2024-06-04 DIAGNOSIS — M86.18: ICD-10-CM

## 2024-06-04 DIAGNOSIS — S76.901D OPEN WOUND OF RIGHT HIP AND THIGH WITH TENDON INVOLVEMENT, SUBSEQUENT ENCOUNTER: Primary | ICD-10-CM

## 2024-06-04 DIAGNOSIS — L89.44 PRESSURE INJURY OF CONTIGUOUS REGION INVOLVING BACK AND RIGHT HIP, STAGE 4: ICD-10-CM

## 2024-06-04 PROCEDURE — 97605 NEG PRS WND THER DME<=50SQCM: CPT

## 2024-06-04 PROCEDURE — 99212 OFFICE O/P EST SF 10 MIN: CPT

## 2024-06-04 PROCEDURE — 27000999 HC MEDICAL RECORD PHOTO DOCUMENTATION

## 2024-06-04 NOTE — PROGRESS NOTES
Referred by: Dr. Sidhu  Low air loss mattress [x] Yes [] No   Is the patient eligible for a graft, and/or currently grafting?  [x] Yes [] No  (right hip - [facility])     Subjective:         Patient ID: Werner Jarrett is a 43 y.o. male.    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 Jarrett returns today for followup on the 2 pressure injuries, 1 to the sacrum and 1 to the right hip.  He does report that he had a follow up with his surgeon, Dr. Sandy Jara on 05/30/2024.  He states that she has indicated that she will be scheduling a bone biopsy of the right hip due to the patient's osteomyelitis.  She is trying to determine his next course of treatment together with infectious disease consideration.  We are currently using a wound VAC and it does seem to be making progress with the development of granular tissue in the wound bed.  There are no signs and symptoms of infection in this wound.  The sacral wound does have a slight increase in depth.  We had changed to collagen and it may be that that product is to wet.  We will DC the collagen and begin application of silver alginate only.  A callus paring was also done on the patient's right elbow.  This has been an ongoing issue and he is currently using Aquaphor on the elbow.    5/28/24 - Mr. 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  He had excisional debridement of this right hip on 5/17/24 by Dr. Sandy Jara.  Her op note:   PROCEDURE -   Excisional debridement right hip wound, dimension of debridement 1 cm circumferentially, total wound dimensions 3.5 cm x 3 cm x 1 cm deep.  Blunt debridement right hip wound  FINDINGS -    Rolled edges of skin, completely removed by excisional debridement  Fibrinous material within the wound debrided bluntly and submitted to culture   Today that wound is clean with nice margins.  We have applied a wound vac using white foam covered  with black foam.     He had a follow up with her scheduled for 5/23 but missed it and is now re-scheduled for 5/30.   He is expecting an appt with ID but has not been contacted as of today.   In addition to the right hip, he does have a wound at the sacrum that has reopened.  It is clean and we will apply collagen into this wound.  A callus paring was done on the right elbow.     5/14/24 - The Patient returns today for followup on the large pressure injury at the right hip as well as a small pressure injury to the sacrum.  He did have a followup appointment with his surgeon, Dr. Sandy Jara. He is scheduled for an I&D/debridment of his right hip with Dr. Quijano this Friday 5/17/24.  We had a lengthy discussion regarding possibilities such as a wound VAC or if she will leave the wound opened or close that wound.  He does not know at this time but this providers preference would be that she would close the wound.  This wound remains open at this time following another procedure and we were unable to get the wound to close completely in bite of the use of a wound VAC.  He does still have ligament exposed in the wound has increased in size.  Bone is fully exposed in the wound bed.  He has been using topical antibiotics for 1 month and those will be discontinued as of today.  Until his procedure we will apply Adaptic over the exposed bone with silver alginate on top, with daily dressing changes.  He does expect to see an infectious disease doctor following his procedure and also mentioned that he expects to possibly have IV antibiotics in another short stay at [facility].    4/23/24 - Today the wound at the elbow is nearly resolved.  We will begin application of Aquaphor to the elbow.  The sacrum was assessed and has improved dramatically.  It is nearly resolved.  He has completed the PO Augmentin for a UTI and this has helped wounds as well  Additionally he began topical abx to the right hip on 4/16/24 (Clindamycin).   Today the hip is also showing improvement.  He does have a f/up with his surgeon, Dr. Sandy Jara, on 5/2/24 with the hope that she will revise the wound to assist with closure since the margins are rolled.  He will return to clinic in two weeks.     4/16/24 - Mr. Jarrett was seen today for f/up on three wounds.  He did have an MRI done on 4/4/24 of the right hip (at [facility], ordered by Dr. Sandy Jara).  Today those results were reviewed with the patient.  We discussed that this provider would reach out to Dr. Jara regarding possible treatment.  This was done with consideration for IV abx, surgical intervention and/or referral to infectious disease for the best possible outcome.  The patient is currently on Augmentin with two days remaining for a UTI.  Additionally he picked up his topical abx today (Clindamycin) which will be used on a moist 4 x 4 gauze, not Basal Gel.    A callus paring was done on the right elbow and it is now healed.  The sacrum was selectively debrided and remains stable.     4/2/24 - The patient was referred back to his surgeon, Dr. Macie Carbajal, who he saw on 3/26/24.  He reports that she is concerned over possible abscess in the space, as well as infection.  A culture was obtained today.  She did prescribe Augmentin, to begin tomorrow, as a prophylaxis for the hip. He is expecting to have an MRI schedule prior to the follow up with her on 4/9/24 at which time they will discuss the MRI results, and options. Today the right hip wound has increasing moisture and bogginess.  The sacrum remains stable.  The right elbow as debrided and is nearly closed.     3/19/24 - We have been using NuShield grafts on the open pressure ulcer at the patient's right hip.  Tendon remains exposed in the wound bed, the margins are nereida, epibole and closed.  Through a series of several weeks the wound margins have been scored with a scalpel in an attempt to open these closed  margins, to no avail.  Today the graft was held, and the patient will be seen by his surgeon, Dr. Sandy Jara next Tuesday, 3/26/24, for consultation regarding options to possible reopen the margins which might bring a successful closure to this wound based on how it is currently healed.  For the time being we will apply adaptic over the tendon, with silver alginate dressing on top.  Both the right elbow and the sacrum remain stable.  We are currently using Endoform on both.  Today the elbow was selectively debrided.     3/12/24 - The pressure injury to the right hip is being [facility].  Today graft #6 was applied.  There has been no significant change to this wound since grafting.  Graft #7 has been ordered.  However, the patient was advised to scheduled with his surgeon, Dr. Sandy Jara, for evaluation to determine if she will need to do further surgery to assist this wound with closure since the margins are rolled.  They were again scored with a scaple to attempt to stimulate the growth of epithelial tissue, which has been done several times to no avail.  The right elbow is progressing well.  The sacrum remains stable with no S & S of infection.  He has received his new power wheelchair.  We are hopeful that this device might contribute to progress with his wounds.     3/5/24 - Mr. Jarrett is seen today for followup on 3 wounds.  The wound to the right elbow was selectively debrided and it does show considerable improvement.  The wound to the sacrum is stable.  We will begin application of moistened Endoform to both of these wounds to be changed on Friday to silver alginate.  The wound to the right hip is slightly moist with a little bit of drainage.  This is to be expected, however, due to the fact that we are grafting that wound.  There has been no significant change to the size of the wound, unfortunately, in spite of grafting multiple times.  We will continue the process with the  hope of making some progress.    2/27/24 - Mr. Jarrett returns to clinic today for followup on 3 areas of concern.  The pressure injury remains stable with no significant change.  He has a new wound to the right elbow.  This has developed due to dryness and cracking which has opened into a wound.  The elbow was selectively debrided using a dermal curette.  We will begin applying MediHoney to the elbow, covering with 4 x 4 gauze, Cover and with a Tubigrip with the hopes of softening this callused tissue further.  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  We are currently grafting using NuShield grafts.  Today we applied graft #4 after the wound margins were crosshatched to try to allow for growth epithelial tissue.  Again this week, there was no significant change in that wound.  Ligament is still fully exposed.  Graft #5 is being ordered today.    2/20/24 - the patient returns to clinic today for followup on a small pressure injury to the sacrum as well as a large stage IV pressure injury to the right hip.  We are currently going through the grafting process.  Today we applied graft #3, which is the 1st of the NuShield grafts.  So far we have not seen any significant change in the wound and the ligament is still fully exposed.  A # 15 scalpel was used to cross keen the wound margin due to rolling of that margin in the need to stimulate the growth of epithelial tissue as well as granular tissue.  The sacrum remains stable and basically the same size.      2/12/24 - Mr. Jarrett is seen today in follow up of two wounds, both of which remain stable.  The left hip is being grafted, and today PuraPly #2 was applied.  There is no significant change since graft #1 was applied.  Today graft #3 was ordered.  We will change from PuraPly to Epifix at this time.  The sacrum remains stable.  We have applied Endoform into that wound and it will be treated the same as a graft, with the hope that the patient does not  have BMs which necessitate removal of the dressing. The patient is eligible and is in need of a new power wheelchair.      2/6/24 - Mr. Jarrett returns to clinic today with 2 wounds.  The very small stage IV pressure injury at the sacrum remains stable.  Today, the wound at the right hip, stage IV pressure injury, will receive the 1st graft application.  We are applying PuraPly grafts at this time and then we will change to NuShield after two PuraPly.  Of note, he is waiting on a demo to be shown to him for a possible new wheelchair.  PuraPly #2 ordered.    January 30, 2024:  43-year-old white male, with paraplegia, is here for follow up of the right hip pressure injury, and the sacral pressure injury.  The wounds are looking similar to the last visit.  He is getting ready to have skin substitute applied to the right hip wound.  There is white tissue in the wound bed, presumably tendon.  I do not see pink granulation tissue.  He has no longer using topical antibiotics.    1/23/24 - Mr. Jarrett returns today for followup on 2 wounds.  Today the sacral pressure injury remains stable and clean.  We have been using topical antibiotics on both of these wounds for several weeks.  That has been discontinued today.  The pressure injury at the right hip was selectively debrided today using the ultrasonic debrider and graft prep was performed.  We will begin applying a small amount of mupirocin ointment on to this wound daily in preparation beginning the grafting process next week.  We will be use PuraPly/NuShield grafts.  Of note, the patient is responsible for contacting calor at the wheelchair company to have his measurements taken.  He has not done that as of today.    December 26, 2023:  43-year-old white male, with paraplegia, is here for follow up of a right hip pressure injury, and a sacral pressure injury.  He has been using topical antibiotics.  The measurements are about the same as last visit.  The nurse practitioner  "has discussed a possible skin substitute on the right hip.    12/11/23 - Mr. Jarrett for followup on the pressure ulcers to the right hip and sacrum.  We started using topical antibiotics on both wounds on 11/21/2023.  Additionally, the patient has been using the vinegar washes for sometime.  He was instructed to discontinue the use of the vinegar washes today and use only the topical antibiotics.  He has been approved for grafts ([facility]).  We will plan to do graft prep in two weeks (once abx are complete) and then begin grafting.    Of note, we discussed today the need for proper support in his wheelchair to offload the pressure.  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  Also, he does have a low air loss mattress, but that mattress is on top of a "regular" mattress and he reports springs are protruding.  He needs a proper bedframe for the low airloss mattress.     12/4/23 - Mr. Jarrett returns for followup on 2 wounds.  He continues to use topical abx on both wounds.  The right hip continues to be concerning.  The tensor fascia ronny is till exposed in the wound bed.  We are mixing the topical abx with basal gel to prevent drying.  The sacral wound is stable but has had a slight increase in size.  Abx are being used on this wound as well. We are attempting to authorize grafts on the right hip wound.    11/27/23 - the patient returns today for followup on pressure injuries to the right hip and the sacrum.  He has received his topical antibiotics (Linezolid/Gentamincin) and started using the antibiotics on Saturday, 11/25/23.  He did not receive basal gel with that order so we have contacted [facility] to send the patient that product as the antibiotics are very drying and they are being applied directly to his tendon.  He must have the moisture of the basal gel.  In " the time being, we will mix his topical antibiotics with mupirocin antibiotic.  Today both wounds remains stable.  He was instructed to use the antibiotics on both wounds to avoid cross contamination.    11/20/23 - Mr. Jarrett returns for f/up on the wound to the right hip and the sacrum.  At his last visit the right hip was cultured and it was positive to have Pseudomonas.  He has been using Vinegar washes since 11/06/2023.  He does have a sensitivity to levofloxacin and so can not take that medication.  We have requested a topical recommendation and he is being prescribed linezolid/gentamicin to be applied directly into the wound.  If we do not see improvement from that topical medication we will be moving forward with an IV antibiotic.  He does have exposed ligament (possibly ischiofemoral ligament) in this wound.  The patient does still have a mediport so that would be the next best solution for him due to his sensitivity.  The wound at the sacrum remains stable and we are currently using silver alginate on both wounds.    11/6/23 - the patient seen today for followup on the right hip and sacral pressure wounds.  Although the sacrum is stable, the right hip does continue to deteriorate.  The right hip was cultured today.  We have been using Endoform and/or collagen on these wounds and unfortunately these products or causing excessive moisture and deterioration to the wounds.  We will discontinue both products and use only silver alginate for the time being.  The right hip does have tendon exposed at this time.  The patient was reminded and encouraged to continue to offload both the right hip in the sacrum which he reports that he is wearing.  However, on the deterioration of the wounds it is concerning.    10/30/23 - Mr. Jarrett to clinic today for followup on the wound to the sacrum as well as the right hip.  Today, both have deteriorated.  We have been using collagen to the wound beds and it appears is though they  are stain to wet.  There is still tendon present in the wound bed of the right hip.  We will discontinue the collagen and begin application Endoform to both wounds, changing every other day, covered with silver alginate.  He was reminded to offload as much as possible to prevent further deterioration.  Of note, reported that is blood pressure has been low, and it was very low today, 86/54.  He states that he noticed it last week that he has stopped taking Tylenol, aspirin, and probiotics.  We discussed today that it is unlikely that any of these medications would impact blood pressure but he prefers to remain off of them.    10/16/23 - The patient returns today for followup on the pressure injury to the right hip as well as the sacrum.  Today, there is now tendon exposed in the wound bed the right hip pressure injury.  We discussed today that it is imperative that the patient offload to allow this wound to heal so that he does not develop osteomyelitis in this hip as well.  The pressure injury to the sacrum has also deteriorated.  We discussed that now that the amputation to the left hip has fully healed, he must take the opportunity to begin offloading that right hip to allow it to heal.  We will begin applying collagen every other day to both wound beds.    10/9/23 The patient returns today for the open wound to the right hip and sacrum.  The surgical incision to the left hip is now resolved and will no longer be followed.  Both the right hip and sacrum are wounds that have reopened.  We will begin application of collagen every third day to both wounds.  Neither wound has any signs and symptoms of infection.  He will return in two weeks. Of note, the patient reports that he has had diarrhea for several days, up to about two weeks.  He has had no treatment and is currently using only probiotics.  He has been prescribed imodium and advised to use that medication no more than 4 days.  He is to contact his PCP if the  diarrhea does not resolve within that time period.     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 Sandy Owusu on 8/21/23.  This wound has progress except personally well and is remaining closed.  The patient is using only iodine along the surgical incision line.  He does have a follow up with his surgeon tomorrow, 09/26/2023.  He was advised today that he can begin application coconut oil with vitamin C along the closed incision line which is fully approximated.  We will follow this incision line for one additional visit then d/c if no issues develop.    He has had a long term wound at the sacrum which today is open again.  It was mechanically debrided.  Endoform as applied to the wound bed, which will be left in place until Friday at which time he will change to silver alginate.     Review of Systems   Constitutional: Negative.    Respiratory: Negative.     Cardiovascular: Negative.    Skin:         As documented in the HPI.   All other systems reviewed and are negative.        Objective:      Vitals:    06/04/24 1313   BP: (!) 146/79   Pulse: (!) 54   Resp: 17             Physical Exam  Vitals and nursing note reviewed. Exam conducted with a chaperone present.   Constitutional:       Appearance: Normal appearance.   Cardiovascular:      Rate and Rhythm: Normal rate.   Pulmonary:      Effort: Pulmonary effort is normal.   Skin:     General: Skin is warm and dry.      Comments: sacral pressure injury, right hip, trochanteric, pressure injury   Neurological:      Mental Status: He is alert.            Negative Pressure Wound Therapy  05/28/24 1308 Right (Active)   05/28/24 1308   Side: Right   Orientation:    Location: Hip   Additional Comments:    Removal Indication and Assessment:    Location:    SDO Location:    NPWT Type Vacuum Therapy 06/04/24 1218   Therapy Setting NPWT Continuous therapy 06/04/24 1218   Pressure Setting NPWT 125 mmHg 06/04/24 1218   Sponges  Inserted NPWT White;Black;1 06/04/24 1218   Sponges Removed NPWT White;Black;1 06/04/24 1218            Altered Skin Integrity 09/25/23 1153 Sacral spine #1 (Active)   09/25/23 1153   Altered Skin Integrity Present on Admission - Did Patient arrive to the hospital with altered skin?: yes   Side:    Orientation:    Location: Sacral spine   Wound Number: #1   Is this injury device related?:    Primary Wound Type:    Description of Altered Skin Integrity:    Ankle-Brachial Index:    Pulses:    Removal Indication and Assessment:    Wound Outcome:    (Retired) Wound Length (cm):    (Retired) Wound Width (cm):    (Retired) Depth (cm):    Wound Description (Comments):    Removal Indications:    Description of Altered Skin Integrity Full thickness tissue loss. Subcutaneous fat may be visible but bone, tendon or muscle are not exposed 06/04/24 1218   Dressing Appearance Intact;Moist drainage 06/04/24 1218   Drainage Amount Moderate 06/04/24 1218   Drainage Characteristics/Odor Serosanguineous 06/04/24 1218   Appearance Intact;Moist;Pink;Not granulating 06/04/24 1218   Tissue loss description Full thickness 06/04/24 1218   Periwound Area Intact;Moist 06/04/24 1218   Wound Edges Open 06/04/24 1218   Wound Length (cm) 1 cm 06/04/24 1218   Wound Width (cm) 0.5 cm 06/04/24 1218   Wound Depth (cm) 0.7 cm 06/04/24 1218   Wound Volume (cm^3) 0.35 cm^3 06/04/24 1218   Wound Surface Area (cm^2) 0.5 cm^2 06/04/24 1218   Care Cleansed with:;Wound cleanser 06/04/24 1218   Dressing Applied;Other (comment) 06/04/24 1218   Dressing Change Due 06/05/24 06/04/24 1218            Altered Skin Integrity 10/09/23 1141 Right Hip #2 (Active)   10/09/23 1141   Altered Skin Integrity Present on Admission - Did Patient arrive to the hospital with altered skin?: yes   Side: Right   Orientation:    Location: Hip   Wound Number: #2   Is this injury device related?: No   Primary Wound Type:    Description of Altered Skin Integrity:    Ankle-Brachial  Index:    Pulses:    Removal Indication and Assessment:    Wound Outcome:    (Retired) Wound Length (cm):    (Retired) Wound Width (cm):    (Retired) Depth (cm):    Wound Description (Comments):    Removal Indications:    Description of Altered Skin Integrity Full thickness tissue loss with exposed bone, tendon, or muscle. Often includes undermining and tunneling. May extend into muscle and/or supporting structures. 06/04/24 1218   Dressing Appearance Intact;Moist drainage 06/04/24 1218   Drainage Amount Moderate 06/04/24 1218   Drainage Characteristics/Odor Serosanguineous 06/04/24 1218   Appearance Intact;Moist;Granulating;Epithelialization;Slough 06/04/24 1218   Tissue loss description Full thickness 06/04/24 1218   Periwound Area Intact;Dry 06/04/24 1218   Wound Edges Open 06/04/24 1218   Wound Length (cm) 3.5 cm 06/04/24 1218   Wound Width (cm) 2.5 cm 06/04/24 1218   Wound Depth (cm) 0.5 cm 06/04/24 1218   Wound Volume (cm^3) 4.375 cm^3 06/04/24 1218   Wound Surface Area (cm^2) 8.75 cm^2 06/04/24 1218   Undermining (depth (cm)/location) Circumferential deepest at 12 o'clock ( 2.3cm) 06/04/24 1218   Care Cleansed with:;Wound cleanser 06/04/24 1218   Dressing Applied;Other (comment) 06/04/24 1218   Dressing Change Due 06/07/24 06/04/24 1218       6/4/24              Sacrum  silver alginate, gentle border         Right hip  wound vac, white foam, black foam,   negative pressure set @ 125mmHg continuous           Right elbow- Monitor  Aquaphor, Vaseline gauze, mepiiex foam,   Kerlix, tape  TR        Assessment:           ICD-10-CM ICD-9-CM   1. Open wound of right hip and thigh with tendon involvement, subsequent encounter  S71.001D V58.89    S71.101D 890.2    S76.001D     S76.901D    2. Pressure injury of contiguous region involving back and right hip, stage 4  L89.44 707.09     707.24   3. Pressure injury of sacral region, stage 4  L89.154 707.03     707.24   4. Acute osteomyelitis of hip  M86.18 730.05   5.  Quadriplegia  G82.50 344.00             Procedures:     Mechanical debridement only      [] Yes [] No   I & D performed  [] Yes [] No   Excisional debridement performed  [] Yes [] No   Selective debridement performed        [x] Yes [] No   Mechanical debridement performed         [] Yes [] No  Silver nitrate applied                                     [] Yes [] No  Labs ordered this visit                                  [] Yes [] No   Imaging ordered this visit                           [] Yes [] No   Tissue pathology and/or culture taken       MEDICATIONS    Current Outpatient Medications:     acetaminophen (TYLENOL) 325 MG tablet, Take 650 mg by mouth every 6 (six) hours as needed for Pain., Disp: , Rfl:     ascorbic acid, vitamin C, (VITAMIN C) 1000 MG tablet, Take 1,000 mg by mouth every evening., Disp: , Rfl:     aspirin 325 MG tablet, Take 325 mg by mouth every morning. Stopped x 1 month, Disp: , Rfl:     baclofen (LIORESAL) 5 mg Tab tablet, Take 1 tablet (5 mg total) by mouth 3 (three) times daily., Disp: 90 tablet, Rfl: 0    bisacodyL (DULCOLAX) 10 mg Supp, Place 10 mg rectally daily as needed., Disp: , Rfl:     busPIRone (BUSPAR) 10 MG tablet, Take 1 tablet (10 mg total) by mouth 2 (two) times daily., Disp: 60 tablet, Rfl: 5    cetirizine (ZYRTEC) 10 MG tablet, Take 10 mg by mouth 2 (two) times a day., Disp: , Rfl:     cloNIDine (CATAPRES) 0.1 MG tablet, Take 0.1 mg by mouth daily as needed., Disp: , Rfl:     DULoxetine (CYMBALTA) 30 MG capsule, Take 1 capsule by mouth 2 (two) times daily., Disp: , Rfl:     ferrous sulfate (SLOW RELEASE IRON) 142 mg (45 mg iron) TbSR, Take 324 mg by mouth every morning., Disp: , Rfl:     ferrous sulfate (SLOW RELEASE IRON) 142 mg (45 mg iron) TbSR, 1 tablet Orally Three times a Week for 30 days, Disp: , Rfl:     fexofenadine (ALLEGRA) 180 MG tablet, Take 180 mg by mouth every morning., Disp: , Rfl:     fluticasone propionate (FLONASE ALLERGY RELIEF) 50 mcg/actuation nasal  "spray, 1 spray by Each Nostril route daily as needed., Disp: , Rfl:     meloxicam (MOBIC) 7.5 MG tablet, Take 7.5 mg by mouth 2 (two) times daily as needed for Pain., Disp: , Rfl:     multivitamin/iron/folic acid (CENTRUM ORAL), Take 1 tablet by mouth every morning., Disp: , Rfl:     mupirocin (BACTROBAN) 2 % ointment, Apply topically once daily., Disp: 30 g, Rfl: 1    NON FORMULARY MEDICATION, Take 3 drops by mouth 2 (two) times daily as needed. THC, Disp: , Rfl:     testosterone undecanoate (JATENZO) 198 mg Cap, 1 capsule., Disp: , Rfl:     [employer], OUTPATIENT,, once daily. Clindamycin topical 300mg capsule, Disp: , Rfl:     zinc gluconate 50 mg tablet, Take 1 tablet (50 mg total) by mouth once daily. (Patient taking differently: Take 50 mg by mouth every morning.), Disp: 30 tablet, Rfl: 1  No current facility-administered medications for this encounter.    Facility-Administered Medications Ordered in Other Encounters:     lactated ringers infusion, , Intravenous, Continuous, Cr Garibay DO, Last Rate: 10 mL/hr at 05/17/24 0705, Restarted at 05/17/24 0859   Review of patient's allergies indicates:   Allergen Reactions    Levofloxacin Rash       DIAGNOSTICS      Labs/Scans/Micro:    CBC:   Lab Results   Component Value Date    WBC 10.42 05/14/2024    HGB 13.9 (L) 05/14/2024    HCT 44.3 05/14/2024    MCV 88.8 05/14/2024     05/14/2024       Sedimentation rate:   Lab Results   Component Value Date    SEDRATE 22 (H) 12/15/2022    C-reactive protein:   Lab Results   Component Value Date    CRP 44.90 (H) 12/15/2022    Chemistry: [unfilled]  HBA1C: No components found for: "HBA1C"    Ankle Brachial Index: No results found for this or any previous visit.    Imaging:    Plain film: No results found for this or any previous visit.    MRI: No results found for this or any previous visit.    Bone Scan: No results found for this or any previous visit.    Vascular studies:  "   Microbiology: Rt hip, 4/2/24        HOME HEALTH AGENCY: [facility]     WOUND CARE ORDERS:  Right hip wound: Cleanse with wound cleanser, cover exposed bone with white foam, layer with black foam with negative pressure set at 125mmHg continuous to be changed on Tuesdays and Fridays.   Sacrum- Cleanse with wound cleanser, apply silver alginate to wound bed and cover with a gentle border to be changed daily  Right elbow- monitor, apply Aquaphor daily        Follow up in about 1 week (around 6/11/2024) for stretcher.

## 2024-06-11 ENCOUNTER — HOSPITAL ENCOUNTER (OUTPATIENT)
Dept: WOUND CARE | Facility: HOSPITAL | Age: 44
Discharge: HOME | End: 2024-06-11
Attending: NURSE PRACTITIONER
Payer: MEDICARE

## 2024-06-11 VITALS
HEIGHT: 70 IN | DIASTOLIC BLOOD PRESSURE: 88 MMHG | BODY MASS INDEX: 18.62 KG/M2 | HEART RATE: 71 BPM | SYSTOLIC BLOOD PRESSURE: 125 MMHG | WEIGHT: 130.06 LBS | RESPIRATION RATE: 19 BRPM

## 2024-06-11 DIAGNOSIS — L89.44 PRESSURE INJURY OF CONTIGUOUS REGION INVOLVING BACK AND RIGHT HIP, STAGE 4: Primary | ICD-10-CM

## 2024-06-11 DIAGNOSIS — G82.50 QUADRIPLEGIA: ICD-10-CM

## 2024-06-11 DIAGNOSIS — M86.28 SUBACUTE OSTEOMYELITIS, OTHER SITE: ICD-10-CM

## 2024-06-11 DIAGNOSIS — L89.154 PRESSURE INJURY OF SACRAL REGION, STAGE 4: ICD-10-CM

## 2024-06-11 PROCEDURE — 97605 NEG PRS WND THER DME<=50SQCM: CPT

## 2024-06-11 PROCEDURE — 27000999 HC MEDICAL RECORD PHOTO DOCUMENTATION

## 2024-06-11 PROCEDURE — 99213 OFFICE O/P EST LOW 20 MIN: CPT | Mod: 25

## 2024-06-11 NOTE — PROGRESS NOTES
Referred by: Dr. Sidhu  Low air loss mattress [x] Yes [] No   Is the patient eligible for a graft, and/or currently grafting?  [x] Yes [] No  (right hip - [facility])       Subjective:         Patient ID: Werner Jarrett is a 43 y.o. male.    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  The sacral wound is stable and clean, with no S & S of infection.  The right hip does have some bruising around the surface of the wound.  We will decrease the wound vac pressure from 125 mmHg to 100 mmHg continuous pressure.  The elbow is still being monitored and he continues to use Urea cream every other day as well as MediHoney.  He has not heard any further information from Dr. Sandy lucero a bone biopsy, and he has been scheduled with ID for f/up.     6/4/24 - Mr. Jarrett returns today for followup on the 2 pressure injuries, 1 to the sacrum and 1 to the right hip.  He does report that he had a follow up with his surgeon, Dr. Sandy Jara on 05/30/2024.  He states that she has indicated that she will be scheduling a bone biopsy of the right hip due to the patient's osteomyelitis.  She is trying to determine his next course of treatment together with infectious disease consideration.  We are currently using a wound VAC and it does seem to be making progress with the development of granular tissue in the wound bed.  There are no signs and symptoms of infection in this wound.  The sacral wound does have a slight increase in depth.  We had changed to collagen and it may be that that product is to wet.  We will DC the collagen and begin application of silver alginate only.  A callus paring was also done on the patient's right elbow.  This has been an ongoing issue and he is currently using Aquaphor on the elbow.    5/28/24 - Mr. 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  He had  excisional debridement of this right hip on 5/17/24 by Dr. Sandy Jara.  Her op note:   PROCEDURE -   Excisional debridement right hip wound, dimension of debridement 1 cm circumferentially, total wound dimensions 3.5 cm x 3 cm x 1 cm deep.  Blunt debridement right hip wound  FINDINGS -    Rolled edges of skin, completely removed by excisional debridement  Fibrinous material within the wound debrided bluntly and submitted to culture   Today that wound is clean with nice margins.  We have applied a wound vac using white foam covered with black foam.     He had a follow up with her scheduled for 5/23 but missed it and is now re-scheduled for 5/30.   He is expecting an appt with ID but has not been contacted as of today.   In addition to the right hip, he does have a wound at the sacrum that has reopened.  It is clean and we will apply collagen into this wound.  A callus paring was done on the right elbow.     5/14/24 - The Patient returns today for followup on the large pressure injury at the right hip as well as a small pressure injury to the sacrum.  He did have a followup appointment with his surgeon, Dr. Sandy Jara. He is scheduled for an I&D/debridment of his right hip with Dr. Quijano this Friday 5/17/24.  We had a lengthy discussion regarding possibilities such as a wound VAC or if she will leave the wound opened or close that wound.  He does not know at this time but this providers preference would be that she would close the wound.  This wound remains open at this time following another procedure and we were unable to get the wound to close completely in bite of the use of a wound VAC.  He does still have ligament exposed in the wound has increased in size.  Bone is fully exposed in the wound bed.  He has been using topical antibiotics for 1 month and those will be discontinued as of today.  Until his procedure we will apply Adaptic over the exposed bone with silver alginate on top, with daily  dressing changes.  He does expect to see an infectious disease doctor following his procedure and also mentioned that he expects to possibly have IV antibiotics in another short stay at [facility].    4/23/24 - Today the wound at the elbow is nearly resolved.  We will begin application of Aquaphor to the elbow.  The sacrum was assessed and has improved dramatically.  It is nearly resolved.  He has completed the PO Augmentin for a UTI and this has helped wounds as well  Additionally he began topical abx to the right hip on 4/16/24 (Clindamycin).  Today the hip is also showing improvement.  He does have a f/up with his surgeon, Dr. Sandy Jara, on 5/2/24 with the hope that she will revise the wound to assist with closure since the margins are rolled.  He will return to clinic in two weeks.     4/16/24 - Mr. Jarrett was seen today for f/up on three wounds.  He did have an MRI done on 4/4/24 of the right hip (at [facility], ordered by Dr. Sandy Jara).  Today those results were reviewed with the patient.  We discussed that this provider would reach out to Dr. Jara regarding possible treatment.  This was done with consideration for IV abx, surgical intervention and/or referral to infectious disease for the best possible outcome.  The patient is currently on Augmentin with two days remaining for a UTI.  Additionally he picked up his topical abx today (Clindamycin) which will be used on a moist 4 x 4 gauze, not Basal Gel.    A callus paring was done on the right elbow and it is now healed.  The sacrum was selectively debrided and remains stable.     4/2/24 - The patient was referred back to his surgeon, Dr. Macie Carbajal, who he saw on 3/26/24.  He reports that she is concerned over possible abscess in the space, as well as infection.  A culture was obtained today.  She did prescribe Augmentin, to begin tomorrow, as a prophylaxis for the hip. He is expecting to have an MRI schedule prior to  the follow up with her on 4/9/24 at which time they will discuss the MRI results, and options. Today the right hip wound has increasing moisture and bogginess.  The sacrum remains stable.  The right elbow as debrided and is nearly closed.     3/19/24 - We have been using NuShield grafts on the open pressure ulcer at the patient's right hip.  Tendon remains exposed in the wound bed, the margins are nereida, epibole and closed.  Through a series of several weeks the wound margins have been scored with a scalpel in an attempt to open these closed margins, to no avail.  Today the graft was held, and the patient will be seen by his surgeon, Dr. Sandy Jara next Tuesday, 3/26/24, for consultation regarding options to possible reopen the margins which might bring a successful closure to this wound based on how it is currently healed.  For the time being we will apply adaptic over the tendon, with silver alginate dressing on top.  Both the right elbow and the sacrum remain stable.  We are currently using Endoform on both.  Today the elbow was selectively debrided.     3/12/24 - The pressure injury to the right hip is being grafted using NuShield grafts.  Today graft #6 was applied.  There has been no significant change to this wound since grafting.  Graft #7 has been ordered.  However, the patient was advised to scheduled with his surgeon, Dr. Sandy Jara, for evaluation to determine if she will need to do further surgery to assist this wound with closure since the margins are rolled.  They were again scored with a scaple to attempt to stimulate the growth of epithelial tissue, which has been done several times to no avail.  The right elbow is progressing well.  The sacrum remains stable with no S & S of infection.  He has received his new power wheelchair.  We are hopeful that this device might contribute to progress with his wounds.     3/5/24 - Mr. Jarrett is seen today for followup on 3 wounds.  The wound to the  right elbow was selectively debrided and it does show considerable improvement.  The wound to the sacrum is stable.  We will begin application of moistened Endoform to both of these wounds to be changed on Friday to silver alginate.  The wound to the right hip is slightly moist with a little bit of drainage.  This is to be expected, however, due to the fact that we are grafting that wound.  There has been no significant change to the size of the wound, unfortunately, in spite of grafting multiple times.  We will continue the process with the hope of making some progress.    2/27/24 - Mr. Jarrett returns to clinic today for followup on 3 areas of concern.  The pressure injury remains stable with no significant change.  He has a new wound to the right elbow.  This has developed due to dryness and cracking which has opened into a wound.  The elbow was selectively debrided using a dermal curette.  We will begin applying MediHoney to the elbow, covering with 4 x 4 gauze, Cover and with a Tubigrip with the hopes of softening this callused tissue further.  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  We are currently grafting using NuShield grafts.  Today we applied graft #4 after the wound margins were crosshatched to try to allow for growth epithelial tissue.  Again this week, there was no significant change in that wound.  Ligament is still fully exposed.  Graft #5 is being ordered today.    2/20/24 - the patient returns to clinic today for followup on a small pressure injury to the sacrum as well as a large stage IV pressure injury to the right hip.  We are currently going through the grafting process.  Today we applied graft #3, which is the 1st of the NuShield grafts.  So far we have not seen any significant change in the wound and the ligament is still fully exposed.  A # 15 scalpel was used to cross keen the wound margin due to rolling of that margin in the need to stimulate the growth of epithelial  tissue as well as granular tissue.  The sacrum remains stable and basically the same size.      2/12/24 - Mr. Jarrett is seen today in follow up of two wounds, both of which remain stable.  The left hip is being grafted, and today PuraPly #2 was applied.  There is no significant change since graft #1 was applied.  Today graft #3 was ordered.  We will change from PuraPly to Epifix at this time.  The sacrum remains stable.  We have applied Endoform into that wound and it will be treated the same as a graft, with the hope that the patient does not have BMs which necessitate removal of the dressing. The patient is eligible and is in need of a new power wheelchair.      2/6/24 - Mr. Jarrett returns to clinic today with 2 wounds.  The very small stage IV pressure injury at the sacrum remains stable.  Today, the wound at the right hip, stage IV pressure injury, will receive the 1st graft application.  We are applying PuraPly grafts at this time and then we will change to [facility] after two PuraPly.  Of note, he is waiting on a demo to be shown to him for a possible new wheelchair.  PuraPly #2 ordered.    January 30, 2024:  43-year-old white male, with paraplegia, is here for follow up of the right hip pressure injury, and the sacral pressure injury.  The wounds are looking similar to the last visit.  He is getting ready to have skin substitute applied to the right hip wound.  There is white tissue in the wound bed, presumably tendon.  I do not see pink granulation tissue.  He has no longer using topical antibiotics.    1/23/24 - Mr. Jarrett returns today for followup on 2 wounds.  Today the sacral pressure injury remains stable and clean.  We have been using topical antibiotics on both of these wounds for several weeks.  That has been discontinued today.  The pressure injury at the right hip was selectively debrided today using the ultrasonic debrider and graft prep was performed.  We will begin applying a small amount of  "mupirocin ointment on to this wound daily in preparation beginning the grafting process next week.  We will be use PuraPly/NuShield grafts.  Of note, the patient is responsible for contacting calor at the [employer] to have his measurements taken.  He has not done that as of today.    December 26, 2023:  43-year-old white male, with paraplegia, is here for follow up of a right hip pressure injury, and a sacral pressure injury.  He has been using topical antibiotics.  The measurements are about the same as last visit.  The nurse practitioner has discussed a possible skin substitute on the right hip.    12/11/23 - Mr. Jarrett for followup on the pressure ulcers to the right hip and sacrum.  We started using topical antibiotics on both wounds on 11/21/2023.  Additionally, the patient has been using the vinegar washes for sometime.  He was instructed to discontinue the use of the vinegar washes today and use only the topical antibiotics.  He has been approved for grafts (Puraply and Nushield).  We will plan to do graft prep in two weeks (once abx are complete) and then begin grafting.    Of note, we discussed today the need for proper support in his wheelchair to offload the pressure.  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  Also, he does have a low air loss mattress, but that mattress is on top of a "regular" mattress and he reports springs are protruding.  He needs a proper bedframe for the low airloss mattress.     12/4/23 - Mr. Jarrett returns for followup on 2 wounds.  He continues to use topical abx on both wounds.  The right hip continues to be concerning.  The tensor fascia ronny is till exposed in the wound bed.  We are mixing the topical abx with basal gel to prevent drying.  The sacral wound is stable but has had a slight increase in size.  Abx are being used on this wound as well. " We are attempting to authorize grafts on the right hip wound.    11/27/23 - the patient returns today for followup on pressure injuries to the right hip and the sacrum.  He has received his topical antibiotics (Linezolid/Gentamincin) and started using the antibiotics on Saturday, 11/25/23.  He did not receive basal gel with that order so we have contacted [facility]: turn off the lights Pharmacy to send the patient that product as the antibiotics are very drying and they are being applied directly to his tendon.  He must have the moisture of the basal gel.  In the time being, we will mix his topical antibiotics with mupirocin antibiotic.  Today both wounds remains stable.  He was instructed to use the antibiotics on both wounds to avoid cross contamination.    11/20/23 - Mr. Jarrett returns for f/up on the wound to the right hip and the sacrum.  At his last visit the right hip was cultured and it was positive to have Pseudomonas.  He has been using Vinegar washes since 11/06/2023.  He does have a sensitivity to levofloxacin and so can not take that medication.  We have requested a topical recommendation and he is being prescribed linezolid/gentamicin to be applied directly into the wound.  If we do not see improvement from that topical medication we will be moving forward with an IV antibiotic.  He does have exposed ligament (possibly ischiofemoral ligament) in this wound.  The patient does still have a mediport so that would be the next best solution for him due to his sensitivity.  The wound at the sacrum remains stable and we are currently using silver alginate on both wounds.    11/6/23 - the patient seen today for followup on the right hip and sacral pressure wounds.  Although the sacrum is stable, the right hip does continue to deteriorate.  The right hip was cultured today.  We have been using Endoform and/or collagen on these wounds and unfortunately these products or causing excessive moisture and deterioration to the  wounds.  We will discontinue both products and use only silver alginate for the time being.  The right hip does have tendon exposed at this time.  The patient was reminded and encouraged to continue to offload both the right hip in the sacrum which he reports that he is wearing.  However, on the deterioration of the wounds it is concerning.    10/30/23 - Mr. Jarrett to clinic today for followup on the wound to the sacrum as well as the right hip.  Today, both have deteriorated.  We have been using collagen to the wound beds and it appears is though they are stain to wet.  There is still tendon present in the wound bed of the right hip.  We will discontinue the collagen and begin application Endoform to both wounds, changing every other day, covered with silver alginate.  He was reminded to offload as much as possible to prevent further deterioration.  Of note, reported that is blood pressure has been low, and it was very low today, 86/54.  He states that he noticed it last week that he has stopped taking Tylenol, aspirin, and probiotics.  We discussed today that it is unlikely that any of these medications would impact blood pressure but he prefers to remain off of them.    10/16/23 - The patient returns today for followup on the pressure injury to the right hip as well as the sacrum.  Today, there is now tendon exposed in the wound bed the right hip pressure injury.  We discussed today that it is imperative that the patient offload to allow this wound to heal so that he does not develop osteomyelitis in this hip as well.  The pressure injury to the sacrum has also deteriorated.  We discussed that now that the amputation to the left hip has fully healed, he must take the opportunity to begin offloading that right hip to allow it to heal.  We will begin applying collagen every other day to both wound beds.    10/9/23 The patient returns today for the open wound to the right hip and sacrum.  The surgical incision to the  left hip is now resolved and will no longer be followed.  Both the right hip and sacrum are wounds that have reopened.  We will begin application of collagen every third day to both wounds.  Neither wound has any signs and symptoms of infection.  He will return in two weeks. Of note, the patient reports that he has had diarrhea for several days, up to about two weeks.  He has had no treatment and is currently using only probiotics.  He has been prescribed imodium and advised to use that medication no more than 4 days.  He is to contact his PCP if the diarrhea does not resolve within that time period.     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 Sandy Owusu on 8/21/23.  This wound has progress except personally well and is remaining closed.  The patient is using only iodine along the surgical incision line.  He does have a follow up with his surgeon tomorrow, 09/26/2023.  He was advised today that he can begin application coconut oil with vitamin C along the closed incision line which is fully approximated.  We will follow this incision line for one additional visit then d/c if no issues develop.    He has had a long term wound at the sacrum which today is open again.  It was mechanically debrided.  Endoform as applied to the wound bed, which will be left in place until Friday at which time he will change to silver alginate.     Review of Systems   Constitutional: Negative.    Respiratory: Negative.     Cardiovascular: Negative.    Skin:         As documented in the HPI.   All other systems reviewed and are negative.        Objective:      Vitals:    06/11/24 1124   BP: 125/88   Pulse: 71   Resp: 19             Physical Exam  Vitals and nursing note reviewed. Exam conducted with a chaperone present.   Constitutional:       Appearance: Normal appearance.   Cardiovascular:      Rate and Rhythm: Normal rate.   Pulmonary:      Effort: Pulmonary effort is normal.    Skin:     General: Skin is warm and dry.      Comments: sacral pressure injury, right hip, trochanteric, pressure injury   Neurological:      Mental Status: He is alert.            Negative Pressure Wound Therapy  05/28/24 1308 Right (Active)   05/28/24 1308   Side: Right   Orientation:    Location: Hip   Additional Comments:    Removal Indication and Assessment:    Location:    SDO Location:    NPWT Type Vacuum Therapy 06/11/24 1125   Therapy Setting NPWT Continuous therapy 06/11/24 1125   Pressure Setting NPWT 100 mmHg 06/11/24 1125   Therapy Interventions NPWT Canister changed;Dressing changed;Trac pad replaced 06/11/24 1125   Sponges Inserted NPWT Black;White;1 06/11/24 1125   Sponges Removed NPWT Black;White;1 06/11/24 1125   General Output (mL) 50 06/11/24 1125            Altered Skin Integrity 09/25/23 1153 Sacral spine #1 (Active)   09/25/23 1153   Altered Skin Integrity Present on Admission - Did Patient arrive to the hospital with altered skin?: yes   Side:    Orientation:    Location: Sacral spine   Wound Number: #1   Is this injury device related?:    Primary Wound Type:    Description of Altered Skin Integrity:    Ankle-Brachial Index:    Pulses:    Removal Indication and Assessment:    Wound Outcome:    (Retired) Wound Length (cm):    (Retired) Wound Width (cm):    (Retired) Depth (cm):    Wound Description (Comments):    Removal Indications:    Dressing Appearance Moist drainage 06/11/24 1125   Drainage Amount Moderate 06/11/24 1125   Drainage Characteristics/Odor Serosanguineous 06/11/24 1125   Appearance Moist;Pink;Red;Granulating 06/11/24 1125   Tissue loss description Full thickness 06/11/24 1125   Periwound Area Intact 06/11/24 1125   Wound Edges Open 06/11/24 1125   Wound Length (cm) 0.7 cm 06/11/24 1125   Wound Width (cm) 0.7 cm 06/11/24 1125   Wound Depth (cm) 0.6 cm 06/11/24 1125   Wound Volume (cm^3) 0.294 cm^3 06/11/24 1125   Wound Surface Area (cm^2) 0.49 cm^2 06/11/24 1125   Care  Cleansed with:;Wound cleanser 06/11/24 1125   Dressing Applied;Other (comment) 06/11/24 1125   Dressing Change Due 06/12/24 06/11/24 1125            Altered Skin Integrity 10/09/23 1141 Right Hip #2 (Active)   10/09/23 1141   Altered Skin Integrity Present on Admission - Did Patient arrive to the hospital with altered skin?: yes   Side: Right   Orientation:    Location: Hip   Wound Number: #2   Is this injury device related?: No   Primary Wound Type:    Description of Altered Skin Integrity:    Ankle-Brachial Index:    Pulses:    Removal Indication and Assessment:    Wound Outcome:    (Retired) Wound Length (cm):    (Retired) Wound Width (cm):    (Retired) Depth (cm):    Wound Description (Comments):    Removal Indications:    Dressing Appearance Moist drainage 06/11/24 1125   Drainage Amount Moderate 06/11/24 1125   Drainage Characteristics/Odor Serosanguineous 06/11/24 1125   Appearance Ecchymotic;Smooth 06/11/24 1125   Tissue loss description Full thickness 06/11/24 1125   Periwound Area Intact 06/11/24 1125   Wound Edges Open 06/11/24 1125   Wound Length (cm) 3 cm 06/11/24 1125   Wound Width (cm) 3.3 cm 06/11/24 1125   Wound Depth (cm) 2 cm 06/11/24 1125   Wound Volume (cm^3) 19.8 cm^3 06/11/24 1125   Wound Surface Area (cm^2) 9.9 cm^2 06/11/24 1125   Undermining (depth (cm)/location) circumf. deepest at 1 o'clock (3.0cm) 06/11/24 1125   Care Cleansed with:;Wound cleanser 06/11/24 1125   Dressing Applied;Other (comment) 06/11/24 1125   Dressing Change Due 06/14/24 06/11/24 1125         6/11/24    Right elbow (monitoring)  Aquaphor, 4x4 gauze, tubigrip       Right hip   White foam, black foam at 125 mmHg cont.       Sacrum   Silver alginate, 4x4 gentle foam border dressing  CT    Assessment:       No diagnosis found.      Procedures:     Mechanical     [] Yes [] No   I & D performed  [] Yes [] No   Excisional debridement performed  [] Yes [] No   Selective debridement performed        [] Yes [] No   Mechanical  debridement performed         [] Yes [] No  Silver nitrate applied                                     [] Yes [] No  Labs ordered this visit                                  [] Yes [] No   Imaging ordered this visit                           [] Yes [] No   Tissue pathology and/or culture taken       MEDICATIONS    Current Outpatient Medications:     acetaminophen (TYLENOL) 325 MG tablet, Take 650 mg by mouth every 6 (six) hours as needed for Pain., Disp: , Rfl:     ascorbic acid, vitamin C, (VITAMIN C) 1000 MG tablet, Take 1,000 mg by mouth every evening., Disp: , Rfl:     aspirin 325 MG tablet, Take 325 mg by mouth every morning. Stopped x 1 month, Disp: , Rfl:     baclofen (LIORESAL) 5 mg Tab tablet, Take 1 tablet (5 mg total) by mouth 3 (three) times daily., Disp: 90 tablet, Rfl: 0    bisacodyL (DULCOLAX) 10 mg Supp, Place 10 mg rectally daily as needed., Disp: , Rfl:     busPIRone (BUSPAR) 10 MG tablet, Take 1 tablet (10 mg total) by mouth 2 (two) times daily., Disp: 60 tablet, Rfl: 5    cetirizine (ZYRTEC) 10 MG tablet, Take 10 mg by mouth 2 (two) times a day., Disp: , Rfl:     cloNIDine (CATAPRES) 0.1 MG tablet, Take 0.1 mg by mouth daily as needed., Disp: , Rfl:     DULoxetine (CYMBALTA) 30 MG capsule, Take 1 capsule by mouth 2 (two) times daily., Disp: , Rfl:     ferrous sulfate (SLOW RELEASE IRON) 142 mg (45 mg iron) TbSR, Take 324 mg by mouth every morning., Disp: , Rfl:     ferrous sulfate (SLOW RELEASE IRON) 142 mg (45 mg iron) TbSR, 1 tablet Orally Three times a Week for 30 days, Disp: , Rfl:     fexofenadine (ALLEGRA) 180 MG tablet, Take 180 mg by mouth every morning., Disp: , Rfl:     fluticasone propionate (FLONASE ALLERGY RELIEF) 50 mcg/actuation nasal spray, 1 spray by Each Nostril route daily as needed., Disp: , Rfl:     meloxicam (MOBIC) 7.5 MG tablet, Take 7.5 mg by mouth 2 (two) times daily as needed for Pain., Disp: , Rfl:     multivitamin/iron/folic acid (CENTRUM ORAL), Take 1 tablet by mouth  "every morning., Disp: , Rfl:     NON FORMULARY MEDICATION, Take 3 drops by mouth 2 (two) times daily as needed. THC, Disp: , Rfl:     testosterone undecanoate (JATENZO) 198 mg Cap, 1 capsule., Disp: , Rfl:     zinc gluconate 50 mg tablet, Take 1 tablet (50 mg total) by mouth once daily. (Patient taking differently: Take 50 mg by mouth every morning.), Disp: 30 tablet, Rfl: 1    mupirocin (BACTROBAN) 2 % ointment, Apply topically once daily. (Patient not taking: Reported on 6/11/2024), Disp: 30 g, Rfl: 1  No current facility-administered medications for this encounter.    Facility-Administered Medications Ordered in Other Encounters:     lactated ringers infusion, , Intravenous, Continuous, Cr Garibay DO, Last Rate: 10 mL/hr at 05/17/24 0705, Restarted at 05/17/24 0859   Review of patient's allergies indicates:   Allergen Reactions    Levofloxacin Rash       DIAGNOSTICS      Labs/Scans/Micro:    CBC:   Lab Results   Component Value Date    WBC 10.42 05/14/2024    HGB 13.9 (L) 05/14/2024    HCT 44.3 05/14/2024    MCV 88.8 05/14/2024     05/14/2024       Sedimentation rate:   Lab Results   Component Value Date    SEDRATE 22 (H) 12/15/2022    C-reactive protein:   Lab Results   Component Value Date    CRP 44.90 (H) 12/15/2022    Chemistry: [unfilled]  HBA1C: No components found for: "HBA1C"    Ankle Brachial Index: No results found for this or any previous visit.    Imaging:    Plain film: No results found for this or any previous visit.    MRI: No results found for this or any previous visit.    Bone Scan: No results found for this or any previous visit.    Vascular studies:    Microbiology: Rt hip, 4/2/24        HOME HEALTH AGENCY: [facility]     WOUND CARE ORDERS:  Right hip wound: Cleanse with wound cleanser, cover exposed bone with white foam, layer with black foam with negative pressure set at 100mmHg continuous to be changed on Tuesdays and Fridays.   Sacrum- Cleanse with wound " cleanser, apply silver alginate to wound bed and cover with a gentle border to be changed daily  Right elbow- monitor, apply Aquaphor daily        Follow up in 1 week (on 6/18/2024) for PU - stretcher .

## 2024-06-18 ENCOUNTER — HOSPITAL ENCOUNTER (OUTPATIENT)
Dept: WOUND CARE | Facility: HOSPITAL | Age: 44
Discharge: HOME | End: 2024-06-18
Attending: NURSE PRACTITIONER
Payer: MEDICARE

## 2024-06-18 VITALS
HEART RATE: 72 BPM | DIASTOLIC BLOOD PRESSURE: 68 MMHG | WEIGHT: 130.06 LBS | BODY MASS INDEX: 18.62 KG/M2 | SYSTOLIC BLOOD PRESSURE: 102 MMHG | RESPIRATION RATE: 18 BRPM | HEIGHT: 70 IN

## 2024-06-18 DIAGNOSIS — L89.44 PRESSURE INJURY OF CONTIGUOUS REGION INVOLVING BACK AND RIGHT HIP, STAGE 4: Primary | ICD-10-CM

## 2024-06-18 DIAGNOSIS — M86.28 SUBACUTE OSTEOMYELITIS, OTHER SITE: ICD-10-CM

## 2024-06-18 DIAGNOSIS — L89.154 PRESSURE INJURY OF SACRAL REGION, STAGE 4: ICD-10-CM

## 2024-06-18 DIAGNOSIS — G82.50 QUADRIPLEGIA: ICD-10-CM

## 2024-06-18 PROCEDURE — 27000999 HC MEDICAL RECORD PHOTO DOCUMENTATION

## 2024-06-18 PROCEDURE — 97597 DBRDMT OPN WND 1ST 20 CM/<: CPT

## 2024-06-18 PROCEDURE — 99213 OFFICE O/P EST LOW 20 MIN: CPT

## 2024-06-18 PROCEDURE — 17250 CHEM CAUT OF GRANLTJ TISSUE: CPT

## 2024-06-18 RX ORDER — UREA 40 %
CREAM (GRAM) TOPICAL DAILY
Qty: 85 G | Refills: 2 | Status: SHIPPED | OUTPATIENT
Start: 2024-06-18

## 2024-06-18 NOTE — PROGRESS NOTES
Referred by: Dr. Sidhu  Low air loss mattress [x] Yes [] No   Is the patient eligible for a graft, and/or currently grafting?  [x] Yes [] No  (right hip - [facility])       Subjective:         Patient ID: Werner Jarrett is a 43 y.o. male.    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 Jarrett returns today for followup on 2 wounds.  The wound to the sacrum continues to progress well with no signs and symptoms of infection.  The wound to the right hip was debrided today.  There was a large amount of slough at the top of the wound bed.  We are continuing to use a wound VAC although there does not seem to be any significant progress towards closing this wound.  He did have a followup appointment with the surgeon, Dr. Sandy Jara and she does still intend to do a bone biopsy on this right hip.  Will have a followup appointment with her on 07/11/2024.  He will also see infectious disease in July, 2024.  He does not have that date as of today.  He does still have the dry area on his right elbow.  A refill for Urea cream has been sent to his pharmacy.    6/11/24 - The patient returns today for the wounds to the sacrum and right hip.  The sacral wound is stable and clean, with no S & S of infection.  The right hip does have some bruising around the surface of the wound.  We will decrease the wound vac pressure from 125 mmHg to 100 mmHg continuous pressure.  The elbow is still being monitored and he continues to use Urea cream every other day as well as MediHoney.  He has not heard any further information from Dr. Sandy lucero a bone biopsy, and he has been scheduled with ID for f/up.     6/4/24 - Mr. Jarrett returns today for followup on the 2 pressure injuries, 1 to the sacrum and 1 to the right hip.  He does report that he had a follow up with his surgeon, Dr. Sandy Jara on 05/30/2024.  He states that she has indicated that she will be scheduling a bone  biopsy of the right hip due to the patient's osteomyelitis.  She is trying to determine his next course of treatment together with infectious disease consideration.  We are currently using a wound VAC and it does seem to be making progress with the development of granular tissue in the wound bed.  There are no signs and symptoms of infection in this wound.  The sacral wound does have a slight increase in depth.  We had changed to collagen and it may be that that product is to wet.  We will DC the collagen and begin application of silver alginate only.  A callus paring was also done on the patient's right elbow.  This has been an ongoing issue and he is currently using Aquaphor on the elbow.    5/28/24 - Mr. 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  He had excisional debridement of this right hip on 5/17/24 by Dr. Sandy Jara.  Her op note:   PROCEDURE -   Excisional debridement right hip wound, dimension of debridement 1 cm circumferentially, total wound dimensions 3.5 cm x 3 cm x 1 cm deep.  Blunt debridement right hip wound  FINDINGS -    Rolled edges of skin, completely removed by excisional debridement  Fibrinous material within the wound debrided bluntly and submitted to culture   Today that wound is clean with nice margins.  We have applied a wound vac using white foam covered with black foam.     He had a follow up with her scheduled for 5/23 but missed it and is now re-scheduled for 5/30.   He is expecting an appt with ID but has not been contacted as of today.   In addition to the right hip, he does have a wound at the sacrum that has reopened.  It is clean and we will apply collagen into this wound.  A callus paring was done on the right elbow.     5/14/24 - The Patient returns today for followup on the large pressure injury at the right hip as well as a small pressure injury to the sacrum.  He did have a followup appointment with his surgeon, Dr. Sandy Jara. He  is scheduled for an I&D/debridment of his right hip with Dr. Quijano this Friday 5/17/24.  We had a lengthy discussion regarding possibilities such as a wound VAC or if she will leave the wound opened or close that wound.  He does not know at this time but this providers preference would be that she would close the wound.  This wound remains open at this time following another procedure and we were unable to get the wound to close completely in bite of the use of a wound VAC.  He does still have ligament exposed in the wound has increased in size.  Bone is fully exposed in the wound bed.  He has been using topical antibiotics for 1 month and those will be discontinued as of today.  Until his procedure we will apply Adaptic over the exposed bone with silver alginate on top, with daily dressing changes.  He does expect to see an infectious disease doctor following his procedure and also mentioned that he expects to possibly have IV antibiotics in another short stay at [facility].    4/23/24 - Today the wound at the elbow is nearly resolved.  We will begin application of Aquaphor to the elbow.  The sacrum was assessed and has improved dramatically.  It is nearly resolved.  He has completed the PO Augmentin for a UTI and this has helped wounds as well  Additionally he began topical abx to the right hip on 4/16/24 (Clindamycin).  Today the hip is also showing improvement.  He does have a f/up with his surgeon, Dr. Sandy Jara, on 5/2/24 with the hope that she will revise the wound to assist with closure since the margins are rolled.  He will return to clinic in two weeks.     4/16/24 - Mr. Jarrett was seen today for f/up on three wounds.  He did have an MRI done on 4/4/24 of the right hip (at [facility], ordered by Dr. Sandy Jara).  Today those results were reviewed with the patient.  We discussed that this provider would reach out to Dr. Jara regarding possible treatment.  This was done with  consideration for IV abx, surgical intervention and/or referral to infectious disease for the best possible outcome.  The patient is currently on Augmentin with two days remaining for a UTI.  Additionally he picked up his topical abx today (Clindamycin) which will be used on a moist 4 x 4 gauze, not Basal Gel.    A callus paring was done on the right elbow and it is now healed.  The sacrum was selectively debrided and remains stable.     4/2/24 - The patient was referred back to his surgeon, Dr. Macie Cabrajal, who he saw on 3/26/24.  He reports that she is concerned over possible abscess in the space, as well as infection.  A culture was obtained today.  She did prescribe Augmentin, to begin tomorrow, as a prophylaxis for the hip. He is expecting to have an MRI schedule prior to the follow up with her on 4/9/24 at which time they will discuss the MRI results, and options. Today the right hip wound has increasing moisture and bogginess.  The sacrum remains stable.  The right elbow as debrided and is nearly closed.     3/19/24 - We have been using NuShield grafts on the open pressure ulcer at the patient's right hip.  Tendon remains exposed in the wound bed, the margins are nereida, epibole and closed.  Through a series of several weeks the wound margins have been scored with a scalpel in an attempt to open these closed margins, to no avail.  Today the graft was held, and the patient will be seen by his surgeon, Dr. Sandy Jara next Tuesday, 3/26/24, for consultation regarding options to possible reopen the margins which might bring a successful closure to this wound based on how it is currently healed.  For the time being we will apply adaptic over the tendon, with silver alginate dressing on top.  Both the right elbow and the sacrum remain stable.  We are currently using Endoform on both.  Today the elbow was selectively debrided.     3/12/24 - The pressure injury to the right hip is being grafted [facility].  Today graft #6 was applied.  There has been no significant change to this wound since grafting.  Graft #7 has been ordered.  However, the patient was advised to scheduled with his surgeon, Dr. Sandy Jara, for evaluation to determine if she will need to do further surgery to assist this wound with closure since the margins are rolled.  They were again scored with a scaple to attempt to stimulate the growth of epithelial tissue, which has been done several times to no avail.  The right elbow is progressing well.  The sacrum remains stable with no S & S of infection.  He has received his new power wheelchair.  We are hopeful that this device might contribute to progress with his wounds.     3/5/24 - Mr. Jarrett is seen today for followup on 3 wounds.  The wound to the right elbow was selectively debrided and it does show considerable improvement.  The wound to the sacrum is stable.  We will begin application of moistened Endoform to both of these wounds to be changed on Friday to silver alginate.  The wound to the right hip is slightly moist with a little bit of drainage.  This is to be expected, however, due to the fact that we are grafting that wound.  There has been no significant change to the size of the wound, unfortunately, in spite of grafting multiple times.  We will continue the process with the hope of making some progress.    2/27/24 - Mr. Jarrett returns to clinic today for followup on 3 areas of concern.  The pressure injury remains stable with no significant change.  He has a new wound to the right elbow.  This has developed due to dryness and cracking which has opened into a wound.  The elbow was selectively debrided using a dermal curette.  We will begin applying MediHoney to the elbow, covering with 4 x 4 gauze, Cover and with a Tubigrip with the hopes of softening this callused tissue further.  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  We are currently grafting  using NuShield grafts.  Today we applied graft #4 after the wound margins were crosshatched to try to allow for growth epithelial tissue.  Again this week, there was no significant change in that wound.  Ligament is still fully exposed.  Graft #5 is being ordered today.    2/20/24 - the patient returns to clinic today for followup on a small pressure injury to the sacrum as well as a large stage IV pressure injury to the right hip.  We are currently going through the grafting process.  Today we applied graft #3, which is the 1st of the NuShield grafts.  So far we have not seen any significant change in the wound and the ligament is still fully exposed.  A # 15 scalpel was used to cross keen the wound margin due to rolling of that margin in the need to stimulate the growth of epithelial tissue as well as granular tissue.  The sacrum remains stable and basically the same size.      2/12/24 - Mr. Jarrett is seen today in follow up of two wounds, both of which remain stable.  The left hip is being grafted, and today PuraPly #2 was applied.  There is no significant change since graft #1 was applied.  Today graft #3 was ordered.  We will change from PuraPly to Epifix at this time.  The sacrum remains stable.  We have applied Endoform into that wound and it will be treated the same as a graft, with the hope that the patient does not have BMs which necessitate removal of the dressing. The patient is eligible and is in need of a new power wheelchair.      2/6/24 - Mr. Jarrett returns to clinic today with 2 wounds.  The very small stage IV pressure injury at the sacrum remains stable.  Today, the wound at the right hip, stage IV pressure injury, will receive the 1st graft application.  We are applying PuraPly grafts at this time and then we will change to NuShield after two PuraPly.  Of note, he is waiting on a demo to be shown to him for a possible new wheelchair.  PuraPly #2 ordered.    January 30, 2024:  43-year-old white  male, with paraplegia, is here for follow up of the right hip pressure injury, and the sacral pressure injury.  The wounds are looking similar to the last visit.  He is getting ready to have skin substitute applied to the right hip wound.  There is white tissue in the wound bed, presumably tendon.  I do not see pink granulation tissue.  He has no longer using topical antibiotics.    1/23/24 - Mr. Jarrett returns today for followup on 2 wounds.  Today the sacral pressure injury remains stable and clean.  We have been using topical antibiotics on both of these wounds for several weeks.  That has been discontinued today.  The pressure injury at the right hip was selectively debrided today using the ultrasonic debrider and graft prep was performed.  We will begin applying a small amount of mupirocin ointment on to this wound daily in preparation beginning the grafting process next week.  We will be use PuraPly/NuShield grafts.  Of note, the patient is responsible for contacting calor at the [facility] to have his measurements taken.  He has not done that as of today.    December 26, 2023:  43-year-old white male, with paraplegia, is here for follow up of a right hip pressure injury, and a sacral pressure injury.  He has been using topical antibiotics.  The measurements are about the same as last visit.  The nurse practitioner has discussed a possible skin substitute on the right hip.    12/11/23 - Mr. Jarrett for followup on the pressure ulcers to the right hip and sacrum.  We started using topical antibiotics on both wounds on 11/21/2023.  Additionally, the patient has been using the vinegar washes for sometime.  He was instructed to discontinue the use of the vinegar washes today and use only the topical antibiotics.  He has been approved for grafts (Puraply and Nushield).  We will plan to do graft prep in two weeks (once abx are complete) and then begin grafting.    Of note, we discussed today the need for proper  "support in his wheelchair to offload the pressure.  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  Also, he does have a low air loss mattress, but that mattress is on top of a "regular" mattress and he reports springs are protruding.  He needs a proper bedframe for the low airloss mattress.     12/4/23 - Mr. Jarrett returns for followup on 2 wounds.  He continues to use topical abx on both wounds.  The right hip continues to be concerning.  The tensor fascia ronny is till exposed in the wound bed.  We are mixing the topical abx with basal gel to prevent drying.  The sacral wound is stable but has had a slight increase in size.  Abx are being used on this wound as well. We are attempting to authorize grafts on the right hip wound.    11/27/23 - the patient returns today for followup on pressure injuries to the right hip and the sacrum.  He has received his topical antibiotics (Linezolid/Gentamincin) and started using the antibiotics on Saturday, 11/25/23.  He did not receive basal gel with that order so we have contacted [facility] to send the patient that product as the antibiotics are very drying and they are being applied directly to his tendon.  He must have the moisture of the basal gel.  In the time being, we will mix his topical antibiotics with mupirocin antibiotic.  Today both wounds remains stable.  He was instructed to use the antibiotics on both wounds to avoid cross contamination.    11/20/23 - Mr. Jarrett returns for f/up on the wound to the right hip and the sacrum.  At his last visit the right hip was cultured and it was positive to have Pseudomonas.  He has been using Vinegar washes since 11/06/2023.  He does have a sensitivity to levofloxacin and so can not take that medication.  We have requested a topical recommendation and he is being prescribed linezolid/gentamicin " to be applied directly into the wound.  If we do not see improvement from that topical medication we will be moving forward with an IV antibiotic.  He does have exposed ligament (possibly ischiofemoral ligament) in this wound.  The patient does still have a mediport so that would be the next best solution for him due to his sensitivity.  The wound at the sacrum remains stable and we are currently using silver alginate on both wounds.    11/6/23 - the patient seen today for followup on the right hip and sacral pressure wounds.  Although the sacrum is stable, the right hip does continue to deteriorate.  The right hip was cultured today.  We have been using Endoform and/or collagen on these wounds and unfortunately these products or causing excessive moisture and deterioration to the wounds.  We will discontinue both products and use only silver alginate for the time being.  The right hip does have tendon exposed at this time.  The patient was reminded and encouraged to continue to offload both the right hip in the sacrum which he reports that he is wearing.  However, on the deterioration of the wounds it is concerning.    10/30/23 - Mr. Jarrett to clinic today for followup on the wound to the sacrum as well as the right hip.  Today, both have deteriorated.  We have been using collagen to the wound beds and it appears is though they are stain to wet.  There is still tendon present in the wound bed of the right hip.  We will discontinue the collagen and begin application Endoform to both wounds, changing every other day, covered with silver alginate.  He was reminded to offload as much as possible to prevent further deterioration.  Of note, reported that is blood pressure has been low, and it was very low today, 86/54.  He states that he noticed it last week that he has stopped taking Tylenol, aspirin, and probiotics.  We discussed today that it is unlikely that any of these medications would impact blood pressure but he  prefers to remain off of them.    10/16/23 - The patient returns today for followup on the pressure injury to the right hip as well as the sacrum.  Today, there is now tendon exposed in the wound bed the right hip pressure injury.  We discussed today that it is imperative that the patient offload to allow this wound to heal so that he does not develop osteomyelitis in this hip as well.  The pressure injury to the sacrum has also deteriorated.  We discussed that now that the amputation to the left hip has fully healed, he must take the opportunity to begin offloading that right hip to allow it to heal.  We will begin applying collagen every other day to both wound beds.    10/9/23 The patient returns today for the open wound to the right hip and sacrum.  The surgical incision to the left hip is now resolved and will no longer be followed.  Both the right hip and sacrum are wounds that have reopened.  We will begin application of collagen every third day to both wounds.  Neither wound has any signs and symptoms of infection.  He will return in two weeks. Of note, the patient reports that he has had diarrhea for several days, up to about two weeks.  He has had no treatment and is currently using only probiotics.  He has been prescribed imodium and advised to use that medication no more than 4 days.  He is to contact his PCP if the diarrhea does not resolve within that time period.     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 Sandy Owusu on 8/21/23.  This wound has progress except personally well and is remaining closed.  The patient is using only iodine along the surgical incision line.  He does have a follow up with his surgeon tomorrow, 09/26/2023.  He was advised today that he can begin application coconut oil with vitamin C along the closed incision line which is fully approximated.  We will follow this incision line for one additional visit then d/c if no  issues develop.    He has had a long term wound at the sacrum which today is open again.  It was mechanically debrided.  Endoform as applied to the wound bed, which will be left in place until Friday at which time he will change to silver alginate.     Review of Systems   Constitutional: Negative.    Respiratory: Negative.     Cardiovascular: Negative.    Skin:         As documented in the HPI.   All other systems reviewed and are negative.        Objective:      Vitals:    06/18/24 1141   BP: 102/68   Pulse: 72   Resp: 18         Physical Exam  Vitals and nursing note reviewed. Exam conducted with a chaperone present.   Constitutional:       Appearance: Normal appearance.   Cardiovascular:      Rate and Rhythm: Normal rate.   Pulmonary:      Effort: Pulmonary effort is normal.   Skin:     General: Skin is warm and dry.      Comments: sacral pressure injury, right hip, trochanteric, pressure injury   Neurological:      Mental Status: He is alert.            Negative Pressure Wound Therapy  05/28/24 1308 Right (Active)   05/28/24 1308   Side: Right   Orientation:    Location: Hip   Additional Comments:    Removal Indication and Assessment:    Location:    SDO Location:    NPWT Type Vacuum Therapy 06/18/24 1133   Therapy Setting NPWT Continuous therapy 06/18/24 1133   Pressure Setting NPWT 100 mmHg 06/18/24 1133   Therapy Interventions NPWT Canister changed;Dressing changed;Trac pad replaced 06/18/24 1133   Sponges Inserted NPWT Black;White 06/18/24 1133   Sponges Removed NPWT Black;White 06/18/24 1133   General Output (mL) 25 06/18/24 1133            Altered Skin Integrity 09/25/23 1153 Sacral spine #1 (Active)   09/25/23 1153   Altered Skin Integrity Present on Admission - Did Patient arrive to the hospital with altered skin?: yes   Side:    Orientation:    Location: Sacral spine   Wound Number: #1   Is this injury device related?:    Primary Wound Type:    Description of Altered Skin Integrity:    Ankle-Brachial  Index:    Pulses:    Removal Indication and Assessment:    Wound Outcome:    (Retired) Wound Length (cm):    (Retired) Wound Width (cm):    (Retired) Depth (cm):    Wound Description (Comments):    Removal Indications:    Dressing Appearance Moist drainage 06/18/24 1133   Drainage Amount Moderate 06/18/24 1133   Drainage Characteristics/Odor Serosanguineous 06/18/24 1133   Appearance Pink;Moist 06/18/24 1133   Tissue loss description Full thickness 06/18/24 1133   Periwound Area Dry 06/18/24 1133   Wound Edges Open 06/18/24 1133   Wound Length (cm) 0.7 cm 06/18/24 1133   Wound Width (cm) 0.5 cm 06/18/24 1133   Wound Depth (cm) 0.5 cm 06/18/24 1133   Wound Volume (cm^3) 0.175 cm^3 06/18/24 1133   Wound Surface Area (cm^2) 0.35 cm^2 06/18/24 1133   Care Cleansed with:;Wound cleanser 06/18/24 1133   Dressing Applied 06/18/24 1133   Dressing Change Due 06/19/24 06/18/24 1133            Altered Skin Integrity 10/09/23 1141 Right Hip #2 (Active)   10/09/23 1141   Altered Skin Integrity Present on Admission - Did Patient arrive to the hospital with altered skin?: yes   Side: Right   Orientation:    Location: Hip   Wound Number: #2   Is this injury device related?: No   Primary Wound Type:    Description of Altered Skin Integrity:    Ankle-Brachial Index:    Pulses:    Removal Indication and Assessment:    Wound Outcome:    (Retired) Wound Length (cm):    (Retired) Wound Width (cm):    (Retired) Depth (cm):    Wound Description (Comments):    Removal Indications:    Dressing Appearance Moist drainage 06/18/24 1133   Drainage Amount Moderate 06/18/24 1133   Drainage Characteristics/Odor Serosanguineous 06/18/24 1133   Appearance Pink;Red;Yellow;Slough;Ecchymotic;Wet;Bone 06/18/24 1133   Tissue loss description Full thickness 06/18/24 1133   Periwound Area Dry 06/18/24 1133   Wound Edges Open 06/18/24 1133   Wound Length (cm) 3.4 cm 06/18/24 1133   Wound Width (cm) 3 cm 06/18/24 1133   Wound Depth (cm) 1.7 cm 06/18/24 1133  "  Wound Volume (cm^3) 17.34 cm^3 06/18/24 1133   Wound Surface Area (cm^2) 10.2 cm^2 06/18/24 1133   Undermining (depth (cm)/location) circumferential, deepest is 2.8 cm @ 1 oclock 06/18/24 1133   Care Cleansed with:;Wound cleanser 06/18/24 1133   Dressing Applied 06/18/24 1133   Dressing Change Due 06/21/24 06/18/24 1133       6/18/24          Right elbow (pre,monitoring)                        Sacrum (pre)                                               Right hip (pre)  (Aquaphor, gentle border)                            (silver alginate, sacral border)      Right hip (post)  (White foam, black foam, wound vac @ 100 mm hg)        Assessment:         ICD-10-CM ICD-9-CM   1. 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 Pressure injury of sacral region, stage 4  L89.154 707.03     707.24   3. Quadriplegia  G82.50 344.00   4. Subacute osteomyelitis, other site  M86.28 730.08         Procedures:     Debridement     Date/Time: 6/18/2024 11:15 AM     Performed by: Ashley Coto NP  Authorized by: Ashley Coto NP    Time out: Immediately prior to procedure a "time out" was called to verify the correct patient, procedure, equipment, support staff and site/side marked as required.     Consent Done?:  Yes (Verbal)     Preparation: Patient was prepped and draped with clean technique    Local anesthesia used?: No       Wound Details:    Location:  Right hip    Type of Debridement:  Non-excisional       Length (cm):  3.4       Area (sq cm):  10.2       Width (cm):  3       Percent Debrided (%):  100       Depth (cm):  1.7       Total Area Debrided (sq cm):  10.2    Depth of debridement:  Epidermis/Dermis    Devitalized tissue debrided:  Biofilm, Exudate, Fibrin and Slough    Instruments:  Forceps and Other ([place])  Bleeding:  Moderate  Hemostasis Achieved: Yes  Method Used:  Pressure and Silver Nitrate  Patient tolerance:  Patient tolerated the procedure " well with no immediate complications          [] Yes [] No   I & D performed  [] Yes [] No   Excisional debridement performed  [x] Yes [] No   Selective debridement performed        [] Yes [] No   Mechanical debridement performed         [x] Yes [] No  Silver nitrate applied                                     [] Yes [] No  Labs ordered this visit                                  [] Yes [] No   Imaging ordered this visit                           [] Yes [] No   Tissue pathology and/or culture taken       MEDICATIONS    Current Outpatient Medications:     acetaminophen (TYLENOL) 325 MG tablet, Take 650 mg by mouth every 6 (six) hours as needed for Pain., Disp: , Rfl:     ascorbic acid, vitamin C, (VITAMIN C) 1000 MG tablet, Take 1,000 mg by mouth every evening., Disp: , Rfl:     aspirin 325 MG tablet, Take 325 mg by mouth every morning. Stopped x 1 month, Disp: , Rfl:     baclofen (LIORESAL) 5 mg Tab tablet, Take 1 tablet (5 mg total) by mouth 3 (three) times daily., Disp: 90 tablet, Rfl: 0    bisacodyL (DULCOLAX) 10 mg Supp, Place 10 mg rectally daily as needed., Disp: , Rfl:     busPIRone (BUSPAR) 10 MG tablet, Take 1 tablet (10 mg total) by mouth 2 (two) times daily., Disp: 60 tablet, Rfl: 5    cetirizine (ZYRTEC) 10 MG tablet, Take 10 mg by mouth 2 (two) times a day., Disp: , Rfl:     cloNIDine (CATAPRES) 0.1 MG tablet, Take 0.1 mg by mouth daily as needed., Disp: , Rfl:     DULoxetine (CYMBALTA) 30 MG capsule, Take 1 capsule by mouth 2 (two) times daily., Disp: , Rfl:     ferrous sulfate (SLOW RELEASE IRON) 142 mg (45 mg iron) TbSR, Take 324 mg by mouth every morning., Disp: , Rfl:     ferrous sulfate (SLOW RELEASE IRON) 142 mg (45 mg iron) TbSR, 1 tablet Orally Three times a Week for 30 days, Disp: , Rfl:     fexofenadine (ALLEGRA) 180 MG tablet, Take 180 mg by mouth every morning., Disp: , Rfl:     fluticasone propionate (FLONASE ALLERGY RELIEF) 50 mcg/actuation nasal spray, 1 spray by Each Nostril route daily  "as needed., Disp: , Rfl:     meloxicam (MOBIC) 7.5 MG tablet, Take 7.5 mg by mouth 2 (two) times daily as needed for Pain., Disp: , Rfl:     multivitamin/iron/folic acid (CENTRUM ORAL), Take 1 tablet by mouth every morning., Disp: , Rfl:     NON FORMULARY MEDICATION, Take 3 drops by mouth 2 (two) times daily as needed. THC, Disp: , Rfl:     testosterone undecanoate (JATENZO) 198 mg Cap, 1 capsule., Disp: , Rfl:     zinc gluconate 50 mg tablet, Take 1 tablet (50 mg total) by mouth once daily. (Patient taking differently: Take 50 mg by mouth every morning.), Disp: 30 tablet, Rfl: 1    mupirocin (BACTROBAN) 2 % ointment, Apply topically once daily. (Patient not taking: Reported on 6/18/2024), Disp: 30 g, Rfl: 1    urea (CARMOL) 40 % Crea, Apply topically once daily., Disp: 85 g, Rfl: 2  No current facility-administered medications for this encounter.    Facility-Administered Medications Ordered in Other Encounters:     lactated ringers infusion, , Intravenous, Continuous, Cr Garibay DO, Last Rate: 10 mL/hr at 05/17/24 0705, Restarted at 05/17/24 0859   Review of patient's allergies indicates:   Allergen Reactions    Levofloxacin Rash       DIAGNOSTICS      Labs/Scans/Micro:    CBC:   Lab Results   Component Value Date    WBC 10.42 05/14/2024    HGB 13.9 (L) 05/14/2024    HCT 44.3 05/14/2024    MCV 88.8 05/14/2024     05/14/2024       Sedimentation rate:   Lab Results   Component Value Date    SEDRATE 22 (H) 12/15/2022    C-reactive protein:   Lab Results   Component Value Date    CRP 44.90 (H) 12/15/2022    Chemistry: [unfilled]  HBA1C: No components found for: "HBA1C"    Ankle Brachial Index: No results found for this or any previous visit.    Imaging:    Plain film: No results found for this or any previous visit.    MRI: No results found for this or any previous visit.    Bone Scan: No results found for this or any previous visit.    Vascular studies:    Microbiology: Rt hip, 4/2/24        HOME " HEALTH AGENCY: [facility]     WOUND CARE ORDERS:  Right hip wound: Cleanse with wound cleanser, cover exposed bone with white foam, layer with black foam with negative pressure set at 100mmHg continuous to be changed on Tuesdays and Fridays.   Sacrum- Cleanse with wound cleanser, apply silver alginate to wound bed and cover with a gentle border to be changed daily  Right elbow- monitor, apply Aquaphor daily        Follow up in 1 week (on 6/25/2024) for stretcher.

## 2024-06-18 NOTE — PROCEDURES
"Debridement    Date/Time: 6/18/2024 11:15 AM    Performed by: Ashley Coto NP  Authorized by: Ashley Coto NP    Time out: Immediately prior to procedure a "time out" was called to verify the correct patient, procedure, equipment, support staff and site/side marked as required.    Consent Done?:  Yes (Verbal)    Preparation: Patient was prepped and draped with clean technique    Local anesthesia used?: No      Wound Details:    Location:  Right hip    Type of Debridement:  Non-excisional       Length (cm):  3.4       Area (sq cm):  10.2       Width (cm):  3       Percent Debrided (%):  100       Depth (cm):  1.7       Total Area Debrided (sq cm):  10.2    Depth of debridement:  Epidermis/Dermis    Devitalized tissue debrided:  Biofilm, Exudate, Fibrin and Slough    Instruments:  Forceps and Other ([place])  Bleeding:  Moderate  Hemostasis Achieved: Yes  Method Used:  Pressure and Silver Nitrate  Patient tolerance:  Patient tolerated the procedure well with no immediate complications    "

## 2024-06-25 ENCOUNTER — HOSPITAL ENCOUNTER (OUTPATIENT)
Dept: WOUND CARE | Facility: HOSPITAL | Age: 44
Discharge: HOME | End: 2024-06-25
Attending: NURSE PRACTITIONER
Payer: MEDICARE

## 2024-06-25 VITALS
RESPIRATION RATE: 19 BRPM | WEIGHT: 130.06 LBS | HEART RATE: 85 BPM | HEIGHT: 70 IN | BODY MASS INDEX: 18.62 KG/M2 | DIASTOLIC BLOOD PRESSURE: 57 MMHG | SYSTOLIC BLOOD PRESSURE: 104 MMHG

## 2024-06-25 DIAGNOSIS — S71.001D OPEN WOUND OF RIGHT HIP AND THIGH WITH TENDON INVOLVEMENT, SUBSEQUENT ENCOUNTER: ICD-10-CM

## 2024-06-25 DIAGNOSIS — G82.50 QUADRIPLEGIA: ICD-10-CM

## 2024-06-25 DIAGNOSIS — S71.101D OPEN WOUND OF RIGHT HIP AND THIGH WITH TENDON INVOLVEMENT, SUBSEQUENT ENCOUNTER: ICD-10-CM

## 2024-06-25 DIAGNOSIS — M86.28 SUBACUTE OSTEOMYELITIS, OTHER SITE: ICD-10-CM

## 2024-06-25 DIAGNOSIS — L89.44 PRESSURE INJURY OF CONTIGUOUS REGION INVOLVING BACK AND RIGHT HIP, STAGE 4: Primary | ICD-10-CM

## 2024-06-25 DIAGNOSIS — S76.001D OPEN WOUND OF RIGHT HIP AND THIGH WITH TENDON INVOLVEMENT, SUBSEQUENT ENCOUNTER: ICD-10-CM

## 2024-06-25 DIAGNOSIS — S76.901D OPEN WOUND OF RIGHT HIP AND THIGH WITH TENDON INVOLVEMENT, SUBSEQUENT ENCOUNTER: ICD-10-CM

## 2024-06-25 PROCEDURE — 97605 NEG PRS WND THER DME<=50SQCM: CPT

## 2024-06-25 PROCEDURE — 27000999 HC MEDICAL RECORD PHOTO DOCUMENTATION

## 2024-06-25 PROCEDURE — 99213 OFFICE O/P EST LOW 20 MIN: CPT | Mod: 25

## 2024-06-25 NOTE — PROGRESS NOTES
Referred by: Dr. Sidhu  Low air loss mattress [x] Yes [] No   Is the patient eligible for a graft, and/or currently grafting?  [x] Yes [] No  (right hip - [facility])       Subjective:         Patient ID: Werner Jarrett is a 43 y.o. male.    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  The sacrum remained stable with no changes and no signs and symptoms of infection.  The right hip seems to be progressing well, with an increase in the development of granular tissue which is occurring across the exposed bone.  At his last visit there was quite a bit of bruising around the periwound so we decrease the wound VAC pressure from 125 mmHg to 100 mmHg and this seems to be improving the growth of granular tissue significantly.  He is scheduled for his bone biopsy on July 1st at [facility] with Dr. Sandy Jara.  He will then follow up with her on 7/11/24.  He also has an upcoming appointment with infectious disease on 07/14/2024.    6/18/24 - Mr. Jarrett returns today for followup on 2 wounds.  The wound to the sacrum continues to progress well with no signs and symptoms of infection.  The wound to the right hip was debrided today.  There was a large amount of slough at the top of the wound bed.  We are continuing to use a wound VAC although there does not seem to be any significant progress towards closing this wound.  He did have a followup appointment with the surgeon, Dr. Sandy Jara and she does still intend to do a bone biopsy on this right hip.  Will have a followup appointment with her on 07/11/2024.  He will also see infectious disease in July, 2024.  He does not have that date as of today.  He does still have the dry area on his right elbow.  A refill for Urea cream has been sent to his  pharmacy.    6/11/24 - The patient returns today for the wounds to the sacrum and right hip.  The sacral wound is stable and clean, with no S & S of infection.  The right hip does have some bruising around the surface of the wound.  We will decrease the wound vac pressure from 125 mmHg to 100 mmHg continuous pressure.  The elbow is still being monitored and he continues to use Urea cream every other day as well as MediHoney.  He has not heard any further information from Dr. Sandy lucero a bone biopsy, and he has been scheduled with ID for f/up.     6/4/24 - Mr. Jarrett returns today for followup on the 2 pressure injuries, 1 to the sacrum and 1 to the right hip.  He does report that he had a follow up with his surgeon, Dr. Sandy Jraa on 05/30/2024.  He states that she has indicated that she will be scheduling a bone biopsy of the right hip due to the patient's osteomyelitis.  She is trying to determine his next course of treatment together with infectious disease consideration.  We are currently using a wound VAC and it does seem to be making progress with the development of granular tissue in the wound bed.  There are no signs and symptoms of infection in this wound.  The sacral wound does have a slight increase in depth.  We had changed to collagen and it may be that that product is to wet.  We will DC the collagen and begin application of silver alginate only.  A callus paring was also done on the patient's right elbow.  This has been an ongoing issue and he is currently using Aquaphor on the elbow.    5/28/24 - Mr. 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  He had excisional debridement of this right hip on 5/17/24 by Dr. Sandy Jara.  Her op note:   PROCEDURE -   Excisional debridement right hip wound, dimension of debridement 1 cm circumferentially, total wound dimensions 3.5 cm x 3 cm x 1 cm deep.  Blunt debridement right hip wound  FINDINGS -    Rolled edges of  skin, completely removed by excisional debridement  Fibrinous material within the wound debrided bluntly and submitted to culture   Today that wound is clean with nice margins.  We have applied a wound vac using white foam covered with black foam.     He had a follow up with her scheduled for 5/23 but missed it and is now re-scheduled for 5/30.   He is expecting an appt with ID but has not been contacted as of today.   In addition to the right hip, he does have a wound at the sacrum that has reopened.  It is clean and we will apply collagen into this wound.  A callus paring was done on the right elbow.     5/14/24 - The Patient returns today for followup on the large pressure injury at the right hip as well as a small pressure injury to the sacrum.  He did have a followup appointment with his surgeon, Dr. Sandy Jara. He is scheduled for an I&D/debridment of his right hip with Dr. Quijano this Friday 5/17/24.  We had a lengthy discussion regarding possibilities such as a wound VAC or if she will leave the wound opened or close that wound.  He does not know at this time but this providers preference would be that she would close the wound.  This wound remains open at this time following another procedure and we were unable to get the wound to close completely in bite of the use of a wound VAC.  He does still have ligament exposed in the wound has increased in size.  Bone is fully exposed in the wound bed.  He has been using topical antibiotics for 1 month and those will be discontinued as of today.  Until his procedure we will apply Adaptic over the exposed bone with silver alginate on top, with daily dressing changes.  He does expect to see an infectious disease doctor following his procedure and also mentioned that he expects to possibly have IV antibiotics in another short stay at [facility].    4/23/24 - Today the wound at the elbow is nearly resolved.  We will begin application of Aquaphor to the elbow.  The  sacrum was assessed and has improved dramatically.  It is nearly resolved.  He has completed the PO Augmentin for a UTI and this has helped wounds as well  Additionally he began topical abx to the right hip on 4/16/24 (Clindamycin).  Today the hip is also showing improvement.  He does have a f/up with his surgeon, Dr. Sandy Jara, on 5/2/24 with the hope that she will revise the wound to assist with closure since the margins are rolled.  He will return to clinic in two weeks.     4/16/24 - Mr. Jarrett was seen today for f/up on three wounds.  He did have an MRI done on 4/4/24 of the right hip (at [facility], ordered by Dr. Sandy Jara).  Today those results were reviewed with the patient.  We discussed that this provider would reach out to Dr. Jara regarding possible treatment.  This was done with consideration for IV abx, surgical intervention and/or referral to infectious disease for the best possible outcome.  The patient is currently on Augmentin with two days remaining for a UTI.  Additionally he picked up his topical abx today (Clindamycin) which will be used on a moist 4 x 4 gauze, not Basal Gel.    A callus paring was done on the right elbow and it is now healed.  The sacrum was selectively debrided and remains stable.     4/2/24 - The patient was referred back to his surgeon, Dr. Macie Carbajal, who he saw on 3/26/24.  He reports that she is concerned over possible abscess in the space, as well as infection.  A culture was obtained today.  She did prescribe Augmentin, to begin tomorrow, as a prophylaxis for the hip. He is expecting to have an MRI schedule prior to the follow up with her on 4/9/24 at which time they will discuss the MRI results, and options. Today the right hip wound has increasing moisture and bogginess.  The sacrum remains stable.  The right elbow as debrided and is nearly closed.     3/19/24 - We have been using NuShield grafts on the open pressure  ulcer at the patient's right hip.  Tendon remains exposed in the wound bed, the margins are nereida, epibole and closed.  Through a series of several weeks the wound margins have been scored with a scalpel in an attempt to open these closed margins, to no avail.  Today the graft was held, and the patient will be seen by his surgeon, Dr. Sandy Jara next Tuesday, 3/26/24, for consultation regarding options to possible reopen the margins which might bring a successful closure to this wound based on how it is currently healed.  For the time being we will apply adaptic over the tendon, with silver alginate dressing on top.  Both the right elbow and the sacrum remain stable.  We are currently using Endoform on both.  Today the elbow was selectively debrided.     3/12/24 - The pressure injury to the right hip is being grafted using NuShield grafts.  Today graft #6 was applied.  There has been no significant change to this wound since grafting.  Graft #7 has been ordered.  However, the patient was advised to scheduled with his surgeon, Dr. Sandy Jara, for evaluation to determine if she will need to do further surgery to assist this wound with closure since the margins are rolled.  They were again scored with a scaple to attempt to stimulate the growth of epithelial tissue, which has been done several times to no avail.  The right elbow is progressing well.  The sacrum remains stable with no S & S of infection.  He has received his new power wheelchair.  We are hopeful that this device might contribute to progress with his wounds.     3/5/24 - Mr. Jarrett is seen today for followup on 3 wounds.  The wound to the right elbow was selectively debrided and it does show considerable improvement.  The wound to the sacrum is stable.  We will begin application of moistened Endoform to both of these wounds to be changed on Friday to silver alginate.  The wound to the right hip is slightly moist with a little bit of  drainage.  This is to be expected, however, due to the fact that we are grafting that wound.  There has been no significant change to the size of the wound, unfortunately, in spite of grafting multiple times.  We will continue the process with the hope of making some progress.    2/27/24 - Mr. Jarrett returns to clinic today for followup on 3 areas of concern.  The pressure injury remains stable with no significant change.  He has a new wound to the right elbow.  This has developed due to dryness and cracking which has opened into a wound.  The elbow was selectively debrided using a dermal curette.  We will begin applying MediHoney to the elbow, covering with 4 x 4 gauze, Cover and with a Tubigrip with the hopes of softening this callused tissue further.  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  We are currently grafting using NuShield grafts.  Today we applied graft #4 after the wound margins were crosshatched to try to allow for growth epithelial tissue.  Again this week, there was no significant change in that wound.  Ligament is still fully exposed.  Graft #5 is being ordered today.    2/20/24 - the patient returns to clinic today for followup on a small pressure injury to the sacrum as well as a large stage IV pressure injury to the right hip.  We are currently going through the grafting process.  Today we applied graft #3, which is the 1st of the NuShield grafts.  So far we have not seen any significant change in the wound and the ligament is still fully exposed.  A # 15 scalpel was used to cross keen the wound margin due to rolling of that margin in the need to stimulate the growth of epithelial tissue as well as granular tissue.  The sacrum remains stable and basically the same size.      2/12/24 - Mr. Jarrett is seen today in follow up of two wounds, both of which remain stable.  The left hip is being grafted, and today PuraPly #2 was applied.  There is no significant change since graft #1  was applied.  Today graft #3 was ordered.  We will change from PuraPly to Epifix at this time.  The sacrum remains stable.  We have applied Endoform into that wound and it will be treated the same as a graft, with the hope that the patient does not have BMs which necessitate removal of the dressing. The patient is eligible and is in need of a new power wheelchair.      2/6/24 - Mr. Jarrett returns to clinic today with 2 wounds.  The very small stage IV pressure injury at the sacrum remains stable.  Today, the wound at the right hip, stage IV pressure injury, will receive the 1st graft application.  We are applying PuraPly grafts at this time and then we will change to NuShield after two PuraPly.  Of note, he is waiting on a demo to be shown to him for a possible new wheelchair.  PuraPly #2 ordered.    January 30, 2024:  43-year-old white male, with paraplegia, is here for follow up of the right hip pressure injury, and the sacral pressure injury.  The wounds are looking similar to the last visit.  He is getting ready to have skin substitute applied to the right hip wound.  There is white tissue in the wound bed, presumably tendon.  I do not see pink granulation tissue.  He has no longer using topical antibiotics.    1/23/24 - Mr. Jarrett returns today for followup on 2 wounds.  Today the sacral pressure injury remains stable and clean.  We have been using topical antibiotics on both of these wounds for several weeks.  That has been discontinued today.  The pressure injury at the right hip was selectively debrided today using the ultrasonic debrider and graft prep was performed.  We will begin applying a small amount of mupirocin ointment on to this wound daily in preparation beginning the grafting process next week.  We will be use PuraPly/NuShield grafts.  Of note, the patient is responsible for contacting calor at the [employer] to have his measurements taken.  He has not done that as of today.    December 26,  "2023:  43-year-old white male, with paraplegia, is here for follow up of a right hip pressure injury, and a sacral pressure injury.  He has been using topical antibiotics.  The measurements are about the same as last visit.  The nurse practitioner has discussed a possible skin substitute on the right hip.    12/11/23 - Mr. Jarrett for followup on the pressure ulcers to the right hip and sacrum.  We started using topical antibiotics on both wounds on 11/21/2023.  Additionally, the patient has been using the vinegar washes for sometime.  He was instructed to discontinue the use of the vinegar washes today and use only the topical antibiotics.  He has been approved for grafts ([facility]).  We will plan to do graft prep in two weeks (once abx are complete) and then begin grafting.    Of note, we discussed today the need for proper support in his wheelchair to offload the pressure.  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  Also, he does have a low air loss mattress, but that mattress is on top of a "regular" mattress and he reports springs are protruding.  He needs a proper bedframe for the low airloss mattress.     12/4/23 - Mr. Jarrett returns for followup on 2 wounds.  He continues to use topical abx on both wounds.  The right hip continues to be concerning.  The tensor fascia ronny is till exposed in the wound bed.  We are mixing the topical abx with basal gel to prevent drying.  The sacral wound is stable but has had a slight increase in size.  Abx are being used on this wound as well. We are attempting to authorize grafts on the right hip wound.    11/27/23 - the patient returns today for followup on pressure injuries to the right hip and the sacrum.  He has received his topical antibiotics (Linezolid/Gentamincin) and started using the antibiotics on Saturday, 11/25/23.  He did not " receive basal gel with that order so we have contacted [facility] to send the patient that product as the antibiotics are very drying and they are being applied directly to his tendon.  He must have the moisture of the basal gel.  In the time being, we will mix his topical antibiotics with mupirocin antibiotic.  Today both wounds remains stable.  He was instructed to use the antibiotics on both wounds to avoid cross contamination.    11/20/23 - Mr. Jarrett returns for f/up on the wound to the right hip and the sacrum.  At his last visit the right hip was cultured and it was positive to have Pseudomonas.  He has been using Vinegar washes since 11/06/2023.  He does have a sensitivity to levofloxacin and so can not take that medication.  We have requested a topical recommendation and he is being prescribed linezolid/gentamicin to be applied directly into the wound.  If we do not see improvement from that topical medication we will be moving forward with an IV antibiotic.  He does have exposed ligament (possibly ischiofemoral ligament) in this wound.  The patient does still have a mediport so that would be the next best solution for him due to his sensitivity.  The wound at the sacrum remains stable and we are currently using silver alginate on both wounds.    11/6/23 - the patient seen today for followup on the right hip and sacral pressure wounds.  Although the sacrum is stable, the right hip does continue to deteriorate.  The right hip was cultured today.  We have been using Endoform and/or collagen on these wounds and unfortunately these products or causing excessive moisture and deterioration to the wounds.  We will discontinue both products and use only silver alginate for the time being.  The right hip does have tendon exposed at this time.  The patient was reminded and encouraged to continue to offload both the right hip in the sacrum which he reports that he is wearing.  However, on the  deterioration of the wounds it is concerning.    10/30/23 - Mr. Jarrett to clinic today for followup on the wound to the sacrum as well as the right hip.  Today, both have deteriorated.  We have been using collagen to the wound beds and it appears is though they are stain to wet.  There is still tendon present in the wound bed of the right hip.  We will discontinue the collagen and begin application Endoform to both wounds, changing every other day, covered with silver alginate.  He was reminded to offload as much as possible to prevent further deterioration.  Of note, reported that is blood pressure has been low, and it was very low today, 86/54.  He states that he noticed it last week that he has stopped taking Tylenol, aspirin, and probiotics.  We discussed today that it is unlikely that any of these medications would impact blood pressure but he prefers to remain off of them.    10/16/23 - The patient returns today for followup on the pressure injury to the right hip as well as the sacrum.  Today, there is now tendon exposed in the wound bed the right hip pressure injury.  We discussed today that it is imperative that the patient offload to allow this wound to heal so that he does not develop osteomyelitis in this hip as well.  The pressure injury to the sacrum has also deteriorated.  We discussed that now that the amputation to the left hip has fully healed, he must take the opportunity to begin offloading that right hip to allow it to heal.  We will begin applying collagen every other day to both wound beds.    10/9/23 The patient returns today for the open wound to the right hip and sacrum.  The surgical incision to the left hip is now resolved and will no longer be followed.  Both the right hip and sacrum are wounds that have reopened.  We will begin application of collagen every third day to both wounds.  Neither wound has any signs and symptoms of infection.  He will return in two weeks. Of note, the patient  reports that he has had diarrhea for several days, up to about two weeks.  He has had no treatment and is currently using only probiotics.  He has been prescribed imodium and advised to use that medication no more than 4 days.  He is to contact his PCP if the diarrhea does not resolve within that time period.     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 Sandy Owusu on 8/21/23.  This wound has progress except personally well and is remaining closed.  The patient is using only iodine along the surgical incision line.  He does have a follow up with his surgeon tomorrow, 09/26/2023.  He was advised today that he can begin application coconut oil with vitamin C along the closed incision line which is fully approximated.  We will follow this incision line for one additional visit then d/c if no issues develop.    He has had a long term wound at the sacrum which today is open again.  It was mechanically debrided.  Endoform as applied to the wound bed, which will be left in place until Friday at which time he will change to silver alginate.     Review of Systems   Constitutional: Negative.    Respiratory: Negative.     Cardiovascular: Negative.    Skin:         As documented in the HPI.   All other systems reviewed and are negative.        Objective:      Vitals:    06/25/24 1341   BP: (!) 104/57   Pulse: 85   Resp: 19           Physical Exam  Vitals and nursing note reviewed. Exam conducted with a chaperone present.   Constitutional:       Appearance: Normal appearance.   Cardiovascular:      Rate and Rhythm: Normal rate.   Pulmonary:      Effort: Pulmonary effort is normal.   Skin:     General: Skin is warm and dry.      Comments: sacral pressure injury, right hip, trochanteric, pressure injury   Neurological:      Mental Status: He is alert.            Negative Pressure Wound Therapy  05/28/24 1308 Right (Active)   05/28/24 1308   Side: Right   Orientation:     Location: Hip   Additional Comments:    Removal Indication and Assessment:    Location:    SDO Location:    NPWT Type Vacuum Therapy 06/25/24 1320   Therapy Setting NPWT Continuous therapy 06/25/24 1320   Pressure Setting NPWT 100 mmHg 06/25/24 1320   Therapy Interventions NPWT Canister changed;Dressing changed;Trac pad replaced 06/25/24 1320   Sponges Inserted NPWT Black;White;1 06/25/24 1320   Sponges Removed NPWT Black;White;1 06/25/24 1320   General Output (mL) 150 06/25/24 1320            Altered Skin Integrity 09/25/23 1153 Sacral spine #1 (Active)   09/25/23 1153   Altered Skin Integrity Present on Admission - Did Patient arrive to the hospital with altered skin?: yes   Side:    Orientation:    Location: Sacral spine   Wound Number: #1   Is this injury device related?:    Primary Wound Type:    Description of Altered Skin Integrity:    Ankle-Brachial Index:    Pulses:    Removal Indication and Assessment:    Wound Outcome:    (Retired) Wound Length (cm):    (Retired) Wound Width (cm):    (Retired) Depth (cm):    Wound Description (Comments):    Removal Indications:    Dressing Appearance Moist drainage 06/25/24 1320   Drainage Amount Moderate 06/25/24 1320   Drainage Characteristics/Odor Serosanguineous 06/25/24 1320   Appearance Moist;Pink;Red;Granulating 06/25/24 1320   Tissue loss description Full thickness 06/25/24 1320   Periwound Area Intact 06/25/24 1320   Wound Edges Open 06/25/24 1320   Wound Length (cm) 0.7 cm 06/25/24 1320   Wound Width (cm) 0.5 cm 06/25/24 1320   Wound Depth (cm) 0.2 cm 06/25/24 1320   Wound Volume (cm^3) 0.07 cm^3 06/25/24 1320   Wound Surface Area (cm^2) 0.35 cm^2 06/25/24 1320   Care Cleansed with:;Wound cleanser 06/25/24 1320   Dressing Applied;Other (comment) 06/25/24 1320   Dressing Change Due 06/26/24 06/25/24 1320            Altered Skin Integrity 10/09/23 1141 Right Hip #2 (Active)   10/09/23 1141   Altered Skin Integrity Present on Admission - Did Patient arrive to the  [facility] with altered skin?: yes   Side: Right   Orientation:    Location: Hip   Wound Number: #2   Is this injury device related?: No   Primary Wound Type:    Description of Altered Skin Integrity:    Ankle-Brachial Index:    Pulses:    Removal Indication and Assessment:    Wound Outcome:    (Retired) Wound Length (cm):    (Retired) Wound Width (cm):    (Retired) Depth (cm):    Wound Description (Comments):    Removal Indications:    Dressing Appearance Moist drainage 06/25/24 1320   Drainage Amount Moderate 06/25/24 1320   Drainage Characteristics/Odor Serosanguineous 06/25/24 1320   Appearance Moist;Pink;Slough;Tan 06/25/24 1320   Tissue loss description Full thickness 06/25/24 1320   Periwound Area Intact 06/25/24 1320   Wound Edges Open 06/25/24 1320   Wound Length (cm) 2.2 cm 06/25/24 1320   Wound Width (cm) 3.3 cm 06/25/24 1320   Wound Depth (cm) 2 cm 06/25/24 1320   Wound Volume (cm^3) 14.52 cm^3 06/25/24 1320   Wound Surface Area (cm^2) 7.26 cm^2 06/25/24 1320   Care Cleansed with:;Wound cleanser 06/25/24 1320   Dressing Applied;Other (comment) 06/25/24 1320   Dressing Change Due 06/28/24 06/25/24 1320     6/25/24   NO PHOTOS D/T EPIC   Assessment:         ICD-10-CM ICD-9-CM   1. 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 Open wound of right hip and thigh with tendon involvement, subsequent encounter  S71.001D V58.89    S71.101D 890.2    S76.001D     S76.901D    3. Quadriplegia  G82.50 344.00   4. Subacute osteomyelitis, other site  M86.28 730.08         Procedures:     Mechanical debridement only    [] Yes [] No   I & D performed  [] Yes [] No   Excisional debridement performed  [] Yes [] No   Selective debridement performed        [x] Yes [] No   Mechanical debridement performed         [] Yes [] No  Silver nitrate applied                                     [] Yes [] No  Labs ordered this visit                                  [] Yes [] No    Imaging ordered this visit                           [] Yes [] No   Tissue pathology and/or culture taken       MEDICATIONS    Current Outpatient Medications:     acetaminophen (TYLENOL) 325 MG tablet, Take 650 mg by mouth every 6 (six) hours as needed for Pain., Disp: , Rfl:     ascorbic acid, vitamin C, (VITAMIN C) 1000 MG tablet, Take 1,000 mg by mouth every evening., Disp: , Rfl:     aspirin 325 MG tablet, Take 325 mg by mouth every morning. Stopped x 1 month, Disp: , Rfl:     baclofen (LIORESAL) 5 mg Tab tablet, Take 1 tablet (5 mg total) by mouth 3 (three) times daily., Disp: 90 tablet, Rfl: 0    bisacodyL (DULCOLAX) 10 mg Supp, Place 10 mg rectally daily as needed., Disp: , Rfl:     busPIRone (BUSPAR) 10 MG tablet, Take 1 tablet (10 mg total) by mouth 2 (two) times daily., Disp: 60 tablet, Rfl: 5    cetirizine (ZYRTEC) 10 MG tablet, Take 10 mg by mouth 2 (two) times a day., Disp: , Rfl:     cloNIDine (CATAPRES) 0.1 MG tablet, Take 0.1 mg by mouth daily as needed., Disp: , Rfl:     DULoxetine (CYMBALTA) 30 MG capsule, Take 1 capsule by mouth 2 (two) times daily., Disp: , Rfl:     ferrous sulfate (SLOW RELEASE IRON) 142 mg (45 mg iron) TbSR, Take 324 mg by mouth every morning., Disp: , Rfl:     ferrous sulfate (SLOW RELEASE IRON) 142 mg (45 mg iron) TbSR, 1 tablet Orally Three times a Week for 30 days, Disp: , Rfl:     fexofenadine (ALLEGRA) 180 MG tablet, Take 180 mg by mouth every morning., Disp: , Rfl:     fluticasone propionate (FLONASE ALLERGY RELIEF) 50 mcg/actuation nasal spray, 1 spray by Each Nostril route daily as needed., Disp: , Rfl:     meloxicam (MOBIC) 7.5 MG tablet, Take 7.5 mg by mouth 2 (two) times daily as needed for Pain., Disp: , Rfl:     multivitamin/iron/folic acid (CENTRUM ORAL), Take 1 tablet by mouth every morning., Disp: , Rfl:     mupirocin (BACTROBAN) 2 % ointment, Apply topically once daily., Disp: 30 g, Rfl: 1    NON FORMULARY MEDICATION, Take 3 drops by mouth 2 (two) times  "daily as needed. THC, Disp: , Rfl:     testosterone undecanoate (JATENZO) 198 mg Cap, 1 capsule., Disp: , Rfl:     urea (CARMOL) 40 % Crea, Apply topically once daily., Disp: 85 g, Rfl: 2    zinc gluconate 50 mg tablet, Take 1 tablet (50 mg total) by mouth once daily. (Patient taking differently: Take 50 mg by mouth every morning.), Disp: 30 tablet, Rfl: 1  No current facility-administered medications for this encounter.    Facility-Administered Medications Ordered in Other Encounters:     lactated ringers infusion, , Intravenous, Continuous, Cr Garibay DO, Last Rate: 10 mL/hr at 05/17/24 0705, Restarted at 05/17/24 0859   Review of patient's allergies indicates:   Allergen Reactions    Levofloxacin Rash       DIAGNOSTICS      Labs/Scans/Micro:    CBC:   Lab Results   Component Value Date    WBC 10.42 05/14/2024    HGB 13.9 (L) 05/14/2024    HCT 44.3 05/14/2024    MCV 88.8 05/14/2024     05/14/2024       Sedimentation rate:   Lab Results   Component Value Date    SEDRATE 22 (H) 12/15/2022    C-reactive protein:   Lab Results   Component Value Date    CRP 44.90 (H) 12/15/2022    Chemistry: [unfilled]  HBA1C: No components found for: "HBA1C"    Ankle Brachial Index: No results found for this or any previous visit.    Imaging:    Plain film: No results found for this or any previous visit.    MRI: No results found for this or any previous visit.    Bone Scan: No results found for this or any previous visit.    Vascular studies:    Microbiology: Rt hip, 4/2/24        HOME HEALTH AGENCY: [facility]     WOUND CARE ORDERS:  Right hip wound: Cleanse with wound cleanser, cover exposed bone with white foam, layer with black foam with negative pressure set at 100mmHg continuous to be changed on Tuesdays and Fridays.   Sacrum- Cleanse with wound cleanser, apply silver alginate to wound bed and cover with a gentle border to be changed daily  Right elbow- monitor, apply Aquaphor daily        Follow up " in about 2 weeks (around 7/9/2024) for PU - stretcher .

## 2024-07-09 ENCOUNTER — HOSPITAL ENCOUNTER (OUTPATIENT)
Dept: WOUND CARE | Facility: HOSPITAL | Age: 44
Discharge: HOME | End: 2024-07-09
Attending: NURSE PRACTITIONER
Payer: MEDICARE

## 2024-07-09 VITALS
RESPIRATION RATE: 18 BRPM | WEIGHT: 130.06 LBS | BODY MASS INDEX: 18.66 KG/M2 | HEART RATE: 76 BPM | SYSTOLIC BLOOD PRESSURE: 125 MMHG | DIASTOLIC BLOOD PRESSURE: 82 MMHG

## 2024-07-09 DIAGNOSIS — M86.28 SUBACUTE OSTEOMYELITIS, OTHER SITE: ICD-10-CM

## 2024-07-09 DIAGNOSIS — L89.44 PRESSURE INJURY OF CONTIGUOUS REGION INVOLVING BACK AND RIGHT HIP, STAGE 4: Primary | ICD-10-CM

## 2024-07-09 DIAGNOSIS — G82.50 QUADRIPLEGIA: ICD-10-CM

## 2024-07-09 DIAGNOSIS — L89.154 PRESSURE INJURY OF SACRAL REGION, STAGE 4: ICD-10-CM

## 2024-07-09 PROCEDURE — 97597 DBRDMT OPN WND 1ST 20 CM/<: CPT

## 2024-07-09 PROCEDURE — 99213 OFFICE O/P EST LOW 20 MIN: CPT

## 2024-07-09 PROCEDURE — 27000999 HC MEDICAL RECORD PHOTO DOCUMENTATION

## 2024-07-09 RX ORDER — MIDODRINE HYDROCHLORIDE 2.5 MG/1
TABLET ORAL
COMMUNITY
Start: 2024-07-03

## 2024-07-09 NOTE — PROGRESS NOTES
Referred by: Dr. Sidhu  Low air loss mattress [x] Yes [] No   Is the patient eligible for a graft, and/or currently grafting?  [x] Yes [] No  (right hip - [facility])   Smoker: yes, vape      Subjective:         Patient ID: Werner Jarrett is a 43 y.o. male.    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  We are using a wound VAC on the right hip and that does seem to be making some minor progress in closing the open space between the muscles of the leg.  Today, a selective debridement was performed in an effort to revise the margins and support growth of granular tissue.  The sacrum was also selectively debrided.  We will continue with the wound VAC on the right hip.  The patient does have an appointment with his surgeon, Dr. Sandy Jara, on 07/11/2024.  He also has an appointment with infectious disease on that same day and we are hoping for some additional guidance from both of them regarding his wounds.    6/26/24 - Mr. 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  The sacrum remained stable with no changes and no signs and symptoms of infection.  The right hip seems to be progressing well, with an increase in the development of granular tissue which is occurring across the exposed bone.  At his last visit there was quite a bit of bruising around the periwound so we decrease the wound VAC pressure from 125 mmHg to 100 mmHg and this seems to be improving the growth of granular tissue significantly.  He is scheduled for his bone biopsy on July 1st at [facility] with Dr. Sandy Jara.  He will then follow up with her on 7/11/24.  He also has an upcoming appointment  with infectious disease on 07/14/2024.    6/18/24 - Mr. Jarrett returns today for followup on 2 wounds.  The wound to the sacrum continues to progress well with no signs and symptoms of infection.  The wound to the right hip was debrided today.  There was a large amount of slough at the top of the wound bed.  We are continuing to use a wound VAC although there does not seem to be any significant progress towards closing this wound.  He did have a followup appointment with the surgeon, Dr. Sandy Jara and she does still intend to do a bone biopsy on this right hip.  Will have a followup appointment with her on 07/11/2024.  He will also see infectious disease in July, 2024.  He does not have that date as of today.  He does still have the dry area on his right elbow.  A refill for Urea cream has been sent to his pharmacy.    6/11/24 - The patient returns today for the wounds to the sacrum and right hip.  The sacral wound is stable and clean, with no S & S of infection.  The right hip does have some bruising around the surface of the wound.  We will decrease the wound vac pressure from 125 mmHg to 100 mmHg continuous pressure.  The elbow is still being monitored and he continues to use Urea cream every other day as well as MediHoney.  He has not heard any further information from Dr. Sandy lucero a bone biopsy, and he has been scheduled with ID for f/up.     6/4/24 - Mr. Jarrett returns today for followup on the 2 pressure injuries, 1 to the sacrum and 1 to the right hip.  He does report that he had a follow up with his surgeon, Dr. Sandy Jara on 05/30/2024.  He states that she has indicated that she will be scheduling a bone biopsy of the right hip due to the patient's osteomyelitis.  She is trying to determine his next course of treatment together with infectious disease consideration.  We are currently using a wound VAC and it does seem to be making progress with the development of granular tissue in the  wound bed.  There are no signs and symptoms of infection in this wound.  The sacral wound does have a slight increase in depth.  We had changed to collagen and it may be that that product is to wet.  We will DC the collagen and begin application of silver alginate only.  A callus paring was also done on the patient's right elbow.  This has been an ongoing issue and he is currently using Aquaphor on the elbow.    5/28/24 - Mr. 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  He had excisional debridement of this right hip on 5/17/24 by Dr. Sandy Jara.  Her op note:   PROCEDURE -   Excisional debridement right hip wound, dimension of debridement 1 cm circumferentially, total wound dimensions 3.5 cm x 3 cm x 1 cm deep.  Blunt debridement right hip wound  FINDINGS -    Rolled edges of skin, completely removed by excisional debridement  Fibrinous material within the wound debrided bluntly and submitted to culture   Today that wound is clean with nice margins.  We have applied a wound vac using white foam covered with black foam.     He had a follow up with her scheduled for 5/23 but missed it and is now re-scheduled for 5/30.   He is expecting an appt with ID but has not been contacted as of today.   In addition to the right hip, he does have a wound at the sacrum that has reopened.  It is clean and we will apply collagen into this wound.  A callus paring was done on the right elbow.     5/14/24 - The Patient returns today for followup on the large pressure injury at the right hip as well as a small pressure injury to the sacrum.  He did have a followup appointment with his surgeon, Dr. Sandy Jara. He is scheduled for an I&D/debridment of his right hip with Dr. Quijano this Friday 5/17/24.  We had a lengthy discussion regarding possibilities such as a wound VAC or if she will leave the wound opened or close that wound.  He does not know at this time but this providers preference would  be that she would close the wound.  This wound remains open at this time following another procedure and we were unable to get the wound to close completely in bite of the use of a wound VAC.  He does still have ligament exposed in the wound has increased in size.  Bone is fully exposed in the wound bed.  He has been using topical antibiotics for 1 month and those will be discontinued as of today.  Until his procedure we will apply Adaptic over the exposed bone with silver alginate on top, with daily dressing changes.  He does expect to see an infectious disease doctor following his procedure and also mentioned that he expects to possibly have IV antibiotics in another short stay at [facility].    4/23/24 - Today the wound at the elbow is nearly resolved.  We will begin application of Aquaphor to the elbow.  The sacrum was assessed and has improved dramatically.  It is nearly resolved.  He has completed the PO Augmentin for a UTI and this has helped wounds as well  Additionally he began topical abx to the right hip on 4/16/24 (Clindamycin).  Today the hip is also showing improvement.  He does have a f/up with his surgeon, Dr. Sandy Jara, on 5/2/24 with the hope that she will revise the wound to assist with closure since the margins are rolled.  He will return to clinic in two weeks.     4/16/24 - Mr. Jarrett was seen today for f/up on three wounds.  He did have an MRI done on 4/4/24 of the right hip (at [facility], ordered by Dr. Sandy Jara).  Today those results were reviewed with the patient.  We discussed that this provider would reach out to Dr. Jara regarding possible treatment.  This was done with consideration for IV abx, surgical intervention and/or referral to infectious disease for the best possible outcome.  The patient is currently on Augmentin with two days remaining for a UTI.  Additionally he picked up his topical abx today (Clindamycin) which will be used on a moist 4 x  4 gauze, not Basal Gel.    A callus paring was done on the right elbow and it is now healed.  The sacrum was selectively debrided and remains stable.     4/2/24 - The patient was referred back to his surgeon, Dr. Macie Carbajal, who he saw on 3/26/24.  He reports that she is concerned over possible abscess in the space, as well as infection.  A culture was obtained today.  She did prescribe Augmentin, to begin tomorrow, as a prophylaxis for the hip. He is expecting to have an MRI schedule prior to the follow up with her on 4/9/24 at which time they will discuss the MRI results, and options. Today the right hip wound has increasing moisture and bogginess.  The sacrum remains stable.  The right elbow as debrided and is nearly closed.     3/19/24 - We have been using NuShield grafts on the open pressure ulcer at the patient's right hip.  Tendon remains exposed in the wound bed, the margins are nereida, epibole and closed.  Through a series of several weeks the wound margins have been scored with a scalpel in an attempt to open these closed margins, to no avail.  Today the graft was held, and the patient will be seen by his surgeon, Dr. Sandy Jara next Tuesday, 3/26/24, for consultation regarding options to possible reopen the margins which might bring a successful closure to this wound based on how it is currently healed.  For the time being we will apply adaptic over the tendon, with silver alginate dressing on top.  Both the right elbow and the sacrum remain stable.  We are currently using Endoform on both.  Today the elbow was selectively debrided.     3/12/24 - The pressure injury to the right hip is being grafted using NuShield grafts.  Today graft #6 was applied.  There has been no significant change to this wound since grafting.  Graft #7 has been ordered.  However, the patient was advised to scheduled with his surgeon, Dr. Sandy Jara, for evaluation to determine if she will need to do further  surgery to assist this wound with closure since the margins are rolled.  They were again scored with a scaple to attempt to stimulate the growth of epithelial tissue, which has been done several times to no avail.  The right elbow is progressing well.  The sacrum remains stable with no S & S of infection.  He has received his new power wheelchair.  We are hopeful that this device might contribute to progress with his wounds.     3/5/24 - Mr. Jarrett is seen today for followup on 3 wounds.  The wound to the right elbow was selectively debrided and it does show considerable improvement.  The wound to the sacrum is stable.  We will begin application of moistened Endoform to both of these wounds to be changed on Friday to silver alginate.  The wound to the right hip is slightly moist with a little bit of drainage.  This is to be expected, however, due to the fact that we are grafting that wound.  There has been no significant change to the size of the wound, unfortunately, in spite of grafting multiple times.  We will continue the process with the hope of making some progress.    2/27/24 - Mr. Jarrett returns to clinic today for followup on 3 areas of concern.  The pressure injury remains stable with no significant change.  He has a new wound to the right elbow.  This has developed due to dryness and cracking which has opened into a wound.  The elbow was selectively debrided using a dermal curette.  We will begin applying MediHoney to the elbow, covering with 4 x 4 gauze, Cover and with a Tubigrip with the hopes of softening this callused tissue further.  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  [facility].  Today we applied graft #4 after the wound margins were crosshatched to try to allow for growth epithelial tissue.  Again this week, there was no significant change in that wound.  Ligament is still fully exposed.  Graft #5 is being ordered today.    2/20/24 - the  patient returns to clinic today for followup on a small pressure injury to the sacrum as well as a large stage IV pressure injury to the right hip.  We are currently going through the grafting process.  Today we applied graft #3, which is the [facility]st of the [facility] grafts.  So far we have not seen any significant change in the wound and the ligament is still fully exposed.  A # 15 scalpel was used to cross keen the wound margin due to rolling of that margin in the need to stimulate the growth of epithelial tissue as well as granular tissue.  The sacrum remains stable and basically the same size.      2/12/24 - Mr. Jarrett is seen today in follow up of two wounds, both of which remain stable.  The left hip is being grafted, and today PuraPly #2 was applied.  There is no significant change since graft #1 was applied.  Today graft #3 was ordered.  We will change from PuraPly to Epifix at this time.  The sacrum remains stable.  We have applied Endoform into that wound and it will be treated the same as a graft, with the hope that the patient does not have BMs which necessitate removal of the dressing. The patient is eligible and is in need of a new power wheelchair.      2/6/24 - Mr. Jarrett returns to clinic today with 2 wounds.  The very small stage IV pressure injury at the sacrum remains stable.  Today, the wound at the right hip, stage IV pressure injury, will receive the 1st graft application.  We are applying PuraPly grafts at this time and then we will change to NuShield after two PuraPly.  Of note, he is waiting on a demo to be shown to him for a possible new wheelchair.  PuraPly #2 ordered.    January 30, 2024:  43-year-old white male, with paraplegia, is here for follow up of the right hip pressure injury, and the sacral pressure injury.  The wounds are looking similar to the last visit.  He is getting ready to have skin substitute applied to the right hip wound.  There is white tissue in the wound bed, presumably  tendon.  I do not see pink granulation tissue.  He has no longer using topical antibiotics.    1/23/24 - Mr. Jarrett returns today for followup on 2 wounds.  Today the sacral pressure injury remains stable and clean.  We have been using topical antibiotics on both of these wounds for several weeks.  That has been discontinued today.  The pressure injury at the right hip was selectively debrided today using the ultrasonic debrider and graft prep was performed.  We will begin applying a small amount of mupirocin ointment on to this wound daily in preparation beginning the grafting process next week.  We will be use PuraPly/NuShield grafts.  Of note, the patient is responsible for contacting calor at the [facility] to have his measurements taken.  He has not done that as of today.    December 26, 2023:  43-year-old white male, with paraplegia, is here for follow up of a right hip pressure injury, and a sacral pressure injury.  He has been using topical antibiotics.  The measurements are about the same as last visit.  The nurse practitioner has discussed a possible skin substitute on the right hip.    12/11/23 - Mr. Jarrett for followup on the pressure ulcers to the right hip and sacrum.  We started using topical antibiotics on both wounds on 11/21/2023.  Additionally, the patient has been using the vinegar washes for sometime.  He was instructed to discontinue the use of the vinegar washes today and use only the topical antibiotics.  He has been approved for grafts (Puraply and Nushield).  We will plan to do graft prep in two weeks (once abx are complete) and then begin grafting.    Of note, we discussed today the need for proper support in his wheelchair to offload the pressure.  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  Also, he does have a low air loss mattress, but that mattress is on top  "of a "regular" mattress and he reports springs are protruding.  He needs a proper bedframe for the low airloss mattress.     12/4/23 - Mr. Jarrett returns for followup on 2 wounds.  He continues to use topical abx on both wounds.  The right hip continues to be concerning.  The tensor fascia ronny is till exposed in the wound bed.  We are mixing the topical abx with basal gel to prevent drying.  The sacral wound is stable but has had a slight increase in size.  Abx are being used on this wound as well. We are attempting to authorize grafts on the right hip wound.    11/27/23 - the patient returns today for followup on pressure injuries to the right hip and the sacrum.  He has received his topical antibiotics (Linezolid/Gentamincin) and started using the antibiotics on Saturday, 11/25/23.  He did not receive basal gel with that order so we have contacted [facility] to send the patient that product as the antibiotics are very drying and they are being applied directly to his tendon.  He must have the moisture of the basal gel.  In the time being, we will mix his topical antibiotics with mupirocin antibiotic.  Today both wounds remains stable.  He was instructed to use the antibiotics on both wounds to avoid cross contamination.    11/20/23 - Mr. Jarrett returns for f/up on the wound to the right hip and the sacrum.  At his last visit the right hip was cultured and it was positive to have Pseudomonas.  He has been using Vinegar washes since 11/06/2023.  He does have a sensitivity to levofloxacin and so can not take that medication.  We have requested a topical recommendation and he is being prescribed linezolid/gentamicin to be applied directly into the wound.  If we do not see improvement from that topical medication we will be moving forward with an IV antibiotic.  He does have exposed ligament (possibly ischiofemoral ligament) in this wound.  The patient does still have a mediport so that would be the next " best solution for him due to his sensitivity.  The wound at the sacrum remains stable and we are currently using silver alginate on both wounds.    11/6/23 - the patient seen today for followup on the right hip and sacral pressure wounds.  Although the sacrum is stable, the right hip does continue to deteriorate.  The right hip was cultured today.  We have been using Endoform and/or collagen on these wounds and unfortunately these products or causing excessive moisture and deterioration to the wounds.  We will discontinue both products and use only silver alginate for the time being.  The right hip does have tendon exposed at this time.  The patient was reminded and encouraged to continue to offload both the right hip in the sacrum which he reports that he is wearing.  However, on the deterioration of the wounds it is concerning.    10/30/23 - Mr. Jarrett to clinic today for followup on the wound to the sacrum as well as the right hip.  Today, both have deteriorated.  We have been using collagen to the wound beds and it appears is though they are stain to wet.  There is still tendon present in the wound bed of the right hip.  We will discontinue the collagen and begin application Endoform to both wounds, changing every other day, covered with silver alginate.  He was reminded to offload as much as possible to prevent further deterioration.  Of note, reported that is blood pressure has been low, and it was very low today, 86/54.  He states that he noticed it last week that he has stopped taking Tylenol, aspirin, and probiotics.  We discussed today that it is unlikely that any of these medications would impact blood pressure but he prefers to remain off of them.    10/16/23 - The patient returns today for followup on the pressure injury to the right hip as well as the sacrum.  Today, there is now tendon exposed in the wound bed the right hip pressure injury.  We discussed today that it is imperative that the patient  offload to allow this wound to heal so that he does not develop osteomyelitis in this hip as well.  The pressure injury to the sacrum has also deteriorated.  We discussed that now that the amputation to the left hip has fully healed, he must take the opportunity to begin offloading that right hip to allow it to heal.  We will begin applying collagen every other day to both wound beds.    10/9/23 The patient returns today for the open wound to the right hip and sacrum.  The surgical incision to the left hip is now resolved and will no longer be followed.  Both the right hip and sacrum are wounds that have reopened.  We will begin application of collagen every third day to both wounds.  Neither wound has any signs and symptoms of infection.  He will return in two weeks. Of note, the patient reports that he has had diarrhea for several days, up to about two weeks.  He has had no treatment and is currently using only probiotics.  He has been prescribed imodium and advised to use that medication no more than 4 days.  He is to contact his PCP if the diarrhea does not resolve within that time period.     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 Sandy Owusu on 8/21/23.  This wound has progress except personally well and is remaining closed.  The patient is using only iodine along the surgical incision line.  He does have a follow up with his surgeon tomorrow, 09/26/2023.  He was advised today that he can begin application coconut oil with vitamin C along the closed incision line which is fully approximated.  We will follow this incision line for one additional visit then d/c if no issues develop.    He has had a long term wound at the sacrum which today is open again.  It was mechanically debrided.  Endoform as applied to the wound bed, which will be left in place until Friday at which time he will change to silver alginate.     Review of Systems   Constitutional:  Negative.    Respiratory: Negative.     Cardiovascular: Negative.    Skin:         As documented in the HPI.   All other systems reviewed and are negative.        Objective:      Vitals:    07/09/24 1112   BP: 125/82   Pulse: 76   Resp: 18     Physical Exam  Vitals and nursing note reviewed. Exam conducted with a chaperone present.   Constitutional:       Appearance: Normal appearance.   Cardiovascular:      Rate and Rhythm: Normal rate.   Pulmonary:      Effort: Pulmonary effort is normal.   Skin:     General: Skin is warm and dry.      Comments: sacral pressure injury, right hip, trochanteric, pressure injury   Neurological:      Mental Status: He is alert.            Negative Pressure Wound Therapy  05/28/24 1308 Right (Active)   05/28/24 1308   Side: Right   Orientation:    Location: Hip   Additional Comments:    Removal Indication and Assessment:    Location:    SDO Location:    NPWT Type Vacuum Therapy 07/09/24 1129   Therapy Setting NPWT Continuous therapy 07/09/24 1129   Pressure Setting NPWT 125 mmHg 07/09/24 1129   Therapy Interventions NPWT Canister changed;Dressing changed;Trac pad replaced 07/09/24 1129   Sponges Inserted NPWT Black;White;1 07/09/24 1129   Sponges Removed NPWT Black;White;1 07/09/24 1129   General Output (mL) 100 07/09/24 1129            Altered Skin Integrity 09/25/23 1153 Sacral spine #1 (Active)   09/25/23 1153   Altered Skin Integrity Present on Admission - Did Patient arrive to the hospital with altered skin?: yes   Side:    Orientation:    Location: Sacral spine   Wound Number: #1   Is this injury device related?:    Primary Wound Type:    Description of Altered Skin Integrity:    Ankle-Brachial Index:    Pulses:    Removal Indication and Assessment:    Wound Outcome:    (Retired) Wound Length (cm):    (Retired) Wound Width (cm):    (Retired) Depth (cm):    Wound Description (Comments):    Removal Indications:    Dressing Appearance Dried drainage 07/09/24 1129   Drainage Amount  Moderate 07/09/24 1129   Drainage Characteristics/Odor Serosanguineous 07/09/24 1129   Appearance Red;Slough 07/09/24 1129   Tissue loss description Full thickness 07/09/24 1129   Periwound Area Intact 07/09/24 1129   Wound Edges Open 07/09/24 1129   Wound Length (cm) 0.5 cm 07/09/24 1129   Wound Width (cm) 0.5 cm 07/09/24 1129   Wound Depth (cm) 0.2 cm 07/09/24 1129   Wound Volume (cm^3) 0.05 cm^3 07/09/24 1129   Wound Surface Area (cm^2) 0.25 cm^2 07/09/24 1129   Care Cleansed with:;Wound cleanser 07/09/24 1129   Dressing Applied 07/09/24 1129   Dressing Change Due 07/12/24 07/09/24 1129            Altered Skin Integrity 10/09/23 1141 Right Hip #2 (Active)   10/09/23 1141   Altered Skin Integrity Present on Admission - Did Patient arrive to the hospital with altered skin?: yes   Side: Right   Orientation:    Location: Hip   Wound Number: #2   Is this injury device related?: No   Primary Wound Type:    Description of Altered Skin Integrity:    Ankle-Brachial Index:    Pulses:    Removal Indication and Assessment:    Wound Outcome:    (Retired) Wound Length (cm):    (Retired) Wound Width (cm):    (Retired) Depth (cm):    Wound Description (Comments):    Removal Indications:    Dressing Appearance Moist drainage 07/09/24 1129   Drainage Amount Moderate 07/09/24 1129   Drainage Characteristics/Odor No odor;Serosanguineous 07/09/24 1129   Appearance Pink;Slough;Moist;Tendon 07/09/24 1129   Tissue loss description Full thickness 07/09/24 1129   Periwound Area Intact 07/09/24 1129   Wound Edges Open 07/09/24 1129   Wound Length (cm) 2.8 cm 07/09/24 1129   Wound Width (cm) 2 cm 07/09/24 1129   Wound Depth (cm) 1.2 cm 07/09/24 1129   Wound Volume (cm^3) 6.72 cm^3 07/09/24 1129   Wound Surface Area (cm^2) 5.6 cm^2 07/09/24 1129   Care Cleansed with:;Wound cleanser 07/09/24 1129   Dressing Applied 07/09/24 1129   Dressing Change Due 07/12/24 07/09/24 1129   6/25/24   NO PHOTOS D/T EPIC     07/08/24      Right hip - pre aden.   "                                    Right hip - post aden.  (White foam, black foam, wound VAC)      Sacrum - pre aden.                                        Sacrum - post aden.   (Collagen, calcium alginate, 4x4 gauze, island dressing)   ML  Assessment:           ICD-10-CM ICD-9-CM   1. 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 Pressure injury of sacral region, stage 4  L89.154 707.03     707.24   3. Quadriplegia  G82.50 344.00   4. Subacute osteomyelitis, other site  M86.28 730.08           Procedures:     Debridement     Date/Time: 7/9/2024 11:00 AM     Performed by: Ashley Coto NP  Authorized by: Ashley Coto NP    Time out: Immediately prior to procedure a "time out" was called to verify the correct patient, procedure, equipment, support staff and site/side marked as required.     Consent Done?:  Yes (Verbal)     Preparation: Patient was prepped and draped with clean technique    Local anesthesia used?: No       Wound Details:    Location:  Right hip    Type of Debridement:  Non-excisional       Length (cm):  2.8       Area (sq cm):  5.6       Width (cm):  2       Percent Debrided (%):  100       Depth (cm):  1.2       Total Area Debrided (sq cm):  5.6    Depth of debridement:  Epidermis/Dermis    Devitalized tissue debrided:  Biofilm, Exudate, Fibrin and Slough    Instruments:  Curette  Bleeding:  Minimal  Hemostasis Achieved: Yes  Method Used:  Pressure     2nd Wound Details:     Debridement - 2nd Wound - General Location: Sacrum.    Type of Debridement:  Non-excisional       Length (cm):  0.5       Area (sq cm):  0.25       Width (cm):  0.5       Percent Debrided (%):  100       Depth (cm):  0.2       Total Area Debrided (sq cm):  0.25    Depth of debridement:  Epidermis/Dermis    Devitalized tissue debrided:  Biofilm, Exudate, Fibrin and Slough    Instruments:  Curette  Bleeding:  Minimal  Hemostasis Achieved: Yes    Method Used:  " Pressure  Patient tolerance:  Patient tolerated the procedure well with no immediate complications          [] Yes [] No   I & D performed  [] Yes [] No   Excisional debridement performed  [x] Yes [] No   Selective debridement performed        [] Yes [] No   Mechanical debridement performed         [] Yes [] No  Silver nitrate applied                                     [] Yes [] No  Labs ordered this visit                                  [] Yes [] No   Imaging ordered this visit                           [] Yes [] No   Tissue pathology and/or culture taken       MEDICATIONS    Current Outpatient Medications:     acetaminophen (TYLENOL) 325 MG tablet, Take 650 mg by mouth every 6 (six) hours as needed for Pain., Disp: , Rfl:     ascorbic acid, vitamin C, (VITAMIN C) 1000 MG tablet, Take 1,000 mg by mouth every evening., Disp: , Rfl:     aspirin 325 MG tablet, Take 325 mg by mouth every morning. Stopped x 1 month, Disp: , Rfl:     baclofen (LIORESAL) 5 mg Tab tablet, Take 1 tablet (5 mg total) by mouth 3 (three) times daily., Disp: 90 tablet, Rfl: 0    bisacodyL (DULCOLAX) 10 mg Supp, Place 10 mg rectally daily as needed., Disp: , Rfl:     busPIRone (BUSPAR) 10 MG tablet, Take 1 tablet (10 mg total) by mouth 2 (two) times daily., Disp: 60 tablet, Rfl: 5    cetirizine (ZYRTEC) 10 MG tablet, Take 10 mg by mouth 2 (two) times a day., Disp: , Rfl:     cloNIDine (CATAPRES) 0.1 MG tablet, Take 0.1 mg by mouth daily as needed., Disp: , Rfl:     DULoxetine (CYMBALTA) 30 MG capsule, Take 1 capsule by mouth 2 (two) times daily., Disp: , Rfl:     ferrous sulfate (SLOW RELEASE IRON) 142 mg (45 mg iron) TbSR, 1 tablet Orally Three times a Week for 30 days, Disp: , Rfl:     fexofenadine (ALLEGRA) 180 MG tablet, Take 180 mg by mouth every morning., Disp: , Rfl:     fluticasone propionate (FLONASE ALLERGY RELIEF) 50 mcg/actuation nasal spray, 1 spray by Each Nostril route daily as needed., Disp: , Rfl:     meloxicam (MOBIC) 7.5 MG  "tablet, Take 7.5 mg by mouth 2 (two) times daily as needed for Pain., Disp: , Rfl:     midodrine (PROAMATINE) 2.5 MG Tab, TAKE ONE TABLET BY MOUTH THREE TIMES DAILY AS NEEDED Systolic blood pressure less than 90 or Diastolic less than 60, Disp: , Rfl:     multivitamin/iron/folic acid (CENTRUM ORAL), Take 1 tablet by mouth every morning., Disp: , Rfl:     NON FORMULARY MEDICATION, Take 3 drops by mouth 2 (two) times daily as needed. THC, Disp: , Rfl:     testosterone undecanoate (JATENZO) 198 mg Cap, 1 capsule., Disp: , Rfl:     urea (CARMOL) 40 % Crea, Apply topically once daily., Disp: 85 g, Rfl: 2    zinc gluconate 50 mg tablet, Take 1 tablet (50 mg total) by mouth once daily. (Patient taking differently: Take 50 mg by mouth every morning.), Disp: 30 tablet, Rfl: 1  No current facility-administered medications for this encounter.    Facility-Administered Medications Ordered in Other Encounters:     lactated ringers infusion, , Intravenous, Continuous, Cr Garibay DO, Last Rate: 10 mL/hr at 05/17/24 0705, Restarted at 05/17/24 0859   Review of patient's allergies indicates:   Allergen Reactions    Levofloxacin Rash       DIAGNOSTICS    Labs/Scans/Micro:    CBC:   Lab Results   Component Value Date    WBC 10.42 05/14/2024    HGB 13.9 (L) 05/14/2024    HCT 44.3 05/14/2024    MCV 88.8 05/14/2024     05/14/2024     Sedimentation rate:   Lab Results   Component Value Date    SEDRATE 22 (H) 12/15/2022    C-reactive protein:   Lab Results   Component Value Date    CRP 44.90 (H) 12/15/2022    Chemistry: [unfilled]  HBA1C: No components found for: "HBA1C"    Ankle Brachial Index: No results found for this or any previous visit.    Imaging:    Plain film: No results found for this or any previous visit.    MRI: No results found for this or any previous visit.    Bone Scan: No results found for this or any previous visit.    Vascular studies:    Microbiology: Rt hip, 4/2/24        HOME HEALTH AGENCY: " [facility]     WOUND CARE ORDERS:  Right hip wound: Cleanse with wound cleanser, cover exposed bone with white foam, layer with black foam with negative pressure set at 100mmHg continuous - to be changed on Tuesdays and Fridays.   Sacrum: Cleanse with wound cleanser and apply collagen to the wound bed, cover with silver alginate and an island dressing - to be changed every three days.   Right elbow: Aquaphor daily, continue to monitor.       Follow up in about 1 week (around 7/16/2024) for Provider Visit.

## 2024-07-09 NOTE — PROCEDURES
"Debridement    Date/Time: 7/9/2024 11:00 AM    Performed by: Ashley Coto NP  Authorized by: Ashley Coto NP    Time out: Immediately prior to procedure a "time out" was called to verify the correct patient, procedure, equipment, support staff and site/side marked as required.    Consent Done?:  Yes (Verbal)    Preparation: Patient was prepped and draped with clean technique    Local anesthesia used?: No      Wound Details:    Location:  Right hip    Type of Debridement:  Non-excisional       Length (cm):  2.8       Area (sq cm):  5.6       Width (cm):  2       Percent Debrided (%):  100       Depth (cm):  1.2       Total Area Debrided (sq cm):  5.6    Depth of debridement:  Epidermis/Dermis    Devitalized tissue debrided:  Biofilm, Exudate, Fibrin and Slough    Instruments:  Curette  Bleeding:  Minimal  Hemostasis Achieved: Yes  Method Used:  Pressure    2nd Wound Details:     Debridement - 2nd Wound - General Location: Sacrum.    Type of Debridement:  Non-excisional       Length (cm):  0.5       Area (sq cm):  0.25       Width (cm):  0.5       Percent Debrided (%):  100       Depth (cm):  0.2       Total Area Debrided (sq cm):  0.25    Depth of debridement:  Epidermis/Dermis    Devitalized tissue debrided:  Biofilm, Exudate, Fibrin and Slough    Instruments:  Curette  Bleeding:  Minimal  Hemostasis Achieved: Yes    Method Used:  Pressure  Patient tolerance:  Patient tolerated the procedure well with no immediate complications    "

## 2024-07-15 ENCOUNTER — TELEPHONE (OUTPATIENT)
Dept: INFECTIOUS DISEASES | Facility: CLINIC | Age: 44
End: 2024-07-15
Payer: MEDICARE

## 2024-07-18 ENCOUNTER — OFFICE VISIT (OUTPATIENT)
Dept: INFECTIOUS DISEASES | Facility: CLINIC | Age: 44
End: 2024-07-18
Payer: MEDICARE

## 2024-07-18 VITALS
WEIGHT: 130.06 LBS | OXYGEN SATURATION: 99 % | HEIGHT: 70 IN | RESPIRATION RATE: 18 BRPM | BODY MASS INDEX: 18.62 KG/M2 | SYSTOLIC BLOOD PRESSURE: 98 MMHG | HEART RATE: 91 BPM | DIASTOLIC BLOOD PRESSURE: 68 MMHG

## 2024-07-18 DIAGNOSIS — Z72.0 TOBACCO USER: Chronic | ICD-10-CM

## 2024-07-18 DIAGNOSIS — S71.001D COMPLICATED OPEN WOUND OF RIGHT HIP, SUBSEQUENT ENCOUNTER: ICD-10-CM

## 2024-07-18 DIAGNOSIS — M86.28 SUBACUTE OSTEOMYELITIS, OTHER SITE: Primary | ICD-10-CM

## 2024-07-18 PROCEDURE — 99214 OFFICE O/P EST MOD 30 MIN: CPT | Mod: S$PBB,,,

## 2024-07-18 PROCEDURE — 99214 OFFICE O/P EST MOD 30 MIN: CPT | Mod: PBBFAC

## 2024-07-18 PROCEDURE — 99999 PR PBB SHADOW E&M-EST. PATIENT-LVL IV: CPT | Mod: PBBFAC,,,

## 2024-07-18 NOTE — PROGRESS NOTES
Subjective:       Patient ID: Werner Jarrett 43 y.o.     Chief Complaint:   Chief Complaint   Patient presents with    F/U Subacute osteomyelitis        HPI:  03/14/2023 Hospital evaluation:  42-year-old incomplete quadriplegic male presenting with drainage from left hip wound and subsequently found to have concern for osteomyelitis and left hip septic arthritis, status post I&D.  Cultures with Enterobacter.  Patient currently on cefepime.  ID consulted for assistance with antimicrobials.     Left hip septic arthritis / OM, s/p I&D  Incomplete quadriplegia  Depression      PLAN:  Continue cefepime - end date: 4/21.  Weekly CBC, CMP, CRP, ESR while on abx.  Fax results to us at [phone].  F/u with us as scheduled.  CM consulted for assistance with OPAT.   Discussed with patient.     04/04/2023:  Wound is healing well, no fevers, no chills, tolerating his Cefepime well, receiving it through port. No current complications. He reports his wound has been healing well with secondary intention, he is following with wound care.     05/08/23 telephone encounter:   Surgeon spoke with Dr. Ceron and had some concerns for worsening infection of R hip wound. Will extend Cefepime 2g IV q8 for an additional two weeks. If wound remains concerning for infection after completion of additional two week he will need additional debridement    07/24/2023:   Mr. Jarrett presents for follow-up today accompanied by his nurse. He reports ongoing issues with his left hip wound. On 6/19 wound care noticed bone fragments on his dressing. Fragments were sent to pathology with ongoing OM noted. He denies having any fevers or chills. He does report having night sweats 2-3x daily while he was on antibiotics, however he hasn't had any in about a month. He is seeing wound care every other week. They recommend daily dressing changes. Dry dressing with kerlix and silver cell with abd and tape.   His wound is draining serosanguinous drainage and  completely saturates in a 24 hour period.     07/18/2024 office visit:  Mr. Jarrett presents for follow-up today accompanied by his caretaker.  He underwent a left AKA in August 2023.  He did receive 4 weeks' course of IV vancomycin, Unasyn, and minocycline for culture showing Acinetobacter and MRSA.  He denies any systemic signs of infection.  He reports that his right hip wound open up during hospitalization for left AKA.  He is being followed by wound care and surgical team.  He did have MRI of the right hip with and without contrast on 04/04/2024 showing  right hip region cellulitis in right proximal femoral osteomyelitis.  Subluxation of the right femoral head from the acetabular cup.  An interval resection of the left femoral head and the left hip joint.  He underwent an I&D on 05/17/2024 with anaerobic, fungal, and aerobic cultures negative.  He does report that he was on a 1-2 week course of TMP/SMX prior to surgical intervention.  Denies any fever, chills, does have occasiaonl night sweats. 2-3 x monthly.  He continues to smoke 1 pack per day.      Past Medical History:   Diagnosis Date    Anxiety     Arthritis     C5 vertebral fracture     Paralyzed nipple line down    Chronic infection of left hip, currently on antibiotics     COPD (chronic obstructive pulmonary disease)     Depression     Heart murmur     Insomnia     Osteomyelitis hip         Past Surgical History:   Procedure Laterality Date    ABOVE-KNEE AMPUTATION Left 8/21/2023    Procedure: AMPUTATION, ABOVE KNEE (left hip diarticulation/amputation);  Surgeon: BARBARA Perez MD;  Location: [facility] OR;  Service: General;  Laterality: Left;    INCISION AND DRAINAGE HIP Left 12/14/2022    Procedure: Incision and drainage Hip (I&D debridement of left hip wound, left hip and femur);  Surgeon: BARBARA Perez MD;  Location: [facility] OR;  Service: General;  Laterality: Left;    INCISION AND DRAINAGE HIP Left 03/10/2023    Procedure: INCISION AND  DRAINAGE, HIP;  Surgeon: BARBARA Perez MD;  Location: [facility] OR;  Service: General;  Laterality: Left;  LEFT HIP    INSERTION, SUPRAPUBIC CATHETER      MEDIPORT INSERTION, SINGLE      Rt Chest wall    SPINE SURGERY      WOUND DEBRIDEMENT Right 5/17/2024    Procedure: DEBRIDEMENT, WOUND;  Surgeon: BARBARA Perez MD;  Location: [facility] OR;  Service: General;  Laterality: Right;    wound debridements      Multiple        Social History     Socioeconomic History    Marital status: Single   Tobacco Use    Smoking status: Every Day     Current packs/day: 1.00     Types: Cigarettes    Smokeless tobacco: Never   Substance and Sexual Activity    Alcohol use: Yes     Comment: 3-5 times week    Drug use: Yes     Types: Marijuana     Comment: Medical THC    Sexual activity: Not Currently     Social Determinants of Health     Financial Resource Strain: Low Risk  (8/25/2023)    Overall Financial Resource Strain (CARDIA)     Difficulty of Paying Living Expenses: Not very hard   Food Insecurity: Unknown (8/25/2023)    Hunger Vital Sign     Worried About Running Out of Food in the Last Year: Never true     Ran Out of Food in the Last Year: Patient declined   Transportation Needs: Unknown (8/25/2023)    PRAPARE - Transportation     Lack of Transportation (Medical): No     Lack of Transportation (Non-Medical): Patient declined   Physical Activity: Patient Declined (8/22/2023)    Exercise Vital Sign     Days of Exercise per Week: Patient declined     Minutes of Exercise per Session: Patient declined   Stress: Patient Declined (8/22/2023)    [employer] [place] [employer] - Occupational Stress Questionnaire     Feeling of Stress : Patient declined   Housing Stability: Unknown (8/25/2023)    Housing Stability Vital Sign     Unable to Pay for Housing in the Last Year: No     Unstable Housing in the Last Year: Patient refused        Family History   Problem Relation Name Age of Onset    Lung cancer Mother       "Brain cancer Mother      No Known Problems Father          Review of patient's allergies indicates:   Allergen Reactions    Levofloxacin Rash          There is no immunization history on file for this patient.     Review of Systems   All other systems reviewed and are negative.         Objective:      BP 98/68 (BP Location: Left arm)   Pulse 91   Resp 18   Ht 5' 10" (1.778 m)   Wt 59 kg (130 lb 1.1 oz)   SpO2 99%   BMI 18.66 kg/m²      Physical Exam  Constitutional:       Appearance: Normal appearance.   HENT:      Head: Normocephalic and atraumatic.      Mouth/Throat:      Pharynx: No oropharyngeal exudate or posterior oropharyngeal erythema.   Eyes:      Extraocular Movements: Extraocular movements intact.      Pupils: Pupils are equal, round, and reactive to light.   Cardiovascular:      Rate and Rhythm: Normal rate and regular rhythm.      Heart sounds: No murmur heard.  Pulmonary:      Effort: No respiratory distress.      Breath sounds: No wheezing, rhonchi or rales.   Abdominal:      General: Bowel sounds are normal. There is no distension.      Palpations: Abdomen is soft.      Tenderness: There is no abdominal tenderness. There is no right CVA tenderness or left CVA tenderness.   Musculoskeletal:         General: No swelling or tenderness.      Cervical back: Neck supple. No rigidity or tenderness.   Lymphadenopathy:      Cervical: No cervical adenopathy.   Skin:     Findings: No lesion or rash.      Comments: L AKA; right hip with wound VAC   Neurological:      Mental Status: He is alert and oriented to person, place, and time. Mental status is at baseline.      Cranial Nerves: No cranial nerve deficit.      Motor: Weakness present.      Comments: Incomplete paraplegia at baseline   Psychiatric:         Mood and Affect: Mood normal.         Behavior: Behavior normal.            Labs: Reviewed most recent relevant labs available, notable results highlighted in this note    Imaging: Reviewed most " recent relevant imaging studies available, notable results highlighted in this note    Assessment:       Problem List Items Addressed This Visit          ID    Subacute osteomyelitis, other site - Primary       Orthopedic    Complicated open wound of right hip       Other    Tobacco user (Chronic)              Plan:       -Enterobacter cloacae in L hip tissue intraoperatively found in 3/10/2023  -received a 6 week course of IV cefepime through April 2023 and then an additional 2 week course at the end of May 2023.   -He did have a left hip disarticulation/AKA on 08/24/2023.  Cultures with Acinetobacter baumannii complex and methicillin-resistant Staphylococcus aureus.  He did receive 4 weeks' course of IV vancomycin, Unasyn, and minocycline.  -MRI right hip with and without contrast 04/04/2024 showing right hip region cellulitis in right proximal femoral osteomyelitis.  Subluxation of the right femoral head from the acetabular cup.  An interval resection of the left femoral head and the left hip joint.   He under went debridement on 05/17/2024 with tissue cultures fungal cultures and anaerobic cultures are negative.  -right hip images reviewed in media tab.  Stage IV with exposed bones present.  -patient states that plan is currently to be referred to plastic surgeon for possible skin grafting.   -given that he does not have any systemic signs of infection and intraoperative cultures were negative, would refrain from empiric antimicrobial therapy at this time.  If systemic signs develop, may need further imaging and deeper cultures to determine antimicrobial regimen  -He verbalizes the importance of tobacco cessation prior to skin grafting evaluation.  -patient and caregiver in agreement with plan of care  -continue aggressive wound care  -can follow up again in 3 months    Follow up in about 3 months (around 10/18/2024).

## 2024-07-23 ENCOUNTER — HOSPITAL ENCOUNTER (OUTPATIENT)
Dept: WOUND CARE | Facility: HOSPITAL | Age: 44
Discharge: HOME | End: 2024-07-23
Attending: NURSE PRACTITIONER
Payer: MEDICARE

## 2024-07-23 VITALS
RESPIRATION RATE: 18 BRPM | HEART RATE: 76 BPM | SYSTOLIC BLOOD PRESSURE: 112 MMHG | HEIGHT: 70 IN | DIASTOLIC BLOOD PRESSURE: 71 MMHG | BODY MASS INDEX: 18.62 KG/M2 | WEIGHT: 130.06 LBS

## 2024-07-23 DIAGNOSIS — L89.44 PRESSURE INJURY OF CONTIGUOUS REGION INVOLVING BACK AND RIGHT HIP, STAGE 4: Primary | ICD-10-CM

## 2024-07-23 DIAGNOSIS — G82.50 QUADRIPLEGIA: ICD-10-CM

## 2024-07-23 DIAGNOSIS — L89.154 PRESSURE INJURY OF SACRAL REGION, STAGE 4: ICD-10-CM

## 2024-07-23 PROCEDURE — 99213 OFFICE O/P EST LOW 20 MIN: CPT

## 2024-07-23 PROCEDURE — 27000999 HC MEDICAL RECORD PHOTO DOCUMENTATION

## 2024-07-23 PROCEDURE — 97605 NEG PRS WND THER DME<=50SQCM: CPT

## 2024-07-23 NOTE — PROGRESS NOTES
Referred by: Dr. Sidhu  Low air loss mattress [x] Yes [] No   Is the patient eligible for a graft, and/or currently grafting?  [x] Yes [] No  (right hip - [facility])   Smoker: yes, vape      Subjective:         Patient ID: Werner Jarrett is a 43 y.o. male.    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  Today that wound was assessed and the hip bone is now nicely covered with tissue.  We will continue the wound vac, using black foam only and increasing the pressure to 125 mmHg continuous pressure.  The wound at the sacrum remains stable with no significant changes.  Of note, he had his appt with ID on 7/18/24, he will follow up with them again in 3 months, there were no new orders from them. He also saw Dr. Delgado, he will follow up again with her in 2 months, in the mean time she will refer him to plastic surgery in [place] for a flap.     7/9/24 - The turns today for followup on the wound to the right hip as well as the small wound to the sacrum.  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  We are using a wound VAC on the right hip and that does seem to be making some minor progress in closing the open space between the muscles of the leg.  Today, a selective debridement was performed in an effort to revise the margins and support growth of granular tissue.  The sacrum was also selectively debrided.  We will continue with the wound VAC on the right hip.  The patient does have an appointment with his surgeon, Dr. Sandy Jara, on 07/11/2024.  He also has an appointment with infectious disease on that same day and we are hoping for some additional guidance from both of them regarding his wounds.    6/26/24 - Mr. Jarrett turned to clinic today for followup on the stage IV pressure injury at the right hip as well as a stage IV pressure injury at  the sacrum.  The sacrum remained stable with no changes and no signs and symptoms of infection.  The right hip seems to be progressing well, with an increase in the development of granular tissue which is occurring across the exposed bone.  At his last visit there was quite a bit of bruising around the periwound so we decrease the wound VAC pressure from 125 mmHg to 100 mmHg and this seems to be improving the growth of granular tissue significantly.  He is scheduled for his bone biopsy on July 1st at [facility] with Dr. Sandy Jara.  He will then follow up with her on 7/11/24.  He also has an upcoming appointment with infectious disease on 07/14/2024.    6/18/24 - Mr. Jarrett returns today for followup on 2 wounds.  The wound to the sacrum continues to progress well with no signs and symptoms of infection.  The wound to the right hip was debrided today.  There was a large amount of slough at the top of the wound bed.  We are continuing to use a wound VAC although there does not seem to be any significant progress towards closing this wound.  He did have a followup appointment with the surgeon, Dr. Sandy Jara and she does still intend to do a bone biopsy on this right hip.  Will have a followup appointment with her on 07/11/2024.  He will also see infectious disease in July, 2024.  He does not have that date as of today.  He does still have the dry area on his right elbow.  A refill for Urea cream has been sent to his pharmacy.    6/11/24 - The patient returns today for the wounds to the sacrum and right hip.  The sacral wound is stable and clean, with no S & S of infection.  The right hip does have some bruising around the surface of the wound.  We will decrease the wound vac pressure from 125 mmHg to 100 mmHg continuous pressure.  The elbow is still being monitored and he continues to use Urea cream every other day as well as MediHoney.  He has not heard any further information from Dr. Delgado  mattyin a bone biopsy, and he has been scheduled with ID for f/up.     6/4/24 - Mr. Jarrett returns today for followup on the 2 pressure injuries, 1 to the sacrum and 1 to the right hip.  He does report that he had a follow up with his surgeon, Dr. Sandy Jara on 05/30/2024.  He states that she has indicated that she will be scheduling a bone biopsy of the right hip due to the patient's osteomyelitis.  She is trying to determine his next course of treatment together with infectious disease consideration.  We are currently using a wound VAC and it does seem to be making progress with the development of granular tissue in the wound bed.  There are no signs and symptoms of infection in this wound.  The sacral wound does have a slight increase in depth.  We had changed to collagen and it may be that that product is to wet.  We will DC the collagen and begin application of silver alginate only.  A callus paring was also done on the patient's right elbow.  This has been an ongoing issue and he is currently using Aquaphor on the elbow.    5/28/24 - Mr. 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  He had excisional debridement of this right hip on 5/17/24 by Dr. Sandy Jara.  Her op note:   PROCEDURE -   Excisional debridement right hip wound, dimension of debridement 1 cm circumferentially, total wound dimensions 3.5 cm x 3 cm x 1 cm deep.  Blunt debridement right hip wound  FINDINGS -    Rolled edges of skin, completely removed by excisional debridement  Fibrinous material within the wound debrided bluntly and submitted to culture   Today that wound is clean with nice margins.  We have applied a wound vac using white foam covered with black foam.     He had a follow up with her scheduled for 5/23 but missed it and is now re-scheduled for 5/30.   He is expecting an appt with ID but has not been contacted as of today.   In addition to the right hip, he does have a wound at the sacrum  that has reopened.  It is clean and we will apply collagen into this wound.  A callus paring was done on the right elbow.     5/14/24 - The Patient returns today for followup on the large pressure injury at the right hip as well as a small pressure injury to the sacrum.  He did have a followup appointment with his surgeon, Dr. Sandy Jara. He is scheduled for an I&D/debridment of his right hip with Dr. Quijano this Friday 5/17/24.  We had a lengthy discussion regarding possibilities such as a wound VAC or if she will leave the wound opened or close that wound.  He does not know at this time but this providers preference would be that she would close the wound.  This wound remains open at this time following another procedure and we were unable to get the wound to close completely in bite of the use of a wound VAC.  He does still have ligament exposed in the wound has increased in size.  Bone is fully exposed in the wound bed.  He has been using topical antibiotics for 1 month and those will be discontinued as of today.  Until his procedure we will apply Adaptic over the exposed bone with silver alginate on top, with daily dressing changes.  He does expect to see an infectious disease doctor following his procedure and also mentioned that he expects to possibly have IV antibiotics in another short stay at [facility].    4/23/24 - Today the wound at the elbow is nearly resolved.  We will begin application of Aquaphor to the elbow.  The sacrum was assessed and has improved dramatically.  It is nearly resolved.  He has completed the PO Augmentin for a UTI and this has helped wounds as well  Additionally he began topical abx to the right hip on 4/16/24 (Clindamycin).  Today the hip is also showing improvement.  He does have a f/up with his surgeon, Dr. Sandy Jara, on 5/2/24 with the hope that she will revise the wound to assist with closure since the margins are rolled.  He will return to clinic in two weeks.      4/16/24 - Mr. Jarrett was seen today for f/up on three wounds.  He did have an MRI done on 4/4/24 of the right hip (at [facility], ordered by Dr. Sandy Jara).  Today those results were reviewed with the patient.  We discussed that this provider would reach out to Dr. Jara regarding possible treatment.  This was done with consideration for IV abx, surgical intervention and/or referral to infectious disease for the best possible outcome.  The patient is currently on Augmentin with two days remaining for a UTI.  Additionally he picked up his topical abx today (Clindamycin) which will be used on a moist 4 x 4 gauze, not Basal Gel.    A callus paring was done on the right elbow and it is now healed.  The sacrum was selectively debrided and remains stable.     4/2/24 - The patient was referred back to his surgeon, Dr. Macie Carbajal, who he saw on 3/26/24.  He reports that she is concerned over possible abscess in the space, as well as infection.  A culture was obtained today.  She did prescribe Augmentin, to begin tomorrow, as a prophylaxis for the hip. He is expecting to have an MRI schedule prior to the follow up with her on 4/9/24 at which time they will discuss the MRI results, and options. Today the right hip wound has increasing moisture and bogginess.  The sacrum remains stable.  The right elbow as debrided and is nearly closed.     3/19/24 - We have been using NuShield grafts on the open pressure ulcer at the patient's right hip.  Tendon remains exposed in the wound bed, the margins are nereida, epibole and closed.  Through a series of several weeks the wound margins have been scored with a scalpel in an attempt to open these closed margins, to no avail.  Today the graft was held, and the patient will be seen by his surgeon, Dr. Sandy Jara next Tuesday, 3/26/24, for consultation regarding options to possible reopen the margins which might bring a successful closure to this  wound based on how it is currently healed.  For the time being we will apply adaptic over the tendon, with silver alginate dressing on top.  Both the right elbow and the sacrum remain stable.  We are currently using Endoform on both.  Today the elbow was selectively debrided.     3/12/24 - The pressure injury to the right hip is being grafted using [facility] grafts.  Today graft #6 was applied.  There has been no significant change to this wound since grafting.  Graft #7 has been ordered.  However, the patient was advised to scheduled with his surgeon, Dr. Sandy Jara, for evaluation to determine if she will need to do further surgery to assist this wound with closure since the margins are rolled.  They were again scored with a scaple to attempt to stimulate the growth of epithelial tissue, which has been done several times to no avail.  The right elbow is progressing well.  The sacrum remains stable with no S & S of infection.  He has received his new power wheelchair.  We are hopeful that this device might contribute to progress with his wounds.     3/5/24 - Mr. Jarrett is seen today for followup on 3 wounds.  The wound to the right elbow was selectively debrided and it does show considerable improvement.  The wound to the sacrum is stable.  We will begin application of moistened Endoform to both of these wounds to be changed on Friday to silver alginate.  The wound to the right hip is slightly moist with a little bit of drainage.  This is to be expected, however, due to the fact that we are grafting that wound.  There has been no significant change to the size of the wound, unfortunately, in spite of grafting multiple times.  We will continue the process with the hope of making some progress.    2/27/24 - Mr. Jarrett returns to clinic today for followup on 3 areas of concern.  The pressure injury remains stable with no significant change.  He has a new wound to the right elbow.  This has developed due to dryness  and cracking which has opened into a wound.  The elbow was selectively debrided using a dermal curette.  We will begin applying MediHoney to the elbow, covering with 4 x 4 gauze, Cover and with a Tubigrip with the hopes of softening this callused tissue further.  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  We are currently grafting using NuShield grafts.  Today we applied graft #4 after the wound margins were crosshatched to try to allow for growth epithelial tissue.  Again this week, there was no significant change in that wound.  Ligament is still fully exposed.  Graft #5 is being ordered today.    2/20/24 - the patient returns to clinic today for followup on a small pressure injury to the sacrum as well as a large stage IV pressure injury to the right hip.  We are currently going through the grafting process.  Today we applied graft #3, which is the 1st of the NuShield grafts.  So far we have not seen any significant change in the wound and the ligament is still fully exposed.  A # 15 scalpel was used to cross keen the wound margin due to rolling of that margin in the need to stimulate the growth of epithelial tissue as well as granular tissue.  The sacrum remains stable and basically the same size.      2/12/24 - Mr. Jarrett is seen today in follow up of two wounds, both of which remain stable.  The left hip is being grafted, and today PuraPly #2 was applied.  There is no significant change since graft #1 was applied.  Today graft #3 was ordered.  We will change from PuraPly to Epifix at this time.  The sacrum remains stable.  We have applied Endoform into that wound and it will be treated the same as a graft, with the hope that the patient does not have BMs which necessitate removal of the dressing. The patient is eligible and is in need of a new power wheelchair.      2/6/24 - Mr. Jarrett returns to clinic today with 2 wounds.  The very small stage IV pressure injury at the sacrum remains stable.   Today, the wound at the right hip, stage IV pressure injury, will receive the 1st graft application.  We are applying PuraPly grafts at this time and then we will change to NuShield after two PuraPly.  Of note, he is waiting on a demo to be shown to him for a possible new wheelchair.  PuraPly #2 ordered.    January 30, 2024:  43-year-old white male, with paraplegia, is here for follow up of the right hip pressure injury, and the sacral pressure injury.  The wounds are looking similar to the last visit.  He is getting ready to have skin substitute applied to the right hip wound.  There is white tissue in the wound bed, presumably tendon.  I do not see pink granulation tissue.  He has no longer using topical antibiotics.    1/23/24 - Mr. Jarrett returns today for followup on 2 wounds.  Today the sacral pressure injury remains stable and clean.  We have been using topical antibiotics on both of these wounds for several weeks.  That has been discontinued today.  The pressure injury at the right hip was selectively debrided today using the ultrasonic debrider and graft prep was performed.  We will begin applying a small amount of mupirocin ointment on to this wound daily in preparation beginning the grafting process next week.  We will be use PuraPly/NuShield grafts.  Of note, the patient is responsible for contacting calor at the wheelchair company to have his measurements taken.  He has not done that as of today.    December 26, 2023:  43-year-old white male, with paraplegia, is here for follow up of a right hip pressure injury, and a sacral pressure injury.  He has been using topical antibiotics.  The measurements are about the same as last visit.  The nurse practitioner has discussed a possible skin substitute on the right hip.    12/11/23 - Mr. Jarrett for followup on the pressure ulcers to the right hip and sacrum.  We started using topical antibiotics on both wounds on 11/21/2023.  Additionally, the patient has been  "using the vinegar washes for sometime.  He was instructed to discontinue the use of the vinegar washes today and use only the topical antibiotics.  He has been approved for grafts ([facility] [place]).  We will plan to do graft prep in two weeks (once abx are complete) and then begin grafting.    Of note, we discussed today the need for proper support in his wheelchair to offload the pressure.  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  Also, he does have a low air loss mattress, but that mattress is on top of a "regular" mattress and he reports springs are protruding.  He needs a proper bedframe for the low airloss mattress.     12/4/23 - Mr. Jarrett returns for followup on 2 wounds.  He continues to use topical abx on both wounds.  The right hip continues to be concerning.  The tensor fascia ronny is till exposed in the wound bed.  We are mixing the topical abx with basal gel to prevent drying.  The sacral wound is stable but has had a slight increase in size.  Abx are being used on this wound as well. We are attempting to authorize grafts on the right hip wound.    11/27/23 - the patient returns today for followup on pressure injuries to the right hip and the sacrum.  He has received his topical antibiotics (Linezolid/Gentamincin) and started using the antibiotics on Saturday, 11/25/23.  He did not receive basal gel with that order so we have contacted [facility] to send the patient that product as the antibiotics are very drying and they are being applied directly to his tendon.  He must have the moisture of the basal gel.  In the time being, we will mix his topical antibiotics with mupirocin antibiotic.  Today both wounds remains stable.  He was instructed to use the antibiotics on both wounds to avoid cross contamination.    11/20/23 - Mr. Jarrett returns for f/up on the " wound to the right hip and the sacrum.  At his last visit the right hip was cultured and it was positive to have Pseudomonas.  He has been using Vinegar washes since 11/06/2023.  He does have a sensitivity to levofloxacin and so can not take that medication.  We have requested a topical recommendation and he is being prescribed linezolid/gentamicin to be applied directly into the wound.  If we do not see improvement from that topical medication we will be moving forward with an IV antibiotic.  He does have exposed ligament (possibly ischiofemoral ligament) in this wound.  The patient does still have a mediport so that would be the next best solution for him due to his sensitivity.  The wound at the sacrum remains stable and we are currently using silver alginate on both wounds.    11/6/23 - the patient seen today for followup on the right hip and sacral pressure wounds.  Although the sacrum is stable, the right hip does continue to deteriorate.  The right hip was cultured today.  We have been using Endoform and/or collagen on these wounds and unfortunately these products or causing excessive moisture and deterioration to the wounds.  We will discontinue both products and use only silver alginate for the time being.  The right hip does have tendon exposed at this time.  The patient was reminded and encouraged to continue to offload both the right hip in the sacrum which he reports that he is wearing.  However, on the deterioration of the wounds it is concerning.    10/30/23 - Mr. Jarrett to clinic today for followup on the wound to the sacrum as well as the right hip.  Today, both have deteriorated.  We have been using collagen to the wound beds and it appears is though they are stain to wet.  There is still tendon present in the wound bed of the right hip.  We will discontinue the collagen and begin application Endoform to both wounds, changing every other day, covered with silver alginate.  He was reminded to offload  as much as possible to prevent further deterioration.  Of note, reported that is blood pressure has been low, and it was very low today, 86/54.  He states that he noticed it last week that he has stopped taking Tylenol, aspirin, and probiotics.  We discussed today that it is unlikely that any of these medications would impact blood pressure but he prefers to remain off of them.    10/16/23 - The patient returns today for followup on the pressure injury to the right hip as well as the sacrum.  Today, there is now tendon exposed in the wound bed the right hip pressure injury.  We discussed today that it is imperative that the patient offload to allow this wound to heal so that he does not develop osteomyelitis in this hip as well.  The pressure injury to the sacrum has also deteriorated.  We discussed that now that the amputation to the left hip has fully healed, he must take the opportunity to begin offloading that right hip to allow it to heal.  We will begin applying collagen every other day to both wound beds.    10/9/23 The patient returns today for the open wound to the right hip and sacrum.  The surgical incision to the left hip is now resolved and will no longer be followed.  Both the right hip and sacrum are wounds that have reopened.  We will begin application of collagen every third day to both wounds.  Neither wound has any signs and symptoms of infection.  He will return in two weeks. Of note, the patient reports that he has had diarrhea for several days, up to about two weeks.  He has had no treatment and is currently using only probiotics.  He has been prescribed imodium and advised to use that medication no more than 4 days.  He is to contact his PCP if the diarrhea does not resolve within that time period.     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 Sandy Owusu on 8/21/23.  This wound has progress except personally well and is remaining closed.   The patient is using only iodine along the surgical incision line.  He does have a follow up with his surgeon tomorrow, 09/26/2023.  He was advised today that he can begin application coconut oil with vitamin C along the closed incision line which is fully approximated.  We will follow this incision line for one additional visit then d/c if no issues develop.    He has had a long term wound at the sacrum which today is open again.  It was mechanically debrided.  Endoform as applied to the wound bed, which will be left in place until Friday at which time he will change to silver alginate.     Review of Systems   Constitutional: Negative.    Respiratory: Negative.     Cardiovascular: Negative.    Skin:         As documented in the HPI.   All other systems reviewed and are negative.        Objective:      Vitals:    07/23/24 1045   BP: 112/71   Pulse: 76   Resp: 18     Physical Exam  Vitals and nursing note reviewed. Exam conducted with a chaperone present.   Constitutional:       Appearance: Normal appearance.   Cardiovascular:      Rate and Rhythm: Normal rate.   Pulmonary:      Effort: Pulmonary effort is normal.   Skin:     General: Skin is warm and dry.      Comments: sacral pressure injury, right hip, trochanteric, pressure injury   Neurological:      Mental Status: He is alert.            Negative Pressure Wound Therapy  05/28/24 1308 Right (Active)   05/28/24 1308   Side: Right   Orientation:    Location: Hip   Additional Comments:    Removal Indication and Assessment:    Location:    SDO Location:    NPWT Type Vacuum Therapy 07/23/24 1046   Therapy Setting NPWT Continuous therapy 07/23/24 1046   Pressure Setting NPWT 100 mmHg 07/23/24 1046   Therapy Interventions NPWT Canister changed;Dressing changed;Trac pad replaced 07/23/24 1046   Sponges Inserted NPWT Black 07/23/24 1046   Sponges Removed NPWT Black;White 07/23/24 1046   General Output (mL) 75 07/23/24 1046            Altered Skin Integrity 09/25/23 1153  Sacral spine #1 (Active)   09/25/23 1153   Altered Skin Integrity Present on Admission - Did Patient arrive to the hospital with altered skin?: yes   Side:    Orientation:    Location: Sacral spine   Wound Number: #1   Is this injury device related?:    Primary Wound Type:    Description of Altered Skin Integrity:    Ankle-Brachial Index:    Pulses:    Removal Indication and Assessment:    Wound Outcome:    (Retired) Wound Length (cm):    (Retired) Wound Width (cm):    (Retired) Depth (cm):    Wound Description (Comments):    Removal Indications:    Dressing Appearance Moist drainage 07/23/24 1046   Drainage Amount Moderate 07/23/24 1046   Drainage Characteristics/Odor Serosanguineous 07/23/24 1046   Appearance Red;Moist 07/23/24 1046   Tissue loss description Full thickness 07/23/24 1046   Periwound Area Dry 07/23/24 1046   Wound Edges Open 07/23/24 1046   Wound Length (cm) 0.6 cm 07/23/24 1046   Wound Width (cm) 0.6 cm 07/23/24 1046   Wound Depth (cm) 0.4 cm 07/23/24 1046   Wound Volume (cm^3) 0.144 cm^3 07/23/24 1046   Wound Surface Area (cm^2) 0.36 cm^2 07/23/24 1046   Care Cleansed with:;Wound cleanser 07/23/24 1046   Dressing Applied 07/23/24 1046   Dressing Change Due 07/26/24 07/23/24 1046            Altered Skin Integrity 10/09/23 1141 Right Hip #2 (Active)   10/09/23 1141   Altered Skin Integrity Present on Admission - Did Patient arrive to the hospital with altered skin?: yes   Side: Right   Orientation:    Location: Hip   Wound Number: #2   Is this injury device related?: No   Primary Wound Type:    Description of Altered Skin Integrity:    Ankle-Brachial Index:    Pulses:    Removal Indication and Assessment:    Wound Outcome:    (Retired) Wound Length (cm):    (Retired) Wound Width (cm):    (Retired) Depth (cm):    Wound Description (Comments):    Removal Indications:    Dressing Appearance Moist drainage 07/23/24 1046   Drainage Amount Moderate 07/23/24 1046   Drainage Characteristics/Odor  Serosanguineous 07/23/24 1046   Appearance Pink;Slough;Smooth;Moist;Bone 07/23/24 1046   Tissue loss description Full thickness 07/23/24 1046   Periwound Area Dry 07/23/24 1046   Wound Edges Open 07/23/24 1046   Wound Length (cm) 2.7 cm 07/23/24 1046   Wound Width (cm) 1.6 cm 07/23/24 1046   Wound Depth (cm) 1.4 cm 07/23/24 1046   Wound Volume (cm^3) 6.048 cm^3 07/23/24 1046   Wound Surface Area (cm^2) 4.32 cm^2 07/23/24 1046   Care Cleansed with:;Wound cleanser 07/23/24 1046   Dressing Applied 07/23/24 1046   Dressing Change Due 07/26/24 07/23/24 1046   07/08/24      Right hip - pre aden.                                      Right hip - post aden.  (White foam, black foam, wound VAC)      Sacrum - pre aden.                                        Sacrum - post aden.   (Collagen, calcium alginate, 4x4 gauze, island dressing)   ML    7/23/24       Right hip (pre)                                              Sacrum (pre)  (black foam, wound vac @ 100 mm hg)      (collagen, silver alginate, island dressing)      Assessment:           ICD-10-CM ICD-9-CM   1. 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 Pressure injury of sacral region, stage 4  L89.154 707.03     707.24   3. Quadriplegia  G82.50 344.00             Procedures:     Mechanical only    [] Yes [] No   I & D performed  [] Yes [] No   Excisional debridement performed  [] Yes [] No   Selective debridement performed        [] Yes [] No   Mechanical debridement performed         [] Yes [] No  Silver nitrate applied                                     [] Yes [] No  Labs ordered this visit                                  [] Yes [] No   Imaging ordered this visit                           [] Yes [] No   Tissue pathology and/or culture taken       MEDICATIONS    Current Outpatient Medications:     acetaminophen (TYLENOL) 325 MG tablet, Take 650 mg by mouth every 6 (six) hours as needed for Pain., Disp: , Rfl:      ascorbic acid, vitamin C, (VITAMIN C) 1000 MG tablet, Take 1,000 mg by mouth every evening., Disp: , Rfl:     aspirin 325 MG tablet, Take 325 mg by mouth every morning. Stopped x 1 month, Disp: , Rfl:     baclofen (LIORESAL) 5 mg Tab tablet, Take 1 tablet (5 mg total) by mouth 3 (three) times daily., Disp: 90 tablet, Rfl: 0    bisacodyL (DULCOLAX) 10 mg Supp, Place 10 mg rectally daily as needed., Disp: , Rfl:     busPIRone (BUSPAR) 10 MG tablet, Take 1 tablet (10 mg total) by mouth 2 (two) times daily., Disp: 60 tablet, Rfl: 5    cetirizine (ZYRTEC) 10 MG tablet, Take 10 mg by mouth 2 (two) times a day., Disp: , Rfl:     cloNIDine (CATAPRES) 0.1 MG tablet, Take 0.1 mg by mouth daily as needed., Disp: , Rfl:     DULoxetine (CYMBALTA) 30 MG capsule, Take 1 capsule by mouth 2 (two) times daily., Disp: , Rfl:     ferrous sulfate (SLOW RELEASE IRON) 142 mg (45 mg iron) TbSR, 1 tablet Orally Three times a Week for 30 days, Disp: , Rfl:     fexofenadine (ALLEGRA) 180 MG tablet, Take 180 mg by mouth every morning., Disp: , Rfl:     fluticasone propionate (FLONASE ALLERGY RELIEF) 50 mcg/actuation nasal spray, 1 spray by Each Nostril route daily as needed., Disp: , Rfl:     meloxicam (MOBIC) 7.5 MG tablet, Take 7.5 mg by mouth 2 (two) times daily as needed for Pain., Disp: , Rfl:     midodrine (PROAMATINE) 2.5 MG Tab, TAKE ONE TABLET BY MOUTH THREE TIMES DAILY AS NEEDED Systolic blood pressure less than 90 or Diastolic less than 60, Disp: , Rfl:     multivitamin/iron/folic acid (CENTRUM ORAL), Take 1 tablet by mouth every morning., Disp: , Rfl:     NON FORMULARY MEDICATION, Take 3 drops by mouth 2 (two) times daily as needed. THC, Disp: , Rfl:     testosterone undecanoate (JATENZO) 198 mg Cap, 1 capsule., Disp: , Rfl:     urea (CARMOL) 40 % Crea, Apply topically once daily., Disp: 85 g, Rfl: 2    zinc gluconate 50 mg tablet, Take 1 tablet (50 mg total) by mouth once daily. (Patient taking differently: Take 50 mg by mouth  "every morning.), Disp: 30 tablet, Rfl: 1  No current facility-administered medications for this encounter.    Facility-Administered Medications Ordered in Other Encounters:     lactated ringers infusion, , Intravenous, Continuous, Cr Garibay DO, Last Rate: 10 mL/hr at 05/17/24 0705, Restarted at 05/17/24 0859   Review of patient's allergies indicates:   Allergen Reactions    Levofloxacin Rash       DIAGNOSTICS    Labs/Scans/Micro:    CBC:   Lab Results   Component Value Date    WBC 10.42 05/14/2024    HGB 13.9 (L) 05/14/2024    HCT 44.3 05/14/2024    MCV 88.8 05/14/2024     05/14/2024     Sedimentation rate:   Lab Results   Component Value Date    SEDRATE 22 (H) 12/15/2022    C-reactive protein:   Lab Results   Component Value Date    CRP 44.90 (H) 12/15/2022    Chemistry: [unfilled]  HBA1C: No components found for: "HBA1C"    Ankle Brachial Index: No results found for this or any previous visit.    Imaging:    Plain film: No results found for this or any previous visit.    MRI: No results found for this or any previous visit.    Bone Scan: No results found for this or any previous visit.    Vascular studies:    Microbiology: Rt hip, 4/2/24        HOME HEALTH AGENCY: [facility]     WOUND CARE ORDERS:  Right hip wound: Cleanse with wound cleanser, apply black foam with negative pressure set at 100mmHg continuous - to be changed on Tuesdays and Fridays.   Sacrum: Cleanse with wound cleanser and apply collagen to the wound bed, cover with silver alginate and an island dressing - to be changed every three days.   Right elbow: Aquaphor daily, continue to monitor.       Follow up in 2 weeks (on 8/6/2024).        "

## 2024-08-06 ENCOUNTER — HOSPITAL ENCOUNTER (OUTPATIENT)
Dept: WOUND CARE | Facility: HOSPITAL | Age: 44
Discharge: HOME | End: 2024-08-06
Attending: FAMILY MEDICINE
Payer: MEDICARE

## 2024-08-06 VITALS
RESPIRATION RATE: 18 BRPM | DIASTOLIC BLOOD PRESSURE: 77 MMHG | BODY MASS INDEX: 18.62 KG/M2 | HEART RATE: 83 BPM | SYSTOLIC BLOOD PRESSURE: 124 MMHG | WEIGHT: 130.06 LBS | HEIGHT: 70 IN

## 2024-08-06 DIAGNOSIS — L89.154 PRESSURE INJURY OF SACRAL REGION, STAGE 4: ICD-10-CM

## 2024-08-06 DIAGNOSIS — L89.013 PRESSURE INJURY OF RIGHT ELBOW, STAGE 3: ICD-10-CM

## 2024-08-06 DIAGNOSIS — L89.44 PRESSURE INJURY OF CONTIGUOUS REGION INVOLVING BACK AND RIGHT HIP, STAGE 4: Primary | ICD-10-CM

## 2024-08-06 PROCEDURE — 97597 DBRDMT OPN WND 1ST 20 CM/<: CPT

## 2024-08-06 PROCEDURE — 99213 OFFICE O/P EST LOW 20 MIN: CPT

## 2024-08-06 PROCEDURE — 27000999 HC MEDICAL RECORD PHOTO DOCUMENTATION

## 2024-08-06 RX ORDER — TESTOSTERONE UNDECANOATE 237 MG/1
CAPSULE, LIQUID FILLED ORAL
COMMUNITY
Start: 2024-07-30

## 2024-08-20 ENCOUNTER — HOSPITAL ENCOUNTER (OUTPATIENT)
Dept: WOUND CARE | Facility: HOSPITAL | Age: 44
Discharge: HOME | End: 2024-08-20
Attending: FAMILY MEDICINE
Payer: MEDICARE

## 2024-08-20 VITALS
WEIGHT: 130.06 LBS | DIASTOLIC BLOOD PRESSURE: 73 MMHG | HEART RATE: 82 BPM | SYSTOLIC BLOOD PRESSURE: 126 MMHG | BODY MASS INDEX: 18.62 KG/M2 | RESPIRATION RATE: 19 BRPM | HEIGHT: 70 IN

## 2024-08-20 DIAGNOSIS — L89.154 PRESSURE INJURY OF SACRAL REGION, STAGE 4: ICD-10-CM

## 2024-08-20 DIAGNOSIS — L89.013 PRESSURE INJURY OF RIGHT ELBOW, STAGE 3: ICD-10-CM

## 2024-08-20 DIAGNOSIS — L89.44 PRESSURE INJURY OF CONTIGUOUS REGION INVOLVING BACK AND RIGHT HIP, STAGE 4: Primary | ICD-10-CM

## 2024-08-20 PROCEDURE — 27000999 HC MEDICAL RECORD PHOTO DOCUMENTATION

## 2024-08-20 PROCEDURE — 97605 NEG PRS WND THER DME<=50SQCM: CPT

## 2024-08-20 PROCEDURE — 99213 OFFICE O/P EST LOW 20 MIN: CPT | Mod: 25

## 2024-08-20 PROCEDURE — 97597 DBRDMT OPN WND 1ST 20 CM/<: CPT

## 2024-08-20 NOTE — PROGRESS NOTES
Referred by: Dr. Sidhu  Low air loss mattress [x] Yes [] No   Is the patient eligible for a graft, and/or currently grafting?  [x] Yes [] No  (right hip - [facility])   Smoker: yes, vape    NOTES:  Appt with Dr. Delgado on 8/29/24  Subjective:         Patient ID: Werner Jarrett is a 43 y.o. male.    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  The wounds have now stalled.  He has been using a wound VAC on the right hip.  The right elbow we will need some debridement today.  He will see his general surgeon next week.    August 6, 2024:  43-year-old white male, with quadriplegia, is here for follow up of his right hip pressure injury, sacral pressure injury, and right posterior elbow pressure injury.  We have been using a wound VAC on his right hip ulcer.  He is using collagen on the sacral wound.  We have just open the right posterior elbow as a new stage II pressure injury, surrounded by callus.  There is no exposed bone at this time.    7/23/14 - Mr. 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  Today that wound was assessed and the hip bone is now nicely covered with tissue.  We will continue the wound vac, using black foam only and increasing the pressure to 125 mmHg continuous pressure.  The wound at the sacrum remains stable with no significant changes.  Of note, he had his appt with ID on 7/18/24, he will follow up with them again in 3 months, there were no new orders from them. He also saw Dr. Delgado, he will follow up again with her in 2 months, in the mean time she will refer him to plastic surgery in [place] for a flap.     7/9/24 - The turns today for followup on the wound to the right hip as well as the small wound to the sacrum.  Both wounds are  stage IV pressure injuries and bone does remain exposed in both wounds.  We are using a wound VAC on the right hip and that does seem to be making some minor progress in closing the open space between the muscles of the leg.  Today, a selective debridement was performed in an effort to revise the margins and support growth of granular tissue.  The sacrum was also selectively debrided.  We will continue with the wound VAC on the right hip.  The patient does have an appointment with his surgeon, Dr. Sandy Jara, on 07/11/2024.  He also has an appointment with infectious disease on that same day and we are hoping for some additional guidance from both of them regarding his wounds.    6/26/24 - Mr. 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  The sacrum remained stable with no changes and no signs and symptoms of infection.  The right hip seems to be progressing well, with an increase in the development of granular tissue which is occurring across the exposed bone.  At his last visit there was quite a bit of bruising around the periwound so we decrease the wound VAC pressure from 125 mmHg to 100 mmHg and this seems to be improving the growth of granular tissue significantly.  He is scheduled for his bone biopsy on July 1st at [facility] with Dr. Sandy Jara.  He will then follow up with her on 7/11/24.  He also has an upcoming appointment with infectious disease on 07/14/2024.    6/18/24 - Mr. Jarrett returns today for followup on 2 wounds.  The wound to the sacrum continues to progress well with no signs and symptoms of infection.  The wound to the right hip was debrided today.  There was a large amount of slough at the top of the wound bed.  We are continuing to use a wound VAC although there does not seem to be any significant progress towards closing this wound.  He did have a followup appointment  with the surgeon, Dr. Sandy Jara and she does still intend to do a bone biopsy on this right hip.  Will have a followup appointment with her on 07/11/2024.  He will also see infectious disease in July, 2024.  He does not have that date as of today.  He does still have the dry area on his right elbow.  A refill for Urea cream has been sent to his pharmacy.    6/11/24 - The patient returns today for the wounds to the sacrum and right hip.  The sacral wound is stable and clean, with no S & S of infection.  The right hip does have some bruising around the surface of the wound.  We will decrease the wound vac pressure from 125 mmHg to 100 mmHg continuous pressure.  The elbow is still being monitored and he continues to use Urea cream every other day as well as MediHoney.  He has not heard any further information from Dr. Sandy lucero a bone biopsy, and he has been scheduled with ID for f/up.     6/4/24 - Mr. Jarrett returns today for followup on the 2 pressure injuries, 1 to the sacrum and 1 to the right hip.  He does report that he had a follow up with his surgeon, Dr. Sandy Jara on 05/30/2024.  He states that she has indicated that she will be scheduling a bone biopsy of the right hip due to the patient's osteomyelitis.  She is trying to determine his next course of treatment together with infectious disease consideration.  We are currently using a wound VAC and it does seem to be making progress with the development of granular tissue in the wound bed.  There are no signs and symptoms of infection in this wound.  The sacral wound does have a slight increase in depth.  We had changed to collagen and it may be that that product is to wet.  We will DC the collagen and begin application of silver alginate only.  A callus paring was also done on the patient's right elbow.  This has been an ongoing issue and he is currently using Aquaphor on the elbow.    5/28/24 - Mr. Jarrett returns today for the stage IV  pressure injury to the right hip.  He had excisional debridement of this right hip on 5/17/24 by Dr. Sandy Jara.  Her op note:   PROCEDURE -   Excisional debridement right hip wound, dimension of debridement 1 cm circumferentially, total wound dimensions 3.5 cm x 3 cm x 1 cm deep.  Blunt debridement right hip wound  FINDINGS -    Rolled edges of skin, completely removed by excisional debridement  Fibrinous material within the wound debrided bluntly and submitted to culture   Today that wound is clean with nice margins.  We have applied a wound vac using white foam covered with black foam.     He had a follow up with her scheduled for 5/23 but missed it and is now re-scheduled for 5/30.   He is expecting an appt with ID but has not been contacted as of today.   In addition to the right hip, he does have a wound at the sacrum that has reopened.  It is clean and we will apply collagen into this wound.  A callus paring was done on the right elbow.     5/14/24 - The Patient returns today for followup on the large pressure injury at the right hip as well as a small pressure injury to the sacrum.  He did have a followup appointment with his surgeon, Dr. Sandy Jara. He is scheduled for an I&D/debridment of his right hip with Dr. Quijano this Friday 5/17/24.  We had a lengthy discussion regarding possibilities such as a wound VAC or if she will leave the wound opened or close that wound.  He does not know at this time but this providers preference would be that she would close the wound.  This wound remains open at this time following another procedure and we were unable to get the wound to close completely in bite of the use of a wound VAC.  He does still have ligament exposed in the wound has increased in size.  Bone is fully exposed in the wound bed.  He has been using topical antibiotics for 1 month and those will be discontinued as of today.  Until his procedure we will apply Adaptic over the exposed bone  with silver alginate on top, with daily dressing changes.  He does expect to see an infectious disease doctor following his procedure and also mentioned that he expects to possibly have IV antibiotics in another short stay at [facility].    4/23/24 - Today the wound at the elbow is nearly resolved.  We will begin application of Aquaphor to the elbow.  The sacrum was assessed and has improved dramatically.  It is nearly resolved.  He has completed the PO Augmentin for a UTI and this has helped wounds as well  Additionally he began topical abx to the right hip on 4/16/24 (Clindamycin).  Today the hip is also showing improvement.  He does have a f/up with his surgeon, Dr. Sandy Jara, on 5/2/24 with the hope that she will revise the wound to assist with closure since the margins are rolled.  He will return to clinic in two weeks.     4/16/24 - Mr. Jarrett was seen today for f/up on three wounds.  He did have an MRI done on 4/4/24 of the right hip (at [facility], ordered by Dr. Sandy Jara).  Today those results were reviewed with the patient.  We discussed that this provider would reach out to Dr. Jara regarding possible treatment.  This was done with consideration for IV abx, surgical intervention and/or referral to infectious disease for the best possible outcome.  The patient is currently on Augmentin with two days remaining for a UTI.  Additionally he picked up his topical abx today (Clindamycin) which will be used on a moist 4 x 4 gauze, not Basal Gel.    A callus paring was done on the right elbow and it is now healed.  The sacrum was selectively debrided and remains stable.     4/2/24 - The patient was referred back to his surgeon, Dr. Macie Carbajal, who he saw on 3/26/24.  He reports that she is concerned over possible abscess in the space, as well as infection.  A culture was obtained today.  She did prescribe Augmentin, to begin tomorrow, as a prophylaxis for the hip. He is  expecting to have an MRI schedule prior to the follow up with her on 4/9/24 at which time they will discuss the MRI results, and options. Today the right hip wound has increasing moisture and bogginess.  The sacrum remains stable.  The right elbow as debrided and is nearly closed.     3/19/24 - We have been using NuShield grafts on the open pressure ulcer at the patient's right hip.  Tendon remains exposed in the wound bed, the margins are nereida, epibole and closed.  Through a series of several weeks the wound margins have been scored with a scalpel in an attempt to open these closed margins, to no avail.  Today the graft was held, and the patient will be seen by his surgeon, Dr. Sandy Jara next Tuesday, 3/26/24, for consultation regarding options to possible reopen the margins which might bring a successful closure to this wound based on how it is currently healed.  For the time being we will apply adaptic over the tendon, with silver alginate dressing on top.  Both the right elbow and the sacrum remain stable.  We are currently using Endoform on both.  Today the elbow was selectively debrided.     3/12/24 - The pressure injury to the right hip is being grafted using NuShield grafts.  Today graft #6 was applied.  There has been no significant change to this wound since grafting.  Graft #7 has been ordered.  However, the patient was advised to scheduled with his surgeon, Dr. Sandy Jara, for evaluation to determine if she will need to do further surgery to assist this wound with closure since the margins are rolled.  They were again scored with a scaple to attempt to stimulate the growth of epithelial tissue, which has been done several times to no avail.  The right elbow is progressing well.  The sacrum remains stable with no S & S of infection.  He has received his new power wheelchair.  We are hopeful that this device might contribute to progress with his wounds.     3/5/24 - Mr. Jarrett is seen today  for followup on 3 wounds.  The wound to the right elbow was selectively debrided and it does show considerable improvement.  The wound to the sacrum is stable.  We will begin application of moistened Endoform to both of these wounds to be changed on Friday to silver alginate.  The wound to the right hip is slightly moist with a little bit of drainage.  This is to be expected, however, due to the fact that we are grafting that wound.  There has been no significant change to the size of the wound, unfortunately, in spite of grafting multiple times.  We will continue the process with the hope of making some progress.    2/27/24 - Mr. Jarrett returns to clinic today for followup on 3 areas of concern.  The pressure injury remains stable with no significant change.  He has a new wound to the right elbow.  This has developed due to dryness and cracking which has opened into a wound.  The elbow was selectively debrided using a dermal curette.  We will begin applying MediHoney to the elbow, covering with 4 x 4 gauze, Cover and with a Tubigrip with the hopes of softening this callused tissue further.  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  We are currently grafting using NuShield grafts.  Today we applied graft #4 after the wound margins were crosshatched to try to allow for growth epithelial tissue.  Again this week, there was no significant change in that wound.  Ligament is still fully exposed.  Graft #5 is being ordered today.    2/20/24 - the patient returns to clinic today for followup on a small pressure injury to the sacrum as well as a large stage IV pressure injury to the right hip.  We are currently going through the grafting process.  Today we applied graft #3, which is the 1st of the NuShield grafts.  So far we have not seen any significant change in the wound and the ligament is still fully exposed.  A # 15 scalpel was used to cross keen the wound margin due to rolling of that margin in the  need to stimulate the growth of epithelial tissue as well as granular tissue.  The sacrum remains stable and basically the same size.      2/12/24 - Mr. Jarrett is seen today in follow up of two wounds, both of which remain stable.  The left hip is being grafted, and today PuraPly #2 was applied.  There is no significant change since graft #1 was applied.  Today graft #3 was ordered.  We will change from PuraPly to Epifix at this time.  The sacrum remains stable.  We have applied Endoform into that wound and it will be treated the same as a graft, with the hope that the patient does not have BMs which necessitate removal of the dressing. The patient is eligible and is in need of a new power wheelchair.      2/6/24 - Mr. Jarrett returns to clinic today with 2 wounds.  The very small stage IV pressure injury at the sacrum remains stable.  Today, the wound at the right hip, stage IV pressure injury, will receive the 1st graft application.  We are applying PuraPly grafts at this time and then we will change to [facility] after two PuraPly.  Of note, he is waiting on a demo to be shown to him for a possible new wheelchair.  PuraPly #2 ordered.    January 30, 2024:  43-year-old white male, with paraplegia, is here for follow up of the right hip pressure injury, and the sacral pressure injury.  The wounds are looking similar to the last visit.  He is getting ready to have skin substitute applied to the right hip wound.  There is white tissue in the wound bed, presumably tendon.  I do not see pink granulation tissue.  He has no longer using topical antibiotics.    1/23/24 - Mr. Jarrett returns today for followup on 2 wounds.  Today the sacral pressure injury remains stable and clean.  We have been using topical antibiotics on both of these wounds for several weeks.  That has been discontinued today.  The pressure injury at the right hip was selectively debrided today using the ultrasonic debrider and graft prep was performed.  We  "will begin applying a small amount of mupirocin ointment on to this wound daily in preparation beginning the grafting process next week.  We will be use PuraPly/NuShield grafts.  Of note, the patient is responsible for contacting calor at the [employer] to have his measurements taken.  He has not done that as of today.    December 26, 2023:  43-year-old white male, with paraplegia, is here for follow up of a right hip pressure injury, and a sacral pressure injury.  He has been using topical antibiotics.  The measurements are about the same as last visit.  The nurse practitioner has discussed a possible skin substitute on the right hip.    12/11/23 - Mr. Jarrett for followup on the pressure ulcers to the right hip and sacrum.  We started using topical antibiotics on both wounds on 11/21/2023.  Additionally, the patient has been using the vinegar washes for sometime.  He was instructed to discontinue the use of the vinegar washes today and use only the topical antibiotics.  He has been approved for grafts (Puraply and Nushield).  We will plan to do graft prep in two weeks (once abx are complete) and then begin grafting.    Of note, we discussed today the need for proper support in his wheelchair to offload the pressure.  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  Also, he does have a low air loss mattress, but that mattress is on top of a "regular" mattress and he reports springs are protruding.  He needs a proper bedframe for the low airloss mattress.     12/4/23 - Mr. Jarrett returns for followup on 2 wounds.  He continues to use topical abx on both wounds.  The right hip continues to be concerning.  The tensor fascia ronny is till exposed in the wound bed.  We are mixing the topical abx with basal gel to prevent drying.  The sacral wound is stable but has had a slight increase in size.  Abx " are being used on this wound as well. We are attempting to authorize grafts on the right hip wound.    11/27/23 - the patient returns today for followup on pressure injuries to the right hip and the sacrum.  He has received his topical antibiotics (Linezolid/Gentamincin) and started using the antibiotics on Saturday, 11/25/23.  He did not receive basal gel with that order so we have contacted [facility] to send the patient that product as the antibiotics are very drying and they are being applied directly to his tendon.  He must have the moisture of the basal gel.  In the time being, we will mix his topical antibiotics with mupirocin antibiotic.  Today both wounds remains stable.  He was instructed to use the antibiotics on both wounds to avoid cross contamination.    11/20/23 - Mr. Jarrett returns for f/up on the wound to the right hip and the sacrum.  At his last visit the right hip was cultured and it was positive to have Pseudomonas.  He has been using Vinegar washes since 11/06/2023.  He does have a sensitivity to levofloxacin and so can not take that medication.  We have requested a topical recommendation and he is being prescribed linezolid/gentamicin to be applied directly into the wound.  If we do not see improvement from that topical medication we will be moving forward with an IV antibiotic.  He does have exposed ligament (possibly ischiofemoral ligament) in this wound.  The patient does still have a mediport so that would be the next best solution for him due to his sensitivity.  The wound at the sacrum remains stable and we are currently using silver alginate on both wounds.    11/6/23 - the patient seen today for followup on the right hip and sacral pressure wounds.  Although the sacrum is stable, the right hip does continue to deteriorate.  The right hip was cultured today.  We have been using Endoform and/or collagen on these wounds and unfortunately these products or causing excessive  moisture and deterioration to the wounds.  We will discontinue both products and use only silver alginate for the time being.  The right hip does have tendon exposed at this time.  The patient was reminded and encouraged to continue to offload both the right hip in the sacrum which he reports that he is wearing.  However, on the deterioration of the wounds it is concerning.    10/30/23 - Mr. Jarrett to clinic today for followup on the wound to the sacrum as well as the right hip.  Today, both have deteriorated.  We have been using collagen to the wound beds and it appears is though they are stain to wet.  There is still tendon present in the wound bed of the right hip.  We will discontinue the collagen and begin application Endoform to both wounds, changing every other day, covered with silver alginate.  He was reminded to offload as much as possible to prevent further deterioration.  Of note, reported that is blood pressure has been low, and it was very low today, 86/54.  He states that he noticed it last week that he has stopped taking Tylenol, aspirin, and probiotics.  We discussed today that it is unlikely that any of these medications would impact blood pressure but he prefers to remain off of them.    10/16/23 - The patient returns today for followup on the pressure injury to the right hip as well as the sacrum.  Today, there is now tendon exposed in the wound bed the right hip pressure injury.  We discussed today that it is imperative that the patient offload to allow this wound to heal so that he does not develop osteomyelitis in this hip as well.  The pressure injury to the sacrum has also deteriorated.  We discussed that now that the amputation to the left hip has fully healed, he must take the opportunity to begin offloading that right hip to allow it to heal.  We will begin applying collagen every other day to both wound beds.    10/9/23 The patient returns today for the open wound to the right hip and  sacrum.  The surgical incision to the left hip is now resolved and will no longer be followed.  Both the right hip and sacrum are wounds that have reopened.  We will begin application of collagen every third day to both wounds.  Neither wound has any signs and symptoms of infection.  He will return in two weeks. Of note, the patient reports that he has had diarrhea for several days, up to about two weeks.  He has had no treatment and is currently using only probiotics.  He has been prescribed imodium and advised to use that medication no more than 4 days.  He is to contact his PCP if the diarrhea does not resolve within that time period.     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 Sandy Owusu on 8/21/23.  This wound has progress except personally well and is remaining closed.  The patient is using only iodine along the surgical incision line.  He does have a follow up with his surgeon tomorrow, 09/26/2023.  He was advised today that he can begin application coconut oil with vitamin C along the closed incision line which is fully approximated.  We will follow this incision line for one additional visit then d/c if no issues develop.    He has had a long term wound at the sacrum which today is open again.  It was mechanically debrided.  Endoform as applied to the wound bed, which will be left in place until Friday at which time he will change to silver alginate.       Review of Systems   Constitutional: Negative.    Respiratory: Negative.     Cardiovascular: Negative.    Skin:         As documented in the HPI.   All other systems reviewed and are negative.        Objective:      Vitals:    08/20/24 1106   BP: 126/73   Pulse: 82   Resp: 19     Physical Exam  Vitals and nursing note reviewed. Exam conducted with a chaperone present.   Constitutional:       Appearance: Normal appearance.   Cardiovascular:      Rate and Rhythm: Normal rate.   Pulmonary:      Effort:  Pulmonary effort is normal.   Skin:     General: Skin is warm and dry.      Comments: The right hip pressure injury is measuring the same as previously.  I did a mechanical debridement, as there is no slough present.  The sacral pressure injury also is stalled, and I did a mechanical debridement.  The right posterior elbow pressure injury has some callus, and some biofilm and exudate, which I gently removed with a dermal curette.  He tolerated this well.   Neurological:      Mental Status: He is alert.            Negative Pressure Wound Therapy  05/28/24 1308 Right (Active)   05/28/24 1308   Side: Right   Orientation:    Location: Hip   Additional Comments:    Removal Indication and Assessment:    Location:    SDO Location:    NPWT Type Vacuum Therapy 08/20/24 1108   Therapy Setting NPWT Continuous therapy 08/20/24 1108   Pressure Setting NPWT 100 mmHg 08/20/24 1108   Therapy Interventions NPWT Dressing changed;Canister changed;Trac pad replaced 08/20/24 1108   Sponges Inserted NPWT Black;1 08/20/24 1108   Sponges Removed NPWT Black;1 08/20/24 1108   General Output (mL) 250 08/20/24 1108            Altered Skin Integrity 09/25/23 1153 Sacral spine #1 (Active)   09/25/23 1153   Altered Skin Integrity Present on Admission - Did Patient arrive to the hospital with altered skin?: yes   Side:    Orientation:    Location: Sacral spine   Wound Number: #1   Is this injury device related?:    Primary Wound Type:    Description of Altered Skin Integrity:    Ankle-Brachial Index:    Pulses:    Removal Indication and Assessment:    Wound Outcome:    (Retired) Wound Length (cm):    (Retired) Wound Width (cm):    (Retired) Depth (cm):    Wound Description (Comments):    Removal Indications:    Dressing Appearance Moist drainage 08/20/24 1108   Drainage Amount Moderate 08/20/24 1108   Drainage Characteristics/Odor Serosanguineous 08/20/24 1108   Appearance Moist;Pink 08/20/24 1108   Tissue loss description Full thickness 08/20/24  1108   Periwound Area Intact 08/20/24 1108   Wound Edges Open 08/20/24 1108   Wound Length (cm) 0.4 cm 08/20/24 1108   Wound Width (cm) 0.5 cm 08/20/24 1108   Wound Depth (cm) 0.6 cm 08/20/24 1108   Wound Volume (cm^3) 0.12 cm^3 08/20/24 1108   Wound Surface Area (cm^2) 0.2 cm^2 08/20/24 1108   Care Cleansed with:;Wound cleanser 08/20/24 1108   Dressing Applied;Other (comment) 08/20/24 1108            Altered Skin Integrity 10/09/23 1141 Right Hip #2 (Active)   10/09/23 1141   Altered Skin Integrity Present on Admission - Did Patient arrive to the hospital with altered skin?: yes   Side: Right   Orientation:    Location: Hip   Wound Number: #2   Is this injury device related?: No   Primary Wound Type:    Description of Altered Skin Integrity:    Ankle-Brachial Index:    Pulses:    Removal Indication and Assessment:    Wound Outcome:    (Retired) Wound Length (cm):    (Retired) Wound Width (cm):    (Retired) Depth (cm):    Wound Description (Comments):    Removal Indications:    Dressing Appearance Moist drainage 08/20/24 1108   Drainage Amount Moderate 08/20/24 1108   Drainage Characteristics/Odor Serosanguineous 08/20/24 1108   Appearance Moist;Fibrin;Tan;Pink 08/20/24 1108   Tissue loss description Full thickness 08/20/24 1108   Periwound Area Intact 08/20/24 1108   Wound Edges Open 08/20/24 1108   Wound Length (cm) 3.2 cm 08/20/24 1108   Wound Width (cm) 2 cm 08/20/24 1108   Wound Depth (cm) 1.5 cm 08/20/24 1108   Wound Volume (cm^3) 9.6 cm^3 08/20/24 1108   Wound Surface Area (cm^2) 6.4 cm^2 08/20/24 1108   Care Cleansed with:;Wound cleanser 08/20/24 1108   Dressing Applied;Other (comment) 08/20/24 1108   Dressing Change Due 08/23/24 08/20/24 1108            Wound 08/06/24 1150 Fissure Right posterior Elbow (Active)   08/06/24 1150   Present on Original Admission: Y   Primary Wound Type: Fissure   Side: Right   Orientation: posterior   Location: Elbow   Wound Approximate Age at First Assessment (Weeks):    Wound  "Number:    Is this injury device related?:    Incision Type:    Closure Method:    Wound Description (Comments):    Type:    Additional Comments:    Ankle-Brachial Index:    Pulses:    Removal Indication and Assessment:    Wound Outcome:    Dressing Appearance Moist drainage 08/20/24 1108   Drainage Amount Moderate 08/20/24 1108   Drainage Characteristics/Odor Serosanguineous 08/20/24 1108   Appearance Pink;Slough;Moist 08/20/24 1108   Tissue loss description Full thickness 08/20/24 1108   Periwound Area Dry 08/20/24 1108   Wound Edges Callused 08/20/24 1108   Wound Length (cm) 2.5 cm 08/20/24 1108   Wound Width (cm) 1 cm 08/20/24 1108   Wound Depth (cm) 0.2 cm 08/20/24 1108   Wound Volume (cm^3) 0.5 cm^3 08/20/24 1108   Wound Surface Area (cm^2) 2.5 cm^2 08/20/24 1108   Care Cleansed with:;Wound cleanser 08/20/24 1108   Dressing Applied;Other (comment) 08/20/24 1108   Dressing Change Due 08/21/24 08/20/24 1108       Debridement     Date/Time: 8/20/2024 11:00 AM     Performed by: Duarte Serra MD  Authorized by: Duarte Serra MD    Time out: Immediately prior to procedure a "time out" was called to verify the correct patient, procedure, equipment, support staff and site/side marked as required.     Consent Done?:  Yes (Verbal)     Preparation: Patient was prepped and draped with aseptic technique    Local anesthesia used?: No       Wound Details:    Location:  Right elbow (posterior)    Type of Debridement:  Non-excisional       Length (cm):  2.5       Area (sq cm):  2.5       Width (cm):  1       Percent Debrided (%):  80       Depth (cm):  0.2       Total Area Debrided (sq cm):  2    Depth of debridement:  Epidermis/Dermis    Tissue debrided:  Epidermis    Devitalized tissue debrided:  Biofilm, Callus, Exudate and Slough    Instruments:  Curette (dermal)  Bleeding:  Minimal  Hemostasis Achieved: Yes  Method Used:  Pressure  Patient tolerance:  Patient tolerated the procedure well with no immediate " complications  1st Wound Pain Assessment: 0        8/20/24    Right hip   Wound vac with black foam at 100 mmHg cont.         Right elbow        Post debridement  Silver alginate, gentle foam border dressing, tubigrip       Sacrum   Silver alginate, island dressing   CT  Assessment:         ICD-10-CM ICD-9-CM   1. 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 Pressure injury of sacral region, stage 4  L89.154 707.03     707.24   3. Pressure injury of right elbow, stage 3  L89.013 707.01     707.23         Procedures:     Selective - elbow - dermal curette     [] Yes [] No   I & D performed  [] Yes [] No   Excisional debridement performed  [x] Yes [] No   Selective debridement performed        [x] Yes [] No   Mechanical debridement performed         [] Yes [x] No  Silver nitrate applied                                     [] Yes [] No  Labs ordered this visit                                  [] Yes [] No   Imaging ordered this visit                           [] Yes [] No   Tissue pathology and/or culture taken       MEDICATIONS    Current Outpatient Medications:     acetaminophen (TYLENOL) 325 MG tablet, Take 650 mg by mouth every 6 (six) hours as needed for Pain., Disp: , Rfl:     ascorbic acid, vitamin C, (VITAMIN C) 1000 MG tablet, Take 1,000 mg by mouth every evening., Disp: , Rfl:     aspirin 325 MG tablet, Take 325 mg by mouth every morning. Stopped x 1 month, Disp: , Rfl:     baclofen (LIORESAL) 5 mg Tab tablet, Take 1 tablet (5 mg total) by mouth 3 (three) times daily., Disp: 90 tablet, Rfl: 0    bisacodyL (DULCOLAX) 10 mg Supp, Place 10 mg rectally daily as needed., Disp: , Rfl:     busPIRone (BUSPAR) 10 MG tablet, Take 1 tablet (10 mg total) by mouth 2 (two) times daily., Disp: 60 tablet, Rfl: 5    cetirizine (ZYRTEC) 10 MG tablet, Take 10 mg by mouth 2 (two) times a day., Disp: , Rfl:     cloNIDine (CATAPRES) 0.1 MG tablet, Take 0.1 mg by mouth  daily as needed., Disp: , Rfl:     DULoxetine (CYMBALTA) 30 MG capsule, Take 1 capsule by mouth 2 (two) times daily., Disp: , Rfl:     ferrous sulfate (SLOW RELEASE IRON) 142 mg (45 mg iron) TbSR, 1 tablet Orally Three times a Week for 30 days, Disp: , Rfl:     fexofenadine (ALLEGRA) 180 MG tablet, Take 180 mg by mouth every morning., Disp: , Rfl:     fluticasone propionate (FLONASE ALLERGY RELIEF) 50 mcg/actuation nasal spray, 1 spray by Each Nostril route daily as needed., Disp: , Rfl:     JATENZO 237 mg Cap, , Disp: , Rfl:     meloxicam (MOBIC) 7.5 MG tablet, Take 7.5 mg by mouth 2 (two) times daily as needed for Pain., Disp: , Rfl:     midodrine (PROAMATINE) 2.5 MG Tab, TAKE ONE TABLET BY MOUTH THREE TIMES DAILY AS NEEDED Systolic blood pressure less than 90 or Diastolic less than 60, Disp: , Rfl:     multivitamin/iron/folic acid (CENTRUM ORAL), Take 1 tablet by mouth every morning., Disp: , Rfl:     NON FORMULARY MEDICATION, Take 3 drops by mouth 2 (two) times daily as needed. THC, Disp: , Rfl:     urea (CARMOL) 40 % Crea, Apply topically once daily., Disp: 85 g, Rfl: 2    zinc gluconate 50 mg tablet, Take 1 tablet (50 mg total) by mouth once daily. (Patient taking differently: Take 50 mg by mouth every morning.), Disp: 30 tablet, Rfl: 1  No current facility-administered medications for this encounter.    Facility-Administered Medications Ordered in Other Encounters:     lactated ringers infusion, , Intravenous, Continuous, Cr Garibay DO, Last Rate: 10 mL/hr at 05/17/24 0705, Restarted at 05/17/24 0859   Review of patient's allergies indicates:   Allergen Reactions    Levofloxacin Rash       DIAGNOSTICS    Labs/Scans/Micro:    CBC:   Lab Results   Component Value Date    WBC 10.42 05/14/2024    HGB 13.9 (L) 05/14/2024    HCT 44.3 05/14/2024    MCV 88.8 05/14/2024     05/14/2024     Sedimentation rate:   Lab Results   Component Value Date    SEDRATE 22 (H) 12/15/2022    C-reactive protein:   Lab  "Results   Component Value Date    CRP 44.90 (H) 12/15/2022    Chemistry: [unfilled]  HBA1C: No components found for: "HBA1C"    Ankle Brachial Index: No results found for this or any previous visit.    Imaging:    Plain film: No results found for this or any previous visit.    MRI: No results found for this or any previous visit.    Bone Scan: No results found for this or any previous visit.    Vascular studies:    Microbiology: Rt hip, 4/2/24        HOME HEALTH AGENCY: [facility]     WOUND CARE ORDERS:  Right hip wound: Cleanse with wound cleanser, apply black foam with negative pressure set at 100mmHg continuous - to be changed on Tuesdays and Fridays.   Sacrum: Cleanse with wound cleanser and apply silver alginate and an island dressing - to be changed every other day  Right elbow: Cleanse with wound cleanser, apply aquaphor or urea cream to callused area, cover wound with silver alginate, 4x4 gauze and wrap with kerlix, secure with tape and tubigrip, change every other day      Follow up in 2 weeks (on 9/3/2024) for PU - stretcher .        "

## 2024-08-20 NOTE — PROCEDURES
"Debridement    Date/Time: 8/20/2024 11:00 AM    Performed by: Duarte Serra MD  Authorized by: Duarte Serra MD    Time out: Immediately prior to procedure a "time out" was called to verify the correct patient, procedure, equipment, support staff and site/side marked as required.    Consent Done?:  Yes (Verbal)    Preparation: Patient was prepped and draped with aseptic technique    Local anesthesia used?: No      Wound Details:    Location:  Right elbow (posterior)    Type of Debridement:  Non-excisional       Length (cm):  2.5       Area (sq cm):  2.5       Width (cm):  1       Percent Debrided (%):  80       Depth (cm):  0.2       Total Area Debrided (sq cm):  2    Depth of debridement:  Epidermis/Dermis    Tissue debrided:  Epidermis    Devitalized tissue debrided:  Biofilm, Callus, Exudate and Slough    Instruments:  Curette (dermal)  Bleeding:  Minimal  Hemostasis Achieved: Yes  Method Used:  Pressure  Patient tolerance:  Patient tolerated the procedure well with no immediate complications  1st Wound Pain Assessment: 0    "

## 2024-09-03 ENCOUNTER — HOSPITAL ENCOUNTER (OUTPATIENT)
Dept: WOUND CARE | Facility: HOSPITAL | Age: 44
Discharge: HOME | End: 2024-09-03
Attending: FAMILY MEDICINE
Payer: MEDICARE

## 2024-09-03 VITALS
WEIGHT: 130.06 LBS | RESPIRATION RATE: 18 BRPM | SYSTOLIC BLOOD PRESSURE: 127 MMHG | BODY MASS INDEX: 18.62 KG/M2 | HEART RATE: 81 BPM | DIASTOLIC BLOOD PRESSURE: 86 MMHG | HEIGHT: 70 IN

## 2024-09-03 DIAGNOSIS — M86.28 SUBACUTE OSTEOMYELITIS, OTHER SITE: ICD-10-CM

## 2024-09-03 DIAGNOSIS — G82.50 QUADRIPLEGIA: ICD-10-CM

## 2024-09-03 DIAGNOSIS — L89.013 PRESSURE INJURY OF RIGHT ELBOW, STAGE 3: ICD-10-CM

## 2024-09-03 DIAGNOSIS — L89.44 PRESSURE INJURY OF CONTIGUOUS REGION INVOLVING BACK AND RIGHT HIP, STAGE 4: Primary | ICD-10-CM

## 2024-09-03 DIAGNOSIS — L89.154 PRESSURE INJURY OF SACRAL REGION, STAGE 4: ICD-10-CM

## 2024-09-03 PROCEDURE — 27000999 HC MEDICAL RECORD PHOTO DOCUMENTATION

## 2024-09-03 PROCEDURE — 17250 CHEM CAUT OF GRANLTJ TISSUE: CPT

## 2024-09-03 PROCEDURE — 99213 OFFICE O/P EST LOW 20 MIN: CPT

## 2024-09-03 PROCEDURE — 97597 DBRDMT OPN WND 1ST 20 CM/<: CPT

## 2024-09-03 RX ORDER — CLINDAMYCIN HYDROCHLORIDE 300 MG/1
300 CAPSULE ORAL EVERY 6 HOURS
Qty: 40 CAPSULE | Refills: 0 | Status: SHIPPED | OUTPATIENT
Start: 2024-09-03 | End: 2024-09-13

## 2024-09-03 RX ORDER — SODIUM CHLORIDE 0.9 % (FLUSH) 0.9 %
SYRINGE (ML) INJECTION
COMMUNITY
Start: 2024-06-05

## 2024-09-03 RX ORDER — HEPARIN 100 UNIT/ML
SYRINGE INTRAVENOUS
COMMUNITY
Start: 2024-06-05

## 2024-09-03 RX ORDER — DOXYCYCLINE HYCLATE 100 MG
100 TABLET ORAL 2 TIMES DAILY
Qty: 20 TABLET | Refills: 0 | Status: SHIPPED | OUTPATIENT
Start: 2024-09-03 | End: 2024-09-13

## 2024-09-03 NOTE — PROCEDURES
"Debridement    Date/Time: 9/3/2024 11:14 AM    Performed by: Ashley Coto NP  Authorized by: Ashley Coto NP    Time out: Immediately prior to procedure a "time out" was called to verify the correct patient, procedure, equipment, support staff and site/side marked as required.    Consent Done?:  Yes (Verbal)    Preparation: Patient was prepped and draped with clean technique    Local anesthesia used?: No      Wound Details:    Location:  Right hip    Type of Debridement:  Non-excisional       Length (cm):  3.2       Area (sq cm):  4.8       Width (cm):  1.5       Percent Debrided (%):  100       Depth (cm):  1.5       Total Area Debrided (sq cm):  4.8    Depth of debridement:  Epidermis/Dermis    Devitalized tissue debrided:  Biofilm, Exudate, Fibrin and Slough    Instruments:  Curette  Bleeding:  Minimal  Hemostasis Achieved: Yes  Method Used:  Pressure    2nd Wound Details:     Location:  Right elbow    Type of Debridement:  Non-excisional       Length (cm):  2.6       Area (sq cm):  3.12       Width (cm):  1.2       Percent Debrided (%):  100       Depth (cm):  0.2       Total Area Debrided (sq cm):  3.12    Depth of debridement:  Epidermis/Dermis    Devitalized tissue debrided:  Biofilm, Callus, Exudate and Fibrin    Instruments:  Curette (Dermal)  Bleeding:  Minimal  Hemostasis Achieved: Yes    Method Used:  Pressure and Silver Nitrate  Patient tolerance:  Patient tolerated the procedure well with no immediate complications    "

## 2024-09-03 NOTE — PROGRESS NOTES
Referred by: Dr. Sidhu  Low air loss mattress [x] Yes [] No   Is the patient eligible for a graft, and/or currently grafting?  [] Yes [x] No   Smoker: yes, bradene    Subjective:         Patient ID: Werner Jarrett is a 43 y.o. male.    Chief Complaint: Pressure Ulcer ( (Sacrum - stage 4 /Right hip - stage 4/Right elbow - stage 3))    9/3/24 - Mr. Jarrett was last seen by this provider a couple of weeks ago prior to vacation.  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  Today, the wound at the right hip is extremely malodorous.  The patient also reports that he has seen an increase in drainage from this wound.  We have been using a wound VAC over this wound for several weeks with no change.  Today we will DC the wound VAC.  A culture was obtained and the patient was prescribed doxycycline and clindamycin pending results of the culture.  The sacrum remains stable.  The right elbow was selectively debrided and does have a large area of exposed granular tissue that has developed since he was last seen by this provider.  Of note, he did see his surgeon, Dr. Sandy Jara last week.  We were hoping that he would see a surgeon for evaluation of the hip for a possible flap.  However, that office is closed and he has now been referred to a plastic surgeon in [place].   We will be waiting for that call with the hopes that the patient's condition does not significantly deteriorate prior to that time.  Will follow up with his surgeon in 6 weeks.    August 20, 2024:  43-year-old white male, who is here for follow up of the chronic right hip pressure injury, sacral pressure injury, and right posterior elbow pressure injury.  The wounds have now stalled.  He has been using a wound VAC on the right hip.  The right elbow we will need some debridement today.  He will see his general surgeon next week.    August  6, 2024:  43-year-old white male, with quadriplegia, is here for follow up of his right hip pressure injury, sacral pressure injury, and right posterior elbow pressure injury.  We have been using a wound VAC on his right hip ulcer.  He is using collagen on the sacral wound.  We have just open the right posterior elbow as a new stage II pressure injury, surrounded by callus.  There is no exposed bone at this time.    7/23/14 - Mr. 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  Today that wound was assessed and the hip bone is now nicely covered with tissue.  We will continue the wound vac, using black foam only and increasing the pressure to 125 mmHg continuous pressure.  The wound at the sacrum remains stable with no significant changes.  Of note, he had his appt with ID on 7/18/24, he will follow up with them again in 3 months, there were no new orders from them. He also saw Dr. Delgado, he will follow up again with her in 2 months, in the mean time she will refer him to plastic surgery in [place] for a flap.     7/9/24 - The turns today for followup on the wound to the right hip as well as the small wound to the sacrum.  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  We are using a wound VAC on the right hip and that does seem to be making some minor progress in closing the open space between the muscles of the leg.  Today, a selective debridement was performed in an effort to revise the margins and support growth of granular tissue.  The sacrum was also selectively debrided.  We will continue with the wound VAC on the right hip.  The patient does have an appointment with his surgeon, Dr. Sandy Jara, on 07/11/2024.  He also has an appointment with infectious disease on that same day and we are hoping for some additional guidance from both of them regarding his wounds.    6/26/24 - Mr. Jarrett turned to clinic today for followup on the stage IV  pressure injury at the right hip as well as a stage IV pressure injury at the sacrum.  The sacrum remained stable with no changes and no signs and symptoms of infection.  The right hip seems to be progressing well, with an increase in the development of granular tissue which is occurring across the exposed bone.  At his last visit there was quite a bit of bruising around the periwound so we decrease the wound VAC pressure from 125 mmHg to 100 mmHg and this seems to be improving the growth of granular tissue significantly.  He is scheduled for his bone biopsy on July 1st at [facility] with Dr. Sandy Jara.  He will then follow up with her on 7/11/24.  He also has an upcoming appointment with infectious disease on 07/14/2024.    6/18/24 - Mr. Jarrett returns today for followup on 2 wounds.  The wound to the sacrum continues to progress well with no signs and symptoms of infection.  The wound to the right hip was debrided today.  There was a large amount of slough at the top of the wound bed.  We are continuing to use a wound VAC although there does not seem to be any significant progress towards closing this wound.  He did have a followup appointment with the surgeon, Dr. Sandy Jara and she does still intend to do a bone biopsy on this right hip.  Will have a followup appointment with her on 07/11/2024.  He will also see infectious disease in July, 2024.  He does not have that date as of today.  He does still have the dry area on his right elbow.  A refill for Urea cream has been sent to his pharmacy.    6/11/24 - The patient returns today for the wounds to the sacrum and right hip.  The sacral wound is stable and clean, with no S & S of infection.  The right hip does have some bruising around the surface of the wound.  We will decrease the wound vac pressure from 125 mmHg to 100 mmHg continuous pressure.  The elbow is still being monitored and he continues to use Urea cream every other day as well  as MediHoney.  He has not heard any further information from Dr. Sandy lucero a bone biopsy, and he has been scheduled with ID for f/up.     6/4/24 - Mr. Jarrett returns today for followup on the 2 pressure injuries, 1 to the sacrum and 1 to the right hip.  He does report that he had a follow up with his surgeon, Dr. Sandy Jara on 05/30/2024.  He states that she has indicated that she will be scheduling a bone biopsy of the right hip due to the patient's osteomyelitis.  She is trying to determine his next course of treatment together with infectious disease consideration.  We are currently using a wound VAC and it does seem to be making progress with the development of granular tissue in the wound bed.  There are no signs and symptoms of infection in this wound.  The sacral wound does have a slight increase in depth.  We had changed to collagen and it may be that that product is to wet.  We will DC the collagen and begin application of silver alginate only.  A callus paring was also done on the patient's right elbow.  This has been an ongoing issue and he is currently using Aquaphor on the elbow.    5/28/24 - Mr. 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  He had excisional debridement of this right hip on 5/17/24 by Dr. Sandy Jara.  Her op note:   PROCEDURE -   Excisional debridement right hip wound, dimension of debridement 1 cm circumferentially, total wound dimensions 3.5 cm x 3 cm x 1 cm deep.  Blunt debridement right hip wound  FINDINGS -    Rolled edges of skin, completely removed by excisional debridement  Fibrinous material within the wound debrided bluntly and submitted to culture   Today that wound is clean with nice margins.  We have applied a wound vac using white foam covered with black foam.     He had a follow up with her scheduled for 5/23 but missed it and is now re-scheduled for 5/30.   He is expecting an appt with ID but has not been contacted as of  today.   In addition to the right hip, he does have a wound at the sacrum that has reopened.  It is clean and we will apply collagen into this wound.  A callus paring was done on the right elbow.     5/14/24 - The Patient returns today for followup on the large pressure injury at the right hip as well as a small pressure injury to the sacrum.  He did have a followup appointment with his surgeon, Dr. Sandy Jara. He is scheduled for an I&D/debridment of his right hip with Dr. Quijano this Friday 5/17/24.  We had a lengthy discussion regarding possibilities such as a wound VAC or if she will leave the wound opened or close that wound.  He does not know at this time but this providers preference would be that she would close the wound.  This wound remains open at this time following another procedure and we were unable to get the wound to close completely in bite of the use of a wound VAC.  He does still have ligament exposed in the wound has increased in size.  Bone is fully exposed in the wound bed.  He has been using topical antibiotics for 1 month and those will be discontinued as of today.  Until his procedure we will apply Adaptic over the exposed bone with silver alginate on top, with daily dressing changes.  He does expect to see an infectious disease doctor following his procedure and also mentioned that he expects to possibly have IV antibiotics in another short stay at [facility].    4/23/24 - Today the wound at the elbow is nearly resolved.  We will begin application of Aquaphor to the elbow.  The sacrum was assessed and has improved dramatically.  It is nearly resolved.  He has completed the PO Augmentin for a UTI and this has helped wounds as well  Additionally he began topical abx to the right hip on 4/16/24 (Clindamycin).  Today the hip is also showing improvement.  He does have a f/up with his surgeon, Dr. Sandy Jara, on 5/2/24 with the hope that she will revise the wound to assist with closure  since the margins are rolled.  He will return to clinic in two weeks.     4/16/24 - Mr. Jarrett was seen today for f/up on three wounds.  He did have an MRI done on 4/4/24 of the right hip (at [facility], ordered by Dr. Sandy Jara).  Today those results were reviewed with the patient.  We discussed that this provider would reach out to Dr. Jara regarding possible treatment.  This was done with consideration for IV abx, surgical intervention and/or referral to infectious disease for the best possible outcome.  The patient is currently on Augmentin with two days remaining for a UTI.  Additionally he picked up his topical abx today (Clindamycin) which will be used on a moist 4 x 4 gauze, not Basal Gel.    A callus paring was done on the right elbow and it is now healed.  The sacrum was selectively debrided and remains stable.     4/2/24 - The patient was referred back to his surgeon, Dr. Macie Carbajal, who he saw on 3/26/24.  He reports that she is concerned over possible abscess in the space, as well as infection.  A culture was obtained today.  She did prescribe Augmentin, to begin tomorrow, as a prophylaxis for the hip. He is expecting to have an MRI schedule prior to the follow up with her on 4/9/24 at which time they will discuss the MRI results, and options. Today the right hip wound has increasing moisture and bogginess.  The sacrum remains stable.  The right elbow as debrided and is nearly closed.     3/19/24 - We have been using NuShield grafts on the open pressure ulcer at the patient's right hip.  Tendon remains exposed in the wound bed, the margins are nereida, epibole and closed.  Through a series of several weeks the wound margins have been scored with a scalpel in an attempt to open these closed margins, to no avail.  Today the graft was held, and the patient will be seen by his surgeon, Dr. Sandy Jara next Tuesday, 3/26/24, for consultation regarding options to  possible reopen the margins which might bring a successful closure to this wound based on how it is currently healed.  For the time being we will apply adaptic over the tendon, with silver alginate dressing on top.  Both the right elbow and the sacrum remain stable.  We are currently using Endoform on both.  Today the elbow was selectively debrided.     3/12/24 - The pressure injury to the right hip is being [facility]ed [facility].  Today graft #6 was applied.  There has been no significant change to this wound since grafting.  Graft #7 has been ordered.  However, the patient was advised to scheduled with his surgeon, Dr. Sandy Jara, for evaluation to determine if she will need to do further surgery to assist this wound with closure since the margins are rolled.  They were again scored with a scaple to attempt to stimulate the growth of epithelial tissue, which has been done several times to no avail.  The right elbow is progressing well.  The sacrum remains stable with no S & S of infection.  He has received his new power wheelchair.  We are hopeful that this device might contribute to progress with his wounds.     3/5/24 - Mr. Jarrett is seen today for followup on 3 wounds.  The wound to the right elbow was selectively debrided and it does show considerable improvement.  The wound to the sacrum is stable.  We will begin application of moistened Endoform to both of these wounds to be changed on Friday to silver alginate.  The wound to the right hip is slightly moist with a little bit of drainage.  This is to be expected, however, due to the fact that we are grafting that wound.  There has been no significant change to the size of the wound, unfortunately, in spite of grafting multiple times.  We will continue the process with the hope of making some progress.    2/27/24 - Mr. Jarrett returns to clinic today for followup on 3 areas of concern.  The pressure injury remains stable with no significant change.   He has a new wound to the right elbow.  This has developed due to dryness and cracking which has opened into a wound.  The elbow was selectively debrided using a dermal curette.  We will begin applying MediHoney to the elbow, covering with 4 x 4 gauze, Cover and with a Tubigrip with the hopes of softening this callused tissue further.  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  We are currently grafting using NuShield grafts.  Today we applied graft #4 after the wound margins were crosshatched to try to allow for growth epithelial tissue.  Again this week, there was no significant change in that wound.  Ligament is still fully exposed.  Graft #5 is being ordered today.    2/20/24 - the patient returns to clinic today for followup on a small pressure injury to the sacrum as well as a large stage IV pressure injury to the right hip.  We are currently going through the grafting process.  Today we applied graft #3, which is the 1st of the NuShield grafts.  So far we have not seen any significant change in the wound and the ligament is still fully exposed.  A # 15 scalpel was used to cross keen the wound margin due to rolling of that margin in the need to stimulate the growth of epithelial tissue as well as granular tissue.  The sacrum remains stable and basically the same size.      2/12/24 - Mr. Jarrett is seen today in follow up of two wounds, both of which remain stable.  The left hip is being grafted, and today PuraPly #2 was applied.  There is no significant change since graft #1 was applied.  Today graft #3 was ordered.  We will change from PuraPly to Epifix at this time.  The sacrum remains stable.  We have applied Endoform into that wound and it will be treated the same as a graft, with the hope that the patient does not have BMs which necessitate removal of the dressing. The patient is eligible and is in need of a new power wheelchair.      2/6/24 - Mr. Jarrett returns to clinic today with 2  wounds.  The very small stage IV pressure injury at the sacrum remains stable.  Today, the wound at the right hip, stage IV pressure injury, will receive the 1st graft application.  We are applying PuraPly grafts at this time and then we will change to NuShield after two PuraPly.  Of note, he is waiting on a demo to be shown to him for a possible new wheelchair.  PuraPly #2 ordered.    January 30, 2024:  43-year-old white male, with paraplegia, is here for follow up of the right hip pressure injury, and the sacral pressure injury.  The wounds are looking similar to the last visit.  He is getting ready to have skin substitute applied to the right hip wound.  There is white tissue in the wound bed, presumably tendon.  I do not see pink granulation tissue.  He has no longer using topical antibiotics.    1/23/24 - Mr. Jarrett returns today for followup on 2 wounds.  Today the sacral pressure injury remains stable and clean.  We have been using topical antibiotics on both of these wounds for several weeks.  That has been discontinued today.  The pressure injury at the right hip was selectively debrided today using the ultrasonic debrider and graft prep was performed.  We will begin applying a small amount of mupirocin ointment on to this wound daily in preparation beginning the grafting process next week.  We will be use PuraPly/NuShield grafts.  Of note, the patient is responsible for contacting calor at the [employer] to have his measurements taken.  He has not done that as of today.    December 26, 2023:  43-year-old white male, with paraplegia, is here for follow up of a right hip pressure injury, and a sacral pressure injury.  He has been using topical antibiotics.  The measurements are about the same as last visit.  The nurse practitioner has discussed a possible skin substitute on the right hip.    12/11/23 - Mr. Jarrett for followup on the pressure ulcers to the right hip and sacrum.  We started using topical  "antibiotics on both wounds on 11/21/2023.  Additionally, the patient has been using the vinegar washes for sometime.  He was instructed to discontinue the use of the vinegar washes today and use only the topical antibiotics.  He has been approved for grafts ([facility] [place]).  We will plan to do graft prep in two weeks (once abx are complete) and then begin grafting.    Of note, we discussed today the need for proper support in his wheelchair to offload the pressure.  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  Also, he does have a low air loss mattress, but that mattress is on top of a "regular" mattress and he reports springs are protruding.  He needs a proper bedframe for the low airloss mattress.     12/4/23 - Mr. Jarrett returns for followup on 2 wounds.  He continues to use topical abx on both wounds.  The right hip continues to be concerning.  The tensor fascia ronny is till exposed in the wound bed.  We are mixing the topical abx with basal gel to prevent drying.  The sacral wound is stable but has had a slight increase in size.  Abx are being used on this wound as well. We are attempting to authorize grafts on the right hip wound.    11/27/23 - the patient returns today for followup on pressure injuries to the right hip and the sacrum.  He has received his topical antibiotics (Linezolid/Gentamincin) and started using the antibiotics on Saturday, 11/25/23.  He did not receive basal gel with that order so we have contacted [facility] to send the patient that product as the antibiotics are very drying and they are being applied directly to his tendon.  He must have the moisture of the basal gel.  In the time being, we will mix his topical antibiotics with mupirocin antibiotic.  Today both wounds remains stable.  He was instructed to use the antibiotics on both wounds to " avoid cross contamination.    11/20/23 - Mr. Jarrett returns for f/up on the wound to the right hip and the sacrum.  At his last visit the right hip was cultured and it was positive to have Pseudomonas.  He has been using Vinegar washes since 11/06/2023.  He does have a sensitivity to levofloxacin and so can not take that medication.  We have requested a topical recommendation and he is being prescribed linezolid/gentamicin to be applied directly into the wound.  If we do not see improvement from that topical medication we will be moving forward with an IV antibiotic.  He does have exposed ligament (possibly ischiofemoral ligament) in this wound.  The patient does still have a mediport so that would be the next best solution for him due to his sensitivity.  The wound at the sacrum remains stable and we are currently using silver alginate on both wounds.    11/6/23 - the patient seen today for followup on the right hip and sacral pressure wounds.  Although the sacrum is stable, the right hip does continue to deteriorate.  The right hip was cultured today.  We have been using Endoform and/or collagen on these wounds and unfortunately these products or causing excessive moisture and deterioration to the wounds.  We will discontinue both products and use only silver alginate for the time being.  The right hip does have tendon exposed at this time.  The patient was reminded and encouraged to continue to offload both the right hip in the sacrum which he reports that he is wearing.  However, on the deterioration of the wounds it is concerning.    10/30/23 - Mr. Jarrett to clinic today for followup on the wound to the sacrum as well as the right hip.  Today, both have deteriorated.  We have been using collagen to the wound beds and it appears is though they are stain to wet.  There is still tendon present in the wound bed of the right hip.  We will discontinue the collagen and begin application Endoform to both wounds, changing  every other day, covered with silver alginate.  He was reminded to offload as much as possible to prevent further deterioration.  Of note, reported that is blood pressure has been low, and it was very low today, 86/54.  He states that he noticed it last week that he has stopped taking Tylenol, aspirin, and probiotics.  We discussed today that it is unlikely that any of these medications would impact blood pressure but he prefers to remain off of them.    10/16/23 - The patient returns today for followup on the pressure injury to the right hip as well as the sacrum.  Today, there is now tendon exposed in the wound bed the right hip pressure injury.  We discussed today that it is imperative that the patient offload to allow this wound to heal so that he does not develop osteomyelitis in this hip as well.  The pressure injury to the sacrum has also deteriorated.  We discussed that now that the amputation to the left hip has fully healed, he must take the opportunity to begin offloading that right hip to allow it to heal.  We will begin applying collagen every other day to both wound beds.    10/9/23 The patient returns today for the open wound to the right hip and sacrum.  The surgical incision to the left hip is now resolved and will no longer be followed.  Both the right hip and sacrum are wounds that have reopened.  We will begin application of collagen every third day to both wounds.  Neither wound has any signs and symptoms of infection.  He will return in two weeks. Of note, the patient reports that he has had diarrhea for several days, up to about two weeks.  He has had no treatment and is currently using only probiotics.  He has been prescribed imodium and advised to use that medication no more than 4 days.  He is to contact his PCP if the diarrhea does not resolve within that time period.     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 Sandy Owusu on  8/21/23.  This wound has progress except personally well and is remaining closed.  The patient is using only iodine along the surgical incision line.  He does have a follow up with his surgeon tomorrow, 09/26/2023.  He was advised today that he can begin application coconut oil with vitamin C along the closed incision line which is fully approximated.  We will follow this incision line for one additional visit then d/c if no issues develop.    He has had a long term wound at the sacrum which today is open again.  It was mechanically debrided.  Endoform as applied to the wound bed, which will be left in place until Friday at which time he will change to silver alginate.       Review of Systems   Constitutional: Negative.    Respiratory: Negative.     Cardiovascular: Negative.    Skin:         As documented in the HPI.   All other systems reviewed and are negative.        Objective:      Vitals:    09/03/24 1124   BP: 127/86   Pulse: 81   Resp: 18     Physical Exam  Vitals and nursing note reviewed. Exam conducted with a chaperone present.   Constitutional:       Appearance: Normal appearance.   Cardiovascular:      Rate and Rhythm: Normal rate.   Pulmonary:      Effort: Pulmonary effort is normal.   Skin:     General: Skin is warm and dry.      Comments: right hip pressure injury, sacral pressure injury, right posterior elbow pressure injury   Neurological:      Mental Status: He is alert.            Altered Skin Integrity 09/25/23 1153 Sacral spine #1 (Active)   09/25/23 1153   Altered Skin Integrity Present on Admission - Did Patient arrive to the hospital with altered skin?: yes   Side:    Orientation:    Location: Sacral spine   Wound Number: #1   Is this injury device related?:    Primary Wound Type:    Description of Altered Skin Integrity:    Ankle-Brachial Index:    Pulses:    Removal Indication and Assessment:    Wound Outcome:    (Retired) Wound Length (cm):    (Retired) Wound Width (cm):    (Retired) Depth  (cm):    Wound Description (Comments):    Removal Indications:    Dressing Appearance Moist drainage 09/03/24 1125   Drainage Amount Moderate 09/03/24 1125   Drainage Characteristics/Odor Serosanguineous 09/03/24 1125   Appearance Pink;Moist 09/03/24 1125   Tissue loss description Full thickness 09/03/24 1125   Periwound Area Dry 09/03/24 1125   Wound Edges Open 09/03/24 1125   Wound Length (cm) 0.4 cm 09/03/24 1125   Wound Width (cm) 0.5 cm 09/03/24 1125   Wound Depth (cm) 0.5 cm 09/03/24 1125   Wound Volume (cm^3) 0.1 cm^3 09/03/24 1125   Wound Surface Area (cm^2) 0.2 cm^2 09/03/24 1125   Care Cleansed with:;Wound cleanser 09/03/24 1125   Dressing Applied 09/03/24 1125   Dressing Change Due 09/04/24 09/03/24 1125            Altered Skin Integrity 10/09/23 1141 Right Hip #2 (Active)   10/09/23 1141   Altered Skin Integrity Present on Admission - Did Patient arrive to the hospital with altered skin?: yes   Side: Right   Orientation:    Location: Hip   Wound Number: #2   Is this injury device related?: No   Primary Wound Type:    Description of Altered Skin Integrity:    Ankle-Brachial Index:    Pulses:    Removal Indication and Assessment:    Wound Outcome:    (Retired) Wound Length (cm):    (Retired) Wound Width (cm):    (Retired) Depth (cm):    Wound Description (Comments):    Removal Indications:    Dressing Appearance Moist drainage 09/03/24 1125   Drainage Amount Moderate 09/03/24 1125   Drainage Characteristics/Odor Malodorous;Green;Serous 09/03/24 1125   Appearance Bone 09/03/24 1125   Tissue loss description Full thickness 09/03/24 1125   Periwound Area Dry 09/03/24 1125   Wound Edges Open 09/03/24 1125   Wound Length (cm) 3.2 cm 09/03/24 1125   Wound Width (cm) 1.5 cm 09/03/24 1125   Wound Depth (cm) 1.5 cm 09/03/24 1125   Wound Volume (cm^3) 7.2 cm^3 09/03/24 1125   Wound Surface Area (cm^2) 4.8 cm^2 09/03/24 1125   Care Cleansed with:;Wound cleanser 09/03/24 1125   Dressing Applied 09/03/24 1125    Dressing Change Due 09/04/24 09/03/24 1125            Wound 08/06/24 1150 Fissure Right posterior Elbow #3 (Active)   08/06/24 1150   Present on Original Admission: Y   Primary Wound Type: Fissure   Side: Right   Orientation: posterior   Location: Elbow   Wound Approximate Age at First Assessment (Weeks):    Wound Number: #3   Is this injury device related?:    Incision Type:    Closure Method:    Wound Description (Comments):    Type:    Additional Comments:    Ankle-Brachial Index:    Pulses:    Removal Indication and Assessment:    Wound Outcome:    Dressing Appearance Moist drainage 09/03/24 1125   Drainage Amount Moderate 09/03/24 1125   Drainage Characteristics/Odor Serosanguineous 09/03/24 1125   Appearance Pink;Moist 09/03/24 1125   Tissue loss description Full thickness 09/03/24 1125   Periwound Area Dry 09/03/24 1125   Wound Edges Callused;Open 09/03/24 1125   Wound Length (cm) 2.6 cm 09/03/24 1125   Wound Width (cm) 1.2 cm 09/03/24 1125   Wound Depth (cm) 0.2 cm 09/03/24 1125   Wound Volume (cm^3) 0.624 cm^3 09/03/24 1125   Wound Surface Area (cm^2) 3.12 cm^2 09/03/24 1125   Care Cleansed with:;Wound cleanser 09/03/24 1125   Dressing Applied 09/03/24 1125   Dressing Change Due 09/04/24 09/03/24 1125     8/20/24    Right hip   Wound vac with black foam at 100 mmHg cont.         Right elbow        Post debridement  Silver alginate, gentle foam border dressing, tubigrip       Sacrum   Silver alginate, island dressing   CT    9/3/24    Sacrum (pre)  (Polymem, 4x4, gentle border)         Right hip (pre)                                              Right hip (post)                                                                      (Versatel, silver alginate, ABD, tape)            Right elbow (pre)                                         Right elbow (post)                                       Right elbow (post silver nitrate)                                                                      (Polymem,  "4x4, kerlix, tape, tubigrip)    Assessment:         ICD-10-CM ICD-9-CM   1. 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 Pressure injury of sacral region, stage 4  L89.154 707.03     707.24   3. Pressure injury of right elbow, stage 3  L89.013 707.01     707.23   4. Quadriplegia  G82.50 344.00   5. Subacute osteomyelitis, other site  M86.28 730.08           Procedures:     Debridement     Date/Time: 9/3/2024 11:14 AM     Performed by: Ashley Coto NP  Authorized by: Ashley Coto NP    Time out: Immediately prior to procedure a "time out" was called to verify the correct patient, procedure, equipment, support staff and site/side marked as required.     Consent Done?:  Yes (Verbal)     Preparation: Patient was prepped and draped with clean technique    Local anesthesia used?: No       Wound Details:    Location:  Right hip    Type of Debridement:  Non-excisional       Length (cm):  3.2       Area (sq cm):  4.8       Width (cm):  1.5       Percent Debrided (%):  100       Depth (cm):  1.5       Total Area Debrided (sq cm):  4.8    Depth of debridement:  Epidermis/Dermis    Devitalized tissue debrided:  Biofilm, Exudate, Fibrin and Slough    Instruments:  Curette  Bleeding:  Minimal  Hemostasis Achieved: Yes  Method Used:  Pressure     2nd Wound Details:     Location:  Right elbow    Type of Debridement:  Non-excisional       Length (cm):  2.6       Area (sq cm):  3.12       Width (cm):  1.2       Percent Debrided (%):  100       Depth (cm):  0.2       Total Area Debrided (sq cm):  3.12    Depth of debridement:  Epidermis/Dermis    Devitalized tissue debrided:  Biofilm, Callus, Exudate and Fibrin    Instruments:  Curette (Dermal)  Bleeding:  Minimal  Hemostasis Achieved: Yes    Method Used:  Pressure and Silver Nitrate  Patient tolerance:  Patient tolerated the procedure well with no immediate complications       [] Yes [] No   I & D performed  [] " Yes [] No   Excisional debridement performed  [x] Yes [] No   Selective debridement performed        [] Yes [] No   Mechanical debridement performed         [] Yes [] No  Silver nitrate applied                                     [] Yes [] No  Labs ordered this visit                                  [] Yes [] No   Imaging ordered this visit                           [] Yes [] No   Tissue pathology and/or culture taken       MEDICATIONS    Current Outpatient Medications:     acetaminophen (TYLENOL) 325 MG tablet, Take 650 mg by mouth every 6 (six) hours as needed for Pain., Disp: , Rfl:     ascorbic acid, vitamin C, (VITAMIN C) 1000 MG tablet, Take 1,000 mg by mouth every evening., Disp: , Rfl:     aspirin 325 MG tablet, Take 325 mg by mouth every morning. Stopped x 1 month, Disp: , Rfl:     baclofen (LIORESAL) 5 mg Tab tablet, Take 1 tablet (5 mg total) by mouth 3 (three) times daily., Disp: 90 tablet, Rfl: 0    bisacodyL (DULCOLAX) 10 mg Supp, Place 10 mg rectally daily as needed., Disp: , Rfl:     busPIRone (BUSPAR) 10 MG tablet, Take 1 tablet (10 mg total) by mouth 2 (two) times daily., Disp: 60 tablet, Rfl: 5    cetirizine (ZYRTEC) 10 MG tablet, Take 10 mg by mouth 2 (two) times a day., Disp: , Rfl:     cloNIDine (CATAPRES) 0.1 MG tablet, Take 0.1 mg by mouth daily as needed., Disp: , Rfl:     DULoxetine (CYMBALTA) 30 MG capsule, Take 1 capsule by mouth 2 (two) times daily., Disp: , Rfl:     ferrous sulfate (SLOW RELEASE IRON) 142 mg (45 mg iron) TbSR, 1 tablet Orally Three times a Week for 30 days, Disp: , Rfl:     fexofenadine (ALLEGRA) 180 MG tablet, Take 180 mg by mouth every morning., Disp: , Rfl:     fluticasone propionate (FLONASE ALLERGY RELIEF) 50 mcg/actuation nasal spray, 1 spray by Each Nostril route daily as needed., Disp: , Rfl:     heparin, porcine, PF, (HEPARIN FLUSH 100 UNITS/ML) 100 unit/mL Syrg, flush mediport with 5ml every FOUR weeks, Disp: , Rfl:     JATENZO 237 mg Cap, , Disp: , Rfl:      "meloxicam (MOBIC) 7.5 MG tablet, Take 7.5 mg by mouth 2 (two) times daily as needed for Pain., Disp: , Rfl:     midodrine (PROAMATINE) 2.5 MG Tab, TAKE ONE TABLET BY MOUTH THREE TIMES DAILY AS NEEDED Systolic blood pressure less than 90 or Diastolic less than 60, Disp: , Rfl:     multivitamin/iron/folic acid (CENTRUM ORAL), Take 1 tablet by mouth every morning., Disp: , Rfl:     NON FORMULARY MEDICATION, Take 3 drops by mouth 2 (two) times daily as needed. THC, Disp: , Rfl:     NORMAL SALINE FLUSH injection, flush mediport with 10ml every FOUR weeks, Disp: , Rfl:     urea (CARMOL) 40 % Crea, Apply topically once daily., Disp: 85 g, Rfl: 2    zinc gluconate 50 mg tablet, Take 1 tablet (50 mg total) by mouth once daily. (Patient taking differently: Take 50 mg by mouth every morning.), Disp: 30 tablet, Rfl: 1    clindamycin (CLEOCIN) 300 MG capsule, Take 1 capsule (300 mg total) by mouth every 6 (six) hours. for 10 days, Disp: 40 capsule, Rfl: 0    doxycycline (VIBRA-TABS) 100 MG tablet, Take 1 tablet (100 mg total) by mouth 2 (two) times daily. for 10 days, Disp: 20 tablet, Rfl: 0  No current facility-administered medications for this encounter.    Facility-Administered Medications Ordered in Other Encounters:     lactated ringers infusion, , Intravenous, Continuous, Cr Garibay DO, Last Rate: 10 mL/hr at 05/17/24 0705, Restarted at 05/17/24 0859   Review of patient's allergies indicates:   Allergen Reactions    Levofloxacin Rash       DIAGNOSTICS    Labs/Scans/Micro:    CBC:   Lab Results   Component Value Date    WBC 10.42 05/14/2024    HGB 13.9 (L) 05/14/2024    HCT 44.3 05/14/2024    MCV 88.8 05/14/2024     05/14/2024     Sedimentation rate:   Lab Results   Component Value Date    SEDRATE 22 (H) 12/15/2022    C-reactive protein:   Lab Results   Component Value Date    CRP 44.90 (H) 12/15/2022    Chemistry: [unfilled]  HBA1C: No components found for: "HBA1C"    Ankle Brachial Index: No results " found for this or any previous visit.    Imaging:    Plain film: No results found for this or any previous visit.    MRI: No results found for this or any previous visit.    Bone Scan: No results found for this or any previous visit.    Vascular studies:    Microbiology: Rt hip, 4/2/24        HOME HEALTH AGENCY: [facility]     WOUND CARE ORDERS:  Right hip wound: Cleanse with wound cleanser, apply adaptic touch over exposed bone, cover with silver alginate, ABD pad and tape, change daily  Sacrum: Cleanse with wound cleanser and apply polymem and an island dressing - to be changed every day  Right elbow: Cleanse with wound cleanser, apply aquaphor to callused area, cover wound with polymem, 4x4 gauze and wrap with kerlix, secure with tape and tubigrip, change every day      Follow up in 2 weeks (on 9/17/2024) for stretcher.

## 2024-09-05 ENCOUNTER — DOCUMENTATION ONLY (OUTPATIENT)
Dept: WOUND CARE | Facility: HOSPITAL | Age: 44
End: 2024-09-05
Payer: MEDICARE

## 2024-09-05 RX ORDER — SULFAMETHOXAZOLE AND TRIMETHOPRIM 800; 160 MG/1; MG/1
1 TABLET ORAL 2 TIMES DAILY
Qty: 20 TABLET | Refills: 0 | Status: SHIPPED | OUTPATIENT
Start: 2024-09-05 | End: 2024-09-15

## 2024-09-05 RX ORDER — AMOXICILLIN AND CLAVULANATE POTASSIUM 875; 125 MG/1; MG/1
1 TABLET, FILM COATED ORAL EVERY 12 HOURS
Qty: 20 TABLET | Refills: 0 | Status: SHIPPED | OUTPATIENT
Start: 2024-09-05 | End: 2024-09-15

## 2024-09-05 NOTE — PROGRESS NOTES
Patient was contacted by phone to review culture results.  Notified to d/c Doxy and Cipro,  and begin Bactrim DS and Augmentin today.  Discussed bacteria, patient instructed to present to ER for possible IV abx if he feels bad at any time.  Acinetobacter baumannii - 69%  Communicated with his surgeon, Dr. Sandy Jara, to make her aware as well.  She agrees with treatment plan.

## 2024-09-17 ENCOUNTER — HOSPITAL ENCOUNTER (OUTPATIENT)
Dept: WOUND CARE | Facility: HOSPITAL | Age: 44
Discharge: HOME | End: 2024-09-17
Attending: NURSE PRACTITIONER
Payer: MEDICARE

## 2024-09-17 VITALS
HEART RATE: 75 BPM | SYSTOLIC BLOOD PRESSURE: 106 MMHG | HEIGHT: 70 IN | DIASTOLIC BLOOD PRESSURE: 62 MMHG | BODY MASS INDEX: 18.62 KG/M2 | WEIGHT: 130.06 LBS | RESPIRATION RATE: 18 BRPM

## 2024-09-17 DIAGNOSIS — T21.22XA BURN OF ABDOMEN WALL, SECOND DEGREE, INITIAL ENCOUNTER: ICD-10-CM

## 2024-09-17 DIAGNOSIS — L89.013 PRESSURE INJURY OF RIGHT ELBOW, STAGE 3: ICD-10-CM

## 2024-09-17 DIAGNOSIS — L89.154 PRESSURE INJURY OF SACRAL REGION, STAGE 4: ICD-10-CM

## 2024-09-17 DIAGNOSIS — L89.44 PRESSURE INJURY OF CONTIGUOUS REGION INVOLVING BACK AND RIGHT HIP, STAGE 4: Primary | ICD-10-CM

## 2024-09-17 PROCEDURE — 97597 DBRDMT OPN WND 1ST 20 CM/<: CPT

## 2024-09-17 PROCEDURE — 99213 OFFICE O/P EST LOW 20 MIN: CPT

## 2024-09-17 PROCEDURE — 27000999 HC MEDICAL RECORD PHOTO DOCUMENTATION

## 2024-09-17 NOTE — PROGRESS NOTES
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 imodium and advised to use that medication no more than 4 days.  He is to contact his PCP if the diarrhea does not resolve within that time period.     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 Sandy Owusu on 8/21/23.  This wound has progress except personally well and is remaining closed.  The patient is using only iodine along the surgical incision line.  He does have a follow up with his surgeon tomorrow, 09/26/2023.  He was advised today that he can begin application coconut oil with vitamin C along the closed incision line which is fully approximated.  We will follow this incision line for one additional visit then d/c if no issues develop.    He has had a long term wound at the sacrum which today is open again.  It was mechanically debrided.  Endoform as applied to the wound bed, which will be left in place until Friday at which time he will change to silver alginate.       Review of Systems   Constitutional: Negative.    Respiratory: Negative.     Cardiovascular: Negative.    Skin:         As documented in the HPI.   All other systems reviewed and are negative.        Objective:      Vitals:    09/17/24 1121   BP: 106/62   Pulse: 75   Resp: 18     Physical Exam  Vitals and  nursing note reviewed. Exam conducted with a chaperone present.   Constitutional:       Appearance: Normal appearance.   Cardiovascular:      Rate and Rhythm: Normal rate.   Pulmonary:      Effort: Pulmonary effort is normal.   Skin:     General: Skin is warm and dry.      Comments: right hip pressure injury, sacral pressure injury, right posterior elbow pressure injury   Neurological:      Mental Status: He is alert.            Altered Skin Integrity 09/25/23 1153 Sacral spine #1 (Active)   09/25/23 1153   Altered Skin Integrity Present on Admission - Did Patient arrive to the hospital with altered skin?: yes   Side:    Orientation:    Location: Sacral spine   Wound Number: #1   Is this injury device related?:    Primary Wound Type:    Description of Altered Skin Integrity:    Ankle-Brachial Index:    Pulses:    Removal Indication and Assessment:    Wound Outcome:    (Retired) Wound Length (cm):    (Retired) Wound Width (cm):    (Retired) Depth (cm):    Wound Description (Comments):    Removal Indications:    Dressing Appearance Moist drainage 09/17/24 1124   Drainage Amount Moderate 09/17/24 1124   Drainage Characteristics/Odor Serosanguineous 09/17/24 1124   Appearance Moist;Pink 09/17/24 1124   Tissue loss description Full thickness 09/17/24 1124   Periwound Area Intact 09/17/24 1124   Wound Edges Open 09/17/24 1124   Wound Length (cm) 0.5 cm 09/17/24 1124   Wound Width (cm) 0.8 cm 09/17/24 1124   Wound Depth (cm) 0.5 cm 09/17/24 1124   Wound Volume (cm^3) 0.2 cm^3 09/17/24 1124   Wound Surface Area (cm^2) 0.4 cm^2 09/17/24 1124   Care Cleansed with:;Wound cleanser 09/17/24 1124   Dressing Applied;Other (comment) 09/17/24 1124   Dressing Change Due 09/18/24 09/17/24 1124            Altered Skin Integrity 10/09/23 1141 Right Hip #2 (Active)   10/09/23 1141   Altered Skin Integrity Present on Admission - Did Patient arrive to the hospital with altered skin?: yes   Side: Right   Orientation:    Location: Hip    Wound Number: #2   Is this injury device related?: No   Primary Wound Type:    Description of Altered Skin Integrity:    Ankle-Brachial Index:    Pulses:    Removal Indication and Assessment:    Wound Outcome:    (Retired) Wound Length (cm):    (Retired) Wound Width (cm):    (Retired) Depth (cm):    Wound Description (Comments):    Removal Indications:    Dressing Appearance Moist drainage 09/17/24 1124   Drainage Amount Moderate 09/17/24 1124   Drainage Characteristics/Odor Serosanguineous 09/17/24 1124   Appearance Moist;Tan;Slough;Fibrin;Red;Granulating;Adipose 09/17/24 1124   Tissue loss description Full thickness 09/17/24 1124   Periwound Area Intact 09/17/24 1124   Wound Edges Open 09/17/24 1124   Wound Length (cm) 3 cm 09/17/24 1124   Wound Width (cm) 1.8 cm 09/17/24 1124   Wound Depth (cm) 1.5 cm 09/17/24 1124   Wound Volume (cm^3) 8.1 cm^3 09/17/24 1124   Wound Surface Area (cm^2) 5.4 cm^2 09/17/24 1124   Care Cleansed with:;Wound cleanser 09/17/24 1124   Dressing Applied;Other (comment) 09/17/24 1124   Dressing Change Due 09/18/24 09/17/24 1124            Wound 08/06/24 1150 Fissure Right posterior Elbow #3 (Active)   08/06/24 1150   Present on Original Admission: Y   Primary Wound Type: Fissure   Side: Right   Orientation: posterior   Location: Elbow   Wound Approximate Age at First Assessment (Weeks):    Wound Number: #3   Is this injury device related?:    Incision Type:    Closure Method:    Wound Description (Comments):    Type:    Additional Comments:    Ankle-Brachial Index:    Pulses:    Removal Indication and Assessment:    Wound Outcome:    Dressing Appearance Moist drainage 09/17/24 1124   Drainage Amount Moderate 09/17/24 1124   Drainage Characteristics/Odor Serosanguineous 09/17/24 1124   Appearance Moist;Red;Granulating 09/17/24 1124   Tissue loss description Full thickness 09/17/24 1124   Periwound Area Intact 09/17/24 1124   Wound Edges Callused 09/17/24 1124   Wound Length (cm) 2 cm  09/17/24 1124   Wound Width (cm) 1 cm 09/17/24 1124   Wound Depth (cm) 0.3 cm 09/17/24 1124   Wound Volume (cm^3) 0.6 cm^3 09/17/24 1124   Wound Surface Area (cm^2) 2 cm^2 09/17/24 1124   Care Cleansed with:;Wound cleanser 09/17/24 1124   Dressing Applied;Other (comment) 09/17/24 1124   Dressing Change Due 09/18/24 09/17/24 1124            Wound 09/17/24 Other (comment) Abdomen #4 (Active)   09/17/24    Present on Original Admission: Y   Primary Wound Type: Other   Side:    Orientation:    Location: Abdomen   Wound Approximate Age at First Assessment (Weeks):    Wound Number: #4   Is this injury device related?: No   Incision Type:    Closure Method:    Wound Description (Comments):    Type:    Additional Comments:    Ankle-Brachial Index:    Pulses:    Removal Indication and Assessment:    Wound Outcome:    Dressing Appearance Moist drainage 09/17/24 1124   Drainage Amount Moderate 09/17/24 1124   Drainage Characteristics/Odor Serosanguineous 09/17/24 1124   Appearance Moist;Slough;Tan;Pink 09/17/24 1124   Tissue loss description Full thickness 09/17/24 1124   Periwound Area Intact 09/17/24 1124   Wound Edges Open 09/17/24 1124   Wound Length (cm) 3 cm 09/17/24 1124   Wound Width (cm) 4.4 cm 09/17/24 1124   Wound Depth (cm) 0.2 cm 09/17/24 1124   Wound Volume (cm^3) 2.64 cm^3 09/17/24 1124   Wound Surface Area (cm^2) 13.2 cm^2 09/17/24 1124   Care Cleansed with:;Wound cleanser 09/17/24 1124   Dressing Applied;Other (comment) 09/17/24 1124   Dressing Change Due 09/18/24 09/17/24 1124       9/17/24      Right elbow            Post debridement  Silver alginate, gentle foam border dressing, size F tubigrip         Abdomen        Post debridement  Gentle foam border dressing                      Right hip   Versatel, silver alginate, gentle foam border dressing       Sacrum   Silver alginate, gentle foam border dressing   CT      Assessment:         ICD-10-CM ICD-9-CM   1. 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 Pressure injury of right elbow, stage 3  L89.013 707.01     707.23   3. Pressure injury of sacral region, stage 4  L89.154 707.03     707.24   4. Burn of abdomen wall, second degree, initial encounter  T21.22XA 942.23         Procedures:     Debridement     Date/Time: 9/17/2024 11:00 AM     Performed by: Ashley Coto NP  Authorized by: Ashley Coto NP    Time out: Immediately prior to procedure a "time out" was called to verify the correct patient, procedure, equipment, support staff and site/side marked as required.     Consent Done?:  Yes (Verbal)     Preparation: Patient was prepped and draped with clean technique    Local anesthesia used?: No       Wound Details:    Location:  Right elbow    Type of Debridement:  Non-excisional       Length (cm):  2       Area (sq cm):  2       Width (cm):  1       Percent Debrided (%):  100       Depth (cm):  0.3       Total Area Debrided (sq cm):  2    Depth of debridement:  Epidermis/Dermis    Devitalized tissue debrided:  Biofilm, Callus, Exudate and Fibrin    Instruments:  Curette and Blade (#10, Dermal)  Bleeding:  Minimal  Hemostasis Achieved: Yes  Method Used:  Pressure     2nd Wound Details:     Debridement - 2nd Wound - General Location: Abdomen.    Type of Debridement:  Non-excisional       Length (cm):  3       Area (sq cm):  13.2       Width (cm):  4.4       Percent Debrided (%):  50       Depth (cm):  0.2       Total Area Debrided (sq cm):  6.6    Depth of debridement:  Epidermis/Dermis    Devitalized tissue debrided:  Biofilm, Exudate, Fibrin and Slough    Instruments:  Forceps and Scissors  Bleeding:  None  Patient tolerance:  Patient tolerated the procedure well with no immediate complications       [] Yes [] No   I & D performed  [] Yes [] No   Excisional debridement performed  [x] Yes [] No   Selective debridement performed        [x] Yes [] No   Mechanical debridement performed         [] " Yes [] No  Silver nitrate applied                                     [] Yes [] No  Labs ordered this visit                                  [] Yes [] No   Imaging ordered this visit                           [] Yes [] No   Tissue pathology and/or culture taken       MEDICATIONS    Current Outpatient Medications:     acetaminophen (TYLENOL) 325 MG tablet, Take 650 mg by mouth every 6 (six) hours as needed for Pain., Disp: , Rfl:     ascorbic acid, vitamin C, (VITAMIN C) 1000 MG tablet, Take 1,000 mg by mouth every evening., Disp: , Rfl:     aspirin 325 MG tablet, Take 325 mg by mouth every morning. Stopped x 1 month, Disp: , Rfl:     baclofen (LIORESAL) 5 mg Tab tablet, Take 1 tablet (5 mg total) by mouth 3 (three) times daily., Disp: 90 tablet, Rfl: 0    bisacodyL (DULCOLAX) 10 mg Supp, Place 10 mg rectally daily as needed., Disp: , Rfl:     busPIRone (BUSPAR) 10 MG tablet, Take 1 tablet (10 mg total) by mouth 2 (two) times daily., Disp: 60 tablet, Rfl: 5    cetirizine (ZYRTEC) 10 MG tablet, Take 10 mg by mouth 2 (two) times a day., Disp: , Rfl:     cloNIDine (CATAPRES) 0.1 MG tablet, Take 0.1 mg by mouth daily as needed., Disp: , Rfl:     DULoxetine (CYMBALTA) 30 MG capsule, Take 1 capsule by mouth 2 (two) times daily., Disp: , Rfl:     ferrous sulfate (SLOW RELEASE IRON) 142 mg (45 mg iron) TbSR, 1 tablet Orally Three times a Week for 30 days, Disp: , Rfl:     fexofenadine (ALLEGRA) 180 MG tablet, Take 180 mg by mouth every morning., Disp: , Rfl:     fluticasone propionate (FLONASE ALLERGY RELIEF) 50 mcg/actuation nasal spray, 1 spray by Each Nostril route daily as needed., Disp: , Rfl:     heparin, porcine, PF, (HEPARIN FLUSH 100 UNITS/ML) 100 unit/mL Syrg, flush mediport with 5ml every FOUR weeks, Disp: , Rfl:     JATENZO 237 mg Cap, , Disp: , Rfl:     meloxicam (MOBIC) 7.5 MG tablet, Take 7.5 mg by mouth 2 (two) times daily as needed for Pain., Disp: , Rfl:     midodrine (PROAMATINE) 2.5 MG Tab, TAKE ONE  "TABLET BY MOUTH THREE TIMES DAILY AS NEEDED Systolic blood pressure less than 90 or Diastolic less than 60, Disp: , Rfl:     multivitamin/iron/folic acid (CENTRUM ORAL), Take 1 tablet by mouth every morning., Disp: , Rfl:     NON FORMULARY MEDICATION, Take 3 drops by mouth 2 (two) times daily as needed. THC, Disp: , Rfl:     NORMAL SALINE FLUSH injection, flush mediport with 10ml every FOUR weeks, Disp: , Rfl:     urea (CARMOL) 40 % Crea, Apply topically once daily., Disp: 85 g, Rfl: 2    zinc gluconate 50 mg tablet, Take 1 tablet (50 mg total) by mouth once daily. (Patient taking differently: Take 50 mg by mouth every morning.), Disp: 30 tablet, Rfl: 1  No current facility-administered medications for this encounter.    Facility-Administered Medications Ordered in Other Encounters:     lactated ringers infusion, , Intravenous, Continuous, Cr Garibay DO, Last Rate: 10 mL/hr at 05/17/24 0705, Restarted at 05/17/24 0859   Review of patient's allergies indicates:   Allergen Reactions    Levofloxacin Rash       DIAGNOSTICS    Labs/Scans/Micro:    CBC:   Lab Results   Component Value Date    WBC 10.42 05/14/2024    HGB 13.9 (L) 05/14/2024    HCT 44.3 05/14/2024    MCV 88.8 05/14/2024     05/14/2024     Sedimentation rate:   Lab Results   Component Value Date    SEDRATE 22 (H) 12/15/2022    C-reactive protein:   Lab Results   Component Value Date    CRP 44.90 (H) 12/15/2022    Chemistry: [unfilled]  HBA1C: No components found for: "HBA1C"    Ankle Brachial Index: No results found for this or any previous visit.    Imaging:    Plain film: No results found for this or any previous visit.    MRI: No results found for this or any previous visit.    Bone Scan: No results found for this or any previous visit.    Vascular studies:    Microbiology: Rt hip, 4/2/24        HOME HEALTH AGENCY: [facility]     WOUND CARE ORDERS:  Right hip wound: Cleanse with wound cleanser, apply adaptic touch over exposed " bone, cover with silver alginate, ABD pad and tape to be changed daily   Sacrum: Cleanse with wound cleanser and apply silver alginate and an island dressing to be changed daily   Right elbow: Cleanse with wound cleanser, apply aquaphor to callused area, cover wound with silver alginate, 4x4 gauze and wrap with kerlix, secure with tape and tubigrip to be changed daily   Abdominal wound: Cleanse with wound cleanser, apply silvadene cream and island dressing to be changed daily       Follow up in 2 weeks (on 10/1/2024) for AMAURY coto .

## 2024-09-17 NOTE — PROCEDURES
"Debridement    Date/Time: 9/17/2024 11:00 AM    Performed by: Ashley Coto NP  Authorized by: Ashley Coto NP    Time out: Immediately prior to procedure a "time out" was called to verify the correct patient, procedure, equipment, support staff and site/side marked as required.    Consent Done?:  Yes (Verbal)    Preparation: Patient was prepped and draped with clean technique    Local anesthesia used?: No      Wound Details:    Location:  Right elbow    Type of Debridement:  Non-excisional       Length (cm):  2       Area (sq cm):  2       Width (cm):  1       Percent Debrided (%):  100       Depth (cm):  0.3       Total Area Debrided (sq cm):  2    Depth of debridement:  Epidermis/Dermis    Devitalized tissue debrided:  Biofilm, Callus, Exudate and Fibrin    Instruments:  Curette and Blade (#10, Dermal)  Bleeding:  Minimal  Hemostasis Achieved: Yes  Method Used:  Pressure    2nd Wound Details:     Debridement - 2nd Wound - General Location: Abdomen.    Type of Debridement:  Non-excisional       Length (cm):  3       Area (sq cm):  13.2       Width (cm):  4.4       Percent Debrided (%):  50       Depth (cm):  0.2       Total Area Debrided (sq cm):  6.6    Depth of debridement:  Epidermis/Dermis    Devitalized tissue debrided:  Biofilm, Exudate, Fibrin and Slough    Instruments:  Forceps and Scissors  Bleeding:  None  Patient tolerance:  Patient tolerated the procedure well with no immediate complications    "

## 2024-10-01 ENCOUNTER — HOSPITAL ENCOUNTER (OUTPATIENT)
Dept: WOUND CARE | Facility: HOSPITAL | Age: 44
Discharge: HOME | End: 2024-10-01
Attending: NURSE PRACTITIONER
Payer: MEDICARE

## 2024-10-01 VITALS
HEIGHT: 70 IN | BODY MASS INDEX: 18.62 KG/M2 | SYSTOLIC BLOOD PRESSURE: 100 MMHG | WEIGHT: 130.06 LBS | DIASTOLIC BLOOD PRESSURE: 59 MMHG | RESPIRATION RATE: 17 BRPM | HEART RATE: 77 BPM

## 2024-10-01 DIAGNOSIS — T21.22XA BURN OF ABDOMEN WALL, SECOND DEGREE, INITIAL ENCOUNTER: ICD-10-CM

## 2024-10-01 DIAGNOSIS — L89.013 PRESSURE INJURY OF RIGHT ELBOW, STAGE 3: ICD-10-CM

## 2024-10-01 DIAGNOSIS — L89.154 PRESSURE INJURY OF SACRAL REGION, STAGE 4: ICD-10-CM

## 2024-10-01 DIAGNOSIS — L89.44 PRESSURE INJURY OF CONTIGUOUS REGION INVOLVING BACK AND RIGHT HIP, STAGE 4: Primary | ICD-10-CM

## 2024-10-01 DIAGNOSIS — M86.28 SUBACUTE OSTEOMYELITIS, OTHER SITE: ICD-10-CM

## 2024-10-01 DIAGNOSIS — G82.50 QUADRIPLEGIA: ICD-10-CM

## 2024-10-01 PROCEDURE — 97597 DBRDMT OPN WND 1ST 20 CM/<: CPT

## 2024-10-01 PROCEDURE — 99213 OFFICE O/P EST LOW 20 MIN: CPT | Mod: 25

## 2024-10-01 PROCEDURE — 27000999 HC MEDICAL RECORD PHOTO DOCUMENTATION

## 2024-10-01 NOTE — PROCEDURES
"Debridement    Date/Time: 10/1/2024 11:00 AM    Performed by: Ashley Coto NP  Authorized by: Ashley Coto NP    Time out: Immediately prior to procedure a "time out" was called to verify the correct patient, procedure, equipment, support staff and site/side marked as required.    Consent Done?:  Yes (Verbal)    Preparation: Patient was prepped and draped with clean technique    Local anesthesia used?: No      Wound Details:    Location:  Right elbow    Type of Debridement:  Non-excisional       Length (cm):  1.5       Area (sq cm):  1.05       Width (cm):  0.7       Percent Debrided (%):  100       Depth (cm):  0.3       Total Area Debrided (sq cm):  1.05    Depth of debridement:  Epidermis/Dermis    Devitalized tissue debrided:  Biofilm, Callus, Exudate and Fibrin    Instruments:  Curette (Dermal)  Bleeding:  None    2nd Wound Details:     Debridement - 2nd Wound - General Location: Abdomen.    Type of Debridement:  Non-excisional       Length (cm):  1.5       Area (sq cm):  3       Width (cm):  2       Percent Debrided (%):  100       Depth (cm):  0.2       Total Area Debrided (sq cm):  3    Depth of debridement:  Epidermis/Dermis    Devitalized tissue debrided:  Biofilm, Exudate, Fibrin and Slough    Instruments:  Curette (Dermal)  Bleeding:  None    3rd Wound Details:     Location:  Right leg (Hip)    Type of Debridement:  Non-excisional       Length (cm):  2.3       Area (sq cm):  3.22       Width (cm):  1.4       Percent Debrided (%):  100       Depth (cm):  1       Total Area Debrided (sq cm):  3.22    Depth of debridement:  Epidermis/Dermis    Devitalized tissue debrided:  Biofilm, Exudate, Fibrin and Slough    Instruments:  Curette (Dermal)  Patient tolerance:  Patient tolerated the procedure well with no immediate complications    "

## 2024-10-01 NOTE — PROGRESS NOTES
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 advised to use that medication no more than 4 days.  He is to contact his PCP if the diarrhea does not resolve within that time period.     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 Sandy Owusu on 8/21/23.  This wound has progress except personally well and is remaining closed.  The patient is using only iodine along the surgical incision line.  He does have a follow up with his surgeon tomorrow, 09/26/2023.  He was advised today that he can begin application coconut oil with vitamin C along the closed incision line which is fully approximated.  We will follow this incision line for one additional visit then d/c if no issues develop.    He has had a long term wound at the sacrum which today is open again.  It was mechanically debrided.  Endoform as applied to the wound bed, which will be left in place until Friday at which time he will change to silver alginate.     10/1/24 - The patient returns today for followup on several wounds.  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  This wound has failed to heal.  He was seen by a plastic surgeon on 09/25/2024 with the hopes of possibly having a flap performed.  However, he was told by the PA that  nothing could be done due to the depth of the wound.  Today, that wound was assessed and selectively debrided.  There is a slight amount of epithelial tissue migrated from the wound margin, however there is still quite a bit of depth.  We will manage the wound as much as we can in wound clinic since additional surgery is not an option.  The wound to the elbow is making excellent progress.  A selective debridement was performed.  He has a burn to the abdomen which is progressing well.  He has been using Silvadene on this area and he was instructed to begin just covering the area from now on.  Lastly, he does have a pressure injury at the sacrum.  Today, that wound appears to be closed, however, it has reopened multiple times.  We will leave that wound as an open wound to continue to monitor.      Review of Systems   Constitutional: Negative.    Respiratory: Negative.     Cardiovascular: Negative.    Skin:         As documented in the HPI.   All other systems reviewed and are negative.        Objective:      Vitals:    10/01/24 1112   BP: (!) 100/59   Pulse: 77   Resp: 17     Physical Exam  Vitals and nursing note reviewed. Exam conducted with a chaperone present.   Constitutional:       Appearance: Normal appearance.   Cardiovascular:      Rate and Rhythm: Normal rate.   Pulmonary:      Effort: Pulmonary effort is normal.   Skin:     General: Skin is warm and dry.      Comments: right hip pressure injury, sacral pressure injury, right posterior elbow pressure injury   Neurological:      Mental Status: He is alert.            Altered Skin Integrity 09/25/23 1153 Sacral spine #1 (Active)   09/25/23 1153   Altered Skin Integrity Present on Admission - Did Patient arrive to the hospital with altered skin?: yes   Side:    Orientation:    Location: Sacral spine   Wound Number: #1   Is this injury device related?:    Primary Wound Type:    Description of Altered Skin Integrity:    Ankle-Brachial Index:    Pulses:    Removal  Indication and Assessment:    Wound Outcome:    (Retired) Wound Length (cm):    (Retired) Wound Width (cm):    (Retired) Depth (cm):    Wound Description (Comments):    Removal Indications:    Dressing Appearance Moist drainage 10/01/24 1113   Drainage Amount Small 10/01/24 1113   Drainage Characteristics/Odor Serosanguineous 10/01/24 1113   Appearance Pink 10/01/24 1113   Tissue loss description Full thickness 10/01/24 1113   Periwound Area Intact 10/01/24 1113   Wound Edges Open 10/01/24 1113   Wound Length (cm) 0.2 cm 10/01/24 1113   Wound Width (cm) 0.2 cm 10/01/24 1113   Wound Depth (cm) 0.2 cm 10/01/24 1113   Wound Volume (cm^3) 0.008 cm^3 10/01/24 1113   Wound Surface Area (cm^2) 0.04 cm^2 10/01/24 1113   Care Cleansed with:;Wound cleanser 10/01/24 1113   Dressing Applied;Other (comment) 10/01/24 1113   Dressing Change Due 10/02/24 10/01/24 1113            Altered Skin Integrity 10/09/23 1141 Right Hip #2 (Active)   10/09/23 1141   Altered Skin Integrity Present on Admission - Did Patient arrive to the hospital with altered skin?: yes   Side: Right   Orientation:    Location: Hip   Wound Number: #2   Is this injury device related?: No   Primary Wound Type:    Description of Altered Skin Integrity:    Ankle-Brachial Index:    Pulses:    Removal Indication and Assessment:    Wound Outcome:    (Retired) Wound Length (cm):    (Retired) Wound Width (cm):    (Retired) Depth (cm):    Wound Description (Comments):    Removal Indications:    Dressing Appearance Moist drainage 10/01/24 1113   Drainage Amount Large 10/01/24 1113   Drainage Characteristics/Odor Serosanguineous 10/01/24 1113   Appearance Red;Moist;Tan;Adipose;Fibrin;Slough;Granulating 10/01/24 1113   Tissue loss description Full thickness 10/01/24 1113   Periwound Area Intact 10/01/24 1113   Wound Edges Open 10/01/24 1113   Wound Length (cm) 2.3 cm 10/01/24 1113   Wound Width (cm) 1.4 cm 10/01/24 1113   Wound Depth (cm) 1 cm 10/01/24 1113   Wound Volume  (cm^3) 3.22 cm^3 10/01/24 1113   Wound Surface Area (cm^2) 3.22 cm^2 10/01/24 1113   Undermining (depth (cm)/location) (circumf) 3cm at 5 o'clock 10/01/24 1113   Care Cleansed with:;Wound cleanser 10/01/24 1113   Dressing Applied;Other (comment) 10/01/24 1113   Dressing Change Due 10/02/24 10/01/24 1113            Wound 08/06/24 1150 Fissure Right posterior Elbow #3 (Active)   08/06/24 1150   Present on Original Admission: Y   Primary Wound Type: Fissure   Side: Right   Orientation: posterior   Location: Elbow   Wound Approximate Age at First Assessment (Weeks):    Wound Number: #3   Is this injury device related?:    Incision Type:    Closure Method:    Wound Description (Comments):    Type:    Additional Comments:    Ankle-Brachial Index:    Pulses:    Removal Indication and Assessment:    Wound Outcome:    Dressing Appearance Moist drainage 10/01/24 1113   Drainage Amount Moderate 10/01/24 1113   Drainage Characteristics/Odor Serosanguineous 10/01/24 1113   Appearance Moist;Red;Slough;Granulating 10/01/24 1113   Tissue loss description Full thickness 10/01/24 1113   Periwound Area Dry 10/01/24 1113   Wound Edges Callused 10/01/24 1113   Wound Length (cm) 1.5 cm 10/01/24 1113   Wound Width (cm) 0.7 cm 10/01/24 1113   Wound Depth (cm) 0.3 cm 10/01/24 1113   Wound Volume (cm^3) 0.315 cm^3 10/01/24 1113   Wound Surface Area (cm^2) 1.05 cm^2 10/01/24 1113   Care Cleansed with:;Wound cleanser 10/01/24 1113   Dressing Applied;Other (comment) 10/01/24 1113   Dressing Change Due 10/02/24 10/01/24 1113            Wound 09/17/24 Other (comment) Abdomen #4 (Active)   09/17/24    Present on Original Admission: Y   Primary Wound Type: Other   Side:    Orientation:    Location: Abdomen   Wound Approximate Age at First Assessment (Weeks):    Wound Number: #4   Is this injury device related?: No   Incision Type:    Closure Method:    Wound Description (Comments):    Type:    Additional Comments:    Ankle-Brachial Index:   "  Pulses:    Removal Indication and Assessment:    Wound Outcome:    Dressing Appearance Dried drainage 10/01/24 1113   Drainage Amount Moderate 10/01/24 1113   Drainage Characteristics/Odor Serosanguineous 10/01/24 1113   Appearance Fibrin;Dry;Red;Granulating 10/01/24 1113   Tissue loss description Full thickness 10/01/24 1113   Periwound Area Intact 10/01/24 1113   Wound Edges Open 10/01/24 1113   Wound Length (cm) 1.5 cm 10/01/24 1113   Wound Width (cm) 2 cm 10/01/24 1113   Wound Depth (cm) 0.2 cm 10/01/24 1113   Wound Volume (cm^3) 0.6 cm^3 10/01/24 1113   Wound Surface Area (cm^2) 3 cm^2 10/01/24 1113   Care Cleansed with:;Wound cleanser 10/01/24 1113   Dressing Applied;Other (comment) 10/01/24 1113   Dressing Change Due 10/02/24 10/01/24 1113     10/1/24      Right elbow        Post debridement  Silver alginate, 4x4 gauze, kerlix, tape, tubigrip      Sacrum   4x4 gentle foam border dressing        Right elbow        Post debridement  Versatel, silver alginate, 6x6 gentle foam border dressing          Abdomen        Post debridement  Island dressing  CT    Assessment:         ICD-10-CM ICD-9-CM   1. 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 Pressure injury of right elbow, stage 3  L89.013 707.01     707.23   3. Pressure injury of sacral region, stage 4  L89.154 707.03     707.24   4. Burn of abdomen wall, second degree, initial encounter  T21.22XA 942.23   5. Quadriplegia  G82.50 344.00   6. Subacute osteomyelitis, other site  M86.28 730.08         Procedures:     Debridement     Date/Time: 10/1/2024 11:00 AM     Performed by: Ashley Coto NP  Authorized by: Ashley Coto NP    Time out: Immediately prior to procedure a "time out" was called to verify the correct patient, procedure, equipment, support staff and site/side marked as required.     Consent Done?:  Yes (Verbal)     Preparation: Patient was prepped and draped with clean " technique    Local anesthesia used?: No       Wound Details:    Location:  Right elbow    Type of Debridement:  Non-excisional       Length (cm):  1.5       Area (sq cm):  1.05       Width (cm):  0.7       Percent Debrided (%):  100       Depth (cm):  0.3       Total Area Debrided (sq cm):  1.05    Depth of debridement:  Epidermis/Dermis    Devitalized tissue debrided:  Biofilm, Callus, Exudate and Fibrin    Instruments:  Curette (Dermal)  Bleeding:  None     2nd Wound Details:     Debridement - 2nd Wound - General Location: Abdomen.    Type of Debridement:  Non-excisional       Length (cm):  1.5       Area (sq cm):  3       Width (cm):  2       Percent Debrided (%):  100       Depth (cm):  0.2       Total Area Debrided (sq cm):  3    Depth of debridement:  Epidermis/Dermis    Devitalized tissue debrided:  Biofilm, Exudate, Fibrin and Slough    Instruments:  Curette (Dermal)  Bleeding:  None     3rd Wound Details:     Location:  Right leg (Hip)    Type of Debridement:  Non-excisional       Length (cm):  2.3       Area (sq cm):  3.22       Width (cm):  1.4       Percent Debrided (%):  100       Depth (cm):  1       Total Area Debrided (sq cm):  3.22    Depth of debridement:  Epidermis/Dermis    Devitalized tissue debrided:  Biofilm, Exudate, Fibrin and Slough    Instruments:  Curette (Dermal)  Patient tolerance:  Patient tolerated the procedure well with no immediate complications       [] Yes [] No   I & D performed  [] Yes [] No   Excisional debridement performed  [x] Yes [] No   Selective debridement performed        [] Yes [] No   Mechanical debridement performed         [] Yes [] No  Silver nitrate applied                                     [] Yes [] No  Labs ordered this visit                                  [] Yes [] No   Imaging ordered this visit                           [] Yes [] No   Tissue pathology and/or culture taken       MEDICATIONS    Current Outpatient Medications:     acetaminophen (TYLENOL)  325 MG tablet, Take 650 mg by mouth every 6 (six) hours as needed for Pain., Disp: , Rfl:     ascorbic acid, vitamin C, (VITAMIN C) 1000 MG tablet, Take 1,000 mg by mouth every evening., Disp: , Rfl:     aspirin 325 MG tablet, Take 325 mg by mouth every morning. Stopped x 1 month, Disp: , Rfl:     baclofen (LIORESAL) 5 mg Tab tablet, Take 1 tablet (5 mg total) by mouth 3 (three) times daily., Disp: 90 tablet, Rfl: 0    bisacodyL (DULCOLAX) 10 mg Supp, Place 10 mg rectally daily as needed., Disp: , Rfl:     busPIRone (BUSPAR) 10 MG tablet, Take 1 tablet (10 mg total) by mouth 2 (two) times daily., Disp: 60 tablet, Rfl: 5    cetirizine (ZYRTEC) 10 MG tablet, Take 10 mg by mouth 2 (two) times a day., Disp: , Rfl:     cloNIDine (CATAPRES) 0.1 MG tablet, Take 0.1 mg by mouth daily as needed., Disp: , Rfl:     DULoxetine (CYMBALTA) 30 MG capsule, Take 1 capsule by mouth 2 (two) times daily., Disp: , Rfl:     ferrous sulfate (SLOW RELEASE IRON) 142 mg (45 mg iron) TbSR, 1 tablet Orally Three times a Week for 30 days, Disp: , Rfl:     fexofenadine (ALLEGRA) 180 MG tablet, Take 180 mg by mouth every morning., Disp: , Rfl:     fluticasone propionate (FLONASE ALLERGY RELIEF) 50 mcg/actuation nasal spray, 1 spray by Each Nostril route daily as needed., Disp: , Rfl:     heparin, porcine, PF, (HEPARIN FLUSH 100 UNITS/ML) 100 unit/mL Syrg, flush mediport with 5ml every FOUR weeks, Disp: , Rfl:     JATENZO 237 mg Cap, , Disp: , Rfl:     meloxicam (MOBIC) 7.5 MG tablet, Take 7.5 mg by mouth 2 (two) times daily as needed for Pain., Disp: , Rfl:     midodrine (PROAMATINE) 2.5 MG Tab, TAKE ONE TABLET BY MOUTH THREE TIMES DAILY AS NEEDED Systolic blood pressure less than 90 or Diastolic less than 60, Disp: , Rfl:     multivitamin/iron/folic acid (CENTRUM ORAL), Take 1 tablet by mouth every morning., Disp: , Rfl:     NON FORMULARY MEDICATION, Take 3 drops by mouth 2 (two) times daily as needed. THC, Disp: , Rfl:     NORMAL SALINE FLUSH  "injection, flush mediport with 10ml every FOUR weeks, Disp: , Rfl:     urea (CARMOL) 40 % Crea, Apply topically once daily., Disp: 85 g, Rfl: 2    zinc gluconate 50 mg tablet, Take 1 tablet (50 mg total) by mouth once daily. (Patient taking differently: Take 50 mg by mouth every morning.), Disp: 30 tablet, Rfl: 1  No current facility-administered medications for this encounter.    Facility-Administered Medications Ordered in Other Encounters:     lactated ringers infusion, , Intravenous, Continuous, Cr Garibay DO, Last Rate: 10 mL/hr at 05/17/24 0705, Restarted at 05/17/24 0859   Review of patient's allergies indicates:   Allergen Reactions    Levofloxacin Rash       DIAGNOSTICS    Labs/Scans/Micro:    CBC:   Lab Results   Component Value Date    WBC 10.42 05/14/2024    HGB 13.9 (L) 05/14/2024    HCT 44.3 05/14/2024    MCV 88.8 05/14/2024     05/14/2024     Sedimentation rate:   Lab Results   Component Value Date    SEDRATE 22 (H) 12/15/2022    C-reactive protein:   Lab Results   Component Value Date    CRP 44.90 (H) 12/15/2022    Chemistry: [unfilled]  HBA1C: No components found for: "HBA1C"    Ankle Brachial Index: No results found for this or any previous visit.    Imaging:    Plain film: No results found for this or any previous visit.    MRI: No results found for this or any previous visit.    Bone Scan: No results found for this or any previous visit.    Vascular studies:    Microbiology: Rt hip, 4/2/24        HOME HEALTH AGENCY: [facility]     WOUND CARE ORDERS:  Right hip wound: Cleanse with wound cleanser, apply adaptic touch over exposed bone, cover with silver alginate, ABD pad and tape to be changed daily   Sacrum: Cleanse with wound cleanser and apply silver alginate and an island dressing to be changed daily   Right elbow: Cleanse with wound cleanser, apply aquaphor to callused area, cover wound with silver alginate, 4x4 gauze and wrap with kerlix, secure with tape and " tubigrip to be changed daily   Abdominal wound: Cleanse with wound cleanser, apply island dressing to be changed daily       Follow up in 2 weeks (on 10/15/2024) for PU - stretcher .

## 2024-10-15 ENCOUNTER — HOSPITAL ENCOUNTER (OUTPATIENT)
Dept: WOUND CARE | Facility: HOSPITAL | Age: 44
Discharge: HOME | End: 2024-10-15
Attending: NURSE PRACTITIONER
Payer: MEDICARE

## 2024-10-15 VITALS
SYSTOLIC BLOOD PRESSURE: 104 MMHG | DIASTOLIC BLOOD PRESSURE: 73 MMHG | BODY MASS INDEX: 18.62 KG/M2 | WEIGHT: 130.06 LBS | HEIGHT: 70 IN | HEART RATE: 74 BPM

## 2024-10-15 DIAGNOSIS — L89.013 PRESSURE INJURY OF RIGHT ELBOW, STAGE 3: ICD-10-CM

## 2024-10-15 DIAGNOSIS — L89.154 PRESSURE INJURY OF SACRAL REGION, STAGE 4: ICD-10-CM

## 2024-10-15 DIAGNOSIS — T21.22XA BURN OF ABDOMEN WALL, SECOND DEGREE, INITIAL ENCOUNTER: ICD-10-CM

## 2024-10-15 DIAGNOSIS — G82.50 QUADRIPLEGIA: ICD-10-CM

## 2024-10-15 DIAGNOSIS — L89.44 PRESSURE INJURY OF CONTIGUOUS REGION INVOLVING BACK AND RIGHT HIP, STAGE 4: Primary | ICD-10-CM

## 2024-10-15 PROCEDURE — 99212 OFFICE O/P EST SF 10 MIN: CPT

## 2024-10-15 PROCEDURE — 97597 DBRDMT OPN WND 1ST 20 CM/<: CPT

## 2024-10-15 PROCEDURE — 27000999 HC MEDICAL RECORD PHOTO DOCUMENTATION

## 2024-10-15 NOTE — PROGRESS NOTES
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 imodium and advised to use that medication no more than 4 days.  He is to contact his PCP if the diarrhea does not resolve within that time period.     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 Sandy Owusu on 8/21/23.  This wound has progress except personally well and is remaining closed.  The patient is using only iodine along the surgical incision line.  He does have a follow up with his surgeon tomorrow, 09/26/2023.  He was advised today that he can begin application coconut oil with vitamin C along the closed incision line which is fully approximated.  We will follow this incision line for one additional visit then d/c if no issues develop.    He has had a long term wound at the sacrum which today is open again.  It was mechanically debrided.  Endoform as applied to the wound bed, which will be left in place until Friday at which time he will change to silver alginate.     10/1/24 - The patient returns today for followup on several wounds.  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  This wound has failed to heal.  He was seen by a plastic surgeon on 09/25/2024 with the hopes of possibly having a flap performed.  However, he was told by the PA that  nothing could be done due to the depth of the wound.  Today, that wound was assessed and selectively debrided.  There is a slight amount of epithelial tissue migrated from the wound margin, however there is still quite a bit of depth.  We will manage the wound as much as we can in wound clinic since additional surgery is not an option.  The wound to the elbow is making excellent progress.  A selective debridement was performed.  He has a burn to the abdomen which is progressing well.  He has been using Silvadene on this area and he was instructed to begin just covering the area from now on.  Lastly, he does have a pressure injury at the sacrum.  Today, that wound appears to be closed, however, it has reopened multiple times.  We will leave that wound as an open wound to continue to monitor.    10/15/24 - The abdominal wound remains stable and will be monitored.  The sacral wound is slightly larger.  The right hip remains stable with no S & S of infection.  The right elbow was selectively debrided and has made good progress.       Review of Systems   Constitutional: Negative.    Respiratory: Negative.     Cardiovascular: Negative.    Skin:         As documented in the HPI.   All other systems reviewed and are negative.        Objective:      Vitals:    10/15/24 1114   BP: 104/73   Pulse: 74     Physical Exam  Vitals and nursing note reviewed. Exam conducted with a chaperone present.   Constitutional:       Appearance: Normal appearance.   Cardiovascular:      Rate and Rhythm: Normal rate.   Pulmonary:      Effort: Pulmonary effort is normal.   Skin:     General: Skin is warm and dry.      Comments: right hip pressure injury, sacral pressure injury, right posterior elbow pressure injury   Neurological:      Mental Status: He is alert.            Altered Skin Integrity 09/25/23 1153 Sacral spine #1 (Active)   09/25/23 1153   Altered Skin Integrity Present on Admission - Did Patient arrive to the hospital with altered  skin?: yes   Side:    Orientation:    Location: Sacral spine   Wound Number: #1   Is this injury device related?:    Primary Wound Type:    Description of Altered Skin Integrity:    Ankle-Brachial Index:    Pulses:    Removal Indication and Assessment:    Wound Outcome:    (Retired) Wound Length (cm):    (Retired) Wound Width (cm):    (Retired) Depth (cm):    Wound Description (Comments):    Removal Indications:    Dressing Appearance Moist drainage 10/15/24 1124   Drainage Amount Small 10/15/24 1124   Drainage Characteristics/Odor Serosanguineous 10/15/24 1124   Appearance Pink 10/15/24 1124   Tissue loss description Full thickness 10/15/24 1124   Periwound Area Intact;Moist 10/15/24 1124   Wound Edges Open 10/15/24 1124   Wound Length (cm) 0.4 cm 10/15/24 1124   Wound Width (cm) 0.4 cm 10/15/24 1124   Wound Depth (cm) 0.2 cm 10/15/24 1124   Wound Volume (cm^3) 0.032 cm^3 10/15/24 1124   Wound Surface Area (cm^2) 0.16 cm^2 10/15/24 1124   Care Cleansed with: 10/15/24 1124   Dressing Applied 10/15/24 1124   Dressing Change Due 10/16/24 10/15/24 1124            Altered Skin Integrity 10/09/23 1141 Right Hip #2 (Active)   10/09/23 1141   Altered Skin Integrity Present on Admission - Did Patient arrive to the hospital with altered skin?: yes   Side: Right   Orientation:    Location: Hip   Wound Number: #2   Is this injury device related?: No   Primary Wound Type:    Description of Altered Skin Integrity:    Ankle-Brachial Index:    Pulses:    Removal Indication and Assessment:    Wound Outcome:    (Retired) Wound Length (cm):    (Retired) Wound Width (cm):    (Retired) Depth (cm):    Wound Description (Comments):    Removal Indications:    Dressing Appearance Moist drainage;Intact 10/15/24 1124   Drainage Amount Large 10/15/24 1124   Drainage Characteristics/Odor Serosanguineous 10/15/24 1124   Appearance Red;Intact;Slough;Moist 10/15/24 1124   Tissue loss description Full thickness 10/15/24 1124   Periwound Area  Intact 10/15/24 1124   Wound Edges Open 10/15/24 1124   Wound Length (cm) 2.5 cm 10/15/24 1124   Wound Width (cm) 1.5 cm 10/15/24 1124   Wound Depth (cm) 1 cm 10/15/24 1124   Wound Volume (cm^3) 3.75 cm^3 10/15/24 1124   Wound Surface Area (cm^2) 3.75 cm^2 10/15/24 1124   Undermining (depth (cm)/location) 2.8cm at 5 o'clock 10/15/24 1124   Care Cleansed with:;Debrided;Wound cleanser 10/15/24 1124   Dressing Applied;Other (comment) 10/15/24 1124   Dressing Change Due 10/16/24 10/15/24 1124            Wound 08/06/24 1150 Fissure Right posterior Elbow #3 (Active)   08/06/24 1150   Present on Original Admission: Y   Primary Wound Type: Fissure   Side: Right   Orientation: posterior   Location: Elbow   Wound Approximate Age at First Assessment (Weeks):    Wound Number: #3   Is this injury device related?:    Incision Type:    Closure Method:    Wound Description (Comments):    Type:    Additional Comments:    Ankle-Brachial Index:    Pulses:    Removal Indication and Assessment:    Wound Outcome:    Dressing Appearance Intact;Moist drainage 10/15/24 1124   Drainage Amount Moderate 10/15/24 1124   Drainage Characteristics/Odor Serosanguineous 10/15/24 1124   Appearance Red;Moist;Intact 10/15/24 1124   Tissue loss description Full thickness 10/15/24 1124   Periwound Area Dry;Intact 10/15/24 1124   Wound Edges Callused 10/15/24 1124   Wound Length (cm) 1.1 cm 10/15/24 1124   Wound Width (cm) 0.7 cm 10/15/24 1124   Wound Depth (cm) 0.2 cm 10/15/24 1124   Wound Volume (cm^3) 0.154 cm^3 10/15/24 1124   Wound Surface Area (cm^2) 0.77 cm^2 10/15/24 1124   Care Cleansed with:;Other (see comments) 10/15/24 1124   Dressing Applied 10/15/24 1124   Dressing Change Due 10/16/24 10/15/24 1124       [REMOVED]      Wound 09/17/24 Other (comment) Abdomen #4 (Removed)   09/17/24    Present on Original Admission: Y   Primary Wound Type: Other   Side:    Orientation:    Location: Abdomen   Wound Approximate Age at First Assessment (Weeks):   "  Wound Number: #4   Is this injury device related?: No   Incision Type:    Closure Method:    Wound Description (Comments):    Type:    Additional Comments:    Ankle-Brachial Index:    Pulses:    Removal Indication and Assessment:    Wound Outcome: Healed   Removed 10/15/24 1301   Dressing Appearance Dry;Intact 10/15/24 1124   Drainage Amount None 10/15/24 1124   Appearance Intact;Eschar 10/15/24 1124   Periwound Area Intact;Dry 10/15/24 1124   Wound Edges Rolled/closed 10/15/24 1124   Wound Length (cm) 0 cm 10/15/24 1124   Wound Width (cm) 0 cm 10/15/24 1124   Wound Depth (cm) 0 cm 10/15/24 1124   Wound Volume (cm^3) 0 cm^3 10/15/24 1124   Wound Surface Area (cm^2) 0 cm^2 10/15/24 1124   Care Cleansed with:;Wound cleanser 10/15/24 1124   Dressing Applied 10/15/24 1124   Dressing Change Due 10/16/24 10/15/24 1124     10/15/24    Sacrum  Silver alginate, 4x4, gentle border      Abdomen  Island dressing          Right elbow- Pre                                        Post  Island dressing         Right hip  Versatel, silver alginate, gentle border  TR    Assessment:         ICD-10-CM ICD-9-CM   1. 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 Pressure injury of right elbow, stage 3  L89.013 707.01     707.23   3. Pressure injury of sacral region, stage 4  L89.154 707.03     707.24   4. Burn of abdomen wall, second degree, initial encounter  T21.22XA 942.23   5. Quadriplegia  G82.50 344.00           Procedures:     Debridement     Date/Time: 10/15/2024 11:00 AM     Performed by: Ashley Coto NP  Authorized by: Ashley Coto NP    Time out: Immediately prior to procedure a "time out" was called to verify the correct patient, procedure, equipment, support staff and site/side marked as required.     Consent Done?:  Yes (Verbal)     Preparation: Patient was prepped and draped with clean technique    Local anesthesia used?: No       Wound Details:    " Location:  Right elbow    Type of Debridement:  Non-excisional       Length (cm):  1.1       Area (sq cm):  0.77       Width (cm):  0.7       Percent Debrided (%):  100       Depth (cm):  0.2       Total Area Debrided (sq cm):  0.77    Depth of debridement:  Epidermis/Dermis    Devitalized tissue debrided:  Biofilm, Callus, Exudate and Fibrin    Instruments:  Blade (#15)  Bleeding:  None  Patient tolerance:  Patient tolerated the procedure well with no immediate complications       [] Yes [] No   I & D performed  [] Yes [] No   Excisional debridement performed  [x] Yes [] No   Selective debridement performed        [] Yes [] No   Mechanical debridement performed         [] Yes [] No  Silver nitrate applied                                     [] Yes [] No  Labs ordered this visit                                  [] Yes [] No   Imaging ordered this visit                           [] Yes [] No   Tissue pathology and/or culture taken       MEDICATIONS    Current Outpatient Medications:     acetaminophen (TYLENOL) 325 MG tablet, Take 650 mg by mouth every 6 (six) hours as needed for Pain., Disp: , Rfl:     ascorbic acid, vitamin C, (VITAMIN C) 1000 MG tablet, Take 1,000 mg by mouth every evening., Disp: , Rfl:     aspirin 325 MG tablet, Take 325 mg by mouth every morning. Stopped x 1 month, Disp: , Rfl:     baclofen (LIORESAL) 5 mg Tab tablet, Take 1 tablet (5 mg total) by mouth 3 (three) times daily., Disp: 90 tablet, Rfl: 0    bisacodyL (DULCOLAX) 10 mg Supp, Place 10 mg rectally daily as needed., Disp: , Rfl:     busPIRone (BUSPAR) 10 MG tablet, Take 1 tablet (10 mg total) by mouth 2 (two) times daily., Disp: 60 tablet, Rfl: 5    cetirizine (ZYRTEC) 10 MG tablet, Take 10 mg by mouth 2 (two) times a day., Disp: , Rfl:     cloNIDine (CATAPRES) 0.1 MG tablet, Take 0.1 mg by mouth daily as needed., Disp: , Rfl:     DULoxetine (CYMBALTA) 30 MG capsule, Take 1 capsule by mouth 2 (two) times daily., Disp: , Rfl:     ferrous  sulfate (SLOW RELEASE IRON) 142 mg (45 mg iron) TbSR, 1 tablet Orally Three times a Week for 30 days, Disp: , Rfl:     fexofenadine (ALLEGRA) 180 MG tablet, Take 180 mg by mouth every morning., Disp: , Rfl:     fluticasone propionate (FLONASE ALLERGY RELIEF) 50 mcg/actuation nasal spray, 1 spray by Each Nostril route daily as needed., Disp: , Rfl:     heparin, porcine, PF, (HEPARIN FLUSH 100 UNITS/ML) 100 unit/mL Syrg, flush mediport with 5ml every FOUR weeks, Disp: , Rfl:     JATENZO 237 mg Cap, , Disp: , Rfl:     meloxicam (MOBIC) 7.5 MG tablet, Take 7.5 mg by mouth 2 (two) times daily as needed for Pain., Disp: , Rfl:     midodrine (PROAMATINE) 2.5 MG Tab, TAKE ONE TABLET BY MOUTH THREE TIMES DAILY AS NEEDED Systolic blood pressure less than 90 or Diastolic less than 60, Disp: , Rfl:     multivitamin/iron/folic acid (CENTRUM ORAL), Take 1 tablet by mouth every morning., Disp: , Rfl:     NON FORMULARY MEDICATION, Take 3 drops by mouth 2 (two) times daily as needed. THC, Disp: , Rfl:     NORMAL SALINE FLUSH injection, flush mediport with 10ml every FOUR weeks, Disp: , Rfl:     urea (CARMOL) 40 % Crea, Apply topically once daily., Disp: 85 g, Rfl: 2    zinc gluconate 50 mg tablet, Take 1 tablet (50 mg total) by mouth once daily. (Patient taking differently: Take 50 mg by mouth every morning.), Disp: 30 tablet, Rfl: 1  No current facility-administered medications for this encounter.    Facility-Administered Medications Ordered in Other Encounters:     lactated ringers infusion, , Intravenous, Continuous, Cr Garibay DO, Last Rate: 10 mL/hr at 05/17/24 0705, Restarted at 05/17/24 0859   Review of patient's allergies indicates:   Allergen Reactions    Levofloxacin Rash       DIAGNOSTICS    Labs/Scans/Micro:    CBC:   Lab Results   Component Value Date    WBC 10.42 05/14/2024    HGB 13.9 (L) 05/14/2024    HCT 44.3 05/14/2024    MCV 88.8 05/14/2024     05/14/2024     Sedimentation rate:   Lab Results  "  Component Value Date    SEDRATE 22 (H) 12/15/2022    C-reactive protein:   Lab Results   Component Value Date    CRP 44.90 (H) 12/15/2022    Chemistry: [unfilled]  HBA1C: No components found for: "HBA1C"    Ankle Brachial Index: No results found for this or any previous visit.    Imaging:    Plain film: No results found for this or any previous visit.    MRI: No results found for this or any previous visit.    Bone Scan: No results found for this or any previous visit.    Vascular studies:    Microbiology: Rt hip, 4/2/24        HOME HEALTH AGENCY: [facility]     WOUND CARE ORDERS:  Right hip wound: Cleanse with wound cleanser, apply adaptic touch over exposed bone, cover with silver alginate, ABD pad and tape to be changed daily   Sacrum: Cleanse with wound cleanser and apply silver alginate and an island dressing to be changed daily   Right elbow: Cleanse with wound cleanser, apply aquaphor to callused area, cover wound with silver alginate, 4x4 gauze and wrap with kerlix, secure with tape and tubigrip to be changed daily   Abdominal wound: Cleanse with wound cleanser, apply island dressing to be changed daily       Follow up in about 2 weeks (around 10/29/2024).        "

## 2024-10-15 NOTE — PROCEDURES
"Debridement    Date/Time: 10/15/2024 11:00 AM    Performed by: Ashley Coto NP  Authorized by: Ashley Coto NP    Time out: Immediately prior to procedure a "time out" was called to verify the correct patient, procedure, equipment, support staff and site/side marked as required.    Consent Done?:  Yes (Verbal)    Preparation: Patient was prepped and draped with clean technique    Local anesthesia used?: No      Wound Details:    Location:  Right elbow    Type of Debridement:  Non-excisional       Length (cm):  1.1       Area (sq cm):  0.77       Width (cm):  0.7       Percent Debrided (%):  100       Depth (cm):  0.2       Total Area Debrided (sq cm):  0.77    Depth of debridement:  Epidermis/Dermis    Devitalized tissue debrided:  Biofilm, Callus, Exudate and Fibrin    Instruments:  Blade (#15)  Bleeding:  None  Patient tolerance:  Patient tolerated the procedure well with no immediate complications    "

## 2024-10-29 ENCOUNTER — HOSPITAL ENCOUNTER (OUTPATIENT)
Dept: WOUND CARE | Facility: HOSPITAL | Age: 44
Discharge: HOME | End: 2024-10-29
Attending: NURSE PRACTITIONER
Payer: MEDICARE

## 2024-10-29 VITALS
RESPIRATION RATE: 18 BRPM | DIASTOLIC BLOOD PRESSURE: 103 MMHG | SYSTOLIC BLOOD PRESSURE: 157 MMHG | HEIGHT: 70 IN | HEART RATE: 65 BPM | WEIGHT: 130.06 LBS | BODY MASS INDEX: 18.62 KG/M2

## 2024-10-29 DIAGNOSIS — L89.44 PRESSURE INJURY OF CONTIGUOUS REGION INVOLVING BACK AND RIGHT HIP, STAGE 4: Primary | ICD-10-CM

## 2024-10-29 DIAGNOSIS — M86.28 SUBACUTE OSTEOMYELITIS, OTHER SITE: ICD-10-CM

## 2024-10-29 DIAGNOSIS — G82.50 QUADRIPLEGIA: ICD-10-CM

## 2024-10-29 DIAGNOSIS — L89.154 PRESSURE INJURY OF SACRAL REGION, STAGE 4: ICD-10-CM

## 2024-10-29 DIAGNOSIS — L89.013 PRESSURE INJURY OF RIGHT ELBOW, STAGE 3: ICD-10-CM

## 2024-10-29 PROCEDURE — 27000999 HC MEDICAL RECORD PHOTO DOCUMENTATION

## 2024-10-29 PROCEDURE — 99213 OFFICE O/P EST LOW 20 MIN: CPT

## 2024-10-29 PROCEDURE — 97597 DBRDMT OPN WND 1ST 20 CM/<: CPT

## 2024-11-06 ENCOUNTER — DOCUMENTATION ONLY (OUTPATIENT)
Dept: WOUND CARE | Facility: HOSPITAL | Age: 44
End: 2024-11-06
Payer: MEDICARE

## 2024-11-06 RX ORDER — SULFAMETHOXAZOLE AND TRIMETHOPRIM 800; 160 MG/1; MG/1
1 TABLET ORAL 2 TIMES DAILY
Qty: 20 TABLET | Refills: 0 | Status: SHIPPED | OUTPATIENT
Start: 2024-11-06 | End: 2024-11-16

## 2024-11-06 NOTE — PROGRESS NOTES
Spoke with patient regarding results of culture that was obtained from the hip on 10/29/2024.    He reports no change of any significance in the wound, no increase in drainage, no increase in odor.  He reports that he feels normal.  We did discuss that the 1 bacteria, Acinetobacter as well as the staph aureus are both concerning and I would like for him to make an appointment with his infectious disease doctor, Dr. Ceron, as soon as possible so that this can be reviewed.  We will also fax these culture results to Dr. Ceron.  A prescription for Bactrim has been sent to the patient's pharmacy, however, was made aware that he may in the and require IV antibiotics and he should touch base with infectious disease in agreement and will call tomorrow to schedule that appointment.

## 2024-11-12 ENCOUNTER — HOSPITAL ENCOUNTER (OUTPATIENT)
Dept: WOUND CARE | Facility: HOSPITAL | Age: 44
Discharge: HOME | End: 2024-11-12
Attending: NURSE PRACTITIONER
Payer: MEDICARE

## 2024-11-12 VITALS
BODY MASS INDEX: 18.62 KG/M2 | HEART RATE: 100 BPM | WEIGHT: 130.06 LBS | HEIGHT: 70 IN | DIASTOLIC BLOOD PRESSURE: 80 MMHG | SYSTOLIC BLOOD PRESSURE: 102 MMHG | RESPIRATION RATE: 18 BRPM

## 2024-11-12 DIAGNOSIS — G82.50 QUADRIPLEGIA: ICD-10-CM

## 2024-11-12 DIAGNOSIS — L89.44 PRESSURE INJURY OF CONTIGUOUS REGION INVOLVING BACK AND RIGHT HIP, STAGE 4: Primary | ICD-10-CM

## 2024-11-12 DIAGNOSIS — L89.013 PRESSURE INJURY OF RIGHT ELBOW, STAGE 3: ICD-10-CM

## 2024-11-12 DIAGNOSIS — A49.01 STAPH AUREUS INFECTION: ICD-10-CM

## 2024-11-12 DIAGNOSIS — L89.154 PRESSURE INJURY OF SACRAL REGION, STAGE 4: ICD-10-CM

## 2024-11-12 DIAGNOSIS — M86.28 SUBACUTE OSTEOMYELITIS, OTHER SITE: ICD-10-CM

## 2024-11-12 NOTE — PROGRESS NOTES
Referred by: Dr. Sidhu  Low air loss mattress [x] Yes [] No   Is the patient eligible for a graft, and/or currently grafting?  [] Yes [x] No   Smoker: yes, vape    NOTES  Reviewed culture results - sent ID results   Encouraged patient to reach out for appt with ID     Subjective:         Patient ID: Werner Jarrett is a 43 y.o. male.    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  Presents today with a new wound for the clinic.  He reports that a couple of weeks ago he has built a plate of red beans on his abdomen which subsequently birth an area on his stomach.  He was at his mobile home in the kitchen when this occured.  He has been applying Silvadene cream to the area daily.  Today, that burn was selectively debrided and he was instructed to continue using the Silvadene cream for the time being.  The right elbow was selectively debrided.  The right hip and sacrum were both mechanically debrided and fully assessed.  The right hip has no significant change.  He is scheduled to see a plastic surgeon on 09/25/2024 at [facility] in [place] ([address] [place] [address]).  He has completed his Bactrim DS and Augmentin that was prescribed from 9/6/to 9/16.  Today the wound at the right hip no longer has odor.    9/3/24 - Mr. Jarrett was last seen by this provider a couple of weeks ago prior to vacation.  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  Today, the wound at the right hip is extremely malodorous.  The patient also reports that he has seen an increase in drainage from this wound.  We have been using a wound VAC over this wound for several weeks with no change.  Today we will DC the wound VAC.  A culture was obtained and the patient was prescribed doxycycline and  clindamycin pending results of the culture.  The sacrum remains stable.  The right elbow was selectively debrided and does have a large area of exposed granular tissue that has developed since he was last seen by this provider.  Of note, he did see his surgeon, Dr. Sandy Jara last week.  We were hoping that he would see a surgeon for evaluation of the hip for a possible flap.  However, that office is closed and he has now been referred to a plastic surgeon in [place].   We will be waiting for that call with the hopes that the patient's condition does not significantly deteriorate prior to that time.  Will follow up with his surgeon in 6 weeks.    August 20, 2024:  43-year-old white male, who is here for follow up of the chronic right hip pressure injury, sacral pressure injury, and right posterior elbow pressure injury.  The wounds have now stalled.  He has been using a wound VAC on the right hip.  The right elbow we will need some debridement today.  He will see his general surgeon next week.    August 6, 2024:  43-year-old white male, with quadriplegia, is here for follow up of his right hip pressure injury, sacral pressure injury, and right posterior elbow pressure injury.  We have been using a wound VAC on his right hip ulcer.  He is using collagen on the sacral wound.  We have just open the right posterior elbow as a new stage II pressure injury, surrounded by callus.  There is no exposed bone at this time.    7/23/14 - Mr. 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  Today that wound was assessed and the hip bone is now nicely covered with tissue.  We will continue the wound vac, using black foam only and increasing the pressure to 125 mmHg continuous pressure.  The wound at the sacrum remains stable with no significant changes.  Of note, he had his appt with ID on 7/18/24, he will follow up with them again in 3 months, there were no new orders from them. He also saw   Sandy, he will follow up again with her in 2 months, in the mean time she will refer him to plastic surgery in [place] for a flap.     7/9/24 - The turns today for followup on the wound to the right hip as well as the small wound to the sacrum.  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  We are using a wound VAC on the right hip and that does seem to be making some minor progress in closing the open space between the muscles of the leg.  Today, a selective debridement was performed in an effort to revise the margins and support growth of granular tissue.  The sacrum was also selectively debrided.  We will continue with the wound VAC on the right hip.  The patient does have an appointment with his surgeon, Dr. Sandy Jara, on 07/11/2024.  He also has an appointment with infectious disease on that same day and we are hoping for some additional guidance from both of them regarding his wounds.    6/26/24 - Mr. 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  The sacrum remained stable with no changes and no signs and symptoms of infection.  The right hip seems to be progressing well, with an increase in the development of granular tissue which is occurring across the exposed bone.  At his last visit there was quite a bit of bruising around the periwound so we decrease the wound VAC pressure from 125 mmHg to 100 mmHg and this seems to be improving the growth of granular tissue significantly.  He is scheduled for his bone biopsy on July 1st at [facility] with Dr. Sandy Jara.  He will then follow up with her on 7/11/24.  He also has an upcoming appointment with infectious disease on 07/14/2024.    6/18/24 - Mr. Jarrett returns today for followup on 2 wounds.  The wound to the sacrum continues to progress well with no signs and symptoms of infection.  The wound  to the right hip was debrided today.  There was a large amount of slough at the top of the wound bed.  We are continuing to use a wound VAC although there does not seem to be any significant progress towards closing this wound.  He did have a followup appointment with the surgeon, Dr. Sandy Jara and she does still intend to do a bone biopsy on this right hip.  Will have a followup appointment with her on 07/11/2024.  He will also see infectious disease in July, 2024.  He does not have that date as of today.  He does still have the dry area on his right elbow.  A refill for Urea cream has been sent to his pharmacy.    6/11/24 - The patient returns today for the wounds to the sacrum and right hip.  The sacral wound is stable and clean, with no S & S of infection.  The right hip does have some bruising around the surface of the wound.  We will decrease the wound vac pressure from 125 mmHg to 100 mmHg continuous pressure.  The elbow is still being monitored and he continues to use Urea cream every other day as well as MediHoney.  He has not heard any further information from Dr. Sandy lucero a bone biopsy, and he has been scheduled with ID for f/up.     6/4/24 - Mr. Jarrett returns today for followup on the 2 pressure injuries, 1 to the sacrum and 1 to the right hip.  He does report that he had a follow up with his surgeon, Dr. Sandy Jara on 05/30/2024.  He states that she has indicated that she will be scheduling a bone biopsy of the right hip due to the patient's osteomyelitis.  She is trying to determine his next course of treatment together with infectious disease consideration.  We are currently using a wound VAC and it does seem to be making progress with the development of granular tissue in the wound bed.  There are no signs and symptoms of infection in this wound.  The sacral wound does have a slight increase in depth.  We had changed to collagen and it may be that that product is to wet.  We  will DC the collagen and begin application of silver alginate only.  A callus paring was also done on the patient's right elbow.  This has been an ongoing issue and he is currently using Aquaphor on the elbow.    5/28/24 - Mr. 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  He had excisional debridement of this right hip on 5/17/24 by Dr. Sandy Jara.  Her op note:   PROCEDURE -   Excisional debridement right hip wound, dimension of debridement 1 cm circumferentially, total wound dimensions 3.5 cm x 3 cm x 1 cm deep.  Blunt debridement right hip wound  FINDINGS -    Rolled edges of skin, completely removed by excisional debridement  Fibrinous material within the wound debrided bluntly and submitted to culture   Today that wound is clean with nice margins.  We have applied a wound vac using white foam covered with black foam.     He had a follow up with her scheduled for 5/23 but missed it and is now re-scheduled for 5/30.   He is expecting an appt with ID but has not been contacted as of today.   In addition to the right hip, he does have a wound at the sacrum that has reopened.  It is clean and we will apply collagen into this wound.  A callus paring was done on the right elbow.     5/14/24 - The Patient returns today for followup on the large pressure injury at the right hip as well as a small pressure injury to the sacrum.  He did have a followup appointment with his surgeon, Dr. Sandy Jara. He is scheduled for an I&D/debridment of his right hip with Dr. Quijano this Friday 5/17/24.  We had a lengthy discussion regarding possibilities such as a wound VAC or if she will leave the wound opened or close that wound.  He does not know at this time but this providers preference would be that she would close the wound.  This wound remains open at this time following another procedure and we were unable to get the wound to close completely in bite of the use of a wound VAC.  He does  still have ligament exposed in the wound has increased in size.  Bone is fully exposed in the wound bed.  He has been using topical antibiotics for 1 month and those will be discontinued as of today.  Until his procedure we will apply Adaptic over the exposed bone with silver alginate on top, with daily dressing changes.  He does expect to see an infectious disease doctor following his procedure and also mentioned that he expects to possibly have IV antibiotics in another short stay at [facility].    4/23/24 - Today the wound at the elbow is nearly resolved.  We will begin application of Aquaphor to the elbow.  The sacrum was assessed and has improved dramatically.  It is nearly resolved.  He has completed the PO Augmentin for a UTI and this has helped wounds as well  Additionally he began topical abx to the right hip on 4/16/24 (Clindamycin).  Today the hip is also showing improvement.  He does have a f/up with his surgeon, Dr. Sandy Jara, on 5/2/24 with the hope that she will revise the wound to assist with closure since the margins are rolled.  He will return to clinic in two weeks.     4/16/24 - Mr. Jarrett was seen today for f/up on three wounds.  He did have an MRI done on 4/4/24 of the right hip (at [facility], ordered by Dr. Sandy Jara).  Today those results were reviewed with the patient.  We discussed that this provider would reach out to Dr. Jara regarding possible treatment.  This was done with consideration for IV abx, surgical intervention and/or referral to infectious disease for the best possible outcome.  The patient is currently on Augmentin with two days remaining for a UTI.  Additionally he picked up his topical abx today (Clindamycin) which will be used on a moist 4 x 4 gauze, not Basal Gel.    A callus paring was done on the right elbow and it is now healed.  The sacrum was selectively debrided and remains stable.     4/2/24 - The patient was referred back to his  surgeon, Dr. Macie Carbajal, who he saw on 3/26/24.  He reports that she is concerned over possible abscess in the space, as well as infection.  A culture was obtained today.  She did prescribe Augmentin, to begin tomorrow, as a prophylaxis for the hip. He is expecting to have an MRI schedule prior to the follow up with her on 4/9/24 at which time they will discuss the MRI results, and options. Today the right hip wound has increasing moisture and bogginess.  The sacrum remains stable.  The right elbow as debrided and is nearly closed.     3/19/24 - We have been using NuShield grafts on the open pressure ulcer at the patient's right hip.  Tendon remains exposed in the wound bed, the margins are nereida, epibole and closed.  Through a series of several weeks the wound margins have been scored with a scalpel in an attempt to open these closed margins, to no avail.  Today the graft was held, and the patient will be seen by his surgeon, Dr. Sandy Jara next Tuesday, 3/26/24, for consultation regarding options to possible reopen the margins which might bring a successful closure to this wound based on how it is currently healed.  For the time being we will apply adaptic over the tendon, with silver alginate dressing on top.  Both the right elbow and the sacrum remain stable.  We are currently using Endoform on both.  Today the elbow was selectively debrided.     3/12/24 - The pressure injury to the right hip is being grafted using NuShield grafts.  Today graft #6 was applied.  There has been no significant change to this wound since grafting.  Graft #7 has been ordered.  However, the patient was advised to scheduled with his surgeon, Dr. Sandy Jara, for evaluation to determine if she will need to do further surgery to assist this wound with closure since the margins are rolled.  They were again scored with a scaple to attempt to stimulate the growth of epithelial tissue, which has been done several times to  no avail.  The right elbow is progressing well.  The sacrum remains stable with no S & S of infection.  He has received his new power wheelchair.  We are hopeful that this device might contribute to progress with his wounds.     3/5/24 - Mr. Jarrett is seen today for followup on 3 wounds.  The wound to the right elbow was selectively debrided and it does show considerable improvement.  The wound to the sacrum is stable.  We will begin application of moistened Endoform to both of these wounds to be changed on Friday to silver alginate.  The wound to the right hip is slightly moist with a little bit of drainage.  This is to be expected, however, due to the fact that we are grafting that wound.  There has been no significant change to the size of the wound, unfortunately, in spite of grafting multiple times.  We will continue the process with the hope of making some progress.    2/27/24 - Mr. Jarrett returns to clinic today for followup on 3 areas of concern.  The pressure injury remains stable with no significant change.  He has a new wound to the right elbow.  This has developed due to dryness and cracking which has opened into a wound.  The elbow was selectively debrided using a dermal curette.  We will begin applying MediHoney to the elbow, covering with 4 x 4 gauze, Cover and with a Tubigrip with the hopes of softening this callused tissue further.  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  [facility].  Today we applied graft #4 after the wound margins were crosshatched to try to allow for growth epithelial tissue.  Again this week, there was no significant change in that wound.  Ligament is still fully exposed.  Graft #5 is being ordered today.    2/20/24 - the patient returns to clinic today for followup on a small pressure injury to the sacrum as well as a large stage IV pressure injury to the right hip.  We are currently going through the grafting process.   Today we applied graft #3, which is the 1st of the [facility] grafts.  So far we have not seen any significant change in the wound and the ligament is still fully exposed.  A # 15 scalpel was used to cross keen the wound margin due to rolling of that margin in the need to stimulate the growth of epithelial tissue as well as granular tissue.  The sacrum remains stable and basically the same size.      2/12/24 - Mr. Jarrett is seen today in follow up of two wounds, both of which remain stable.  The left hip is being grafted, and today PuraPly #2 was applied.  There is no significant change since graft #1 was applied.  Today graft #3 was ordered.  We will change from PuraPly to Epifix at this time.  The sacrum remains stable.  We have applied Endoform into that wound and it will be treated the same as a graft, with the hope that the patient does not have BMs which necessitate removal of the dressing. The patient is eligible and is in need of a new power wheelchair.      2/6/24 - Mr. Jarrett returns to clinic today with 2 wounds.  The very small stage IV pressure injury at the sacrum remains stable.  Today, the wound at the right hip, stage IV pressure injury, will receive the 1st graft application.  We are applying PuraPly grafts at this time and then we will change to NuShield after two PuraPly.  Of note, he is waiting on a demo to be shown to him for a possible new wheelchair.  PuraPly #2 ordered.    January 30, 2024:  43-year-old white male, with paraplegia, is here for follow up of the right hip pressure injury, and the sacral pressure injury.  The wounds are looking similar to the last visit.  He is getting ready to have skin substitute applied to the right hip wound.  There is white tissue in the wound bed, presumably tendon.  I do not see pink granulation tissue.  He has no longer using topical antibiotics.    1/23/24 - Mr. Jarrett returns today for followup on 2 wounds.  Today the sacral pressure injury remains stable  "and clean.  We have been using topical antibiotics on both of these wounds for several weeks.  That has been discontinued today.  The pressure injury at the right hip was selectively debrided today using the ultrasonic debrider and graft prep was performed.  We will begin applying a small amount of mupirocin ointment on to this wound daily in preparation beginning the grafting process next week.  We will be use PuraPly/NuShield grafts.  Of note, the patient is responsible for contacting calor at the [employer] to have his measurements taken.  He has not done that as of today.    December 26, 2023:  43-year-old white male, with paraplegia, is here for follow up of a right hip pressure injury, and a sacral pressure injury.  He has been using topical antibiotics.  The measurements are about the same as last visit.  The nurse practitioner has discussed a possible skin substitute on the right hip.    12/11/23 - Mr. Jarrett for followup on the pressure ulcers to the right hip and sacrum.  We started using topical antibiotics on both wounds on 11/21/2023.  Additionally, the patient has been using the vinegar washes for sometime.  He was instructed to discontinue the use of the vinegar washes today and use only the topical antibiotics.  He has been approved for grafts (Puraply and Nushield).  We will plan to do graft prep in two weeks (once abx are complete) and then begin grafting.    Of note, we discussed today the need for proper support in his wheelchair to offload the pressure.  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  Also, he does have a low air loss mattress, but that mattress is on top of a "regular" mattress and he reports springs are protruding.  He needs a proper bedframe for the low airloss mattress.     12/4/23 - Mr. Jarrett returns for followup on 2 wounds.  He continues to use " topical abx on both wounds.  The right hip continues to be concerning.  The tensor fascia ronny is till exposed in the wound bed.  We are mixing the topical abx with basal gel to prevent drying.  The sacral wound is stable but has had a slight increase in size.  Abx are being used on this wound as well. We are attempting to authorize grafts on the right hip wound.    11/27/23 - the patient returns today for followup on pressure injuries to the right hip and the sacrum.  He has received his topical antibiotics (Linezolid/Gentamincin) and started using the antibiotics on Saturday, 11/25/23.  He did not receive basal gel with that order so we have contacted [facility] to send the patient that product as the antibiotics are very drying and they are being applied directly to his tendon.  He must have the moisture of the basal gel.  In the time being, we will mix his topical antibiotics with mupirocin antibiotic.  Today both wounds remains stable.  He was instructed to use the antibiotics on both wounds to avoid cross contamination.    11/20/23 - Mr. Jarrett returns for f/up on the wound to the right hip and the sacrum.  At his last visit the right hip was cultured and it was positive to have Pseudomonas.  He has been using Vinegar washes since 11/06/2023.  He does have a sensitivity to levofloxacin and so can not take that medication.  We have requested a topical recommendation and he is being prescribed linezolid/gentamicin to be applied directly into the wound.  If we do not see improvement from that topical medication we will be moving forward with an IV antibiotic.  He does have exposed ligament (possibly ischiofemoral ligament) in this wound.  The patient does still have a mediport so that would be the next best solution for him due to his sensitivity.  The wound at the sacrum remains stable and we are currently using silver alginate on both wounds.    11/6/23 - the patient seen today for followup on  the right hip and sacral pressure wounds.  Although the sacrum is stable, the right hip does continue to deteriorate.  The right hip was cultured today.  We have been using Endoform and/or collagen on these wounds and unfortunately these products or causing excessive moisture and deterioration to the wounds.  We will discontinue both products and use only silver alginate for the time being.  The right hip does have tendon exposed at this time.  The patient was reminded and encouraged to continue to offload both the right hip in the sacrum which he reports that he is wearing.  However, on the deterioration of the wounds it is concerning.    10/30/23 - Mr. Jarrett to clinic today for followup on the wound to the sacrum as well as the right hip.  Today, both have deteriorated.  We have been using collagen to the wound beds and it appears is though they are stain to wet.  There is still tendon present in the wound bed of the right hip.  We will discontinue the collagen and begin application Endoform to both wounds, changing every other day, covered with silver alginate.  He was reminded to offload as much as possible to prevent further deterioration.  Of note, reported that is blood pressure has been low, and it was very low today, 86/54.  He states that he noticed it last week that he has stopped taking Tylenol, aspirin, and probiotics.  We discussed today that it is unlikely that any of these medications would impact blood pressure but he prefers to remain off of them.    10/16/23 - The patient returns today for followup on the pressure injury to the right hip as well as the sacrum.  Today, there is now tendon exposed in the wound bed the right hip pressure injury.  We discussed today that it is imperative that the patient offload to allow this wound to heal so that he does not develop osteomyelitis in this hip as well.  The pressure injury to the sacrum has also deteriorated.  We discussed that now that the amputation  to the left hip has fully healed, he must take the opportunity to begin offloading that right hip to allow it to heal.  We will begin applying collagen every other day to both wound beds.    10/9/23 The patient returns today for the open wound to the right hip and sacrum.  The surgical incision to the left hip is now resolved and will no longer be followed.  Both the right hip and sacrum are wounds that have reopened.  We will begin application of collagen every third day to both wounds.  Neither wound has any signs and symptoms of infection.  He will return in two weeks. Of note, the patient reports that he has had diarrhea for several days, up to about two weeks.  He has had no treatment and is currently using only probiotics.  He has been prescribed imodium and advised to use that medication no more than 4 days.  He is to contact his PCP if the diarrhea does not resolve within that time period.     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 Sandy Owusu on 8/21/23.  This wound has progress except personally well and is remaining closed.  The patient is using only iodine along the surgical incision line.  He does have a follow up with his surgeon tomorrow, 09/26/2023.  He was advised today that he can begin application coconut oil with vitamin C along the closed incision line which is fully approximated.  We will follow this incision line for one additional visit then d/c if no issues develop.    He has had a long term wound at the sacrum which today is open again.  It was mechanically debrided.  Endoform as applied to the wound bed, which will be left in place until Friday at which time he will change to silver alginate.     10/1/24 - The patient returns today for followup on several wounds.  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  This wound has failed to heal.  He was seen by a plastic surgeon on 09/25/2024 with the hopes of  possibly having a flap performed.  However, he was told by the PA that nothing could be done due to the depth of the wound.  Today, that wound was assessed and selectively debrided.  There is a slight amount of epithelial tissue migrated from the wound margin, however there is still quite a bit of depth.  We will manage the wound as much as we can in wound clinic since additional surgery is not an option.  The wound to the elbow is making excellent progress.  A selective debridement was performed.  He has a burn to the abdomen which is progressing well.  He has been using Silvadene on this area and he was instructed to begin just covering the area from now on.  Lastly, he does have a pressure injury at the sacrum.  Today, that wound appears to be closed, however, it has reopened multiple times.  We will leave that wound as an open wound to continue to monitor.    10/15/24 - The abdominal wound remains stable and will be monitored.  The sacral wound is slightly larger.  The right hip remains stable with no S & S of infection.  The right elbow was selectively debrided and has made good progress.     10/29/24 - Mr. Jarrett is seen today for f/up on three wounds.  A selective debridement was performed on the right elbow, and that wound is nearly healed.  The sacrum remains stable.  The right hip reportedly has an increase in drainage.  A culture was performed.  He will follow up with Dr. Delgado in 6 months. We will try to send him back to [facility] in [place].     11/12/4 - The patient was seen today for f/up on his pressure injuries.  A culture was obtained from the right hip on 10/29/24.  The patient was contacted to review the results and to discuss concerns over the pathogens found in that culture.  An Rx for Bactrim DS was sent to his pharmacy and he was instructed to call his infectious disease MD, Dr. Ceron, for a f/up appt due to this providers concerns over the bacteria found.  Today he  reports that he called and left message, but did not follow up.  This provider had requested that a fax be sent to Dr. Ceron of these results.  No confirmation was received.  Dr. Ceron's office was contacted to confirm that the fax was received and asked for Dr. Ceron's review.  The patient was instructed again today to f/up so that Dr. Ceron can help to determine if IV abx are indicated at this time or if Bactrim is sufficient.  All wounds remain stable.  The right hip has decreased in drainage slightly and the wound remains stable.  The elbow was mechanically debrided and the sacrum also remain stable. Of note, the patient has a newly developed rash on the right forearm and right side of his waist area.  He feels that these are mosquito bites.         Review of Systems   Constitutional: Negative.    Respiratory: Negative.     Cardiovascular: Negative.    Skin:         As documented in the HPI.   All other systems reviewed and are negative.        Objective:      Vitals:    11/12/24 1112   BP: 102/80   Pulse: 100   Resp: 18     Physical Exam  Vitals and nursing note reviewed. Exam conducted with a chaperone present.   Constitutional:       Appearance: Normal appearance.   Cardiovascular:      Rate and Rhythm: Normal rate.   Pulmonary:      Effort: Pulmonary effort is normal.   Skin:     General: Skin is warm and dry.      Comments: right hip pressure injury, sacral pressure injury, right posterior elbow pressure injury   Neurological:      Mental Status: He is alert.            Altered Skin Integrity 09/25/23 1153 Sacral spine #1 (Active)   09/25/23 1153   Altered Skin Integrity Present on Admission - Did Patient arrive to the hospital with altered skin?: yes   Side:    Orientation:    Location: Sacral spine   Wound Number: #1   Is this injury device related?:    Primary Wound Type:    Description of Altered Skin Integrity:    Ankle-Brachial Index:    Pulses:    Removal Indication and Assessment:    Wound Outcome:     (Retired) Wound Length (cm):    (Retired) Wound Width (cm):    (Retired) Depth (cm):    Wound Description (Comments):    Removal Indications:    Dressing Appearance Moist drainage 11/12/24 1117   Drainage Amount Moderate 11/12/24 1117   Drainage Characteristics/Odor Serosanguineous 11/12/24 1117   Appearance Moist;Red;Granulating 11/12/24 1117   Tissue loss description Full thickness 11/12/24 1117   Periwound Area Intact 11/12/24 1117   Wound Edges Open 11/12/24 1117   Wound Length (cm) 0.8 cm 11/12/24 1117   Wound Width (cm) 0.5 cm 11/12/24 1117   Wound Depth (cm) 0.2 cm 11/12/24 1117   Wound Volume (cm^3) 0.08 cm^3 11/12/24 1117   Wound Surface Area (cm^2) 0.4 cm^2 11/12/24 1117   Care Cleansed with:;Wound cleanser 11/12/24 1117   Dressing Applied;Other (comment) 11/12/24 1117            Altered Skin Integrity 10/09/23 1141 Right Hip #2 (Active)   10/09/23 1141   Altered Skin Integrity Present on Admission - Did Patient arrive to the hospital with altered skin?: yes   Side: Right   Orientation:    Location: Hip   Wound Number: #2   Is this injury device related?: No   Primary Wound Type:    Description of Altered Skin Integrity:    Ankle-Brachial Index:    Pulses:    Removal Indication and Assessment:    Wound Outcome:    (Retired) Wound Length (cm):    (Retired) Wound Width (cm):    (Retired) Depth (cm):    Wound Description (Comments):    Removal Indications:    Dressing Appearance Moist drainage 11/12/24 1117   Drainage Amount Moderate 11/12/24 1117   Drainage Characteristics/Odor Serosanguineous 11/12/24 1117   Appearance Moist;Red;Pink;Slough;Tan 11/12/24 1117   Tissue loss description Full thickness 11/12/24 1117   Periwound Area Other (see comments) 11/12/24 1117   Wound Edges Open 11/12/24 1117   Wound Length (cm) 2.5 cm 11/12/24 1117   Wound Width (cm) 1.5 cm 11/12/24 1117   Wound Depth (cm) 1.5 cm 11/12/24 1117   Wound Volume (cm^3) 5.625 cm^3 11/12/24 1117   Wound Surface Area (cm^2) 3.75 cm^2  11/12/24 1117   Undermining (depth (cm)/location) circumferential deepest at 9 o'clock (3.4cm) 11/12/24 1117   Care Cleansed with:;Wound cleanser 11/12/24 1117   Dressing Applied;Other (comment) 11/12/24 1117     11/12/24    Right hip   Polymem, 4x4 gauze, gentle foam border dressing      Endoform, polymem, 4x4 gauze, gentle foam border dressing         Right elbow         Post callus pairing   Aquaphor   CT      Assessment:       No diagnosis found.        Procedures:     Mechanical to all wounds     [] Yes [] No   I & D performed  [] Yes [] No   Excisional debridement performed  [] Yes [] No   Selective debridement performed        [x] Yes [] No   Mechanical debridement performed         [] Yes [] No  Silver nitrate applied                                     [] Yes [] No  Labs ordered this visit                                  [] Yes [] No   Imaging ordered this visit                           [] Yes [] No   Tissue pathology and/or culture taken       MEDICATIONS    Current Outpatient Medications:     acetaminophen (TYLENOL) 325 MG tablet, Take 650 mg by mouth every 6 (six) hours as needed for Pain., Disp: , Rfl:     ascorbic acid, vitamin C, (VITAMIN C) 1000 MG tablet, Take 1,000 mg by mouth every evening., Disp: , Rfl:     aspirin 325 MG tablet, Take 325 mg by mouth every morning. Stopped x 1 month, Disp: , Rfl:     baclofen (LIORESAL) 5 mg Tab tablet, Take 1 tablet (5 mg total) by mouth 3 (three) times daily., Disp: 90 tablet, Rfl: 0    bisacodyL (DULCOLAX) 10 mg Supp, Place 10 mg rectally daily as needed., Disp: , Rfl:     busPIRone (BUSPAR) 10 MG tablet, Take 1 tablet (10 mg total) by mouth 2 (two) times daily., Disp: 60 tablet, Rfl: 5    cetirizine (ZYRTEC) 10 MG tablet, Take 10 mg by mouth 2 (two) times a day., Disp: , Rfl:     cloNIDine (CATAPRES) 0.1 MG tablet, Take 0.1 mg by mouth daily as needed., Disp: , Rfl:     DULoxetine (CYMBALTA) 30 MG capsule, Take 1 capsule by mouth 2 (two) times daily., Disp:  , Rfl:     ferrous sulfate (SLOW RELEASE IRON) 142 mg (45 mg iron) TbSR, 1 tablet Orally Three times a Week for 30 days, Disp: , Rfl:     fexofenadine (ALLEGRA) 180 MG tablet, Take 180 mg by mouth every morning., Disp: , Rfl:     fluticasone propionate (FLONASE ALLERGY RELIEF) 50 mcg/actuation nasal spray, 1 spray by Each Nostril route daily as needed., Disp: , Rfl:     heparin, porcine, PF, (HEPARIN FLUSH 100 UNITS/ML) 100 unit/mL Syrg, flush mediport with 5ml every FOUR weeks, Disp: , Rfl:     JATENZO 237 mg Cap, , Disp: , Rfl:     meloxicam (MOBIC) 7.5 MG tablet, Take 7.5 mg by mouth 2 (two) times daily as needed for Pain., Disp: , Rfl:     midodrine (PROAMATINE) 2.5 MG Tab, TAKE ONE TABLET BY MOUTH THREE TIMES DAILY AS NEEDED Systolic blood pressure less than 90 or Diastolic less than 60, Disp: , Rfl:     multivitamin/iron/folic acid (CENTRUM ORAL), Take 1 tablet by mouth every morning., Disp: , Rfl:     NON FORMULARY MEDICATION, Take 3 drops by mouth 2 (two) times daily as needed. THC, Disp: , Rfl:     NORMAL SALINE FLUSH injection, flush mediport with 10ml every FOUR weeks, Disp: , Rfl:     sulfamethoxazole-trimethoprim 800-160mg (BACTRIM DS) 800-160 mg Tab, Take 1 tablet by mouth 2 (two) times daily. for 10 days, Disp: 20 tablet, Rfl: 0    urea (CARMOL) 40 % Crea, Apply topically once daily., Disp: 85 g, Rfl: 2    zinc gluconate 50 mg tablet, Take 1 tablet (50 mg total) by mouth once daily. (Patient taking differently: Take 50 mg by mouth every morning.), Disp: 30 tablet, Rfl: 1  No current facility-administered medications for this encounter.    Facility-Administered Medications Ordered in Other Encounters:     lactated ringers infusion, , Intravenous, Continuous, rC Garibay DO, Last Rate: 10 mL/hr at 05/17/24 0705, Restarted at 05/17/24 0859   Review of patient's allergies indicates:   Allergen Reactions    Levofloxacin Rash       DIAGNOSTICS    Labs/Scans/Micro:    CBC:   Lab Results   Component  "Value Date    WBC 10.42 05/14/2024    HGB 13.9 (L) 05/14/2024    HCT 44.3 05/14/2024    MCV 88.8 05/14/2024     05/14/2024     Sedimentation rate:   Lab Results   Component Value Date    SEDRATE 22 (H) 12/15/2022    C-reactive protein:   Lab Results   Component Value Date    CRP 44.90 (H) 12/15/2022    Chemistry: [unfilled]  HBA1C: No components found for: "HBA1C"    Ankle Brachial Index: No results found for this or any previous visit.    Imaging:    Plain film: No results found for this or any previous visit.    MRI: No results found for this or any previous visit.    Bone Scan: No results found for this or any previous visit.    Vascular studies:    Microbiology: 10/29/24          HOME HEALTH AGENCY: [facility]     WOUND CARE ORDERS:  Right hip wound: Cleanse with wound cleanser, apply polymem, 4x4 gauze, and ABD pad and tape to be changed every other day  Sacrum: Cleanse with wound cleanser and apply endoform, and polymem and an island dressing to be changed every other day  Right elbow: Cleanse with wound cleanser, apply aquaphor to callused area daily       No follow-ups on file.        "

## 2024-11-20 ENCOUNTER — OFFICE VISIT (OUTPATIENT)
Dept: INFECTIOUS DISEASES | Facility: CLINIC | Age: 44
End: 2024-11-20
Payer: MEDICARE

## 2024-11-20 VITALS
BODY MASS INDEX: 18.62 KG/M2 | RESPIRATION RATE: 18 BRPM | DIASTOLIC BLOOD PRESSURE: 80 MMHG | OXYGEN SATURATION: 96 % | WEIGHT: 130.06 LBS | HEART RATE: 106 BPM | HEIGHT: 70 IN | SYSTOLIC BLOOD PRESSURE: 115 MMHG

## 2024-11-20 DIAGNOSIS — G82.20 PARAPLEGIA: Primary | ICD-10-CM

## 2024-11-20 DIAGNOSIS — L89.44 PRESSURE INJURY OF CONTIGUOUS REGION INVOLVING BACK AND RIGHT HIP, STAGE 4: ICD-10-CM

## 2024-11-20 DIAGNOSIS — S71.001D COMPLICATED OPEN WOUND OF RIGHT HIP, SUBSEQUENT ENCOUNTER: ICD-10-CM

## 2024-11-20 DIAGNOSIS — Z72.0 TOBACCO USER: Chronic | ICD-10-CM

## 2024-11-20 PROCEDURE — 99999 PR PBB SHADOW E&M-EST. PATIENT-LVL V: CPT | Mod: PBBFAC,,,

## 2024-11-20 PROCEDURE — 99214 OFFICE O/P EST MOD 30 MIN: CPT | Mod: S$PBB,,,

## 2024-11-20 PROCEDURE — 99215 OFFICE O/P EST HI 40 MIN: CPT | Mod: PBBFAC

## 2024-11-20 NOTE — PROGRESS NOTES
Subjective:       Patient ID: Werner Jarrett 44 y.o.     Chief Complaint:   Chief Complaint   Patient presents with    3 month f/u Subacute osteomyelitis        HPI:  03/14/2023 Hospital evaluation:  42-year-old incomplete quadriplegic male presenting with drainage from left hip wound and subsequently found to have concern for osteomyelitis and left hip septic arthritis, status post I&D.  Cultures with Enterobacter.  Patient currently on cefepime.  ID consulted for assistance with antimicrobials.     Left hip septic arthritis / OM, s/p I&D  Incomplete quadriplegia  Depression      PLAN:  Continue cefepime - end date: 4/21.  Weekly CBC, CMP, CRP, ESR while on abx.  Fax results to us at [phone].  F/u with us as scheduled.  CM consulted for assistance with OPAT.   Discussed with patient.     04/04/2023:  Wound is healing well, no fevers, no chills, tolerating his Cefepime well, receiving it through port. No current complications. He reports his wound has been healing well with secondary intention, he is following with wound care.     05/08/23 telephone encounter:   Surgeon spoke with Dr. Ceron and had some concerns for worsening infection of R hip wound. Will extend Cefepime 2g IV q8 for an additional two weeks. If wound remains concerning for infection after completion of additional two week he will need additional debridement    07/24/2023:   Mr. Jarrett presents for follow-up today accompanied by his nurse. He reports ongoing issues with his left hip wound. On 6/19 wound care noticed bone fragments on his dressing. Fragments were sent to pathology with ongoing OM noted. He denies having any fevers or chills. He does report having night sweats 2-3x daily while he was on antibiotics, however he hasn't had any in about a month. He is seeing wound care every other week. They recommend daily dressing changes. Dry dressing with kerlix and silver cell with abd and tape.   His wound is draining serosanguinous drainage and  completely saturates in a 24 hour period.     07/18/2024 office visit:  Mr. Jarrett presents for follow-up today accompanied by his caretaker.  He underwent a left AKA in August 2023.  He did receive 4 weeks' course of IV vancomycin, Unasyn, and minocycline for culture showing Acinetobacter and MRSA.  He denies any systemic signs of infection.  He reports that his right hip wound open up during hospitalization for left AKA.  He is being followed by wound care and surgical team.  He did have MRI of the right hip with and without contrast on 04/04/2024 showing  right hip region cellulitis in right proximal femoral osteomyelitis.  Subluxation of the right femoral head from the acetabular cup.  An interval resection of the left femoral head and the left hip joint.  He underwent an I&D on 05/17/2024 with anaerobic, fungal, and aerobic cultures negative.  He does report that he was on a 1-2 week course of TMP/SMX prior to surgical intervention.  Denies any fever, chills, does have occasiaonl night sweats. 2-3 x monthly.  He continues to smoke 1 pack per day.    11/20/2024 office visit:   Mr. Jarrett presents for follow-up today.  He reach back out to ID Clinic after wound care obtained polymicrobial culture secondary to increase serous drainage of right hip.  He reports a temperature of 99.9 last Wednesday prompting wound care to culture. Was given course of TMP/SMX without improvement in amount of drainage.  Denies any chills or night sweats.      Past Medical History:   Diagnosis Date    Anxiety     Arthritis     C5 vertebral fracture     Paralyzed nipple line down    Chronic infection of left hip, currently on antibiotics     COPD (chronic obstructive pulmonary disease)     Depression     Heart murmur     Insomnia     Osteomyelitis hip         Past Surgical History:   Procedure Laterality Date    ABOVE-KNEE AMPUTATION Left 8/21/2023    Procedure: AMPUTATION, ABOVE KNEE (left hip diarticulation/amputation);  Surgeon:  BARBARA Perez MD;  Location: [facility];  Service: General;  Laterality: Left;    INCISION AND DRAINAGE HIP Left 12/14/2022    Procedure: Incision and drainage Hip (I&D debridement of left hip wound, left hip and femur);  Surgeon: BARBARA Perez MD;  Location: [facility];  Service: General;  Laterality: Left;    INCISION AND DRAINAGE HIP Left 03/10/2023    Procedure: INCISION AND DRAINAGE, HIP;  Surgeon: BARBARA Perez MD;  [facility];  Service: General;  Laterality: Left;  LEFT HIP    INSERTION, SUPRAPUBIC CATHETER      MEDIPORT INSERTION, SINGLE      Rt Chest wall    SPINE SURGERY      WOUND DEBRIDEMENT Right 5/17/2024    Procedure: DEBRIDEMENT, WOUND;  Surgeon: BARBARA Perez MD;  Location: [facility];  Service: General;  Laterality: Right;    wound debridements      Multiple        Social History     Socioeconomic History    Marital status: Single   Tobacco Use    Smoking status: Every Day     Current packs/day: 1.00     Types: Cigarettes    Smokeless tobacco: Never   Substance and Sexual Activity    Alcohol use: Yes     Comment: 3-5 times week    Drug use: Yes     Types: Marijuana     Comment: Medical THC    Sexual activity: Not Currently     Social Drivers of Health     Financial Resource Strain: Low Risk  (8/25/2023)    Overall Financial Resource Strain (CARDIA)     Difficulty of Paying Living Expenses: Not very hard   Food Insecurity: Unknown (8/25/2023)    Hunger Vital Sign     Worried About Running Out of Food in the Last Year: Never true     Ran Out of Food in the Last Year: Patient declined   Transportation Needs: Unknown (8/25/2023)    PRAPARE - Transportation     Lack of Transportation (Medical): No     Lack of Transportation (Non-Medical): Patient declined   Physical Activity: Patient Declined (8/22/2023)    Exercise Vital Sign     Days of Exercise per Week: Patient declined     Minutes of Exercise per Session: Patient declined   Stress: Patient Declined  "(8/22/2023)    [employer] [place] [employer] - Occupational Stress Questionnaire     Feeling of Stress : Patient declined   Housing Stability: Unknown (8/25/2023)    Housing Stability Vital Sign     Unable to Pay for Housing in the Last Year: No     Unstable Housing in the Last Year: Patient refused        Family History   Problem Relation Name Age of Onset    Lung cancer Mother      Brain cancer Mother      No Known Problems Father          Review of patient's allergies indicates:   Allergen Reactions    Levofloxacin Rash          There is no immunization history on file for this patient.     Review of Systems   All other systems reviewed and are negative.         Objective:      /80 (BP Location: Left arm, Patient Position: Sitting)   Pulse 106   Resp 18   Ht 5' 10" (1.778 m)   Wt 59 kg (130 lb 1.1 oz)   SpO2 96%   BMI 18.66 kg/m²      Physical Exam  Constitutional:       Appearance: Normal appearance.   HENT:      Head: Normocephalic and atraumatic.      Mouth/Throat:      Pharynx: No oropharyngeal exudate or posterior oropharyngeal erythema.   Eyes:      Extraocular Movements: Extraocular movements intact.      Pupils: Pupils are equal, round, and reactive to light.   Cardiovascular:      Rate and Rhythm: Normal rate and regular rhythm.      Heart sounds: No murmur heard.  Pulmonary:      Effort: No respiratory distress.      Breath sounds: No wheezing, rhonchi or rales.   Abdominal:      General: Bowel sounds are normal. There is no distension.      Palpations: Abdomen is soft.      Tenderness: There is no abdominal tenderness. There is no right CVA tenderness or left CVA tenderness.   Musculoskeletal:         General: No swelling or tenderness.      Cervical back: Neck supple. No rigidity or tenderness.   Lymphadenopathy:      Cervical: No cervical adenopathy.   Skin:     Findings: No lesion or rash.      Comments: L AKA; right hip with tunneling wound.  Seropurulent drainage noted as " pictured below   Neurological:      Mental Status: He is alert and oriented to person, place, and time. Mental status is at baseline.      Cranial Nerves: No cranial nerve deficit.      Motor: Weakness present.      Comments: Incomplete paraplegia at baseline   Psychiatric:         Mood and Affect: Mood normal.         Behavior: Behavior normal.            Labs: Reviewed most recent relevant labs available, notable results highlighted in this note    Imaging: Reviewed most recent relevant imaging studies available, notable results highlighted in this note    Assessment:       Problem List Items Addressed This Visit    None               Plan:       -Enterobacter cloacae in L hip tissue intraoperatively found in 3/10/2023  -received a 6 week course of IV cefepime through April 2023 and then an additional 2 week course at the end of May 2023.   -He did have a left hip disarticulation/AKA on 08/24/2023.  Cultures with Acinetobacter baumannii complex and methicillin-resistant Staphylococcus aureus.  He did receive 4 weeks' course of IV vancomycin, Unasyn, and minocycline.  -MRI right hip with and without contrast 04/04/2024 showing right hip region cellulitis in right proximal femoral osteomyelitis.  Subluxation of the right femoral head from the acetabular cup.  An interval resection of the left femoral head and the left hip joint.   He under went debridement on 05/17/2024 with tissue cultures fungal cultures and anaerobic cultures are negative.    --------    -Mr. Caldera was referred back to ID Clinic after DNA wound culture was done with polymicrobial results.  Patient denies any systemic signs of infection.  He reports an increase in drainage from right hip that prompted culture.  -any wound culture collected on inpatient with chronic nonhealing ulcers or likely to be polymicrobial.  Would be hesitant to give empiric antimicrobial treatment.  -we will obtain imaging of right foot to ensure no new abscesses  formed.  -in order to treat would like to obtain sterile and deep surgical sample for culture to guide treatment.  -he continues without systemic signs of infection  -we will collect CBC, CMP, ESR and CRP  -plan discussed with patient who is in agreement with the plan of care  -can follow up again in 1 month.  No follow-ups on file.

## 2024-11-21 ENCOUNTER — HOSPITAL ENCOUNTER (OUTPATIENT)
Dept: WOUND CARE | Facility: HOSPITAL | Age: 44
Discharge: HOME | End: 2024-11-21
Attending: NURSE PRACTITIONER
Payer: MEDICARE

## 2024-11-21 DIAGNOSIS — L89.154 PRESSURE INJURY OF SACRAL REGION, STAGE 4: ICD-10-CM

## 2024-11-21 DIAGNOSIS — L89.44 PRESSURE INJURY OF CONTIGUOUS REGION INVOLVING BACK AND RIGHT HIP, STAGE 4: Primary | ICD-10-CM

## 2024-11-21 DIAGNOSIS — G82.50 QUADRIPLEGIA: ICD-10-CM

## 2024-11-21 DIAGNOSIS — L89.013 PRESSURE INJURY OF RIGHT ELBOW, STAGE 3: ICD-10-CM

## 2024-11-21 PROCEDURE — 97597 DBRDMT OPN WND 1ST 20 CM/<: CPT

## 2024-11-21 PROCEDURE — 27000999 HC MEDICAL RECORD PHOTO DOCUMENTATION

## 2024-11-21 PROCEDURE — 99213 OFFICE O/P EST LOW 20 MIN: CPT | Mod: 25

## 2024-11-21 NOTE — PROGRESS NOTES
Referred by: Dr. Sidhu  Low air loss mattress [x] Yes [] No   Is the patient eligible for a graft, and/or currently grafting?  [] Yes [x] No   Smoker: yes, vape      Subjective:         Patient ID: Werner Jarrett is a 44 y.o. male.    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  Presents today with a new wound for the clinic.  He reports that a couple of weeks ago he has built a plate of red beans on his abdomen which subsequently birth an area on his stomach.  He was at his mobile home in the kitchen when this occured.  He has been applying Silvadene cream to the area daily.  Today, that burn was selectively debrided and he was instructed to continue using the Silvadene cream for the time being.  The right elbow was selectively debrided.  The right hip and sacrum were both mechanically debrided and fully assessed.  The right hip has no significant change.  He is scheduled to see a plastic surgeon on 09/25/2024 at [facility] in [place] ([address] [place] [address]).  He has completed his Bactrim DS and Augmentin that was prescribed from 9/6/to 9/16.  Today the wound at the right hip no longer has odor.    9/3/24 - Mr. Jarrett was last seen by this provider a couple of weeks ago prior to vacation.  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  Today, the wound at the right hip is extremely malodorous.  The patient also reports that he has seen an increase in drainage from this wound.  We have been using a wound VAC over this wound for several weeks with no change.  Today we will DC the wound VAC.  A culture was obtained and the patient was prescribed doxycycline and clindamycin pending results of the culture.  The sacrum remains stable.  The right elbow was selectively  debrided and does have a large area of exposed granular tissue that has developed since he was last seen by this provider.  Of note, he did see his surgeon, Dr. Sandy Jara last week.  We were hoping that he would see a surgeon for evaluation of the hip for a possible flap.  However, that office is closed and he has now been referred to a plastic surgeon in [place].   We will be waiting for that call with the hopes that the patient's condition does not significantly deteriorate prior to that time.  Will follow up with his surgeon in 6 weeks.    August 20, 2024:  43-year-old white male, who is here for follow up of the chronic right hip pressure injury, sacral pressure injury, and right posterior elbow pressure injury.  The wounds have now stalled.  He has been using a wound VAC on the right hip.  The right elbow we will need some debridement today.  He will see his general surgeon next week.    August 6, 2024:  43-year-old white male, with quadriplegia, is here for follow up of his right hip pressure injury, sacral pressure injury, and right posterior elbow pressure injury.  We have been using a wound VAC on his right hip ulcer.  He is using collagen on the sacral wound.  We have just open the right posterior elbow as a new stage II pressure injury, surrounded by callus.  There is no exposed bone at this time.    7/23/14 - Mr. 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  Today that wound was assessed and the hip bone is now nicely covered with tissue.  We will continue the wound vac, using black foam only and increasing the pressure to 125 mmHg continuous pressure.  The wound at the sacrum remains stable with no significant changes.  Of note, he had his appt with ID on 7/18/24, he will follow up with them again in 3 months, there were no new orders from them. He also saw Dr. Delgado, he will follow up again with her in 2 months, in the mean time she will refer him to plastic surgery  in [place] for a flap.     7/9/24 - The turns today for followup on the wound to the right hip as well as the small wound to the sacrum.  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  We are using a wound VAC on the right hip and that does seem to be making some minor progress in closing the open space between the muscles of the leg.  Today, a selective debridement was performed in an effort to revise the margins and support growth of granular tissue.  The sacrum was also selectively debrided.  We will continue with the wound VAC on the right hip.  The patient does have an appointment with his surgeon, Dr. Sandy Jara, on 07/11/2024.  He also has an appointment with infectious disease on that same day and we are hoping for some additional guidance from both of them regarding his wounds.    6/26/24 - Mr. 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  The sacrum remained stable with no changes and no signs and symptoms of infection.  The right hip seems to be progressing well, with an increase in the development of granular tissue which is occurring across the exposed bone.  At his last visit there was quite a bit of bruising around the periwound so we decrease the wound VAC pressure from 125 mmHg to 100 mmHg and this seems to be improving the growth of granular tissue significantly.  He is scheduled for his bone biopsy on July 1st at [facility] with Dr. Sandy Jara.  He will then follow up with her on 7/11/24.  He also has an upcoming appointment with infectious disease on 07/14/2024.    6/18/24 - Mr. Jarrett returns today for followup on 2 wounds.  The wound to the sacrum continues to progress well with no signs and symptoms of infection.  The wound to the right hip was debrided today.  There was a large amount of slough at the top of the wound bed.  We  are continuing to use a wound VAC although there does not seem to be any significant progress towards closing this wound.  He did have a followup appointment with the surgeon, Dr. Sandy Jara and she does still intend to do a bone biopsy on this right hip.  Will have a followup appointment with her on 07/11/2024.  He will also see infectious disease in July, 2024.  He does not have that date as of today.  He does still have the dry area on his right elbow.  A refill for Urea cream has been sent to his pharmacy.    6/11/24 - The patient returns today for the wounds to the sacrum and right hip.  The sacral wound is stable and clean, with no S & S of infection.  The right hip does have some bruising around the surface of the wound.  We will decrease the wound vac pressure from 125 mmHg to 100 mmHg continuous pressure.  The elbow is still being monitored and he continues to use Urea cream every other day as well as MediHoney.  He has not heard any further information from Dr. Sandy lucero a bone biopsy, and he has been scheduled with ID for f/up.     6/4/24 - Mr. Jarrett returns today for followup on the 2 pressure injuries, 1 to the sacrum and 1 to the right hip.  He does report that he had a follow up with his surgeon, Dr. Sandy Jara on 05/30/2024.  He states that she has indicated that she will be scheduling a bone biopsy of the right hip due to the patient's osteomyelitis.  She is trying to determine his next course of treatment together with infectious disease consideration.  We are currently using a wound VAC and it does seem to be making progress with the development of granular tissue in the wound bed.  There are no signs and symptoms of infection in this wound.  The sacral wound does have a slight increase in depth.  We had changed to collagen and it may be that that product is to wet.  We will DC the collagen and begin application of silver alginate only.  A callus paring was also done on the  patient's right elbow.  This has been an ongoing issue and he is currently using Aquaphor on the elbow.    5/28/24 - Mr. 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  He had excisional debridement of this right hip on 5/17/24 by Dr. Sanyd Jara.  Her op note:   PROCEDURE -   Excisional debridement right hip wound, dimension of debridement 1 cm circumferentially, total wound dimensions 3.5 cm x 3 cm x 1 cm deep.  Blunt debridement right hip wound  FINDINGS -    Rolled edges of skin, completely removed by excisional debridement  Fibrinous material within the wound debrided bluntly and submitted to culture   Today that wound is clean with nice margins.  We have applied a wound vac using white foam covered with black foam.     He had a follow up with her scheduled for 5/23 but missed it and is now re-scheduled for 5/30.   He is expecting an appt with ID but has not been contacted as of today.   In addition to the right hip, he does have a wound at the sacrum that has reopened.  It is clean and we will apply collagen into this wound.  A callus paring was done on the right elbow.     5/14/24 - The Patient returns today for followup on the large pressure injury at the right hip as well as a small pressure injury to the sacrum.  He did have a followup appointment with his surgeon, Dr. Sandy Jara. He is scheduled for an I&D/debridment of his right hip with Dr. Quijano this Friday 5/17/24.  We had a lengthy discussion regarding possibilities such as a wound VAC or if she will leave the wound opened or close that wound.  He does not know at this time but this providers preference would be that she would close the wound.  This wound remains open at this time following another procedure and we were unable to get the wound to close completely in bite of the use of a wound VAC.  He does still have ligament exposed in the wound has increased in size.  Bone is fully exposed in the wound bed.  He  has been using topical antibiotics for 1 month and those will be discontinued as of today.  Until his procedure we will apply Adaptic over the exposed bone with silver alginate on top, with daily dressing changes.  He does expect to see an infectious disease doctor following his procedure and also mentioned that he expects to possibly have IV antibiotics in another short stay at [facility].    4/23/24 - Today the wound at the elbow is nearly resolved.  We will begin application of Aquaphor to the elbow.  The sacrum was assessed and has improved dramatically.  It is nearly resolved.  He has completed the PO Augmentin for a UTI and this has helped wounds as well  Additionally he began topical abx to the right hip on 4/16/24 (Clindamycin).  Today the hip is also showing improvement.  He does have a f/up with his surgeon, Dr. Sandy Jara, on 5/2/24 with the hope that she will revise the wound to assist with closure since the margins are rolled.  He will return to clinic in two weeks.     4/16/24 - Mr. Jarrett was seen today for f/up on three wounds.  He did have an MRI done on 4/4/24 of the right hip (at [facility], ordered by Dr. Sandy Jara).  Today those results were reviewed with the patient.  We discussed that this provider would reach out to Dr. Jara regarding possible treatment.  This was done with consideration for IV abx, surgical intervention and/or referral to infectious disease for the best possible outcome.  The patient is currently on Augmentin with two days remaining for a UTI.  Additionally he picked up his topical abx today (Clindamycin) which will be used on a moist 4 x 4 gauze, not Basal Gel.    A callus paring was done on the right elbow and it is now healed.  The sacrum was selectively debrided and remains stable.     4/2/24 - The patient was referred back to his surgeon, Dr. Macie Carbajal, who he saw on 3/26/24.  He reports that she is concerned over possible abscess  in the space, as well as infection.  A culture was obtained today.  She did prescribe Augmentin, to begin tomorrow, as a prophylaxis for the hip. He is expecting to have an MRI schedule prior to the follow up with her on 4/9/24 at which time they will discuss the MRI results, and options. Today the right hip wound has increasing moisture and bogginess.  The sacrum remains stable.  The right elbow as debrided and is nearly closed.     3/19/24 - We have been using NuShield grafts on the open pressure ulcer at the patient's right hip.  Tendon remains exposed in the wound bed, the margins are nereida, epibole and closed.  Through a series of several weeks the wound margins have been scored with a scalpel in an attempt to open these closed margins, to no avail.  Today the graft was held, and the patient will be seen by his surgeon, Dr. Sandy Jara next Tuesday, 3/26/24, for consultation regarding options to possible reopen the margins which might bring a successful closure to this wound based on how it is currently healed.  For the time being we will apply adaptic over the tendon, with silver alginate dressing on top.  Both the right elbow and the sacrum remain stable.  We are currently using Endoform on both.  Today the elbow was selectively debrided.     3/12/24 - The pressure injury to the right hip is being grafted using NuShield grafts.  Today graft #6 was applied.  There has been no significant change to this wound since grafting.  Graft #7 has been ordered.  However, the patient was advised to scheduled with his surgeon, Dr. Sandy Jara, for evaluation to determine if she will need to do further surgery to assist this wound with closure since the margins are rolled.  They were again scored with a scaple to attempt to stimulate the growth of epithelial tissue, which has been done several times to no avail.  The right elbow is progressing well.  The sacrum remains stable with no S & S of infection.  He has  received his new power wheelchair.  We are hopeful that this device might contribute to progress with his wounds.     3/5/24 - Mr. Jarrett is seen today for followup on 3 wounds.  The wound to the right elbow was selectively debrided and it does show considerable improvement.  The wound to the sacrum is stable.  We will begin application of moistened Endoform to both of these wounds to be changed on Friday to silver alginate.  The wound to the right hip is slightly moist with a little bit of drainage.  This is to be expected, however, due to the fact that we are grafting that wound.  There has been no significant change to the size of the wound, unfortunately, in spite of grafting multiple times.  We will continue the process with the hope of making some progress.    2/27/24 - Mr. Jarrett returns to clinic today for followup on 3 areas of concern.  The pressure injury remains stable with no significant change.  He has a new wound to the right elbow.  This has developed due to dryness and cracking which has opened into a wound.  The elbow was selectively debrided using a dermal curette.  We will begin applying MediHoney to the elbow, covering with 4 x 4 gauze, Cover and with a Tubigrip with the hopes of softening this callused tissue further.  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  We are currently grafting using NuShield grafts.  Today we applied graft #4 after the wound margins were crosshatched to try to allow for growth epithelial tissue.  Again this week, there was no significant change in that wound.  Ligament is still fully exposed.  Graft #5 is being ordered today.    2/20/24 - the patient returns to clinic today for followup on a small pressure injury to the sacrum as well as a large stage IV pressure injury to the right hip.  We are currently going through the grafting process.  Today we applied graft #3, which is the 1st of the NuShield grafts.  So far we have not seen any significant  change in the wound and the ligament is still fully exposed.  A # 15 scalpel was used to cross keen the wound margin due to rolling of that margin in the need to stimulate the growth of epithelial tissue as well as granular tissue.  The sacrum remains stable and basically the same size.      2/12/24 - Mr. Jarrett is seen today in follow up of two wounds, both of which remain stable.  The left hip is being grafted, and today PuraPly #2 was applied.  There is no significant change since graft #1 was applied.  Today graft #3 was ordered.  We will change from PuraPly to Epifix at this time.  The sacrum remains stable.  We have applied Endoform into that wound and it will be treated the same as a graft, with the hope that the patient does not have BMs which necessitate removal of the dressing. The patient is eligible and is in need of a new power wheelchair.      2/6/24 - Mr. Jarrett returns to clinic today with 2 wounds.  The very small stage IV pressure injury at the sacrum remains stable.  Today, the wound at the right hip, stage IV pressure injury, will receive the 1st graft application.  We are applying PuraPly grafts at this time and then we will change to [facility] after two PuraPly.  Of note, he is waiting on a demo to be shown to him for a possible new wheelchair.  PuraPly #2 ordered.    January 30, 2024:  43-year-old white male, with paraplegia, is here for follow up of the right hip pressure injury, and the sacral pressure injury.  The wounds are looking similar to the last visit.  He is getting ready to have skin substitute applied to the right hip wound.  There is white tissue in the wound bed, presumably tendon.  I do not see pink granulation tissue.  He has no longer using topical antibiotics.    1/23/24 - Mr. Jarrett returns today for followup on 2 wounds.  Today the sacral pressure injury remains stable and clean.  We have been using topical antibiotics on both of these wounds for several weeks.  That has been  "discontinued today.  The pressure injury at the right hip was selectively debrided today using the ultrasonic debrider and graft prep was performed.  We will begin applying a small amount of mupirocin ointment on to this wound daily in preparation beginning the grafting process next week.  We will be use PuraPly/NuShield grafts.  Of note, the patient is responsible for contacting calor at the [facility] to have his measurements taken.  He has not done that as of today.    December 26, 2023:  43-year-old white male, with paraplegia, is here for follow up of a right hip pressure injury, and a sacral pressure injury.  He has been using topical antibiotics.  The measurements are about the same as last visit.  The nurse practitioner has discussed a possible skin substitute on the right hip.    12/11/23 - Mr. Jarrett for followup on the pressure ulcers to the right hip and sacrum.  We started using topical antibiotics on both wounds on 11/21/2023.  Additionally, the patient has been using the vinegar washes for sometime.  He was instructed to discontinue the use of the vinegar washes today and use only the topical antibiotics.  He has been approved for grafts (Puraply and Nushield).  We will plan to do graft prep in two weeks (once abx are complete) and then begin grafting.    Of note, we discussed today the need for proper support in his wheelchair to offload the pressure.  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  Also, he does have a low air loss mattress, but that mattress is on top of a "regular" mattress and he reports springs are protruding.  He needs a proper bedframe for the low airloss mattress.     12/4/23 - Mr. Jarrett returns for followup on 2 wounds.  He continues to use topical abx on both wounds.  The right hip continues to be concerning.  The tensor fascia ronny is till exposed " in the wound bed.  We are mixing the topical abx with basal gel to prevent drying.  The sacral wound is stable but has had a slight increase in size.  Abx are being used on this wound as well. We are attempting to authorize grafts on the right hip wound.    11/27/23 - the patient returns today for followup on pressure injuries to the right hip and the sacrum.  He has received his topical antibiotics (Linezolid/Gentamincin) and started using the antibiotics on Saturday, 11/25/23.  He did not receive basal gel with that order so we have contacted [facility] to send the patient that product as the antibiotics are very drying and they are being applied directly to his tendon.  He must have the moisture of the basal gel.  In the time being, we will mix his topical antibiotics with mupirocin antibiotic.  Today both wounds remains stable.  He was instructed to use the antibiotics on both wounds to avoid cross contamination.    11/20/23 - Mr. Jarrett returns for f/up on the wound to the right hip and the sacrum.  At his last visit the right hip was cultured and it was positive to have Pseudomonas.  He has been using Vinegar washes since 11/06/2023.  He does have a sensitivity to levofloxacin and so can not take that medication.  We have requested a topical recommendation and he is being prescribed linezolid/gentamicin to be applied directly into the wound.  If we do not see improvement from that topical medication we will be moving forward with an IV antibiotic.  He does have exposed ligament (possibly ischiofemoral ligament) in this wound.  The patient does still have a mediport so that would be the next best solution for him due to his sensitivity.  The wound at the sacrum remains stable and we are currently using silver alginate on both wounds.    11/6/23 - the patient seen today for followup on the right hip and sacral pressure wounds.  Although the sacrum is stable, the right hip does continue to  deteriorate.  The right hip was cultured today.  We have been using Endoform and/or collagen on these wounds and unfortunately these products or causing excessive moisture and deterioration to the wounds.  We will discontinue both products and use only silver alginate for the time being.  The right hip does have tendon exposed at this time.  The patient was reminded and encouraged to continue to offload both the right hip in the sacrum which he reports that he is wearing.  However, on the deterioration of the wounds it is concerning.    10/30/23 - Mr. Jarrett to clinic today for followup on the wound to the sacrum as well as the right hip.  Today, both have deteriorated.  We have been using collagen to the wound beds and it appears is though they are stain to wet.  There is still tendon present in the wound bed of the right hip.  We will discontinue the collagen and begin application Endoform to both wounds, changing every other day, covered with silver alginate.  He was reminded to offload as much as possible to prevent further deterioration.  Of note, reported that is blood pressure has been low, and it was very low today, 86/54.  He states that he noticed it last week that he has stopped taking Tylenol, aspirin, and probiotics.  We discussed today that it is unlikely that any of these medications would impact blood pressure but he prefers to remain off of them.    10/16/23 - The patient returns today for followup on the pressure injury to the right hip as well as the sacrum.  Today, there is now tendon exposed in the wound bed the right hip pressure injury.  We discussed today that it is imperative that the patient offload to allow this wound to heal so that he does not develop osteomyelitis in this hip as well.  The pressure injury to the sacrum has also deteriorated.  We discussed that now that the amputation to the left hip has fully healed, he must take the opportunity to begin offloading that right hip to  allow it to heal.  We will begin applying collagen every other day to both wound beds.    10/9/23 The patient returns today for the open wound to the right hip and sacrum.  The surgical incision to the left hip is now resolved and will no longer be followed.  Both the right hip and sacrum are wounds that have reopened.  We will begin application of collagen every third day to both wounds.  Neither wound has any signs and symptoms of infection.  He will return in two weeks. Of note, the patient reports that he has had diarrhea for several days, up to about two weeks.  He has had no treatment and is currently using only probiotics.  He has been prescribed imodium and advised to use that medication no more than 4 days.  He is to contact his PCP if the diarrhea does not resolve within that time period.     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 Sandy Owusu on 8/21/23.  This wound has progress except personally well and is remaining closed.  The patient is using only iodine along the surgical incision line.  He does have a follow up with his surgeon tomorrow, 09/26/2023.  He was advised today that he can begin application coconut oil with vitamin C along the closed incision line which is fully approximated.  We will follow this incision line for one additional visit then d/c if no issues develop.    He has had a long term wound at the sacrum which today is open again.  It was mechanically debrided.  Endoform as applied to the wound bed, which will be left in place until Friday at which time he will change to silver alginate.     10/1/24 - The patient returns today for followup on several wounds.  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  This wound has failed to heal.  He was seen by a plastic surgeon on 09/25/2024 with the hopes of possibly having a flap performed.  However, he was told by the PA that nothing could be done due to  the depth of the wound.  Today, that wound was assessed and selectively debrided.  There is a slight amount of epithelial tissue migrated from the wound margin, however there is still quite a bit of depth.  We will manage the wound as much as we can in wound clinic since additional surgery is not an option.  The wound to the elbow is making excellent progress.  A selective debridement was performed.  He has a burn to the abdomen which is progressing well.  He has been using Silvadene on this area and he was instructed to begin just covering the area from now on.  Lastly, he does have a pressure injury at the sacrum.  Today, that wound appears to be closed, however, it has reopened multiple times.  We will leave that wound as an open wound to continue to monitor.    10/15/24 - The abdominal wound remains stable and will be monitored.  The sacral wound is slightly larger.  The right hip remains stable with no S & S of infection.  The right elbow was selectively debrided and has made good progress.     10/29/24 - Mr. Jarrett is seen today for f/up on three wounds.  A selective debridement was performed on the right elbow, and that wound is nearly healed.  The sacrum remains stable.  The right hip reportedly has an increase in drainage.  A culture was performed.  He will follow up with Dr. Delgado in 6 months. We will try to send him back to [facility] in [place].     11/12/4 - The patient was seen today for f/up on his pressure injuries.  A culture was obtained from the right hip on 10/29/24.  The patient was contacted to review the results and to discuss concerns over the pathogens found in that culture.  An Rx for Bactrim DS was sent to his pharmacy and he was instructed to call his infectious disease MD, Dr. Ceron, for a f/up appt due to this providers concerns over the bacteria found.  Today he reports that he called and left message, but did not follow up.  This provider had requested that a fax be  sent to Dr. Ceron of these results.  No confirmation was received.  Dr. Ceron's office was contacted to confirm that the fax was received and asked for Dr. Ceron's review.  The patient was instructed again today to f/up so that Dr. Ceron can help to determine if IV abx are indicated at this time or if Bactrim is sufficient.  All wounds remain stable.  The right hip has decreased in drainage slightly and the wound remains stable.  The elbow was mechanically debrided and the sacrum also remain stable. Of note, the patient has a newly developed rash on the right forearm and right side of his waist area.  He feels that these are mosquito bites.         11/21/24 - Mr. Jarrett returns to clinic today for followup on several wounds.  He did have a culture that was concerning.  We discussed a follow up with his infectious disease doctor.  He did see them on 11/20/2024.  They feel that these bacteria are colonized and no additional antibiotics were recommended.  No additional orders.  He will follow up with them in 3 months.  Today, the right hip is stable but draining.  The original wound to the right elbow has healed, however, a 2nd wound has opened.  That wound is stable.  The pressure injury at the sacrum is also stable and we will continue to applied called into the wound bed.  The patient will return to clinic in 1 month unless he has any deterioration.    Review of Systems   Constitutional: Negative.    Respiratory: Negative.     Cardiovascular: Negative.    Skin:         As documented in the HPI.   All other systems reviewed and are negative.        Objective:      There were no vitals filed for this visit.    Physical Exam  Vitals and nursing note reviewed. Exam conducted with a chaperone present.   Constitutional:       Appearance: Normal appearance.   Cardiovascular:      Rate and Rhythm: Normal rate.   Pulmonary:      Effort: Pulmonary effort is normal.   Skin:     General: Skin is warm and dry.      Comments:  right hip pressure injury, sacral pressure injury, right posterior elbow pressure injury   Neurological:      Mental Status: He is alert.            Altered Skin Integrity 09/25/23 1153 Sacral spine #1 (Active)   09/25/23 1153   Altered Skin Integrity Present on Admission - Did Patient arrive to the hospital with altered skin?: yes   Side:    Orientation:    Location: Sacral spine   Wound Number: #1   Is this injury device related?:    Primary Wound Type:    Description of Altered Skin Integrity:    Ankle-Brachial Index:    Pulses:    Removal Indication and Assessment:    Wound Outcome:    (Retired) Wound Length (cm):    (Retired) Wound Width (cm):    (Retired) Depth (cm):    Wound Description (Comments):    Removal Indications:    Dressing Appearance Moist drainage 11/21/24 1421   Drainage Amount Moderate 11/21/24 1421   Drainage Characteristics/Odor Serosanguineous 11/21/24 1421   Appearance Moist;Red;Granulating 11/21/24 1421   Tissue loss description Full thickness 11/21/24 1421   Periwound Area Intact 11/21/24 1421   Wound Edges Open 11/21/24 1421   Wound Length (cm) 0.8 cm 11/21/24 1421   Wound Width (cm) 0.8 cm 11/21/24 1421   Wound Depth (cm) 0.8 cm 11/21/24 1421   Wound Volume (cm^3) 0.512 cm^3 11/21/24 1421   Wound Surface Area (cm^2) 0.64 cm^2 11/21/24 1421   Care Cleansed with:;Wound cleanser 11/21/24 1421   Dressing Applied;Other (comment) 11/21/24 1421   Dressing Change Due 11/23/24 11/21/24 1421            Altered Skin Integrity 10/09/23 1141 Right Hip #2 (Active)   10/09/23 1141   Altered Skin Integrity Present on Admission - Did Patient arrive to the hospital with altered skin?: yes   Side: Right   Orientation:    Location: Hip   Wound Number: #2   Is this injury device related?: No   Primary Wound Type:    Description of Altered Skin Integrity:    Ankle-Brachial Index:    Pulses:    Removal Indication and Assessment:    Wound Outcome:    (Retired) Wound Length (cm):    (Retired) Wound Width (cm):     (Retired) Depth (cm):    Wound Description (Comments):    Removal Indications:    Dressing Appearance Moist drainage 11/21/24 1421   Drainage Amount Moderate 11/21/24 1421   Drainage Characteristics/Odor Serosanguineous 11/21/24 1421   Appearance Moist;Red;Pink;Tan;Slough;Fibrin;Adipose;Tendon 11/21/24 1421   Tissue loss description Full thickness 11/21/24 1421   Periwound Area Intact 11/21/24 1421   Wound Edges Open 11/21/24 1421   Wound Length (cm) 3 cm 11/21/24 1421   Wound Width (cm) 1.6 cm 11/21/24 1421   Wound Depth (cm) 2.1 cm 11/21/24 1421   Wound Volume (cm^3) 10.08 cm^3 11/21/24 1421   Wound Surface Area (cm^2) 4.8 cm^2 11/21/24 1421   Tunneling (depth (cm)/location) 4cm at 5 o'clock 11/21/24 1421   Care Cleansed with:;Wound cleanser 11/21/24 1421   Dressing Applied;Other (comment) 11/21/24 1421   Dressing Change Due 11/23/24 11/21/24 1421            Wound 11/21/24 Pressure Injury Right Elbow #3 (Active)   11/21/24  Elbow   Present on Original Admission: Y   Primary Wound Type: Pressure inj   Side: Right   Orientation:    Wound Approximate Age at First Assessment (Weeks):    Wound Number: #3   Is this injury device related?: No   Incision Type:    Closure Method:    Wound Description (Comments):    Type:    Additional Comments:    Ankle-Brachial Index:    Pulses:    Removal Indication and Assessment:    Wound Outcome:    Dressing Appearance Moist drainage 11/21/24 1421   Drainage Amount Moderate 11/21/24 1421   Drainage Characteristics/Odor Serosanguineous 11/21/24 1421   Appearance Moist;Tan;Pink;Slough;Granulating 11/21/24 1421   Tissue loss description Full thickness 11/21/24 1421   Periwound Area Intact 11/21/24 1421   Wound Edges Open 11/21/24 1421   Wound Length (cm) 1 cm 11/21/24 1421   Wound Width (cm) 0.8 cm 11/21/24 1421   Wound Depth (cm) 0.2 cm 11/21/24 1421   Wound Volume (cm^3) 0.16 cm^3 11/21/24 1421   Wound Surface Area (cm^2) 0.8 cm^2 11/21/24 1421   Care Cleansed with:;Wound cleanser  11/21/24 1421   Dressing Applied;Other (comment) 11/21/24 1421   Dressing Change Due 11/23/24 11/21/24 1421       11/21/24    Right hip   Polymem, gentle foam border dressing       Sacrum   Polymem, gentle foam border dressing       Right elbow   Polymem, gentle foam border dressing   CT        Assessment:         ICD-10-CM ICD-9-CM   1. 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 Pressure injury of sacral region, stage 4  L89.154 707.03     707.24   3. Pressure injury of right elbow, stage 3  L89.013 707.01     707.23   4. Quadriplegia  G82.50 344.00         Procedures:     Mechanical right hip/sacrum    [] Yes [] No   I & D performed  [] Yes [] No   Excisional debridement performed  [] Yes [] No   Selective debridement performed        [x] Yes [] No   Mechanical debridement performed         [] Yes [] No  Silver nitrate applied                                     [] Yes [] No  Labs ordered this visit                                  [] Yes [] No   Imaging ordered this visit                           [] Yes [] No   Tissue pathology and/or culture taken       MEDICATIONS    Current Outpatient Medications:     acetaminophen (TYLENOL) 325 MG tablet, Take 650 mg by mouth every 6 (six) hours as needed for Pain., Disp: , Rfl:     ascorbic acid, vitamin C, (VITAMIN C) 1000 MG tablet, Take 1,000 mg by mouth every evening., Disp: , Rfl:     aspirin 325 MG tablet, Take 325 mg by mouth every morning. Stopped x 1 month, Disp: , Rfl:     baclofen (LIORESAL) 5 mg Tab tablet, Take 1 tablet (5 mg total) by mouth 3 (three) times daily., Disp: 90 tablet, Rfl: 0    bisacodyL (DULCOLAX) 10 mg Supp, Place 10 mg rectally daily as needed., Disp: , Rfl:     busPIRone (BUSPAR) 10 MG tablet, Take 1 tablet (10 mg total) by mouth 2 (two) times daily., Disp: 60 tablet, Rfl: 5    cetirizine (ZYRTEC) 10 MG tablet, Take 10 mg by mouth 2 (two) times a day., Disp: , Rfl:     cloNIDine  (CATAPRES) 0.1 MG tablet, Take 0.1 mg by mouth daily as needed., Disp: , Rfl:     DULoxetine (CYMBALTA) 30 MG capsule, Take 1 capsule by mouth 2 (two) times daily., Disp: , Rfl:     ferrous sulfate (SLOW RELEASE IRON) 142 mg (45 mg iron) TbSR, 1 tablet Orally Three times a Week for 30 days, Disp: , Rfl:     fexofenadine (ALLEGRA) 180 MG tablet, Take 180 mg by mouth every morning., Disp: , Rfl:     fluticasone propionate (FLONASE ALLERGY RELIEF) 50 mcg/actuation nasal spray, 1 spray by Each Nostril route daily as needed., Disp: , Rfl:     heparin, porcine, PF, (HEPARIN FLUSH 100 UNITS/ML) 100 unit/mL Syrg, flush mediport with 5ml every FOUR weeks, Disp: , Rfl:     JATENZO 237 mg Cap, , Disp: , Rfl:     meloxicam (MOBIC) 7.5 MG tablet, Take 7.5 mg by mouth 2 (two) times daily as needed for Pain., Disp: , Rfl:     midodrine (PROAMATINE) 2.5 MG Tab, TAKE ONE TABLET BY MOUTH THREE TIMES DAILY AS NEEDED Systolic blood pressure less than 90 or Diastolic less than 60, Disp: , Rfl:     multivitamin/iron/folic acid (CENTRUM ORAL), Take 1 tablet by mouth every morning., Disp: , Rfl:     NON FORMULARY MEDICATION, Take 3 drops by mouth 2 (two) times daily as needed. THC, Disp: , Rfl:     NORMAL SALINE FLUSH injection, flush mediport with 10ml every FOUR weeks, Disp: , Rfl:     urea (CARMOL) 40 % Crea, Apply topically once daily., Disp: 85 g, Rfl: 2    zinc gluconate 50 mg tablet, Take 1 tablet (50 mg total) by mouth once daily. (Patient taking differently: Take 50 mg by mouth every morning.), Disp: 30 tablet, Rfl: 1  No current facility-administered medications for this encounter.    Facility-Administered Medications Ordered in Other Encounters:     lactated ringers infusion, , Intravenous, Continuous, Cr Garibay DO, Last Rate: 10 mL/hr at 05/17/24 0705, Restarted at 05/17/24 0859   Review of patient's allergies indicates:   Allergen Reactions    Levofloxacin Rash       DIAGNOSTICS    Labs/Scans/Micro:    CBC:   Lab  "Results   Component Value Date    WBC 10.42 05/14/2024    HGB 13.9 (L) 05/14/2024    HCT 44.3 05/14/2024    MCV 88.8 05/14/2024     05/14/2024     Sedimentation rate:   Lab Results   Component Value Date    SEDRATE 22 (H) 12/15/2022    C-reactive protein:   Lab Results   Component Value Date    CRP 44.90 (H) 12/15/2022    Chemistry: [unfilled]  HBA1C: No components found for: "HBA1C"    Ankle Brachial Index: No results found for this or any previous visit.    Imaging:    Plain film: No results found for this or any previous visit.    MRI: No results found for this or any previous visit.    Bone Scan: No results found for this or any previous visit.    Vascular studies:    Microbiology: 10/29/24          HOME HEALTH AGENCY: [facility]     WOUND CARE ORDERS:  Right hip wound: Cleanse with wound cleanser, apply polymem, 4x4 gauze, and ABD pad and tape to be changed every other day  Sacrum: Cleanse with wound cleanser and apply polymem and an island dressing to be changed every other day. *Collagen to be applied under polymem only on Mondays*  Right elbow: Cleanse with wound cleanser, apply aquaphor to callused area daily       Follow up in about 2 weeks (around 12/5/2024) for ST - liv PU .        "

## 2024-12-05 ENCOUNTER — HOSPITAL ENCOUNTER (OUTPATIENT)
Dept: WOUND CARE | Facility: HOSPITAL | Age: 44
Discharge: HOME | End: 2024-12-05
Attending: NURSE PRACTITIONER
Payer: MEDICARE

## 2024-12-05 VITALS
BODY MASS INDEX: 18.62 KG/M2 | SYSTOLIC BLOOD PRESSURE: 111 MMHG | HEIGHT: 70 IN | WEIGHT: 130.06 LBS | RESPIRATION RATE: 17 BRPM | HEART RATE: 103 BPM | DIASTOLIC BLOOD PRESSURE: 77 MMHG

## 2024-12-05 DIAGNOSIS — G82.50 QUADRIPLEGIA: ICD-10-CM

## 2024-12-05 DIAGNOSIS — L89.013 PRESSURE INJURY OF RIGHT ELBOW, STAGE 3: ICD-10-CM

## 2024-12-05 DIAGNOSIS — L89.154 PRESSURE INJURY OF SACRAL REGION, STAGE 4: ICD-10-CM

## 2024-12-05 DIAGNOSIS — L89.44 PRESSURE INJURY OF CONTIGUOUS REGION INVOLVING BACK AND RIGHT HIP, STAGE 4: Primary | ICD-10-CM

## 2024-12-05 PROCEDURE — 99212 OFFICE O/P EST SF 10 MIN: CPT

## 2024-12-05 PROCEDURE — 27000999 HC MEDICAL RECORD PHOTO DOCUMENTATION

## 2024-12-05 RX ORDER — DOXYCYCLINE HYCLATE 100 MG
100 TABLET ORAL 2 TIMES DAILY
Qty: 60 TABLET | Refills: 0 | Status: SHIPPED | OUTPATIENT
Start: 2024-12-05 | End: 2025-01-04

## 2024-12-05 NOTE — PROGRESS NOTES
Referred by: Dr. Sidhu  Low air loss mattress [x] Yes [] No   Is the patient eligible for a graft, and/or currently grafting?  [] Yes [x] No   Smoker: yes, vape      Subjective:         Patient ID: Werner Jarrett is a 44 y.o. male.    Chief Complaint: Pressure Ulcer (Sacrum - stage 4 /Right hip - stage 4/Right elbow - stage 3))      9/17/24 - The patient returns to clinic for followup on several wounds.  Presents today with a new wound for the clinic.  He reports that a couple of weeks ago he has built a plate of red beans on his abdomen which subsequently birth an area on his stomach.  He was at his mobile home in the kitchen when this occured.  He has been applying Silvadene cream to the area daily.  Today, that burn was selectively debrided and he was instructed to continue using the Silvadene cream for the time being.  The right elbow was selectively debrided.  The right hip and sacrum were both mechanically debrided and fully assessed.  The right hip has no significant change.  He is scheduled to see a plastic surgeon on 09/25/2024 at [facility] in [place] ([address] [place] [address]).  He has completed his Bactrim DS and Augmentin that was prescribed from 9/6/to 9/16.  Today the wound at the right hip no longer has odor.    9/3/24 - Mr. Jarrett was last seen by this provider a couple of weeks ago prior to vacation.  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  Today, the wound at the right hip is extremely malodorous.  The patient also reports that he has seen an increase in drainage from this wound.  We have been using a wound VAC over this wound for several weeks with no change.  Today we will DC the wound VAC.  A culture was obtained and the patient was prescribed doxycycline and clindamycin pending results of the culture.  The sacrum remains stable.  The right elbow was  selectively debrided and does have a large area of exposed granular tissue that has developed since he was last seen by this provider.  Of note, he did see his surgeon, Dr. Sandy Jara last week.  We were hoping that he would see a surgeon for evaluation of the hip for a possible flap.  However, that office is closed and he has now been referred to a plastic surgeon in [place].   We will be waiting for that call with the hopes that the patient's condition does not significantly deteriorate prior to that time.  Will follow up with his surgeon in 6 weeks.    August 20, 2024:  43-year-old white male, who is here for follow up of the chronic right hip pressure injury, sacral pressure injury, and right posterior elbow pressure injury.  The wounds have now stalled.  He has been using a wound VAC on the right hip.  The right elbow we will need some debridement today.  He will see his general surgeon next week.    August 6, 2024:  43-year-old white male, with quadriplegia, is here for follow up of his right hip pressure injury, sacral pressure injury, and right posterior elbow pressure injury.  We have been using a wound VAC on his right hip ulcer.  He is using collagen on the sacral wound.  We have just open the right posterior elbow as a new stage II pressure injury, surrounded by callus.  There is no exposed bone at this time.    7/23/14 - Mr. 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  Today that wound was assessed and the hip bone is now nicely covered with tissue.  We will continue the wound vac, using black foam only and increasing the pressure to 125 mmHg continuous pressure.  The wound at the sacrum remains stable with no significant changes.  Of note, he had his appt with ID on 7/18/24, he will follow up with them again in 3 months, there were no new orders from them. He also saw Dr. Delgado, he will follow up again with her in 2 months, in the mean time she will refer him to  plastic surgery in [place] for a flap.     7/9/24 - The turns today for followup on the wound to the right hip as well as the small wound to the sacrum.  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  We are using a wound VAC on the right hip and that does seem to be making some minor progress in closing the open space between the muscles of the leg.  Today, a selective debridement was performed in an effort to revise the margins and support growth of granular tissue.  The sacrum was also selectively debrided.  We will continue with the wound VAC on the right hip.  The patient does have an appointment with his surgeon, Dr. Sandy Jara, on 07/11/2024.  He also has an appointment with infectious disease on that same day and we are hoping for some additional guidance from both of them regarding his wounds.    6/26/24 - Mr. 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  The sacrum remained stable with no changes and no signs and symptoms of infection.  The right hip seems to be progressing well, with an increase in the development of granular tissue which is occurring across the exposed bone.  At his last visit there was quite a bit of bruising around the periwound so we decrease the wound VAC pressure from 125 mmHg to 100 mmHg and this seems to be improving the growth of granular tissue significantly.  He is scheduled for his bone biopsy on July 1st at [facility] with Dr. Sandy Jara.  He will then follow up with her on 7/11/24.  He also has an upcoming appointment with infectious disease on 07/14/2024.    6/18/24 - Mr. Jarrett returns today for followup on 2 wounds.  The wound to the sacrum continues to progress well with no signs and symptoms of infection.  The wound to the right hip was debrided today.  There was a large amount of slough at the top of the  wound bed.  We are continuing to use a wound VAC although there does not seem to be any significant progress towards closing this wound.  He did have a followup appointment with the surgeon, Dr. Sandy Jara and she does still intend to do a bone biopsy on this right hip.  Will have a followup appointment with her on 07/11/2024.  He will also see infectious disease in July, 2024.  He does not have that date as of today.  He does still have the dry area on his right elbow.  A refill for Urea cream has been sent to his pharmacy.    6/11/24 - The patient returns today for the wounds to the sacrum and right hip.  The sacral wound is stable and clean, with no S & S of infection.  The right hip does have some bruising around the surface of the wound.  We will decrease the wound vac pressure from 125 mmHg to 100 mmHg continuous pressure.  The elbow is still being monitored and he continues to use Urea cream every other day as well as MediHoney.  He has not heard any further information from Dr. Sandy lucero a bone biopsy, and he has been scheduled with ID for f/up.     6/4/24 - Mr. Jarrett returns today for followup on the 2 pressure injuries, 1 to the sacrum and 1 to the right hip.  He does report that he had a follow up with his surgeon, Dr. Sandy Jara on 05/30/2024.  He states that she has indicated that she will be scheduling a bone biopsy of the right hip due to the patient's osteomyelitis.  She is trying to determine his next course of treatment together with infectious disease consideration.  We are currently using a wound VAC and it does seem to be making progress with the development of granular tissue in the wound bed.  There are no signs and symptoms of infection in this wound.  The sacral wound does have a slight increase in depth.  We had changed to collagen and it may be that that product is to wet.  We will DC the collagen and begin application of silver alginate only.  A callus paring was also  done on the patient's right elbow.  This has been an ongoing issue and he is currently using Aquaphor on the elbow.    5/28/24 - Mr. 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  He had excisional debridement of this right hip on 5/17/24 by Dr. Sandy Jara.  Her op note:   PROCEDURE -   Excisional debridement right hip wound, dimension of debridement 1 cm circumferentially, total wound dimensions 3.5 cm x 3 cm x 1 cm deep.  Blunt debridement right hip wound  FINDINGS -    Rolled edges of skin, completely removed by excisional debridement  Fibrinous material within the wound debrided bluntly and submitted to culture   Today that wound is clean with nice margins.  We have applied a wound vac using white foam covered with black foam.     He had a follow up with her scheduled for 5/23 but missed it and is now re-scheduled for 5/30.   He is expecting an appt with ID but has not been contacted as of today.   In addition to the right hip, he does have a wound at the sacrum that has reopened.  It is clean and we will apply collagen into this wound.  A callus paring was done on the right elbow.     5/14/24 - The Patient returns today for followup on the large pressure injury at the right hip as well as a small pressure injury to the sacrum.  He did have a followup appointment with his surgeon, Dr. Sandy Jara. He is scheduled for an I&D/debridment of his right hip with Dr. Quijano this Friday 5/17/24.  We had a lengthy discussion regarding possibilities such as a wound VAC or if she will leave the wound opened or close that wound.  He does not know at this time but this providers preference would be that she would close the wound.  This wound remains open at this time following another procedure and we were unable to get the wound to close completely in bite of the use of a wound VAC.  He does still have ligament exposed in the wound has increased in size.  Bone is fully exposed in the  wound bed.  He has been using topical antibiotics for 1 month and those will be discontinued as of today.  Until his procedure we will apply Adaptic over the exposed bone with silver alginate on top, with daily dressing changes.  He does expect to see an infectious disease doctor following his procedure and also mentioned that he expects to possibly have IV antibiotics in another short stay at [facility].    4/23/24 - Today the wound at the elbow is nearly resolved.  We will begin application of Aquaphor to the elbow.  The sacrum was assessed and has improved dramatically.  It is nearly resolved.  He has completed the PO Augmentin for a UTI and this has helped wounds as well  Additionally he began topical abx to the right hip on 4/16/24 (Clindamycin).  Today the hip is also showing improvement.  He does have a f/up with his surgeon, Dr. Sandy Jara, on 5/2/24 with the hope that she will revise the wound to assist with closure since the margins are rolled.  He will return to clinic in two weeks.     4/16/24 - Mr. Jarrett was seen today for f/up on three wounds.  He did have an MRI done on 4/4/24 of the right hip (at [facility], ordered by Dr. Sandy Jara).  Today those results were reviewed with the patient.  We discussed that this provider would reach out to Dr. Jara regarding possible treatment.  This was done with consideration for IV abx, surgical intervention and/or referral to infectious disease for the best possible outcome.  The patient is currently on Augmentin with two days remaining for a UTI.  Additionally he picked up his topical abx today (Clindamycin) which will be used on a moist 4 x 4 gauze, not Basal Gel.    A callus paring was done on the right elbow and it is now healed.  The sacrum was selectively debrided and remains stable.     4/2/24 - The patient was referred back to his surgeon, Dr. Macie Carbajal, who he saw on 3/26/24.  He reports that she is concerned over  possible abscess in the space, as well as infection.  A culture was obtained today.  She did prescribe Augmentin, to begin tomorrow, as a prophylaxis for the hip. He is expecting to have an MRI schedule prior to the follow up with her on 4/9/24 at which time they will discuss the MRI results, and options. Today the right hip wound has increasing moisture and bogginess.  The sacrum remains stable.  The right elbow as debrided and is nearly closed.     3/19/24 - We have been using NuShield grafts on the open pressure ulcer at the patient's right hip.  Tendon remains exposed in the wound bed, the margins are nereida, epibole and closed.  Through a series of several weeks the wound margins have been scored with a scalpel in an attempt to open these closed margins, to no avail.  Today the graft was held, and the patient will be seen by his surgeon, Dr. Sandy Jara next Tuesday, 3/26/24, for consultation regarding options to possible reopen the margins which might bring a successful closure to this wound based on how it is currently healed.  For the time being we will apply adaptic over the tendon, with silver alginate dressing on top.  Both the right elbow and the sacrum remain stable.  We are currently using Endoform on both.  Today the elbow was selectively debrided.     3/12/24 - The pressure injury to the right hip is being grafted using NuShield grafts.  Today graft #6 was applied.  There has been no significant change to this wound since grafting.  Graft #7 has been ordered.  However, the patient was advised to scheduled with his surgeon, Dr. Sandy Jara, for evaluation to determine if she will need to do further surgery to assist this wound with closure since the margins are rolled.  They were again scored with a scaple to attempt to stimulate the growth of epithelial tissue, which has been done several times to no avail.  The right elbow is progressing well.  The sacrum remains stable with no S & S of  infection.  He has received his new power wheelchair.  We are hopeful that this device might contribute to progress with his wounds.     3/5/24 - Mr. Jarrett is seen today for followup on 3 wounds.  The wound to the right elbow was selectively debrided and it does show considerable improvement.  The wound to the sacrum is stable.  We will begin application of moistened Endoform to both of these wounds to be changed on Friday to silver alginate.  The wound to the right hip is slightly moist with a little bit of drainage.  This is to be expected, however, due to the fact that we are grafting that wound.  There has been no significant change to the size of the wound, unfortunately, in spite of grafting multiple times.  We will continue the process with the hope of making some progress.    2/27/24 - Mr. Jarrett returns to clinic today for followup on 3 areas of concern.  The pressure injury remains stable with no significant change.  He has a new wound to the right elbow.  This has developed due to dryness and cracking which has opened into a wound.  The elbow was selectively debrided using a dermal curette.  We will begin applying MediHoney to the elbow, covering with 4 x 4 gauze, Cover and with a Tubigrip with the hopes of softening this callused tissue further.  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  We are currently grafting using NuShield grafts.  Today we applied graft #4 after the wound margins were crosshatched to try to allow for growth epithelial tissue.  Again this week, there was no significant change in that wound.  Ligament is still fully exposed.  Graft #5 is being ordered today.    2/20/24 - the patient returns to clinic today for followup on a small pressure injury to the sacrum as well as a large stage IV pressure injury to the right hip.  We are currently going through the grafting process.  Today we applied graft #3, which is the 1st of the NuShield grafts.  So far we have not seen  any significant change in the wound and the ligament is still fully exposed.  A # 15 scalpel was used to cross keen the wound margin due to rolling of that margin in the need to stimulate the growth of epithelial tissue as well as granular tissue.  The sacrum remains stable and basically the same size.      2/12/24 - Mr. Jarrett is seen today in follow up of two wounds, both of which remain stable.  The left hip is being grafted, and today PuraPly #2 was applied.  There is no significant change since graft #1 was applied.  Today graft #3 was ordered.  We will change from PuraPly to Epifix at this time.  The sacrum remains stable.  We have applied Endoform into that wound and it will be treated the same as a graft, with the hope that the patient does not have BMs which necessitate removal of the dressing. The patient is eligible and is in need of a new power wheelchair.      2/6/24 - Mr. Jarrett returns to clinic today with 2 wounds.  The very small stage IV pressure injury at the sacrum remains stable.  Today, the wound at the right hip, stage IV pressure injury, will receive the 1st graft application.  We are applying PuraPly grafts at this time and then we will change to [facility] after two PuraPly.  Of note, he is waiting on a demo to be shown to him for a possible new wheelchair.  PuraPly #2 ordered.    January 30, 2024:  43-year-old white male, with paraplegia, is here for follow up of the right hip pressure injury, and the sacral pressure injury.  The wounds are looking similar to the last visit.  He is getting ready to have skin substitute applied to the right hip wound.  There is white tissue in the wound bed, presumably tendon.  I do not see pink granulation tissue.  He has no longer using topical antibiotics.    1/23/24 - Mr. Jarrett returns today for followup on 2 wounds.  Today the sacral pressure injury remains stable and clean.  We have been using topical antibiotics on both of these wounds for several  "weeks.  That has been discontinued today.  The pressure injury at the right hip was selectively debrided today using the ultrasonic debrider and graft prep was performed.  We will begin applying a small amount of mupirocin ointment on to this wound daily in preparation beginning the grafting process next week.  We will be use PuraPly/NuShield grafts.  Of note, the patient is responsible for contacting calor at the [facility] to have his measurements taken.  He has not done that as of today.    December 26, 2023:  43-year-old white male, with paraplegia, is here for follow up of a right hip pressure injury, and a sacral pressure injury.  He has been using topical antibiotics.  The measurements are about the same as last visit.  The nurse practitioner has discussed a possible skin substitute on the right hip.    12/11/23 - Mr. Jarrett for followup on the pressure ulcers to the right hip and sacrum.  We started using topical antibiotics on both wounds on 11/21/2023.  Additionally, the patient has been using the vinegar washes for sometime.  He was instructed to discontinue the use of the vinegar washes today and use only the topical antibiotics.  He has been approved for grafts (Puraply and Nushield).  We will plan to do graft prep in two weeks (once abx are complete) and then begin grafting.    Of note, we discussed today the need for proper support in his wheelchair to offload the pressure.  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  Also, he does have a low air loss mattress, but that mattress is on top of a "regular" mattress and he reports springs are protruding.  He needs a proper bedframe for the low airloss mattress.     12/4/23 - Mr. Jarrett returns for followup on 2 wounds.  He continues to use topical abx on both wounds.  The right hip continues to be concerning.  The tensor fascia " ronny is till exposed in the wound bed.  We are mixing the topical abx with basal gel to prevent drying.  The sacral wound is stable but has had a slight increase in size.  Abx are being used on this wound as well. We are attempting to authorize grafts on the right hip wound.    11/27/23 - the patient returns today for followup on pressure injuries to the right hip and the sacrum.  He has received his topical antibiotics (Linezolid/Gentamincin) and started using the antibiotics on Saturday, 11/25/23.  He did not receive basal gel with that order so we have contacted [facility] to send the patient that product as the antibiotics are very drying and they are being applied directly to his tendon.  He must have the moisture of the basal gel.  In the time being, we will mix his topical antibiotics with mupirocin antibiotic.  Today both wounds remains stable.  He was instructed to use the antibiotics on both wounds to avoid cross contamination.    11/20/23 - Mr. Jarrett returns for f/up on the wound to the right hip and the sacrum.  At his last visit the right hip was cultured and it was positive to have Pseudomonas.  He has been using Vinegar washes since 11/06/2023.  He does have a sensitivity to levofloxacin and so can not take that medication.  We have requested a topical recommendation and he is being prescribed linezolid/gentamicin to be applied directly into the wound.  If we do not see improvement from that topical medication we will be moving forward with an IV antibiotic.  He does have exposed ligament (possibly ischiofemoral ligament) in this wound.  The patient does still have a mediport so that would be the next best solution for him due to his sensitivity.  The wound at the sacrum remains stable and we are currently using silver alginate on both wounds.    11/6/23 - the patient seen today for followup on the right hip and sacral pressure wounds.  Although the sacrum is stable, the right hip does  continue to deteriorate.  The right hip was cultured today.  We have been using Endoform and/or collagen on these wounds and unfortunately these products or causing excessive moisture and deterioration to the wounds.  We will discontinue both products and use only silver alginate for the time being.  The right hip does have tendon exposed at this time.  The patient was reminded and encouraged to continue to offload both the right hip in the sacrum which he reports that he is wearing.  However, on the deterioration of the wounds it is concerning.    10/30/23 - Mr. Jarrett to clinic today for followup on the wound to the sacrum as well as the right hip.  Today, both have deteriorated.  We have been using collagen to the wound beds and it appears is though they are stain to wet.  There is still tendon present in the wound bed of the right hip.  We will discontinue the collagen and begin application Endoform to both wounds, changing every other day, covered with silver alginate.  He was reminded to offload as much as possible to prevent further deterioration.  Of note, reported that is blood pressure has been low, and it was very low today, 86/54.  He states that he noticed it last week that he has stopped taking Tylenol, aspirin, and probiotics.  We discussed today that it is unlikely that any of these medications would impact blood pressure but he prefers to remain off of them.    10/16/23 - The patient returns today for followup on the pressure injury to the right hip as well as the sacrum.  Today, there is now tendon exposed in the wound bed the right hip pressure injury.  We discussed today that it is imperative that the patient offload to allow this wound to heal so that he does not develop osteomyelitis in this hip as well.  The pressure injury to the sacrum has also deteriorated.  We discussed that now that the amputation to the left hip has fully healed, he must take the opportunity to begin offloading that right  hip to allow it to heal.  We will begin applying collagen every other day to both wound beds.    10/9/23 The patient returns today for the open wound to the right hip and sacrum.  The surgical incision to the left hip is now resolved and will no longer be followed.  Both the right hip and sacrum are wounds that have reopened.  We will begin application of collagen every third day to both wounds.  Neither wound has any signs and symptoms of infection.  He will return in two weeks. Of note, the patient reports that he has had diarrhea for several days, up to about two weeks.  He has had no treatment and is currently using only probiotics.  He has been prescribed imodium and advised to use that medication no more than 4 days.  He is to contact his PCP if the diarrhea does not resolve within that time period.     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 Sandy Owusu on 8/21/23.  This wound has progress except personally well and is remaining closed.  The patient is using only iodine along the surgical incision line.  He does have a follow up with his surgeon tomorrow, 09/26/2023.  He was advised today that he can begin application coconut oil with vitamin C along the closed incision line which is fully approximated.  We will follow this incision line for one additional visit then d/c if no issues develop.    He has had a long term wound at the sacrum which today is open again.  It was mechanically debrided.  Endoform as applied to the wound bed, which will be left in place until Friday at which time he will change to silver alginate.     10/1/24 - The patient returns today for followup on several wounds.  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  This wound has failed to heal.  He was seen by a plastic surgeon on 09/25/2024 with the hopes of possibly having a flap performed.  However, he was told by the PA that nothing could be done  due to the depth of the wound.  Today, that wound was assessed and selectively debrided.  There is a slight amount of epithelial tissue migrated from the wound margin, however there is still quite a bit of depth.  We will manage the wound as much as we can in wound clinic since additional surgery is not an option.  The wound to the elbow is making excellent progress.  A selective debridement was performed.  He has a burn to the abdomen which is progressing well.  He has been using Silvadene on this area and he was instructed to begin just covering the area from now on.  Lastly, he does have a pressure injury at the sacrum.  Today, that wound appears to be closed, however, it has reopened multiple times.  We will leave that wound as an open wound to continue to monitor.    10/15/24 - The abdominal wound remains stable and will be monitored.  The sacral wound is slightly larger.  The right hip remains stable with no S & S of infection.  The right elbow was selectively debrided and has made good progress.     10/29/24 - Mr. Jarrett is seen today for f/up on three wounds.  A selective debridement was performed on the right elbow, and that wound is nearly healed.  The sacrum remains stable.  The right hip reportedly has an increase in drainage.  A culture was performed.  He will follow up with Dr. Delgado in 6 months. We will try to send him back to [facility] in [place].     11/12/4 - The patient was seen today for f/up on his pressure injuries.  A culture was obtained from the right hip on 10/29/24.  The patient was contacted to review the results and to discuss concerns over the pathogens found in that culture.  An Rx for Bactrim DS was sent to his pharmacy and he was instructed to call his infectious disease MD, Dr. Ceron, for a f/up appt due to this providers concerns over the bacteria found.  Today he reports that he called and left message, but did not follow up.  This provider had requested that a  fax be sent to Dr. Ceron of these results.  No confirmation was received.  Dr. Ceron's office was contacted to confirm that the fax was received and asked for Dr. Ceron's review.  The patient was instructed again today to f/up so that Dr. Ceron can help to determine if IV abx are indicated at this time or if Bactrim is sufficient.  All wounds remain stable.  The right hip has decreased in drainage slightly and the wound remains stable.  The elbow was mechanically debrided and the sacrum also remain stable. Of note, the patient has a newly developed rash on the right forearm and right side of his waist area.  He feels that these are mosquito bites.         11/21/24 - Mr. Jarrett returns to clinic today for followup on several wounds.  He did have a culture that was concerning.  We discussed a follow up with his infectious disease doctor.  He did see them on 11/20/2024.  They feel that these bacteria are colonized and no additional antibiotics were recommended.  No additional orders.  He will follow up with them in 3 months.  Today, the right hip is stable but draining.  The original wound to the right elbow has healed, however, a 2nd wound has opened.  That wound is stable.  The pressure injury at the sacrum is also stable and we will continue to applied called into the wound bed.  The patient will return to clinic in 1 month unless he has any deterioration.    12/5/24 - The turns today for followup on 3 wounds.  Today, the right elbow was assessed and is now closed.  The pressure injury at the sacrum remains stable.  A continuing is at right hip.  This wound does remain open.  He saw infectious disease a couple of weeks ago for Thanksgiving and he is scheduled for an MRI on Tuesday, 12/10/2024.  He will then follow up with infectious disease a proximally 3 weeks later to discuss those results.  He understood from infectious disease that the MRI is to determine if a cavity is forming in the right hip space.  Today, the  drainage was slightly malodorous.  We did recently culture that hip and he is not currently on antibiotics.  We will begin doxycycline as a preventive and he will stay on that antibiotic until 1 week prior to his follow up appointment with infectious disease.  So he will DC the doxycycline on new year's.  He was cautioned to ensure that he takes a probe diabetic and/or consumes yogurt to prevent c diff.    Review of Systems   Constitutional: Negative.    Respiratory: Negative.     Cardiovascular: Negative.    Skin:         As documented in the HPI.   All other systems reviewed and are negative.        Objective:      Vitals:    12/05/24 1202   BP: 111/77   Pulse: 103   Resp: 17       Physical Exam  Vitals and nursing note reviewed. Exam conducted with a chaperone present.   Constitutional:       Appearance: Normal appearance.   Cardiovascular:      Rate and Rhythm: Normal rate.   Pulmonary:      Effort: Pulmonary effort is normal.   Skin:     General: Skin is warm and dry.      Comments: right hip pressure injury, sacral pressure injury, right posterior elbow pressure injury   Neurological:      Mental Status: He is alert.            Altered Skin Integrity 09/25/23 1153 Sacral spine #1 (Active)   09/25/23 1153   Altered Skin Integrity Present on Admission - Did Patient arrive to the hospital with altered skin?: yes   Side:    Orientation:    Location: Sacral spine   Wound Number: #1   Is this injury device related?:    Primary Wound Type:    Description of Altered Skin Integrity:    Ankle-Brachial Index:    Pulses:    Removal Indication and Assessment:    Wound Outcome:    (Retired) Wound Length (cm):    (Retired) Wound Width (cm):    (Retired) Depth (cm):    Wound Description (Comments):    Removal Indications:    Description of Altered Skin Integrity Full thickness tissue loss with exposed bone, tendon, or muscle. Often includes undermining and tunneling. May extend into muscle and/or supporting structures.  12/05/24 1148   Dressing Appearance Moist drainage 12/05/24 1148   Drainage Amount Moderate 12/05/24 1148   Drainage Characteristics/Odor Serosanguineous 12/05/24 1148   Appearance Intact;Slough;Moist 12/05/24 1148   Tissue loss description Full thickness 12/05/24 1148   Periwound Area Intact;[place] 12/05/24 1148   Wound Edges Defined 12/05/24 1148   Wound Length (cm) 0.8 cm 12/05/24 1148   Wound Width (cm) 0.6 cm 12/05/24 1148   Wound Depth (cm) 0.4 cm 12/05/24 1148   Wound Volume (cm^3) 0.192 cm^3 12/05/24 1148   Wound Surface Area (cm^2) 0.48 cm^2 12/05/24 1148   Care Cleansed with:;Wound cleanser 12/05/24 1148   Dressing Applied;Other (comment) 12/05/24 1148   Dressing Change Due 12/06/24 12/05/24 1148            Altered Skin Integrity 10/09/23 1141 Right Hip #2 (Active)   10/09/23 1141   Altered Skin Integrity Present on Admission - Did Patient arrive to the hospital with altered skin?: yes   Side: Right   Orientation:    Location: Hip   Wound Number: #2   Is this injury device related?: No   Primary Wound Type:    Description of Altered Skin Integrity:    Ankle-Brachial Index:    Pulses:    Removal Indication and Assessment:    Wound Outcome:    (Retired) Wound Length (cm):    (Retired) Wound Width (cm):    (Retired) Depth (cm):    Wound Description (Comments):    Removal Indications:    Description of Altered Skin Integrity Full thickness tissue loss. Subcutaneous fat may be visible but bone, tendon or muscle are not exposed 12/05/24 1148   Dressing Appearance Intact;Moist drainage 12/05/24 1148   Drainage Amount Moderate 12/05/24 1148   Drainage Characteristics/Odor Serosanguineous;Serous 12/05/24 1148   Appearance Intact;Pink;Slough;Red;Moist 12/05/24 1148   Tissue loss description Full thickness 12/05/24 1148   Periwound Area Intact 12/05/24 1148   Wound Edges Open 12/05/24 1148   Wound Length (cm) 3 cm 12/05/24 1148   Wound Width (cm) 1.8 cm 12/05/24 1148   Wound Depth (cm) 1.7 cm 12/05/24 1148   Wound  Volume (cm^3) 9.18 cm^3 12/05/24 1148   Wound Surface Area (cm^2) 5.4 cm^2 12/05/24 1148   Undermining (depth (cm)/location) from 10-5 o'clock deepest at 4 2.5cm 12/05/24 1148   Care Cleansed with:;Wound cleanser 12/05/24 1148   Dressing Applied;Other (comment) 12/05/24 1148   Dressing Change Due 12/06/24 12/05/24 1148            Wound 11/21/24 Pressure Injury Right Elbow #3 (Active)   11/21/24  Elbow   Present on Original Admission: Y   Primary Wound Type: Pressure inj   Side: Right   Orientation:    Wound Approximate Age at First Assessment (Weeks):    Wound Number: #3   Is this injury device related?: No   Incision Type:    Closure Method:    Wound Description (Comments):    Type:    Additional Comments:    Ankle-Brachial Index:    Pulses:    Removal Indication and Assessment:    Wound Outcome:    Dressing Appearance Open to air 12/05/24 1148   Drainage Amount Moderate 12/05/24 1148   Drainage Characteristics/Odor Serosanguineous 12/05/24 1148   Appearance Intact;[place] 12/05/24 1148   Tissue loss description Full thickness 12/05/24 1148   Wound Length (cm) 0.5 cm 12/05/24 1148   Wound Width (cm) 0.5 cm 12/05/24 1148   Wound Depth (cm) 0.2 cm 12/05/24 1148   Wound Volume (cm^3) 0.05 cm^3 12/05/24 1148   Wound Surface Area (cm^2) 0.25 cm^2 12/05/24 1148   Care Cleansed with:;Other (see comments) 12/05/24 1148   Dressing Applied;Other (comment) 12/05/24 1148   Dressing Change Due 12/06/24 12/05/24 1148       12/5/24    Right hip  Silver alginate, gentle foam border dressing         Sacrum  Silver alginate, gentle foam border dressing         Right elbow  Medihoney, gentle border, E tubigrip  TR      Assessment:         ICD-10-CM ICD-9-CM   1. 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 Pressure injury of sacral region, stage 4  L89.154 707.03     707.24   3. Pressure injury of right elbow, stage 3  L89.013 707.01     707.23   4. Quadriplegia  G82.50  344.00           Procedures:     Mechanical right hip/sacrum    [] Yes [] No   I & D performed  [] Yes [] No   Excisional debridement performed  [] Yes [] No   Selective debridement performed        [x] Yes [] No   Mechanical debridement performed         [] Yes [] No  Silver nitrate applied                                     [] Yes [] No  Labs ordered this visit                                  [] Yes [] No   Imaging ordered this visit                           [] Yes [] No   Tissue pathology and/or culture taken       MEDICATIONS    Current Outpatient Medications:     acetaminophen (TYLENOL) 325 MG tablet, Take 650 mg by mouth every 6 (six) hours as needed for Pain., Disp: , Rfl:     ascorbic acid, vitamin C, (VITAMIN C) 1000 MG tablet, Take 1,000 mg by mouth every evening., Disp: , Rfl:     aspirin 325 MG tablet, Take 325 mg by mouth every morning. Stopped x 1 month, Disp: , Rfl:     baclofen (LIORESAL) 5 mg Tab tablet, Take 1 tablet (5 mg total) by mouth 3 (three) times daily., Disp: 90 tablet, Rfl: 0    bisacodyL (DULCOLAX) 10 mg Supp, Place 10 mg rectally daily as needed., Disp: , Rfl:     busPIRone (BUSPAR) 10 MG tablet, Take 1 tablet (10 mg total) by mouth 2 (two) times daily., Disp: 60 tablet, Rfl: 5    cetirizine (ZYRTEC) 10 MG tablet, Take 10 mg by mouth 2 (two) times a day., Disp: , Rfl:     cloNIDine (CATAPRES) 0.1 MG tablet, Take 0.1 mg by mouth daily as needed., Disp: , Rfl:     doxycycline (VIBRA-TABS) 100 MG tablet, Take 1 tablet (100 mg total) by mouth 2 (two) times daily., Disp: 60 tablet, Rfl: 0    DULoxetine (CYMBALTA) 30 MG capsule, Take 1 capsule by mouth 2 (two) times daily., Disp: , Rfl:     ferrous sulfate (SLOW RELEASE IRON) 142 mg (45 mg iron) TbSR, 1 tablet Orally Three times a Week for 30 days, Disp: , Rfl:     fexofenadine (ALLEGRA) 180 MG tablet, Take 180 mg by mouth every morning., Disp: , Rfl:     fluticasone propionate (FLONASE ALLERGY RELIEF) 50 mcg/actuation nasal spray, 1 spray  "by Each Nostril route daily as needed., Disp: , Rfl:     heparin, porcine, PF, (HEPARIN FLUSH 100 UNITS/ML) 100 unit/mL Syrg, flush mediport with 5ml every FOUR weeks, Disp: , Rfl:     JATENZO 237 mg Cap, , Disp: , Rfl:     meloxicam (MOBIC) 7.5 MG tablet, Take 7.5 mg by mouth 2 (two) times daily as needed for Pain., Disp: , Rfl:     midodrine (PROAMATINE) 2.5 MG Tab, TAKE ONE TABLET BY MOUTH THREE TIMES DAILY AS NEEDED Systolic blood pressure less than 90 or Diastolic less than 60, Disp: , Rfl:     multivitamin/iron/folic acid (CENTRUM ORAL), Take 1 tablet by mouth every morning., Disp: , Rfl:     NON FORMULARY MEDICATION, Take 3 drops by mouth 2 (two) times daily as needed. THC, Disp: , Rfl:     NORMAL SALINE FLUSH injection, flush mediport with 10ml every FOUR weeks, Disp: , Rfl:     urea (CARMOL) 40 % Crea, Apply topically once daily., Disp: 85 g, Rfl: 2    zinc gluconate 50 mg tablet, Take 1 tablet (50 mg total) by mouth once daily. (Patient taking differently: Take 50 mg by mouth every morning.), Disp: 30 tablet, Rfl: 1  No current facility-administered medications for this encounter.    Facility-Administered Medications Ordered in Other Encounters:     lactated ringers infusion, , Intravenous, Continuous, Cr Garibay DO, Last Rate: 10 mL/hr at 05/17/24 0705, Restarted at 05/17/24 0859   Review of patient's allergies indicates:   Allergen Reactions    Levofloxacin Rash       DIAGNOSTICS    Labs/Scans/Micro:    CBC:   Lab Results   Component Value Date    WBC 10.42 05/14/2024    HGB 13.9 (L) 05/14/2024    HCT 44.3 05/14/2024    MCV 88.8 05/14/2024     05/14/2024     Sedimentation rate:   Lab Results   Component Value Date    SEDRATE 22 (H) 12/15/2022    C-reactive protein:   Lab Results   Component Value Date    CRP 44.90 (H) 12/15/2022    Chemistry: [unfilled]  HBA1C: No components found for: "HBA1C"    Ankle Brachial Index: No results found for this or any previous " visit.    Imaging:    Plain film: No results found for this or any previous visit.    MRI: No results found for this or any previous visit.    Bone Scan: No results found for this or any previous visit.    Vascular studies:    Microbiology: 10/29/24          HOME HEALTH AGENCY: [facility]     WOUND CARE ORDERS:  Right hip wound: Cleanse with wound cleanser, apply polymem, 4x4 gauze, and ABD pad and tape to be changed every other day  Sacrum: Cleanse with wound cleanser and apply polymem and an island dressing to be changed every other day. *Collagen to be applied under polymem only on Mondays*  Right elbow: Cleanse with wound cleanser, apply medihoney to callused area daily       Follow up in about 2 weeks (around 12/19/2024).

## 2024-12-10 ENCOUNTER — HOSPITAL ENCOUNTER (OUTPATIENT)
Dept: RADIOLOGY | Facility: HOSPITAL | Age: 44
Discharge: HOME | End: 2024-12-10
Payer: MEDICARE

## 2024-12-10 DIAGNOSIS — S71.001D COMPLICATED OPEN WOUND OF RIGHT HIP, SUBSEQUENT ENCOUNTER: ICD-10-CM

## 2024-12-10 DIAGNOSIS — L89.44 PRESSURE INJURY OF CONTIGUOUS REGION INVOLVING BACK AND RIGHT HIP, STAGE 4: ICD-10-CM

## 2024-12-10 PROCEDURE — 25500020 PHARM REV CODE 255

## 2024-12-10 PROCEDURE — 73723 MRI JOINT LWR EXTR W/O&W/DYE: CPT | Mod: TC,RT

## 2024-12-10 PROCEDURE — A9577 INJ MULTIHANCE: HCPCS

## 2024-12-10 RX ADMIN — GADOBENATE DIMEGLUMINE 15 ML: 529 INJECTION, SOLUTION INTRAVENOUS at 10:12

## 2024-12-26 ENCOUNTER — HOSPITAL ENCOUNTER (OUTPATIENT)
Dept: WOUND CARE | Facility: HOSPITAL | Age: 44
Discharge: HOME | End: 2024-12-26
Attending: FAMILY MEDICINE
Payer: MEDICARE

## 2024-12-26 VITALS
DIASTOLIC BLOOD PRESSURE: 81 MMHG | RESPIRATION RATE: 18 BRPM | BODY MASS INDEX: 18.62 KG/M2 | HEART RATE: 70 BPM | SYSTOLIC BLOOD PRESSURE: 145 MMHG | WEIGHT: 130.06 LBS | HEIGHT: 70 IN

## 2024-12-26 DIAGNOSIS — M86.9 OSTEOMYELITIS OF RIGHT HIP: Primary | ICD-10-CM

## 2024-12-26 DIAGNOSIS — L89.44 PRESSURE INJURY OF CONTIGUOUS REGION INVOLVING BACK AND RIGHT HIP, STAGE 4: ICD-10-CM

## 2024-12-26 DIAGNOSIS — L89.154 PRESSURE INJURY OF SACRAL REGION, STAGE 4: ICD-10-CM

## 2024-12-26 PROCEDURE — 99213 OFFICE O/P EST LOW 20 MIN: CPT

## 2024-12-26 PROCEDURE — 27000999 HC MEDICAL RECORD PHOTO DOCUMENTATION

## 2024-12-26 NOTE — PROGRESS NOTES
Referred by: Dr. Sidhu  Low air loss mattress [x] Yes [] No   Is the patient eligible for a graft, and/or currently grafting?  [] Yes [x] No   Smoker: yes, vape    NOTES:  MRI done on 12/10/24, he states that ID called him and told him that the MRI was negative and that there was nothing to worry about. Those results given to patient today. He is still taking Doxycycline. He will follow up with ID on 1/6/24 and Dr. Delgado on 1/16/24  Subjective:         Patient ID: Werner Jarrett is a 44 y.o. male.    Chief Complaint: Pressure Ulcer ((Sacrum - stage 4 /Right hip - stage 4/Right elbow - stage 3))      9/17/24 - The patient returns to clinic for followup on several wounds.  Presents today with a new wound for the clinic.  He reports that a couple of weeks ago he has built a plate of red beans on his abdomen which subsequently birth an area on his stomach.  He was at his mobile home in the kitchen when this occured.  He has been applying Silvadene cream to the area daily.  Today, that burn was selectively debrided and he was instructed to continue using the Silvadene cream for the time being.  The right elbow was selectively debrided.  The right hip and sacrum were both mechanically debrided and fully assessed.  The right hip has no significant change.  He is scheduled to see a plastic surgeon on 09/25/2024 at [facility] in [place] ([address] [place] [address]).  He has completed his Bactrim DS and Augmentin that was prescribed from 9/6/to 9/16.  Today the wound at the right hip no longer has odor.    9/3/24 - Mr. Jarrett was last seen by this provider a couple of weeks ago prior to vacation.  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  Today, the wound at the right hip is extremely malodorous.  The patient also reports that he has seen an increase in drainage from this wound.  We have been  using a wound VAC over this wound for several weeks with no change.  Today we will DC the wound VAC.  A culture was obtained and the patient was prescribed doxycycline and clindamycin pending results of the culture.  The sacrum remains stable.  The right elbow was selectively debrided and does have a large area of exposed granular tissue that has developed since he was last seen by this provider.  Of note, he did see his surgeon, Dr. Sandy Jara last week.  We were hoping that he would see a surgeon for evaluation of the hip for a possible flap.  However, that office is closed and he has now been referred to a plastic surgeon in [place].   We will be waiting for that call with the hopes that the patient's condition does not significantly deteriorate prior to that time.  Will follow up with his surgeon in 6 weeks.    August 20, 2024:  43-year-old white male, who is here for follow up of the chronic right hip pressure injury, sacral pressure injury, and right posterior elbow pressure injury.  The wounds have now stalled.  He has been using a wound VAC on the right hip.  The right elbow we will need some debridement today.  He will see his general surgeon next week.    August 6, 2024:  43-year-old white male, with quadriplegia, is here for follow up of his right hip pressure injury, sacral pressure injury, and right posterior elbow pressure injury.  We have been using a wound VAC on his right hip ulcer.  He is using collagen on the sacral wound.  We have just open the right posterior elbow as a new stage II pressure injury, surrounded by callus.  There is no exposed bone at this time.    7/23/14 - Mr. 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  Today that wound was assessed and the hip bone is now nicely covered with tissue.  We will continue the wound vac, using black foam only and increasing the pressure to 125 mmHg continuous pressure.  The wound at the sacrum remains stable with no  significant changes.  Of note, he had his appt with ID on 7/18/24, he will follow up with them again in 3 months, there were no new orders from them. He also saw Dr. Delgado, he will follow up again with her in 2 months, in the mean time she will refer him to plastic surgery in [place] for a flap.     7/9/24 - The turns today for followup on the wound to the right hip as well as the small wound to the sacrum.  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  We are using a wound VAC on the right hip and that does seem to be making some minor progress in closing the open space between the muscles of the leg.  Today, a selective debridement was performed in an effort to revise the margins and support growth of granular tissue.  The sacrum was also selectively debrided.  We will continue with the wound VAC on the right hip.  The patient does have an appointment with his surgeon, Dr. Sandy Jara, on 07/11/2024.  He also has an appointment with infectious disease on that same day and we are hoping for some additional guidance from both of them regarding his wounds.    6/26/24 - Mr. 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  The sacrum remained stable with no changes and no signs and symptoms of infection.  The right hip seems to be progressing well, with an increase in the development of granular tissue which is occurring across the exposed bone.  At his last visit there was quite a bit of bruising around the periwound so we decrease the wound VAC pressure from 125 mmHg to 100 mmHg and this seems to be improving the growth of granular tissue significantly.  He is scheduled for his bone biopsy on July 1st at [facility] with Dr. Sandy Jara.  He will then follow up with her on 7/11/24.  He also has an upcoming appointment with infectious disease on  07/14/2024.    6/18/24 - Mr. Jarrett returns today for followup on 2 wounds.  The wound to the sacrum continues to progress well with no signs and symptoms of infection.  The wound to the right hip was debrided today.  There was a large amount of slough at the top of the wound bed.  We are continuing to use a wound VAC although there does not seem to be any significant progress towards closing this wound.  He did have a followup appointment with the surgeon, Dr. Sandy Jara and she does still intend to do a bone biopsy on this right hip.  Will have a followup appointment with her on 07/11/2024.  He will also see infectious disease in July, 2024.  He does not have that date as of today.  He does still have the dry area on his right elbow.  A refill for Urea cream has been sent to his pharmacy.    6/11/24 - The patient returns today for the wounds to the sacrum and right hip.  The sacral wound is stable and clean, with no S & S of infection.  The right hip does have some bruising around the surface of the wound.  We will decrease the wound vac pressure from 125 mmHg to 100 mmHg continuous pressure.  The elbow is still being monitored and he continues to use Urea cream every other day as well as MediHoney.  He has not heard any further information from Dr. Sandy lucero a bone biopsy, and he has been scheduled with ID for f/up.     6/4/24 - Mr. Jarrett returns today for followup on the 2 pressure injuries, 1 to the sacrum and 1 to the right hip.  He does report that he had a follow up with his surgeon, Dr. Sandy Jara on 05/30/2024.  He states that she has indicated that she will be scheduling a bone biopsy of the right hip due to the patient's osteomyelitis.  She is trying to determine his next course of treatment together with infectious disease consideration.  We are currently using a wound VAC and it does seem to be making progress with the development of granular tissue in the wound bed.  There are no  signs and symptoms of infection in this wound.  The sacral wound does have a slight increase in depth.  We had changed to collagen and it may be that that product is to wet.  We will DC the collagen and begin application of silver alginate only.  A callus paring was also done on the patient's right elbow.  This has been an ongoing issue and he is currently using Aquaphor on the elbow.    5/28/24 - Mr. 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  He had excisional debridement of this right hip on 5/17/24 by Dr. Sandy Jara.  Her op note:   PROCEDURE -   Excisional debridement right hip wound, dimension of debridement 1 cm circumferentially, total wound dimensions 3.5 cm x 3 cm x 1 cm deep.  Blunt debridement right hip wound  FINDINGS -    Rolled edges of skin, completely removed by excisional debridement  Fibrinous material within the wound debrided bluntly and submitted to culture   Today that wound is clean with nice margins.  We have applied a wound vac using white foam covered with black foam.     He had a follow up with her scheduled for 5/23 but missed it and is now re-scheduled for 5/30.   He is expecting an appt with ID but has not been contacted as of today.   In addition to the right hip, he does have a wound at the sacrum that has reopened.  It is clean and we will apply collagen into this wound.  A callus paring was done on the right elbow.     5/14/24 - The Patient returns today for followup on the large pressure injury at the right hip as well as a small pressure injury to the sacrum.  He did have a followup appointment with his surgeon, Dr. Sandy Jara. He is scheduled for an I&D/debridment of his right hip with Dr. Quijano this Friday 5/17/24.  We had a lengthy discussion regarding possibilities such as a wound VAC or if she will leave the wound opened or close that wound.  He does not know at this time but this providers preference would be that she would close  the wound.  This wound remains open at this time following another procedure and we were unable to get the wound to close completely in bite of the use of a wound VAC.  He does still have ligament exposed in the wound has increased in size.  Bone is fully exposed in the wound bed.  He has been using topical antibiotics for 1 month and those will be discontinued as of today.  Until his procedure we will apply Adaptic over the exposed bone with silver alginate on top, with daily dressing changes.  He does expect to see an infectious disease doctor following his procedure and also mentioned that he expects to possibly have IV antibiotics in another short stay at [facility].    4/23/24 - Today the wound at the elbow is nearly resolved.  We will begin application of Aquaphor to the elbow.  The sacrum was assessed and has improved dramatically.  It is nearly resolved.  He has completed the PO Augmentin for a UTI and this has helped wounds as well  Additionally he began topical abx to the right hip on 4/16/24 (Clindamycin).  Today the hip is also showing improvement.  He does have a f/up with his surgeon, Dr. Sandy Jara, on 5/2/24 with the hope that she will revise the wound to assist with closure since the margins are rolled.  He will return to clinic in two weeks.     4/16/24 - Mr. Jarrett was seen today for f/up on three wounds.  He did have an MRI done on 4/4/24 of the right hip (at [facility], ordered by Dr. Sandy Jara).  Today those results were reviewed with the patient.  We discussed that this provider would reach out to Dr. Jara regarding possible treatment.  This was done with consideration for IV abx, surgical intervention and/or referral to infectious disease for the best possible outcome.  The patient is currently on Augmentin with two days remaining for a UTI.  Additionally he picked up his topical abx today (Clindamycin) which will be used on a moist 4 x 4 gauze, not Basal Gel.     A callus paring was done on the right elbow and it is now healed.  The sacrum was selectively debrided and remains stable.     4/2/24 - The patient was referred back to his surgeon, Dr. Macie Carbajal, who he saw on 3/26/24.  He reports that she is concerned over possible abscess in the space, as well as infection.  A culture was obtained today.  She did prescribe Augmentin, to begin tomorrow, as a prophylaxis for the hip. He is expecting to have an MRI schedule prior to the follow up with her on 4/9/24 at which time they will discuss the MRI results, and options. Today the right hip wound has increasing moisture and bogginess.  The sacrum remains stable.  The right elbow as debrided and is nearly closed.     3/19/24 - We have been using NuShield grafts on the open pressure ulcer at the patient's right hip.  Tendon remains exposed in the wound bed, the margins are nereida, epibole and closed.  Through a series of several weeks the wound margins have been scored with a scalpel in an attempt to open these closed margins, to no avail.  Today the graft was held, and the patient will be seen by his surgeon, Dr. Sandy Jara next Tuesday, 3/26/24, for consultation regarding options to possible reopen the margins which might bring a successful closure to this wound based on how it is currently healed.  For the time being we will apply adaptic over the tendon, with silver alginate dressing on top.  Both the right elbow and the sacrum remain stable.  We are currently using Endoform on both.  Today the elbow was selectively debrided.     3/12/24 - The pressure injury to the right hip is being grafted using NuShield grafts.  Today graft #6 was applied.  There has been no significant change to this wound since grafting.  Graft #7 has been ordered.  However, the patient was advised to scheduled with his surgeon, Dr. Sandy Jara, for evaluation to determine if she will need to do further surgery to assist this wound  with closure since the margins are rolled.  They were again scored with a scaple to attempt to stimulate the growth of epithelial tissue, which has been done several times to no avail.  The right elbow is progressing well.  The sacrum remains stable with no S & S of infection.  He has received his new power wheelchair.  We are hopeful that this device might contribute to progress with his wounds.     3/5/24 - Mr. Jarrett is seen today for followup on 3 wounds.  The wound to the right elbow was selectively debrided and it does show considerable improvement.  The wound to the sacrum is stable.  We will begin application of moistened Endoform to both of these wounds to be changed on Friday to silver alginate.  The wound to the right hip is slightly moist with a little bit of drainage.  This is to be expected, however, due to the fact that we are grafting that wound.  There has been no significant change to the size of the wound, unfortunately, in spite of grafting multiple times.  We will continue the process with the hope of making some progress.    2/27/24 - Mr. Jarrett returns to clinic today for followup on 3 areas of concern.  The pressure injury remains stable with no significant change.  He has a new wound to the right elbow.  This has developed due to dryness and cracking which has opened into a wound.  The elbow was selectively debrided using a dermal curette.  We will begin applying MediHoney to the elbow, covering with 4 x 4 gauze, Cover and with a Tubigrip with the hopes of softening this callused tissue further.  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  We are [facility] grafting [facility].  Today we applied graft #4 after the wound margins were crosshatched to try to allow for growth epithelial tissue.  Again this week, there was no significant change in that wound.  Ligament is still fully exposed.  Graft #5 is being ordered today.    2/20/24 - the patient returns to clinic  today for followup on a small pressure injury to the sacrum as well as a large stage IV pressure injury to the right hip.  We are currently going through the grafting process.  Today we applied graft #3, which is the 1st of the [facility] grafts.  So far we have not seen any significant change in the wound and the ligament is still fully exposed.  A # 15 scalpel was used to cross keen the wound margin due to rolling of that margin in the need to stimulate the growth of epithelial tissue as well as granular tissue.  The sacrum remains stable and basically the same size.      2/12/24 - Mr. Jarrett is seen today in follow up of two wounds, both of which remain stable.  The left hip is being grafted, and today PuraPly #2 was applied.  There is no significant change since graft #1 was applied.  Today graft #3 was ordered.  We will change from PuraPly to Epifix at this time.  The sacrum remains stable.  We have applied Endoform into that wound and it will be treated the same as a graft, with the hope that the patient does not have BMs which necessitate removal of the dressing. The patient is eligible and is in need of a new power wheelchair.      2/6/24 - Mr. Jarrett returns to clinic today with 2 wounds.  The very small stage IV pressure injury at the sacrum remains stable.  Today, the wound at the right hip, stage IV pressure injury, will receive the 1st graft application.  We are applying PuraPly grafts at this time and then we will change to NuShield after two PuraPly.  Of note, he is waiting on a demo to be shown to him for a possible new wheelchair.  PuraPly #2 ordered.    January 30, 2024:  43-year-old white male, with paraplegia, is here for follow up of the right hip pressure injury, and the sacral pressure injury.  The wounds are looking similar to the last visit.  He is getting ready to have skin substitute applied to the right hip wound.  There is white tissue in the wound bed, presumably tendon.  I do not see pink  "granulation tissue.  He has no longer using topical antibiotics.    1/23/24 - Mr. Jarrett returns today for followup on 2 wounds.  Today the sacral pressure injury remains stable and clean.  We have been using topical antibiotics on both of these wounds for several weeks.  That has been discontinued today.  The pressure injury at the right hip was selectively debrided today using the ultrasonic debrider and graft prep was performed.  We will begin applying a small amount of mupirocin ointment on to this wound daily in preparation beginning the grafting process next week.  We will be use PuraPly/NuShield grafts.  Of note, the patient is responsible for contacting calor at the [facility] to have his measurements taken.  He has not done that as of today.    December 26, 2023:  43-year-old white male, with paraplegia, is here for follow up of a right hip pressure injury, and a sacral pressure injury.  He has been using topical antibiotics.  The measurements are about the same as last visit.  The nurse practitioner has discussed a possible skin substitute on the right hip.    12/11/23 - Mr. Jarrett for followup on the pressure ulcers to the right hip and sacrum.  We started using topical antibiotics on both wounds on 11/21/2023.  Additionally, the patient has been using the vinegar washes for sometime.  He was instructed to discontinue the use of the vinegar washes today and use only the topical antibiotics.  He has been approved for grafts (Puraply and Nushield).  We will plan to do graft prep in two weeks (once abx are complete) and then begin grafting.    Of note, we discussed today the need for proper support in his wheelchair to offload the pressure.  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  Also, he does have a low air loss mattress, but that mattress is on top of a "regular" mattress " and he reports springs are protruding.  He needs a proper bedframe for the low airloss mattress.     12/4/23 - Mr. Jarrett returns for followup on 2 wounds.  He continues to use topical abx on both wounds.  The right hip continues to be concerning.  The tensor fascia ronny is till exposed in the wound bed.  We are mixing the topical abx with basal gel to prevent drying.  The sacral wound is stable but has had a slight increase in size.  Abx are being used on this wound as well. We are attempting to authorize grafts on the right hip wound.    11/27/23 - the patient returns today for followup on pressure injuries to the right hip and the sacrum.  He has received his topical antibiotics (Linezolid/Gentamincin) and started using the antibiotics on Saturday, 11/25/23.  He did not receive basal gel with that order so we have contacted [facility] to send the patient that product as the antibiotics are very drying and they are being applied directly to his tendon.  He must have the moisture of the basal gel.  In the time being, we will mix his topical antibiotics with mupirocin antibiotic.  Today both wounds remains stable.  He was instructed to use the antibiotics on both wounds to avoid cross contamination.    11/20/23 - Mr. Jarrett returns for f/up on the wound to the right hip and the sacrum.  At his last visit the right hip was cultured and it was positive to have Pseudomonas.  He has been using Vinegar washes since 11/06/2023.  He does have a sensitivity to levofloxacin and so can not take that medication.  We have requested a topical recommendation and he is being prescribed linezolid/gentamicin to be applied directly into the wound.  If we do not see improvement from that topical medication we will be moving forward with an IV antibiotic.  He does have exposed ligament (possibly ischiofemoral ligament) in this wound.  The patient does still have a mediport so that would be the next best solution for him  due to his sensitivity.  The wound at the sacrum remains stable and we are currently using silver alginate on both wounds.    11/6/23 - the patient seen today for followup on the right hip and sacral pressure wounds.  Although the sacrum is stable, the right hip does continue to deteriorate.  The right hip was cultured today.  We have been using Endoform and/or collagen on these wounds and unfortunately these products or causing excessive moisture and deterioration to the wounds.  We will discontinue both products and use only silver alginate for the time being.  The right hip does have tendon exposed at this time.  The patient was reminded and encouraged to continue to offload both the right hip in the sacrum which he reports that he is wearing.  However, on the deterioration of the wounds it is concerning.    10/30/23 - Mr. Jarrett to clinic today for followup on the wound to the sacrum as well as the right hip.  Today, both have deteriorated.  We have been using collagen to the wound beds and it appears is though they are stain to wet.  There is still tendon present in the wound bed of the right hip.  We will discontinue the collagen and begin application Endoform to both wounds, changing every other day, covered with silver alginate.  He was reminded to offload as much as possible to prevent further deterioration.  Of note, reported that is blood pressure has been low, and it was very low today, 86/54.  He states that he noticed it last week that he has stopped taking Tylenol, aspirin, and probiotics.  We discussed today that it is unlikely that any of these medications would impact blood pressure but he prefers to remain off of them.    10/16/23 - The patient returns today for followup on the pressure injury to the right hip as well as the sacrum.  Today, there is now tendon exposed in the wound bed the right hip pressure injury.  We discussed today that it is imperative that the patient offload to allow this  wound to heal so that he does not develop osteomyelitis in this hip as well.  The pressure injury to the sacrum has also deteriorated.  We discussed that now that the amputation to the left hip has fully healed, he must take the opportunity to begin offloading that right hip to allow it to heal.  We will begin applying collagen every other day to both wound beds.    10/9/23 The patient returns today for the open wound to the right hip and sacrum.  The surgical incision to the left hip is now resolved and will no longer be followed.  Both the right hip and sacrum are wounds that have reopened.  We will begin application of collagen every third day to both wounds.  Neither wound has any signs and symptoms of infection.  He will return in two weeks. Of note, the patient reports that he has had diarrhea for several days, up to about two weeks.  He has had no treatment and is currently using only probiotics.  He has been prescribed imodium and advised to use that medication no more than 4 days.  He is to contact his PCP if the diarrhea does not resolve within that time period.     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 Sandy Owusu on 8/21/23.  This wound has progress except personally well and is remaining closed.  The patient is using only iodine along the surgical incision line.  He does have a follow up with his surgeon tomorrow, 09/26/2023.  He was advised today that he can begin application coconut oil with vitamin C along the closed incision line which is fully approximated.  We will follow this incision line for one additional visit then d/c if no issues develop.    He has had a long term wound at the sacrum which today is open again.  It was mechanically debrided.  Endoform as applied to the wound bed, which will be left in place until Friday at which time he will change to silver alginate.     10/1/24 - The patient returns today for followup on several  wounds.  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  This wound has failed to heal.  He was seen by a plastic surgeon on 09/25/2024 with the hopes of possibly having a flap performed.  However, he was told by the PA that nothing could be done due to the depth of the wound.  Today, that wound was assessed and selectively debrided.  There is a slight amount of epithelial tissue migrated from the wound margin, however there is still quite a bit of depth.  We will manage the wound as much as we can in wound clinic since additional surgery is not an option.  The wound to the elbow is making excellent progress.  A selective debridement was performed.  He has a burn to the abdomen which is progressing well.  He has been using Silvadene on this area and he was instructed to begin just covering the area from now on.  Lastly, he does have a pressure injury at the sacrum.  Today, that wound appears to be closed, however, it has reopened multiple times.  We will leave that wound as an open wound to continue to monitor.    10/15/24 - The abdominal wound remains stable and will be monitored.  The sacral wound is slightly larger.  The right hip remains stable with no S & S of infection.  The right elbow was selectively debrided and has made good progress.     10/29/24 - Mr. Jarrett is seen today for f/up on three wounds.  A selective debridement was performed on the right elbow, and that wound is nearly healed.  The sacrum remains stable.  The right hip reportedly has an increase in drainage.  A culture was performed.  He will follow up with Dr. Delgado in 6 months. We will try to send him back to [facility] in [place].     11/12/4 - The patient was seen today for f/up on his pressure injuries.  A culture was obtained from the right hip on 10/29/24.  The patient was contacted to review the results and to discuss concerns over the pathogens found in that culture.  An Rx for Bactrim DS  was sent to his pharmacy and he was instructed to call his infectious disease MD, Dr. Ceron, for a f/up appt due to this providers concerns over the bacteria found.  Today he reports that he called and left message, but did not follow up.  This provider had requested that a fax be sent to Dr. Ceron of these results.  No confirmation was received.  Dr. Ceron's office was contacted to confirm that the fax was received and asked for Dr. Ceron's review.  The patient was instructed again today to f/up so that Dr. Ceron can help to determine if IV abx are indicated at this time or if Bactrim is sufficient.  All wounds remain stable.  The right hip has decreased in drainage slightly and the wound remains stable.  The elbow was mechanically debrided and the sacrum also remain stable. Of note, the patient has a newly developed rash on the right forearm and right side of his waist area.  He feels that these are mosquito bites.         11/21/24 - Mr. Jarrett returns to clinic today for followup on several wounds.  He did have a culture that was concerning.  We discussed a follow up with his infectious disease doctor.  He did see them on 11/20/2024.  They feel that these bacteria are colonized and no additional antibiotics were recommended.  No additional orders.  He will follow up with them in 3 months.  Today, the right hip is stable but draining.  The original wound to the right elbow has healed, however, a 2nd wound has opened.  That wound is stable.  The pressure injury at the sacrum is also stable and we will continue to applied called into the wound bed.  The patient will return to clinic in 1 month unless he has any deterioration.    12/5/24 - The turns today for followup on 3 wounds.  Today, the right elbow was assessed and is now closed.  The pressure injury at the sacrum remains stable.  A continuing is at right hip.  This wound does remain open.  He saw infectious disease a couple of weeks ago for Thanksgiving and he  is scheduled for an MRI on Tuesday, 12/10/2024.  He will then follow up with infectious disease a proximally 3 weeks later to discuss those results.  He understood from infectious disease that the MRI is to determine if a cavity is forming in the right hip space.  Today, the drainage was slightly malodorous.  We did recently culture that hip and he is not currently on antibiotics.  We will begin doxycycline as a preventive and he will stay on that antibiotic until 1 week prior to his follow up appointment with infectious disease.  So he will DC the doxycycline on new year's.  He was cautioned to ensure that he takes a probe diabetic and/or consumes yogurt to prevent c diff.    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  He recently had an MRI of the right hip, which was positive for osteomyelitis.  He is scheduled to see the infectious disease doctor in a couple of weeks to discuss this.  In the meantime, he is on doxycycline oral.  He denies fever.    Review of Systems   Constitutional: Negative.    Respiratory: Negative.     Cardiovascular: Negative.    Skin:         As documented in the HPI.   All other systems reviewed and are negative.        Objective:      Vitals:    12/26/24 1100   BP: (!) 145/81   Pulse: 70   Resp: 18       Physical Exam  Vitals and nursing note reviewed. Exam conducted with a chaperone present.   Constitutional:       Appearance: Normal appearance.   Cardiovascular:      Rate and Rhythm: Normal rate.   Pulmonary:      Effort: Pulmonary effort is normal.   Skin:     General: Skin is warm and dry.      Comments: The sacral pressure injury is measuring slightly smaller at this time.  I did a mechanical debridement.  The right hip, trochanter, pressure injury reveals no slough, and no foul odor.  I do not palpate bone.  I did a mechanical debridement.   Neurological:      Mental Status: He is alert.             Altered Skin Integrity 09/25/23 1153 Sacral spine #1 (Active)   09/25/23 1153   Altered Skin Integrity Present on Admission - Did Patient arrive to the hospital with altered skin?: yes   Side:    Orientation:    Location: Sacral spine   Wound Number: #1   Is this injury device related?:    Primary Wound Type:    Description of Altered Skin Integrity:    Ankle-Brachial Index:    Pulses:    Removal Indication and Assessment:    Wound Outcome:    (Retired) Wound Length (cm):    (Retired) Wound Width (cm):    (Retired) Depth (cm):    Wound Description (Comments):    Removal Indications:    Dressing Appearance Moist drainage 12/26/24 1107   Drainage Amount Moderate 12/26/24 1107   Drainage Characteristics/Odor Serosanguineous 12/26/24 1107   Appearance Pink;Moist 12/26/24 1107   Tissue loss description Full thickness 12/26/24 1107   Periwound Area Dry 12/26/24 1107   Wound Edges Open 12/26/24 1107   Wound Length (cm) 0.8 cm 12/26/24 1107   Wound Width (cm) 0.5 cm 12/26/24 1107   Wound Depth (cm) 0.6 cm 12/26/24 1107   Wound Volume (cm^3) 0.24 cm^3 12/26/24 1107   Wound Surface Area (cm^2) 0.4 cm^2 12/26/24 1107   Care Cleansed with:;Wound cleanser 12/26/24 1107   Dressing Applied 12/26/24 1107            Altered Skin Integrity 10/09/23 1141 Right Hip #2 (Active)   10/09/23 1141   Altered Skin Integrity Present on Admission - Did Patient arrive to the hospital with altered skin?: yes   Side: Right   Orientation:    Location: Hip   Wound Number: #2   Is this injury device related?: No   Primary Wound Type:    Description of Altered Skin Integrity:    Ankle-Brachial Index:    Pulses:    Removal Indication and Assessment:    Wound Outcome:    (Retired) Wound Length (cm):    (Retired) Wound Width (cm):    (Retired) Depth (cm):    Wound Description (Comments):    Removal Indications:    Dressing Appearance Moist drainage 12/26/24 1107   Drainage Amount Moderate 12/26/24 1107   Drainage Characteristics/Odor Serosanguineous  12/26/24 1107   Appearance Pink;Yellow;Slough;Moist 12/26/24 1107   Tissue loss description Full thickness 12/26/24 1107   Periwound Area Dry 12/26/24 1107   Wound Edges Open 12/26/24 1107   Wound Length (cm) 3 cm 12/26/24 1107   Wound Width (cm) 1.5 cm 12/26/24 1107   Wound Depth (cm) 1 cm 12/26/24 1107   Wound Volume (cm^3) 4.5 cm^3 12/26/24 1107   Wound Surface Area (cm^2) 4.5 cm^2 12/26/24 1107   Undermining (depth (cm)/location) from 10-5 oclock, deepest is 4 cm @ 4 oclock 12/26/24 1107   Care Cleansed with:;Wound cleanser 12/26/24 1107   Dressing Applied 12/26/24 1107       [REMOVED]      Wound 11/21/24 Pressure Injury Right Elbow #3 (Removed)   11/21/24  Elbow   Present on Original Admission: Y   Primary Wound Type: Pressure inj   Side: Right   Orientation:    Wound Approximate Age at First Assessment (Weeks):    Wound Number: #3   Is this injury device related?: No   Incision Type:    Closure Method:    Wound Description (Comments):    Type:    Additional Comments:    Ankle-Brachial Index:    Pulses:    Removal Indication and Assessment:    Wound Outcome: Healed   Removed 12/26/24 1117   Dressing Appearance Dry;Intact;Clean 12/26/24 1107   Drainage Amount None 12/26/24 1107   Appearance Intact;Pink;Dry 12/26/24 1107   Periwound Area Intact;Dry;[place] 12/26/24 1107   Wound Edges Approximated 12/26/24 1107   Wound Length (cm) 0 cm 12/26/24 1107   Wound Width (cm) 0 cm 12/26/24 1107   Wound Depth (cm) 0 cm 12/26/24 1107   Wound Volume (cm^3) 0 cm^3 12/26/24 1107   Wound Surface Area (cm^2) 0 cm^2 12/26/24 1107   Care Cleansed with:;Wound cleanser 12/26/24 1107     12/26/24          Right hip (pre)                                               Sacrum (pre)                                               Right elbow (pre,healed)  (Silver alginate, 4x4, gentle border)             (silver alginate, 4x4, gentle border)             (ZUHAIR)    Assessment:         ICD-10-CM ICD-9-CM   1. Osteomyelitis of right hip  M86.9  730.25   2. Pressure injury of contiguous region involving back and right hip, stage 4  L89.44 707.09     707.24   3. Pressure injury of sacral region, stage 4  L89.154 707.03     707.24             Procedures:   Mechanical       [] Yes [] No   I & D performed  [] Yes [] No   Excisional debridement performed  [] Yes [] No   Selective debridement performed        [x] Yes [] No   Mechanical debridement performed         [] Yes [] No  Silver nitrate applied                                     [] Yes [] No  Labs ordered this visit                                  [] Yes [] No   Imaging ordered this visit                           [] Yes [] No   Tissue pathology and/or culture taken       MEDICATIONS    Current Outpatient Medications:     acetaminophen (TYLENOL) 325 MG tablet, Take 650 mg by mouth every 6 (six) hours as needed for Pain., Disp: , Rfl:     ascorbic acid, vitamin C, (VITAMIN C) 1000 MG tablet, Take 1,000 mg by mouth every evening., Disp: , Rfl:     aspirin 325 MG tablet, Take 325 mg by mouth every morning. Stopped x 1 month, Disp: , Rfl:     baclofen (LIORESAL) 5 mg Tab tablet, Take 1 tablet (5 mg total) by mouth 3 (three) times daily., Disp: 90 tablet, Rfl: 0    bisacodyL (DULCOLAX) 10 mg Supp, Place 10 mg rectally daily as needed., Disp: , Rfl:     busPIRone (BUSPAR) 10 MG tablet, Take 1 tablet (10 mg total) by mouth 2 (two) times daily., Disp: 60 tablet, Rfl: 5    cetirizine (ZYRTEC) 10 MG tablet, Take 10 mg by mouth 2 (two) times a day., Disp: , Rfl:     cloNIDine (CATAPRES) 0.1 MG tablet, Take 0.1 mg by mouth daily as needed., Disp: , Rfl:     doxycycline (VIBRA-TABS) 100 MG tablet, Take 1 tablet (100 mg total) by mouth 2 (two) times daily., Disp: 60 tablet, Rfl: 0    DULoxetine (CYMBALTA) 30 MG capsule, Take 1 capsule by mouth 2 (two) times daily., Disp: , Rfl:     ferrous sulfate (SLOW RELEASE IRON) 142 mg (45 mg iron) TbSR, 1 tablet Orally Three times a Week for 30 days, Disp: , Rfl:      fexofenadine (ALLEGRA) 180 MG tablet, Take 180 mg by mouth every morning., Disp: , Rfl:     fluticasone propionate (FLONASE ALLERGY RELIEF) 50 mcg/actuation nasal spray, 1 spray by Each Nostril route daily as needed., Disp: , Rfl:     heparin, porcine, PF, (HEPARIN FLUSH 100 UNITS/ML) 100 unit/mL Syrg, flush mediport with 5ml every FOUR weeks, Disp: , Rfl:     JATENZO 237 mg Cap, , Disp: , Rfl:     meloxicam (MOBIC) 7.5 MG tablet, Take 7.5 mg by mouth 2 (two) times daily as needed for Pain., Disp: , Rfl:     midodrine (PROAMATINE) 2.5 MG Tab, TAKE ONE TABLET BY MOUTH THREE TIMES DAILY AS NEEDED Systolic blood pressure less than 90 or Diastolic less than 60, Disp: , Rfl:     multivitamin/iron/folic acid (CENTRUM ORAL), Take 1 tablet by mouth every morning., Disp: , Rfl:     NON FORMULARY MEDICATION, Take 3 drops by mouth 2 (two) times daily as needed. THC, Disp: , Rfl:     NORMAL SALINE FLUSH injection, flush mediport with 10ml every FOUR weeks, Disp: , Rfl:     urea (CARMOL) 40 % Crea, Apply topically once daily., Disp: 85 g, Rfl: 2    zinc gluconate 50 mg tablet, Take 1 tablet (50 mg total) by mouth once daily. (Patient taking differently: Take 50 mg by mouth every morning.), Disp: 30 tablet, Rfl: 1  No current facility-administered medications for this encounter.    Facility-Administered Medications Ordered in Other Encounters:     lactated ringers infusion, , Intravenous, Continuous, Cr Garibay DO, Last Rate: 10 mL/hr at 05/17/24 0705, Restarted at 05/17/24 0859   Review of patient's allergies indicates:   Allergen Reactions    Levofloxacin Rash       DIAGNOSTICS    Labs/Scans/Micro:    CBC:   Lab Results   Component Value Date    WBC 10.42 05/14/2024    HGB 13.9 (L) 05/14/2024    HCT 44.3 05/14/2024    MCV 88.8 05/14/2024     05/14/2024     Sedimentation rate:   Lab Results   Component Value Date    SEDRATE 22 (H) 12/15/2022    C-reactive protein:   Lab Results   Component Value Date    CRP 44.90  "(H) 12/15/2022    Chemistry: [unfilled]  HBA1C: No components found for: "HBA1C"    Ankle Brachial Index: No results found for this or any previous visit.    Imaging:    Plain film: No results found for this or any previous visit.    MRI: No results found for this or any previous visit.    Bone Scan: No results found for this or any previous visit.    Vascular studies:    Microbiology: 10/29/24          HOME HEALTH AGENCY: [facility]     WOUND CARE ORDERS:  Right hip wound: Cleanse with wound cleanser, apply silver alginate, 4x4 gauze, and ABD pad and tape to be changed every other day  Sacrum: Cleanse with wound cleanser and apply silver alginate and an island dressing to be changed every other day.   Right elbow: Cleanse with wound cleanser, apply medihoney to callused area daily       Follow up in 2 weeks (on 1/9/2025) for "

## 2025-01-09 ENCOUNTER — HOSPITAL ENCOUNTER (OUTPATIENT)
Dept: WOUND CARE | Facility: HOSPITAL | Age: 45
Discharge: HOME | End: 2025-01-09
Attending: NURSE PRACTITIONER
Payer: MEDICARE

## 2025-01-09 VITALS
DIASTOLIC BLOOD PRESSURE: 84 MMHG | HEART RATE: 72 BPM | RESPIRATION RATE: 19 BRPM | WEIGHT: 130.06 LBS | SYSTOLIC BLOOD PRESSURE: 146 MMHG | BODY MASS INDEX: 18.62 KG/M2 | HEIGHT: 70 IN

## 2025-01-09 DIAGNOSIS — L89.44 PRESSURE INJURY OF CONTIGUOUS REGION INVOLVING BACK AND RIGHT HIP, STAGE 4: ICD-10-CM

## 2025-01-09 DIAGNOSIS — S76.001D OPEN WOUND OF HIP WITH TENDON INVOLVEMENT, RIGHT, SUBSEQUENT ENCOUNTER: ICD-10-CM

## 2025-01-09 DIAGNOSIS — M86.9 OSTEOMYELITIS OF RIGHT HIP: Primary | ICD-10-CM

## 2025-01-09 DIAGNOSIS — S71.001D OPEN WOUND OF HIP WITH TENDON INVOLVEMENT, RIGHT, SUBSEQUENT ENCOUNTER: ICD-10-CM

## 2025-01-09 PROCEDURE — 27000999 HC MEDICAL RECORD PHOTO DOCUMENTATION

## 2025-01-09 PROCEDURE — 99212 OFFICE O/P EST SF 10 MIN: CPT

## 2025-01-09 NOTE — PROGRESS NOTES
Referred by: Dr. Sidhu  Low air loss mattress [x] Yes [] No   Is the patient eligible for a graft, and/or currently grafting?  [] Yes [x] No   Smoker: yes, vape     Subjective:         Patient ID: Werner Jarrett is a 44 y.o. male.    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  Presents today with a new wound for the clinic.  He reports that a couple of weeks ago he has built a plate of red beans on his abdomen which subsequently birth an area on his stomach.  He was at his mobile home in the kitchen when this occured.  He has been applying Silvadene cream to the area daily.  Today, that burn was selectively debrided and he was instructed to continue using the Silvadene cream for the time being.  The right elbow was selectively debrided.  The right hip and sacrum were both mechanically debrided and fully assessed.  The right hip has no significant change.  He is scheduled to see a plastic surgeon on 09/25/2024 at [facility] in [place] ([address] [place] [address]).  He has completed his Bactrim DS and Augmentin that was prescribed from 9/6/to 9/16.  Today the wound at the right hip no longer has odor.    9/3/24 - Mr. Jarrett was last seen by this provider a couple of weeks ago prior to vacation.  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  Today, the wound at the right hip is extremely malodorous.  The patient also reports that he has seen an increase in drainage from this wound.  We have been using a wound VAC over this wound for several weeks with no change.  Today we will DC the wound VAC.  A culture was obtained and the patient was prescribed doxycycline and clindamycin pending results of the culture.  The sacrum remains stable.  The right elbow was selectively  debrided and does have a large area of exposed granular tissue that has developed since he was last seen by this provider.  Of note, he did see his surgeon, Dr. Sandy Jara last week.  We were hoping that he would see a surgeon for evaluation of the hip for a possible flap.  However, that office is closed and he has now been referred to a plastic surgeon in [place].   We will be waiting for that call with the hopes that the patient's condition does not significantly deteriorate prior to that time.  Will follow up with his surgeon in 6 weeks.    August 20, 2024:  43-year-old white male, who is here for follow up of the chronic right hip pressure injury, sacral pressure injury, and right posterior elbow pressure injury.  The wounds have now stalled.  He has been using a wound VAC on the right hip.  The right elbow we will need some debridement today.  He will see his general surgeon next week.    August 6, 2024:  43-year-old white male, with quadriplegia, is here for follow up of his right hip pressure injury, sacral pressure injury, and right posterior elbow pressure injury.  We have been using a wound VAC on his right hip ulcer.  He is using collagen on the sacral wound.  We have just open the right posterior elbow as a new stage II pressure injury, surrounded by callus.  There is no exposed bone at this time.    7/23/14 - Mr. 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  Today that wound was assessed and the hip bone is now nicely covered with tissue.  We will continue the wound vac, using black foam only and increasing the pressure to 125 mmHg continuous pressure.  The wound at the sacrum remains stable with no significant changes.  Of note, he had his appt with ID on 7/18/24, he will follow up with them again in 3 months, there were no new orders from them. He also saw Dr. Delgado, he will follow up again with her in 2 months, in the mean time she will refer him to plastic surgery  in [place] for a flap.     7/9/24 - The turns today for followup on the wound to the right hip as well as the small wound to the sacrum.  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  We are using a wound VAC on the right hip and that does seem to be making some minor progress in closing the open space between the muscles of the leg.  Today, a selective debridement was performed in an effort to revise the margins and support growth of granular tissue.  The sacrum was also selectively debrided.  We will continue with the wound VAC on the right hip.  The patient does have an appointment with his surgeon, Dr. Sandy Jara, on 07/11/2024.  He also has an appointment with infectious disease on that same day and we are hoping for some additional guidance from both of them regarding his wounds.    6/26/24 - Mr. 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  The sacrum remained stable with no changes and no signs and symptoms of infection.  The right hip seems to be progressing well, with an increase in the development of granular tissue which is occurring across the exposed bone.  At his last visit there was quite a bit of bruising around the periwound so we decrease the wound VAC pressure from 125 mmHg to 100 mmHg and this seems to be improving the growth of granular tissue significantly.  He is scheduled for his bone biopsy on July 1st at [facility] with Dr. Sandy Jara.  He will then follow up with her on 7/11/24.  He also has an upcoming appointment with infectious disease on 07/14/2024.    6/18/24 - Mr. Jarrett returns today for followup on 2 wounds.  The wound to the sacrum continues to progress well with no signs and symptoms of infection.  The wound to the right hip was debrided today.  There was a large amount of slough at the top of the wound bed.  We  are continuing to use a wound VAC although there does not seem to be any significant progress towards closing this wound.  He did have a followup appointment with the surgeon, Dr. Sandy Jara and she does still intend to do a bone biopsy on this right hip.  Will have a followup appointment with her on 07/11/2024.  He will also see infectious disease in July, 2024.  He does not have that date as of today.  He does still have the dry area on his right elbow.  A refill for Urea cream has been sent to his pharmacy.    6/11/24 - The patient returns today for the wounds to the sacrum and right hip.  The sacral wound is stable and clean, with no S & S of infection.  The right hip does have some bruising around the surface of the wound.  We will decrease the wound vac pressure from 125 mmHg to 100 mmHg continuous pressure.  The elbow is still being monitored and he continues to use Urea cream every other day as well as MediHoney.  He has not heard any further information from Dr. Sandy lucero a bone biopsy, and he has been scheduled with ID for f/up.     6/4/24 - Mr. Jarrett returns today for followup on the 2 pressure injuries, 1 to the sacrum and 1 to the right hip.  He does report that he had a follow up with his surgeon, Dr. Sandy Jara on 05/30/2024.  He states that she has indicated that she will be scheduling a bone biopsy of the right hip due to the patient's osteomyelitis.  She is trying to determine his next course of treatment together with infectious disease consideration.  We are currently using a wound VAC and it does seem to be making progress with the development of granular tissue in the wound bed.  There are no signs and symptoms of infection in this wound.  The sacral wound does have a slight increase in depth.  We had changed to collagen and it may be that that product is to wet.  We will DC the collagen and begin application of silver alginate only.  A callus paring was also done on the  patient's right elbow.  This has been an ongoing issue and he is currently using Aquaphor on the elbow.    5/28/24 - Mr. 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  He had excisional debridement of this right hip on 5/17/24 by Dr. Sandy Jara.  Her op note:   PROCEDURE -   Excisional debridement right hip wound, dimension of debridement 1 cm circumferentially, total wound dimensions 3.5 cm x 3 cm x 1 cm deep.  Blunt debridement right hip wound  FINDINGS -    Rolled edges of skin, completely removed by excisional debridement  Fibrinous material within the wound debrided bluntly and submitted to culture   Today that wound is clean with nice margins.  We have applied a wound vac using white foam covered with black foam.     He had a follow up with her scheduled for 5/23 but missed it and is now re-scheduled for 5/30.   He is expecting an appt with ID but has not been contacted as of today.   In addition to the right hip, he does have a wound at the sacrum that has reopened.  It is clean and we will apply collagen into this wound.  A callus paring was done on the right elbow.     5/14/24 - The Patient returns today for followup on the large pressure injury at the right hip as well as a small pressure injury to the sacrum.  He did have a followup appointment with his surgeon, Dr. Sandy Jara. He is scheduled for an I&D/debridment of his right hip with Dr. Quijano this Friday 5/17/24.  We had a lengthy discussion regarding possibilities such as a wound VAC or if she will leave the wound opened or close that wound.  He does not know at this time but this providers preference would be that she would close the wound.  This wound remains open at this time following another procedure and we were unable to get the wound to close completely in bite of the use of a wound VAC.  He does still have ligament exposed in the wound has increased in size.  Bone is fully exposed in the wound bed.  He  has been using topical antibiotics for 1 month and those will be discontinued as of today.  Until his procedure we will apply Adaptic over the exposed bone with silver alginate on top, with daily dressing changes.  He does expect to see an infectious disease doctor following his procedure and also mentioned that he expects to possibly have IV antibiotics in another short stay at [facility].    4/23/24 - Today the wound at the elbow is nearly resolved.  We will begin application of Aquaphor to the elbow.  The sacrum was assessed and has improved dramatically.  It is nearly resolved.  He has completed the PO Augmentin for a UTI and this has helped wounds as well  Additionally he began topical abx to the right hip on 4/16/24 (Clindamycin).  Today the hip is also showing improvement.  He does have a f/up with his surgeon, Dr. Sandy Jara, on 5/2/24 with the hope that she will revise the wound to assist with closure since the margins are rolled.  He will return to clinic in two weeks.     4/16/24 - Mr. Jarrett was seen today for f/up on three wounds.  He did have an MRI done on 4/4/24 of the right hip (at [facility], ordered by Dr. Sandy Jara).  Today those results were reviewed with the patient.  We discussed that this provider would reach out to Dr. Jara regarding possible treatment.  This was done with consideration for IV abx, surgical intervention and/or referral to infectious disease for the best possible outcome.  The patient is currently on Augmentin with two days remaining for a UTI.  Additionally he picked up his topical abx today (Clindamycin) which will be used on a moist 4 x 4 gauze, not Basal Gel.    A callus paring was done on the right elbow and it is now healed.  The sacrum was selectively debrided and remains stable.     4/2/24 - The patient was referred back to his surgeon, Dr. Macie Carbajal, who he saw on 3/26/24.  He reports that she is concerned over possible abscess  in the space, as well as infection.  A culture was obtained today.  She did prescribe Augmentin, to begin tomorrow, as a prophylaxis for the hip. He is expecting to have an MRI schedule prior to the follow up with her on 4/9/24 at which time they will discuss the MRI results, and options. Today the right hip wound has increasing moisture and bogginess.  The sacrum remains stable.  The right elbow as debrided and is nearly closed.     3/19/24 - We have been using NuShield grafts on the open pressure ulcer at the patient's right hip.  Tendon remains exposed in the wound bed, the margins are nereida, epibole and closed.  Through a series of several weeks the wound margins have been scored with a scalpel in an attempt to open these closed margins, to no avail.  Today the graft was held, and the patient will be seen by his surgeon, Dr. Sandy Jara next Tuesday, 3/26/24, for consultation regarding options to possible reopen the margins which might bring a successful closure to this wound based on how it is currently healed.  For the time being we will apply adaptic over the tendon, with silver alginate dressing on top.  Both the right elbow and the sacrum remain stable.  We are currently using Endoform on both.  Today the elbow was selectively debrided.     3/12/24 - The pressure injury to the right hip is being grafted using NuShield grafts.  Today graft #6 was applied.  There has been no significant change to this wound since grafting.  Graft #7 has been ordered.  However, the patient was advised to scheduled with his surgeon, Dr. Sandy Jara, for evaluation to determine if she will need to do further surgery to assist this wound with closure since the margins are rolled.  They were again scored with a scaple to attempt to stimulate the growth of epithelial tissue, which has been done several times to no avail.  The right elbow is progressing well.  The sacrum remains stable with no S & S of infection.  He has  received his new power wheelchair.  We are hopeful that this device might contribute to progress with his wounds.     3/5/24 - Mr. Jarrett is seen today for followup on 3 wounds.  The wound to the right elbow was selectively debrided and it does show considerable improvement.  The wound to the sacrum is stable.  We will begin application of moistened Endoform to both of these wounds to be changed on Friday to silver alginate.  The wound to the right hip is slightly moist with a little bit of drainage.  This is to be expected, however, due to the fact that we are grafting that wound.  There has been no significant change to the size of the wound, unfortunately, in spite of grafting multiple times.  We will continue the process with the hope of making some progress.    2/27/24 - Mr. Jarrett returns to clinic today for followup on 3 areas of concern.  The pressure injury remains stable with no significant change.  He has a new wound to the right elbow.  This has developed due to dryness and cracking which has opened into a wound.  The elbow was selectively debrided using a dermal curette.  We will begin applying MediHoney to the elbow, covering with 4 x 4 gauze, Cover and with a Tubigrip with the hopes of softening this callused tissue further.  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  We are currently grafting using NuShield grafts.  Today we applied graft #4 after the wound margins were crosshatched to try to allow for growth epithelial tissue.  Again this week, there was no significant change in that wound.  Ligament is still fully exposed.  Graft #5 is being ordered today.    2/20/24 - the patient returns to clinic today for followup on a small pressure injury to the sacrum as well as a large stage IV pressure injury to the right hip.  We are currently going through the grafting process.  Today we applied graft #3, which is the 1st of the NuShield grafts.  So far we have not seen any significant  change in the wound and the ligament is still fully exposed.  A # 15 scalpel was used to cross keen the wound margin due to rolling of that margin in the need to stimulate the growth of epithelial tissue as well as granular tissue.  The sacrum remains stable and basically the same size.      2/12/24 - Mr. Jarrett is seen today in follow up of two wounds, both of which remain stable.  The left hip is being grafted, and today PuraPly #2 was applied.  There is no significant change since graft #1 was applied.  Today graft #3 was ordered.  We will change from PuraPly to Epifix at this time.  The sacrum remains stable.  We have applied Endoform into that wound and it will be treated the same as a graft, with the hope that the patient does not have BMs which necessitate removal of the dressing. The patient is eligible and is in need of a new power wheelchair.      2/6/24 - Mr. Jarrett returns to clinic today with 2 wounds.  The very small stage IV pressure injury at the sacrum remains stable.  Today, the wound at the right hip, stage IV pressure injury, will receive the 1st graft application.  We are applying PuraPly grafts at this time and then we will change to [facility] after two PuraPly.  Of note, he is waiting on a demo to be shown to him for a possible new wheelchair.  PuraPly #2 ordered.    January 30, 2024:  43-year-old white male, with paraplegia, is here for follow up of the right hip pressure injury, and the sacral pressure injury.  The wounds are looking similar to the last visit.  He is getting ready to have skin substitute applied to the right hip wound.  There is white tissue in the wound bed, presumably tendon.  I do not see pink granulation tissue.  He has no longer using topical antibiotics.    1/23/24 - Mr. Jarrett returns today for followup on 2 wounds.  Today the sacral pressure injury remains stable and clean.  We have been using topical antibiotics on both of these wounds for several weeks.  That has been  "discontinued today.  The pressure injury at the right hip was selectively debrided today using the ultrasonic debrider and graft prep was performed.  We will begin applying a small amount of mupirocin ointment on to this wound daily in preparation beginning the grafting process next week.  We will be use PuraPly/NuShield grafts.  Of note, the patient is responsible for contacting calor at the [employer] to have his measurements taken.  He has not done that as of today.    December 26, 2023:  43-year-old white male, with paraplegia, is here for follow up of a right hip pressure injury, and a sacral pressure injury.  He has been using topical antibiotics.  The measurements are about the same as last visit.  The nurse practitioner has discussed a possible skin substitute on the right hip.    12/11/23 - Mr. Jarrett for followup on the pressure ulcers to the right hip and sacrum.  We started using topical antibiotics on both wounds on 11/21/2023.  Additionally, the patient has been using the vinegar washes for sometime.  He was instructed to discontinue the use of the vinegar washes today and use only the topical antibiotics.  He has been approved for grafts (Puraply and Nushield).  We will plan to do graft prep in two weeks (once abx are complete) and then begin grafting.    Of note, we discussed today the need for proper support in his wheelchair to offload the pressure.  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  Also, he does have a low air loss mattress, but that mattress is on top of a "regular" mattress and he reports springs are protruding.  He needs a proper bedframe for the low airloss mattress.     12/4/23 - Mr. Jarrett returns for followup on 2 wounds.  He continues to use topical abx on both wounds.  The right hip continues to be concerning.  The tensor fascia ronny is till exposed " in the wound bed.  We are mixing the topical abx with basal gel to prevent drying.  The sacral wound is stable but has had a slight increase in size.  Abx are being used on this wound as well. We are attempting to authorize grafts on the right hip wound.    11/27/23 - the patient returns today for followup on pressure injuries to the right hip and the sacrum.  He has received his topical antibiotics (Linezolid/Gentamincin) and started using the antibiotics on Saturday, 11/25/23.  He did not receive basal gel with that order so we have contacted [facility] to send the patient that product as the antibiotics are very drying and they are being applied directly to his tendon.  He must have the moisture of the basal gel.  In the time being, we will mix his topical antibiotics with mupirocin antibiotic.  Today both wounds remains stable.  He was instructed to use the antibiotics on both wounds to avoid cross contamination.    11/20/23 - Mr. Jarrett returns for f/up on the wound to the right hip and the sacrum.  At his last visit the right hip was cultured and it was positive to have Pseudomonas.  He has been using Vinegar washes since 11/06/2023.  He does have a sensitivity to levofloxacin and so can not take that medication.  We have requested a topical recommendation and he is being prescribed linezolid/gentamicin to be applied directly into the wound.  If we do not see improvement from that topical medication we will be moving forward with an IV antibiotic.  He does have exposed ligament (possibly ischiofemoral ligament) in this wound.  The patient does still have a mediport so that would be the next best solution for him due to his sensitivity.  The wound at the sacrum remains stable and we are currently using silver alginate on both wounds.    11/6/23 - the patient seen today for followup on the right hip and sacral pressure wounds.  Although the sacrum is stable, the right hip does continue to  deteriorate.  The right hip was cultured today.  We have been using Endoform and/or collagen on these wounds and unfortunately these products or causing excessive moisture and deterioration to the wounds.  We will discontinue both products and use only silver alginate for the time being.  The right hip does have tendon exposed at this time.  The patient was reminded and encouraged to continue to offload both the right hip in the sacrum which he reports that he is wearing.  However, on the deterioration of the wounds it is concerning.    10/30/23 - Mr. Jarrett to clinic today for followup on the wound to the sacrum as well as the right hip.  Today, both have deteriorated.  We have been using collagen to the wound beds and it appears is though they are stain to wet.  There is still tendon present in the wound bed of the right hip.  We will discontinue the collagen and begin application Endoform to both wounds, changing every other day, covered with silver alginate.  He was reminded to offload as much as possible to prevent further deterioration.  Of note, reported that is blood pressure has been low, and it was very low today, 86/54.  He states that he noticed it last week that he has stopped taking Tylenol, aspirin, and probiotics.  We discussed today that it is unlikely that any of these medications would impact blood pressure but he prefers to remain off of them.    10/16/23 - The patient returns today for followup on the pressure injury to the right hip as well as the sacrum.  Today, there is now tendon exposed in the wound bed the right hip pressure injury.  We discussed today that it is imperative that the patient offload to allow this wound to heal so that he does not develop osteomyelitis in this hip as well.  The pressure injury to the sacrum has also deteriorated.  We discussed that now that the amputation to the left hip has fully healed, he must take the opportunity to begin offloading that right hip to  allow it to heal.  We will begin applying collagen every other day to both wound beds.    10/9/23 The patient returns today for the open wound to the right hip and sacrum.  The surgical incision to the left hip is now resolved and will no longer be followed.  Both the right hip and sacrum are wounds that have reopened.  We will begin application of collagen every third day to both wounds.  Neither wound has any signs and symptoms of infection.  He will return in two weeks. Of note, the patient reports that he has had diarrhea for several days, up to about two weeks.  He has had no treatment and is currently using only probiotics.  He has been prescribed imodium and advised to use that medication no more than 4 days.  He is to contact his PCP if the diarrhea does not resolve within that time period.     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 Sandy Owusu on 8/21/23.  This wound has progress except personally well and is remaining closed.  The patient is using only iodine along the surgical incision line.  He does have a follow up with his surgeon tomorrow, 09/26/2023.  He was advised today that he can begin application coconut oil with vitamin C along the closed incision line which is fully approximated.  We will follow this incision line for one additional visit then d/c if no issues develop.    He has had a long term wound at the sacrum which today is open again.  It was mechanically debrided.  Endoform as applied to the wound bed, which will be left in place until Friday at which time he will change to silver alginate.     10/1/24 - The patient returns today for followup on several wounds.  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  This wound has failed to heal.  He was seen by a plastic surgeon on 09/25/2024 with the hopes of possibly having a flap performed.  However, he was told by the PA that nothing could be done due to  the depth of the wound.  Today, that wound was assessed and selectively debrided.  There is a slight amount of epithelial tissue migrated from the wound margin, however there is still quite a bit of depth.  We will manage the wound as much as we can in wound clinic since additional surgery is not an option.  The wound to the elbow is making excellent progress.  A selective debridement was performed.  He has a burn to the abdomen which is progressing well.  He has been using Silvadene on this area and he was instructed to begin just covering the area from now on.  Lastly, he does have a pressure injury at the sacrum.  Today, that wound appears to be closed, however, it has reopened multiple times.  We will leave that wound as an open wound to continue to monitor.    10/15/24 - The abdominal wound remains stable and will be monitored.  The sacral wound is slightly larger.  The right hip remains stable with no S & S of infection.  The right elbow was selectively debrided and has made good progress.     10/29/24 - Mr. Jarrett is seen today for f/up on three wounds.  A selective debridement was performed on the right elbow, and that wound is nearly healed.  The sacrum remains stable.  The right hip reportedly has an increase in drainage.  A culture was performed.  He will follow up with Dr. Delgado in 6 months. We will try to send him back to [facility] in [place].     11/12/4 - The patient was seen today for f/up on his pressure injuries.  A culture was obtained from the right hip on 10/29/24.  The patient was contacted to review the results and to discuss concerns over the pathogens found in that culture.  An Rx for Bactrim DS was sent to his pharmacy and he was instructed to call his infectious disease MD, Dr. Ceron, for a f/up appt due to this providers concerns over the bacteria found.  Today he reports that he called and left message, but did not follow up.  This provider had requested that a fax be  sent to Dr. Ceron of these results.  No confirmation was received.  Dr. Ceron's office was contacted to confirm that the fax was received and asked for Dr. Ceron's review.  The patient was instructed again today to f/up so that Dr. Ceron can help to determine if IV abx are indicated at this time or if Bactrim is sufficient.  All wounds remain stable.  The right hip has decreased in drainage slightly and the wound remains stable.  The elbow was mechanically debrided and the sacrum also remain stable. Of note, the patient has a newly developed rash on the right forearm and right side of his waist area.  He feels that these are mosquito bites.         11/21/24 - Mr. Jarrett returns to clinic today for followup on several wounds.  He did have a culture that was concerning.  We discussed a follow up with his infectious disease doctor.  He did see them on 11/20/2024.  They feel that these bacteria are colonized and no additional antibiotics were recommended.  No additional orders.  He will follow up with them in 3 months.  Today, the right hip is stable but draining.  The original wound to the right elbow has healed, however, a 2nd wound has opened.  That wound is stable.  The pressure injury at the sacrum is also stable and we will continue to applied called into the wound bed.  The patient will return to clinic in 1 month unless he has any deterioration.    12/5/24 - The turns today for followup on 3 wounds.  Today, the right elbow was assessed and is now closed.  The pressure injury at the sacrum remains stable.  A continuing is at right hip.  This wound does remain open.  He saw infectious disease a couple of weeks ago for Thanksgiving and he is scheduled for an MRI on Tuesday, 12/10/2024.  He will then follow up with infectious disease a proximally 3 weeks later to discuss those results.  He understood from infectious disease that the MRI is to determine if a cavity is forming in the right hip space.  Today, the  drainage was slightly malodorous.  We did recently culture that hip and he is not currently on antibiotics.  We will begin doxycycline as a preventive and he will stay on that antibiotic until 1 week prior to his follow up appointment with infectious disease.  So he will DC the doxycycline on new year's.  He was cautioned to ensure that he takes a probe diabetic and/or consumes yogurt to prevent c diff.    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  He recently had an MRI of the right hip, which was positive for osteomyelitis.  He is scheduled to see the infectious disease doctor in a couple of weeks to discuss this.  In the meantime, he is on doxycycline oral.  He denies fever.    1/9/25 - The patient is seen today for follow up on the pressure injuries to the sacrum and right hip.  Both remain stable, but unchanged.  He did have a f/up MRI done on 12/10/24, and that MRI again reports osteomyelitis.  This dx has gone back and forth.  He follows up with ID on 1/28/25.  They would prefer a deep bone biopsy.  He will see Dr. Delgado on 1/16/25.  Hopefully she can schedule that to confirm if osteo is present.      Review of Systems   Constitutional: Negative.    Respiratory: Negative.     Cardiovascular: Negative.    Skin:         As documented in the HPI.   All other systems reviewed and are negative.        Objective:      Vitals:    01/09/25 1116   BP: (!) 146/84   Pulse: 72   Resp: 19       Physical Exam  Vitals and nursing note reviewed. Exam conducted with a chaperone present.   Constitutional:       Appearance: Normal appearance.   Cardiovascular:      Rate and Rhythm: Normal rate.   Pulmonary:      Effort: Pulmonary effort is normal.   Skin:     General: Skin is warm and dry.      Comments: sacral pressure injury, right hip, trochanter   Neurological:      Mental Status: He is alert.            Altered Skin Integrity 09/25/23  1153 Sacral spine #1 (Active)   09/25/23 1153   Altered Skin Integrity Present on Admission - Did Patient arrive to the hospital with altered skin?: yes   Side:    Orientation:    Location: Sacral spine   Wound Number: #1   Is this injury device related?:    Primary Wound Type:    Description of Altered Skin Integrity:    Ankle-Brachial Index:    Pulses:    Removal Indication and Assessment:    Wound Outcome:    (Retired) Wound Length (cm):    (Retired) Wound Width (cm):    (Retired) Depth (cm):    Wound Description (Comments):    Removal Indications:    Dressing Appearance Moist drainage 01/09/25 1116   Drainage Amount Moderate 01/09/25 1116   Drainage Characteristics/Odor Serosanguineous 01/09/25 1116   Appearance Moist;Pink 01/09/25 1116   Tissue loss description Full thickness 01/09/25 1116   Periwound Area Intact 01/09/25 1116   Wound Edges Open 01/09/25 1116   Wound Length (cm) 1 cm 01/09/25 1116   Wound Width (cm) 0.7 cm 01/09/25 1116   Wound Depth (cm) 0.3 cm 01/09/25 1116   Wound Volume (cm^3) 0.21 cm^3 01/09/25 1116   Wound Surface Area (cm^2) 0.7 cm^2 01/09/25 1116   Care Cleansed with:;Wound cleanser 01/09/25 1116   Dressing Applied;Other (comment) 01/09/25 1116   Dressing Change Due 01/10/25 01/09/25 1116            Altered Skin Integrity 10/09/23 1141 Right Hip #2 (Active)   10/09/23 1141   Altered Skin Integrity Present on Admission - Did Patient arrive to the hospital with altered skin?: yes   Side: Right   Orientation:    Location: Hip   Wound Number: #2   Is this injury device related?: No   Primary Wound Type:    Description of Altered Skin Integrity:    Ankle-Brachial Index:    Pulses:    Removal Indication and Assessment:    Wound Outcome:    (Retired) Wound Length (cm):    (Retired) Wound Width (cm):    (Retired) Depth (cm):    Wound Description (Comments):    Removal Indications:    Dressing Appearance Moist drainage 01/09/25 1116   Drainage Amount Moderate 01/09/25 1116   Drainage  Characteristics/Odor Serosanguineous 01/09/25 1116   Appearance Moist;Tan;Red 01/09/25 1116   Tissue loss description Full thickness 01/09/25 1116   Periwound Area Intact 01/09/25 1116   Wound Edges Open 01/09/25 1116   Wound Length (cm) 2.5 cm 01/09/25 1116   Wound Width (cm) 1 cm 01/09/25 1116   Wound Depth (cm) 1.5 cm 01/09/25 1116   Wound Volume (cm^3) 3.75 cm^3 01/09/25 1116   Wound Surface Area (cm^2) 2.5 cm^2 01/09/25 1116   Undermining (depth (cm)/location) 4cm circumferentially 01/09/25 1116   Care Cleansed with:;Wound cleanser 01/09/25 1116   Dressing Applied;Other (comment) 01/09/25 1116   Dressing Change Due 01/10/25 01/09/25 1116       1/9/25    Right hip   Silver alginate, 4x4 gauze, 6x6 gentle foam border dressing      Sacrum   Silver alginate, 4x4 gentle foam border dressing  CT      Assessment:         ICD-10-CM ICD-9-CM   1. Osteomyelitis of right hip  M86.9 730.25   2. Pressure injury of contiguous region involving back and right hip, stage 4  L89.44 707.09     707.24   3. Open wound of hip with tendon involvement, right, subsequent encounter  S71.001D V58.89    S76.001D 890.2         Procedures:     Mechanical debridement only     [] Yes [] No   I & D performed  [] Yes [] No   Excisional debridement performed  [] Yes [] No   Selective debridement performed        [x] Yes [] No   Mechanical debridement performed         [] Yes [] No  Silver nitrate applied                                     [] Yes [] No  Labs ordered this visit                                  [] Yes [] No   Imaging ordered this visit                           [] Yes [] No   Tissue pathology and/or culture taken       MEDICATIONS    Current Outpatient Medications:     acetaminophen (TYLENOL) 325 MG tablet, Take 650 mg by mouth every 6 (six) hours as needed for Pain., Disp: , Rfl:     ascorbic acid, vitamin C, (VITAMIN C) 1000 MG tablet, Take 1,000 mg by mouth every evening., Disp: , Rfl:     aspirin 325 MG tablet, Take 325 mg by  mouth every morning. Stopped x 1 month, Disp: , Rfl:     baclofen (LIORESAL) 5 mg Tab tablet, Take 1 tablet (5 mg total) by mouth 3 (three) times daily., Disp: 90 tablet, Rfl: 0    bisacodyL (DULCOLAX) 10 mg Supp, Place 10 mg rectally daily as needed., Disp: , Rfl:     busPIRone (BUSPAR) 10 MG tablet, Take 1 tablet (10 mg total) by mouth 2 (two) times daily., Disp: 60 tablet, Rfl: 5    cetirizine (ZYRTEC) 10 MG tablet, Take 10 mg by mouth 2 (two) times a day., Disp: , Rfl:     cloNIDine (CATAPRES) 0.1 MG tablet, Take 0.1 mg by mouth daily as needed., Disp: , Rfl:     DULoxetine (CYMBALTA) 30 MG capsule, Take 1 capsule by mouth 2 (two) times daily., Disp: , Rfl:     ferrous sulfate (SLOW RELEASE IRON) 142 mg (45 mg iron) TbSR, 1 tablet Orally Three times a Week for 30 days, Disp: , Rfl:     fexofenadine (ALLEGRA) 180 MG tablet, Take 180 mg by mouth every morning., Disp: , Rfl:     fluticasone propionate (FLONASE ALLERGY RELIEF) 50 mcg/actuation nasal spray, 1 spray by Each Nostril route daily as needed., Disp: , Rfl:     heparin, porcine, PF, (HEPARIN FLUSH 100 UNITS/ML) 100 unit/mL Syrg, flush mediport with 5ml every FOUR weeks, Disp: , Rfl:     JATENZO 237 mg Cap, , Disp: , Rfl:     meloxicam (MOBIC) 7.5 MG tablet, Take 7.5 mg by mouth 2 (two) times daily as needed for Pain., Disp: , Rfl:     midodrine (PROAMATINE) 2.5 MG Tab, TAKE ONE TABLET BY MOUTH THREE TIMES DAILY AS NEEDED Systolic blood pressure less than 90 or Diastolic less than 60, Disp: , Rfl:     multivitamin/iron/folic acid (CENTRUM ORAL), Take 1 tablet by mouth every morning., Disp: , Rfl:     NON FORMULARY MEDICATION, Take 3 drops by mouth 2 (two) times daily as needed. THC, Disp: , Rfl:     NORMAL SALINE FLUSH injection, flush mediport with 10ml every FOUR weeks, Disp: , Rfl:     urea (CARMOL) 40 % Crea, Apply topically once daily., Disp: 85 g, Rfl: 2    zinc gluconate 50 mg tablet, Take 1 tablet (50 mg total) by mouth once daily. (Patient taking  "differently: Take 50 mg by mouth every morning.), Disp: 30 tablet, Rfl: 1  No current facility-administered medications for this encounter.    Facility-Administered Medications Ordered in Other Encounters:     lactated ringers infusion, , Intravenous, Continuous, Cr Garibay DO, Last Rate: 10 mL/hr at 05/17/24 0705, Restarted at 05/17/24 0859   Review of patient's allergies indicates:   Allergen Reactions    Levofloxacin Rash       DIAGNOSTICS    Labs/Scans/Micro:    CBC:   Lab Results   Component Value Date    WBC 10.42 05/14/2024    HGB 13.9 (L) 05/14/2024    HCT 44.3 05/14/2024    MCV 88.8 05/14/2024     05/14/2024     Sedimentation rate:   Lab Results   Component Value Date    SEDRATE 22 (H) 12/15/2022    C-reactive protein:   Lab Results   Component Value Date    CRP 44.90 (H) 12/15/2022    Chemistry: [unfilled]  HBA1C: No components found for: "HBA1C"    Ankle Brachial Index: No results found for this or any previous visit.    Imaging:    Plain film: No results found for this or any previous visit.    MRI: No results found for this or any previous visit.    Bone Scan: No results found for this or any previous visit.    Vascular studies:    Microbiology: 10/29/24          HOME HEALTH AGENCY: [facility]     WOUND CARE ORDERS:  Right hip wound: Cleanse with wound cleanser, apply silver alginate, 4x4 gauze, and ABD pad and tape to be changed every other day  Sacrum: Cleanse with wound cleanser and apply silver alginate and an island dressing to be changed every other day.   Right elbow: Cleanse with wound cleanser, apply medihoney to callused area daily       Follow up in 26 days (on 2/4/2025) for ST/ Right hip and sacrum .        "

## 2025-01-27 ENCOUNTER — OFFICE VISIT (OUTPATIENT)
Dept: INFECTIOUS DISEASES | Facility: CLINIC | Age: 45
End: 2025-01-27
Payer: MEDICARE

## 2025-01-27 VITALS
BODY MASS INDEX: 18.62 KG/M2 | RESPIRATION RATE: 18 BRPM | TEMPERATURE: 99 F | OXYGEN SATURATION: 95 % | SYSTOLIC BLOOD PRESSURE: 78 MMHG | HEIGHT: 70 IN | HEART RATE: 72 BPM | WEIGHT: 130.06 LBS | DIASTOLIC BLOOD PRESSURE: 53 MMHG

## 2025-01-27 DIAGNOSIS — M86.9 OSTEOMYELITIS OF RIGHT HIP: ICD-10-CM

## 2025-01-27 DIAGNOSIS — M86.28 SUBACUTE OSTEOMYELITIS, OTHER SITE: ICD-10-CM

## 2025-01-27 DIAGNOSIS — Z72.0 TOBACCO USER: Chronic | ICD-10-CM

## 2025-01-27 DIAGNOSIS — G82.50 CHRONIC INCOMPLETE QUADRIPLEGIA: Primary | ICD-10-CM

## 2025-01-27 PROCEDURE — 99999 PR PBB SHADOW E&M-EST. PATIENT-LVL III: CPT | Mod: PBBFAC,,,

## 2025-01-27 PROCEDURE — 99213 OFFICE O/P EST LOW 20 MIN: CPT | Mod: PBBFAC

## 2025-01-27 PROCEDURE — 99214 OFFICE O/P EST MOD 30 MIN: CPT | Mod: S$PBB,,,

## 2025-01-27 NOTE — PROGRESS NOTES
Subjective:       Patient ID: Werner Jarrett 44 y.o.     Chief Complaint:   Chief Complaint   Patient presents with    6 week follow up      OM of right hip. Patient stated bone biopsy showed negative but MRI of Right hip showed positive for OM and patient is concerned about results         HPI:  03/14/2023 Hospital evaluation:  42-year-old incomplete quadriplegic male presenting with drainage from left hip wound and subsequently found to have concern for osteomyelitis and left hip septic arthritis, status post I&D.  Cultures with Enterobacter.  Patient currently on cefepime.  ID consulted for assistance with antimicrobials.     Left hip septic arthritis / OM, s/p I&D  Incomplete quadriplegia  Depression      PLAN:  Continue cefepime - end date: 4/21.  Weekly CBC, CMP, CRP, ESR while on abx.  Fax results to us at [phone].  F/u with us as scheduled.  CM consulted for assistance with OPAT.   Discussed with patient.     04/04/2023:  Wound is healing well, no fevers, no chills, tolerating his Cefepime well, receiving it through port. No current complications. He reports his wound has been healing well with secondary intention, he is following with wound care.     05/08/23 telephone encounter:   Surgeon spoke with Dr. Ceron and had some concerns for worsening infection of R hip wound. Will extend Cefepime 2g IV q8 for an additional two weeks. If wound remains concerning for infection after completion of additional two week he will need additional debridement    07/24/2023:   Mr. Jarrett presents for follow-up today accompanied by his nurse. He reports ongoing issues with his left hip wound. On 6/19 wound care noticed bone fragments on his dressing. Fragments were sent to pathology with ongoing OM noted. He denies having any fevers or chills. He does report having night sweats 2-3x daily while he was on antibiotics, however he hasn't had any in about a month. He is seeing wound care every other week. They recommend daily  dressing changes. Dry dressing with kerlix and silver cell with abd and tape.   His wound is draining serosanguinous drainage and completely saturates in a 24 hour period.     07/18/2024 office visit:  Mr. Jarrett presents for follow-up today accompanied by his caretaker.  He underwent a left AKA in August 2023.  He did receive 4 weeks' course of IV vancomycin, Unasyn, and minocycline for culture showing Acinetobacter and MRSA.  He denies any systemic signs of infection.  He reports that his right hip wound open up during hospitalization for left AKA.  He is being followed by wound care and surgical team.  He did have MRI of the right hip with and without contrast on 04/04/2024 showing  right hip region cellulitis in right proximal femoral osteomyelitis.  Subluxation of the right femoral head from the acetabular cup.  An interval resection of the left femoral head and the left hip joint.  He underwent an I&D on 05/17/2024 with anaerobic, fungal, and aerobic cultures negative.  He does report that he was on a 1-2 week course of TMP/SMX prior to surgical intervention.  Denies any fever, chills, does have occasiaonl night sweats. 2-3 x monthly.  He continues to smoke 1 pack per day.    11/20/2024 office visit:   Mr. Jarrett presents for follow-up today.  He reach back out to ID Clinic after wound care obtained polymicrobial culture secondary to increase serous drainage of right hip.  He reports a temperature of 99.9 last Wednesday prompting wound care to culture. Was given course of TMP/SMX without improvement in amount of drainage.  Denies any chills or night sweats.    01/27/2025 office visit:  Mr. Jarrett presents today for follow up to discuss MRI findings from most recent imaging in November of 2024 as well as recent would culture results containing polymicrobial data.  He is accompanies by his caregiver and gives permission to discuss his care with his presence.  He recently completed an extended course of PO  Doxycycline reportedly prescribed by wound care for his recent MRI findings.  He denies fever, chills, or night sweats.  He reports minimal drainage from his R hip wound site, describing the fluid as clear with only once daily dressing changes needed.  Recent findings of chronic OM on MRI discussed with him and his caregiver.        Past Medical History:   Diagnosis Date    Anxiety     Arthritis     C5 vertebral fracture     Paralyzed nipple line down    Chronic infection of left hip, currently on antibiotics     COPD (chronic obstructive pulmonary disease)     Depression     Heart murmur     Insomnia     Osteomyelitis hip         Past Surgical History:   Procedure Laterality Date    ABOVE-KNEE AMPUTATION Left 8/21/2023    Procedure: AMPUTATION, ABOVE KNEE (left hip diarticulation/amputation);  Surgeon: BARBARA Perez MD;  Location: [facility];  Service: General;  Laterality: Left;    INCISION AND DRAINAGE HIP Left 12/14/2022    Procedure: Incision and drainage Hip (I&D debridement of left hip wound, left hip and femur);  Surgeon: BARBARA Perez MD;  Location: [facility];  Service: General;  Laterality: Left;    INCISION AND DRAINAGE HIP Left 03/10/2023    Procedure: INCISION AND DRAINAGE, HIP;  Surgeon: BARBARA Perez MD;  Location: [facility] OR;  Service: General;  Laterality: Left;  LEFT HIP    INSERTION, SUPRAPUBIC CATHETER      MEDIPORT INSERTION, SINGLE      Rt Chest wall    SPINE SURGERY      WOUND DEBRIDEMENT Right 5/17/2024    Procedure: DEBRIDEMENT, WOUND;  Surgeon: BARBARA Perez MD;  Location: [facility];  Service: General;  Laterality: Right;    wound debridements      Multiple        Social History     Socioeconomic History    Marital status: Single   Tobacco Use    Smoking status: Every Day     Current packs/day: 1.00     Types: Cigarettes    Smokeless tobacco: Never   Substance and Sexual Activity    Alcohol use: Yes     Comment: 3-5 times week    Drug use: Yes     Types:  "Marijuana     Comment: Medical THC    Sexual activity: Not Currently     Social Drivers of Health     Financial Resource Strain: Low Risk  (8/25/2023)    Overall Financial Resource Strain (CARDIA)     Difficulty of Paying Living Expenses: Not very hard   Food Insecurity: Unknown (8/25/2023)    Hunger Vital Sign     Worried About Running Out of Food in the Last Year: Never true     Ran Out of Food in the Last Year: Patient declined   Transportation Needs: Unknown (8/25/2023)    PRAPARE - Transportation     Lack of Transportation (Medical): No     Lack of Transportation (Non-Medical): Patient declined   Physical Activity: Patient Declined (8/22/2023)    Exercise Vital Sign     Days of Exercise per Week: Patient declined     Minutes of Exercise per Session: Patient declined   Stress: Patient Declined (8/22/2023)    [employer] - Occupational Stress Questionnaire     Feeling of Stress : Patient declined   Housing Stability: Unknown (8/25/2023)    Housing Stability Vital Sign     Unable to Pay for Housing in the Last Year: No     Unstable Housing in the Last Year: Patient refused        Family History   Problem Relation Name Age of Onset    Lung cancer Mother      Brain cancer Mother      No Known Problems Father          Review of patient's allergies indicates:   Allergen Reactions    Levofloxacin Rash          There is no immunization history on file for this patient.     Review of Systems   All other systems reviewed and are negative.         Objective:      BP (!) 78/53 (BP Location: Right arm)   Pulse 72   Temp 98.6 °F (37 °C) (Oral)   Resp 18   Ht 5' 10" (1.778 m)   Wt 59 kg (130 lb 1.1 oz)   SpO2 95%   BMI 18.66 kg/m²      Physical Exam  Constitutional:       Appearance: Normal appearance.   HENT:      Head: Normocephalic and atraumatic.      Mouth/Throat:      Pharynx: No oropharyngeal exudate or posterior oropharyngeal erythema.   Eyes:      Extraocular Movements: Extraocular " movements intact.      Pupils: Pupils are equal, round, and reactive to light.   Cardiovascular:      Rate and Rhythm: Normal rate and regular rhythm.      Heart sounds: No murmur heard.  Pulmonary:      Effort: No respiratory distress.      Breath sounds: No wheezing, rhonchi or rales.   Abdominal:      General: Bowel sounds are normal. There is no distension.      Palpations: Abdomen is soft.      Tenderness: There is no abdominal tenderness. There is no right CVA tenderness or left CVA tenderness.   Musculoskeletal:         General: No swelling or tenderness.      Cervical back: Neck supple. No rigidity or tenderness.   Lymphadenopathy:      Cervical: No cervical adenopathy.   Skin:     Findings: No lesion or rash.      Comments: L AKA/hip disarticulation; right hip with tunneling wound; dressed today with clean dressing.  No erythema to site or surrounding skin.    Neurological:      Mental Status: He is alert and oriented to person, place, and time. Mental status is at baseline.      Cranial Nerves: No cranial nerve deficit.      Motor: Weakness present.      Comments: Incomplete paraplegia at baseline   Psychiatric:         Mood and Affect: Mood normal.         Behavior: Behavior normal.            Labs: Reviewed most recent relevant labs available, notable results highlighted in this note    Imaging: Reviewed most recent relevant imaging studies available, notable results highlighted in this note    Assessment:       Problem List Items Addressed This Visit          Neuro    Chronic incomplete quadriplegia - Primary       ID    Subacute osteomyelitis, other site    Osteomyelitis of right hip       Other    Tobacco user (Chronic)                Plan:       -Enterobacter cloacae in L hip tissue intraoperatively found in 3/10/2023  -received a 6 week course of IV cefepime through April 2023 and then an additional 2 week course at the end of May 2023.   -He did have a left hip disarticulation/AKA on 08/24/2023.   Cultures with Acinetobacter baumannii complex and methicillin-resistant Staphylococcus aureus.  He did receive 4 weeks' course of IV vancomycin, Unasyn, and minocycline.  -MRI right hip with and without contrast 04/04/2024 showing right hip region cellulitis in right proximal femoral osteomyelitis.  Subluxation of the right femoral head from the acetabular cup.  An interval resection of the left femoral head and the left hip joint.   He under went debridement on 05/17/2024 with tissue cultures fungal cultures and anaerobic cultures are negative.    --------    -Mr. Caldera continues with wound care team and is now being seen again in ID clinic for follow up after MRI from 11/24/24 showed OM.  We discussed this with the patient that the findings of OM, particularly chronic OM in his case, will always persist on imaging and that his most recent imaging shows no new signs of acute OM or abscess formation concerning for furthering infection.    -any wound cultures collected on patient with chronic, nonhealing ulcers are likely to be polymicrobial.  Would strongly discourage empiric antimicrobial therapy in the absence of systemic signs of infection.  -he continues without systemic signs of infection  -plan discussed with patient who is in agreement with the plan of care  - can follow up as needed.  Discussed with patient and caregiver the need for close observation of the site and to immediately report or seek care for any new signs of infection to the site or systemic signs including fever, chills, or night sweats.        35 minutes of total time spent on the encounter, which includes face to face time and non-face to face time preparing to see the patient (eg, review of tests), Obtaining and/or reviewing separately obtained history, Documenting clinical information in the electronic or other health record, Independently interpreting results (not separately reported) and communicating results to the  patient/family/caregiver, or Care coordination (not separately reported).

## 2025-02-04 ENCOUNTER — HOSPITAL ENCOUNTER (OUTPATIENT)
Dept: WOUND CARE | Facility: HOSPITAL | Age: 45
Discharge: HOME | End: 2025-02-04
Attending: NURSE PRACTITIONER
Payer: MEDICARE

## 2025-02-04 VITALS
RESPIRATION RATE: 17 BRPM | DIASTOLIC BLOOD PRESSURE: 77 MMHG | HEIGHT: 70 IN | SYSTOLIC BLOOD PRESSURE: 125 MMHG | HEART RATE: 83 BPM | WEIGHT: 130.06 LBS | BODY MASS INDEX: 18.62 KG/M2

## 2025-02-04 DIAGNOSIS — M86.9 OSTEOMYELITIS OF RIGHT HIP: ICD-10-CM

## 2025-02-04 DIAGNOSIS — S71.001D OPEN WOUND OF HIP WITH TENDON INVOLVEMENT, RIGHT, SUBSEQUENT ENCOUNTER: ICD-10-CM

## 2025-02-04 DIAGNOSIS — S76.001D OPEN WOUND OF HIP WITH TENDON INVOLVEMENT, RIGHT, SUBSEQUENT ENCOUNTER: ICD-10-CM

## 2025-02-04 DIAGNOSIS — L89.44 PRESSURE INJURY OF CONTIGUOUS REGION INVOLVING BACK AND RIGHT HIP, STAGE 4: Primary | ICD-10-CM

## 2025-02-04 DIAGNOSIS — L89.154 PRESSURE INJURY OF SACRAL REGION, STAGE 4: ICD-10-CM

## 2025-02-04 PROCEDURE — 27000999 HC MEDICAL RECORD PHOTO DOCUMENTATION

## 2025-02-04 PROCEDURE — 99212 OFFICE O/P EST SF 10 MIN: CPT

## 2025-02-04 NOTE — PROGRESS NOTES
Referred by: Dr. Sidhu  Low air loss mattress [x] Yes [] No   Is the patient eligible for a graft, and/or currently grafting?  [] Yes [x] No   Smoker: yes, vape     Subjective:        Patient ID: Werner Jarrett is a 44 y.o. male.    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  Presents today with a new wound for the clinic.  He reports that a couple of weeks ago he has built a plate of red beans on his abdomen which subsequently birth an area on his stomach.  He was at his mobile home in the kitchen when this occured.  He has been applying Silvadene cream to the area daily.  Today, that burn was selectively debrided and he was instructed to continue using the Silvadene cream for the time being.  The right elbow was selectively debrided.  The right hip and sacrum were both mechanically debrided and fully assessed.  The right hip has no significant change.  He is scheduled to see a plastic surgeon on 09/25/2024 at [facility] ([address] [place] [address]).  He has completed his Bactrim DS and Augmentin that was prescribed from 9/6/to 9/16.  Today the wound at the right hip no longer has odor.    9/3/24 - Mr. Jarrett was last seen by this provider a couple of weeks ago prior to vacation.  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  Today, the wound at the right hip is extremely malodorous.  The patient also reports that he has seen an increase in drainage from this wound.  We have been using a wound VAC over this wound for several weeks with no change.  Today we will DC the wound VAC.  A culture was obtained and the patient was prescribed doxycycline and clindamycin pending results of the culture.  The sacrum remains stable.  The right elbow was selectively debrided and does have a  large area of exposed granular tissue that has developed since he was last seen by this provider.  Of note, he did see his surgeon, Dr. Sandy Jara last week.  We were hoping that he would see a surgeon for evaluation of the hip for a possible flap.  However, that office is closed and he has now been referred to a plastic surgeon in [place].   We will be waiting for that call with the hopes that the patient's condition does not significantly deteriorate prior to that time.  Will follow up with his surgeon in 6 weeks.    August 20, 2024:  43-year-old white male, who is here for follow up of the chronic right hip pressure injury, sacral pressure injury, and right posterior elbow pressure injury.  The wounds have now stalled.  He has been using a wound VAC on the right hip.  The right elbow we will need some debridement today.  He will see his general surgeon next week.    August 6, 2024:  43-year-old white male, with quadriplegia, is here for follow up of his right hip pressure injury, sacral pressure injury, and right posterior elbow pressure injury.  We have been using a wound VAC on his right hip ulcer.  He is using collagen on the sacral wound.  We have just open the right posterior elbow as a new stage II pressure injury, surrounded by callus.  There is no exposed bone at this time.    7/23/14 - Mr. 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  Today that wound was assessed and the hip bone is now nicely covered with tissue.  We will continue the wound vac, using black foam only and increasing the pressure to 125 mmHg continuous pressure.  The wound at the sacrum remains stable with no significant changes.  Of note, he had his appt with ID on 7/18/24, he will follow up with them again in 3 months, there were no new orders from them. He also saw Dr. Delgado, he will follow up again with her in 2 months, in the mean time she will refer him to plastic surgery in [place] for a flap.      7/9/24 - The turns today for followup on the wound to the right hip as well as the small wound to the sacrum.  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  We are using a wound VAC on the right hip and that does seem to be making some minor progress in closing the open space between the muscles of the leg.  Today, a selective debridement was performed in an effort to revise the margins and support growth of granular tissue.  The sacrum was also selectively debrided.  We will continue with the wound VAC on the right hip.  The patient does have an appointment with his surgeon, Dr. Sandy Jara, on 07/11/2024.  He also has an appointment with infectious disease on that same day and we are hoping for some additional guidance from both of them regarding his wounds.    6/26/24 - Mr. 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  The sacrum remained stable with no changes and no signs and symptoms of infection.  The right hip seems to be progressing well, with an increase in the development of granular tissue which is occurring across the exposed bone.  At his last visit there was quite a bit of bruising around the periwound so we decrease the wound VAC pressure from 125 mmHg to 100 mmHg and this seems to be improving the growth of granular tissue significantly.  He is scheduled for his bone biopsy on July 1st at [facility] with Dr. Sandy Jara.  He will then follow up with her on 7/11/24.  He also has an upcoming appointment with infectious disease on 07/14/2024.    6/18/24 - Mr. Jarrett returns today for followup on 2 wounds.  The wound to the sacrum continues to progress well with no signs and symptoms of infection.  The wound to the right hip was debrided today.  There was a large amount of slough at the top of the wound bed.  We are continuing to use a  wound VAC although there does not seem to be any significant progress towards closing this wound.  He did have a followup appointment with the surgeon, Dr. Sandy Jara and she does still intend to do a bone biopsy on this right hip.  Will have a followup appointment with her on 07/11/2024.  He will also see infectious disease in July, 2024.  He does not have that date as of today.  He does still have the dry area on his right elbow.  A refill for Urea cream has been sent to his pharmacy.    6/11/24 - The patient returns today for the wounds to the sacrum and right hip.  The sacral wound is stable and clean, with no S & S of infection.  The right hip does have some bruising around the surface of the wound.  We will decrease the wound vac pressure from 125 mmHg to 100 mmHg continuous pressure.  The elbow is still being monitored and he continues to use Urea cream every other day as well as MediHoney.  He has not heard any further information from Dr. Sandy lucero a bone biopsy, and he has been scheduled with ID for f/up.     6/4/24 - Mr. Jarrett returns today for followup on the 2 pressure injuries, 1 to the sacrum and 1 to the right hip.  He does report that he had a follow up with his surgeon, Dr. Sandy Jara on 05/30/2024.  He states that she has indicated that she will be scheduling a bone biopsy of the right hip due to the patient's osteomyelitis.  She is trying to determine his next course of treatment together with infectious disease consideration.  We are currently using a wound VAC and it does seem to be making progress with the development of granular tissue in the wound bed.  There are no signs and symptoms of infection in this wound.  The sacral wound does have a slight increase in depth.  We had changed to collagen and it may be that that product is to wet.  We will DC the collagen and begin application of silver alginate only.  A callus paring was also done on the patient's right elbow.   This has been an ongoing issue and he is currently using Aquaphor on the elbow.    5/28/24 - Mr. 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  He had excisional debridement of this right hip on 5/17/24 by Dr. Sandy Jara.  Her op note:   PROCEDURE -   Excisional debridement right hip wound, dimension of debridement 1 cm circumferentially, total wound dimensions 3.5 cm x 3 cm x 1 cm deep.  Blunt debridement right hip wound  FINDINGS -    Rolled edges of skin, completely removed by excisional debridement  Fibrinous material within the wound debrided bluntly and submitted to culture   Today that wound is clean with nice margins.  We have applied a wound vac using white foam covered with black foam.     He had a follow up with her scheduled for 5/23 but missed it and is now re-scheduled for 5/30.   He is expecting an appt with ID but has not been contacted as of today.   In addition to the right hip, he does have a wound at the sacrum that has reopened.  It is clean and we will apply collagen into this wound.  A callus paring was done on the right elbow.     5/14/24 - The Patient returns today for followup on the large pressure injury at the right hip as well as a small pressure injury to the sacrum.  He did have a followup appointment with his surgeon, Dr. Sandy Jara. He is scheduled for an I&D/debridment of his right hip with Dr. Quijano this Friday 5/17/24.  We had a lengthy discussion regarding possibilities such as a wound VAC or if she will leave the wound opened or close that wound.  He does not know at this time but this providers preference would be that she would close the wound.  This wound remains open at this time following another procedure and we were unable to get the wound to close completely in bite of the use of a wound VAC.  He does still have ligament exposed in the wound has increased in size.  Bone is fully exposed in the wound bed.  He has been using topical  antibiotics for 1 month and those will be discontinued as of today.  Until his procedure we will apply Adaptic over the exposed bone with silver alginate on top, with daily dressing changes.  He does expect to see an infectious disease doctor following his procedure and also mentioned that he expects to possibly have IV antibiotics in another short stay at [facility].    4/23/24 - Today the wound at the elbow is nearly resolved.  We will begin application of Aquaphor to the elbow.  The sacrum was assessed and has improved dramatically.  It is nearly resolved.  He has completed the PO Augmentin for a UTI and this has helped wounds as well  Additionally he began topical abx to the right hip on 4/16/24 (Clindamycin).  Today the hip is also showing improvement.  He does have a f/up with his surgeon, Dr. Sandy Jara, on 5/2/24 with the hope that she will revise the wound to assist with closure since the margins are rolled.  He will return to clinic in two weeks.     4/16/24 - Mr. Jarrett was seen today for f/up on three wounds.  He did have an MRI done on 4/4/24 of the right hip (at [facility], ordered by Dr. Sandy Jara).  Today those results were reviewed with the patient.  We discussed that this provider would reach out to Dr. Jara regarding possible treatment.  This was done with consideration for IV abx, surgical intervention and/or referral to infectious disease for the best possible outcome.  The patient is currently on Augmentin with two days remaining for a UTI.  Additionally he picked up his topical abx today (Clindamycin) which will be used on a moist 4 x 4 gauze, not Basal Gel.    A callus paring was done on the right elbow and it is now healed.  The sacrum was selectively debrided and remains stable.     4/2/24 - The patient was referred back to his surgeon, Dr. Macie Carbajal, who he saw on 3/26/24.  He reports that she is concerned over possible abscess in the space, as well  as infection.  A culture was obtained today.  She did prescribe Augmentin, to begin tomorrow, as a prophylaxis for the hip. He is expecting to have an MRI schedule prior to the follow up with her on 4/9/24 at which time they will discuss the MRI results, and options. Today the right hip wound has increasing moisture and bogginess.  The sacrum remains stable.  The right elbow as debrided and is nearly closed.     3/19/24 - We have been using NuShield grafts on the open pressure ulcer at the patient's right hip.  Tendon remains exposed in the wound bed, the margins are nereida, epibole and closed.  Through a series of several weeks the wound margins have been scored with a scalpel in an attempt to open these closed margins, to no avail.  Today the graft was held, and the patient will be seen by his surgeon, Dr. Sandy Jara next Tuesday, 3/26/24, for consultation regarding options to possible reopen the margins which might bring a successful closure to this wound based on how it is currently healed.  For the time being we will apply adaptic over the tendon, with silver alginate dressing on top.  Both the right elbow and the sacrum remain stable.  We are currently using Endoform on both.  Today the elbow was selectively debrided.     3/12/24 - The pressure injury to the right hip is being grafted using NuShield grafts.  Today graft #6 was applied.  There has been no significant change to this wound since grafting.  Graft #7 has been ordered.  However, the patient was advised to scheduled with his surgeon, Dr. Sandy Jara, for evaluation to determine if she will need to do further surgery to assist this wound with closure since the margins are rolled.  They were again scored with a scaple to attempt to stimulate the growth of epithelial tissue, which has been done several times to no avail.  The right elbow is progressing well.  The sacrum remains stable with no S & S of infection.  He has received his new  power wheelchair.  We are hopeful that this device might contribute to progress with his wounds.     3/5/24 - Mr. Jarrett is seen today for followup on 3 wounds.  The wound to the right elbow was selectively debrided and it does show considerable improvement.  The wound to the sacrum is stable.  We will begin application of moistened Endoform to both of these wounds to be changed on Friday to silver alginate.  The wound to the right hip is slightly moist with a little bit of drainage.  This is to be expected, however, due to the fact that we are grafting that wound.  There has been no significant change to the size of the wound, unfortunately, in spite of grafting multiple times.  We will continue the process with the hope of making some progress.    2/27/24 - Mr. Jarrett returns to clinic today for followup on 3 areas of concern.  The pressure injury remains stable with no significant change.  He has a new wound to the right elbow.  This has developed due to dryness and cracking which has opened into a wound.  The elbow was selectively debrided using a dermal curette.  We will begin applying MediHoney to the elbow, covering with 4 x 4 gauze, Cover and with a Tubigrip with the hopes of softening this callused tissue further.  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  We are currently grafting using NuShield grafts.  Today we applied graft #4 after the wound margins were crosshatched to try to allow for growth epithelial tissue.  Again this week, there was no significant change in that wound.  Ligament is still fully exposed.  Graft #5 is being ordered today.    2/20/24 - the patient returns to clinic today for followup on a small pressure injury to the sacrum as well as a large stage IV pressure injury to the right hip.  We are currently going through the grafting process.  Today we applied graft #3, which is the 1st of the NuShield grafts.  So far we have not seen any significant change in the wound  and the ligament is still fully exposed.  A # 15 scalpel was used to cross keen the wound margin due to rolling of that margin in the need to stimulate the growth of epithelial tissue as well as granular tissue.  The sacrum remains stable and basically the same size.      2/12/24 - Mr. Jarrett is seen today in follow up of two wounds, both of which remain stable.  The left hip is being grafted, and today PuraPly #2 was applied.  There is no significant change since graft #1 was applied.  Today graft #3 was ordered.  We will change from PuraPly to Epifix at this time.  The sacrum remains stable.  We have applied Endoform into that wound and it will be treated the same as a graft, with the hope that the patient does not have BMs which necessitate removal of the dressing. The patient is eligible and is in need of a new power wheelchair.      2/6/24 - Mr. Jarrett returns to clinic today with 2 wounds.  The very small stage IV pressure injury at the sacrum remains stable.  Today, the wound at the right hip, stage IV pressure injury, will receive the 1st graft application.  We are applying PuraPly grafts at this time and then we will change to [facility] after two PuraPly.  Of note, he is waiting on a demo to be shown to him for a possible new wheelchair.  PuraPly #2 ordered.    January 30, 2024:  43-year-old white male, with paraplegia, is here for follow up of the right hip pressure injury, and the sacral pressure injury.  The wounds are looking similar to the last visit.  He is getting ready to have skin substitute applied to the right hip wound.  There is white tissue in the wound bed, presumably tendon.  I do not see pink granulation tissue.  He has no longer using topical antibiotics.    1/23/24 - Mr. Jarrett returns today for followup on 2 wounds.  Today the sacral pressure injury remains stable and clean.  We have been using topical antibiotics on both of these wounds for several weeks.  That has been discontinued today.  " The pressure injury at the right hip was selectively debrided today using the ultrasonic debrider and graft prep was performed.  We will begin applying a small amount of mupirocin ointment on to this wound daily in preparation beginning the grafting process next week.  We will be use PuraPly/NuShield grafts.  Of note, the patient is responsible for contacting calor at the [facility] to have his measurements taken.  He has not done that as of today.    December 26, 2023:  43-year-old white male, with paraplegia, is here for follow up of a right hip pressure injury, and a sacral pressure injury.  He has been using topical antibiotics.  The measurements are about the same as last visit.  The nurse practitioner has discussed a possible skin substitute on the right hip.    12/11/23 - Mr. Jarrett for followup on the pressure ulcers to the right hip and sacrum.  We started using topical antibiotics on both wounds on 11/21/2023.  Additionally, the patient has been using the vinegar washes for sometime.  He was instructed to discontinue the use of the vinegar washes today and use only the topical antibiotics.  He has been approved for grafts (Puraply and Nushield).  We will plan to do graft prep in two weeks (once abx are complete) and then begin grafting.    Of note, we discussed today the need for proper support in his wheelchair to offload the pressure.  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  Also, he does have a low air loss mattress, but that mattress is on top of a "regular" mattress and he reports springs are protruding.  He needs a proper bedframe for the low airloss mattress.     12/4/23 - Mr. Jarrett returns for followup on 2 wounds.  He continues to use topical abx on both wounds.  The right hip continues to be concerning.  The tensor fascia ronny is till exposed in the wound bed.  We " are mixing the topical abx with basal gel to prevent drying.  The sacral wound is stable but has had a slight increase in size.  Abx are being used on this wound as well. We are attempting to authorize grafts on the right hip wound.    11/27/23 - the patient returns today for followup on pressure injuries to the right hip and the sacrum.  He has received his topical antibiotics (Linezolid/Gentamincin) and started using the antibiotics on Saturday, 11/25/23.  He did not receive basal gel with that order so we have contacted [facility] to send the patient that product as the antibiotics are very drying and they are being applied directly to his tendon.  He must have the moisture of the basal gel.  In the time being, we will mix his topical antibiotics with mupirocin antibiotic.  Today both wounds remains stable.  He was instructed to use the antibiotics on both wounds to avoid cross contamination.    11/20/23 - Mr. Jarrett returns for f/up on the wound to the right hip and the sacrum.  At his last visit the right hip was cultured and it was positive to have Pseudomonas.  He has been using Vinegar washes since 11/06/2023.  He does have a sensitivity to levofloxacin and so can not take that medication.  We have requested a topical recommendation and he is being prescribed linezolid/gentamicin to be applied directly into the wound.  If we do not see improvement from that topical medication we will be moving forward with an IV antibiotic.  He does have exposed ligament (possibly ischiofemoral ligament) in this wound.  The patient does still have a mediport so that would be the next best solution for him due to his sensitivity.  The wound at the sacrum remains stable and we are currently using silver alginate on both wounds.    11/6/23 - the patient seen today for followup on the right hip and sacral pressure wounds.  Although the sacrum is stable, the right hip does continue to deteriorate.  The right hip  was cultured today.  We have been using Endoform and/or collagen on these wounds and unfortunately these products or causing excessive moisture and deterioration to the wounds.  We will discontinue both products and use only silver alginate for the time being.  The right hip does have tendon exposed at this time.  The patient was reminded and encouraged to continue to offload both the right hip in the sacrum which he reports that he is wearing.  However, on the deterioration of the wounds it is concerning.    10/30/23 - Mr. Jarrett to clinic today for followup on the wound to the sacrum as well as the right hip.  Today, both have deteriorated.  We have been using collagen to the wound beds and it appears is though they are stain to wet.  There is still tendon present in the wound bed of the right hip.  We will discontinue the collagen and begin application Endoform to both wounds, changing every other day, covered with silver alginate.  He was reminded to offload as much as possible to prevent further deterioration.  Of note, reported that is blood pressure has been low, and it was very low today, 86/54.  He states that he noticed it last week that he has stopped taking Tylenol, aspirin, and probiotics.  We discussed today that it is unlikely that any of these medications would impact blood pressure but he prefers to remain off of them.    10/16/23 - The patient returns today for followup on the pressure injury to the right hip as well as the sacrum.  Today, there is now tendon exposed in the wound bed the right hip pressure injury.  We discussed today that it is imperative that the patient offload to allow this wound to heal so that he does not develop osteomyelitis in this hip as well.  The pressure injury to the sacrum has also deteriorated.  We discussed that now that the amputation to the left hip has fully healed, he must take the opportunity to begin offloading that right hip to allow it to heal.  We will begin  applying collagen every other day to both wound beds.    10/9/23 The patient returns today for the open wound to the right hip and sacrum.  The surgical incision to the left hip is now resolved and will no longer be followed.  Both the right hip and sacrum are wounds that have reopened.  We will begin application of collagen every third day to both wounds.  Neither wound has any signs and symptoms of infection.  He will return in two weeks. Of note, the patient reports that he has had diarrhea for several days, up to about two weeks.  He has had no treatment and is currently using only probiotics.  He has been prescribed imodium and advised to use that medication no more than 4 days.  He is to contact his PCP if the diarrhea does not resolve within that time period.     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 Sandy Owusu on 8/21/23.  This wound has progress except personally well and is remaining closed.  The patient is using only iodine along the surgical incision line.  He does have a follow up with his surgeon tomorrow, 09/26/2023.  He was advised today that he can begin application coconut oil with vitamin C along the closed incision line which is fully approximated.  We will follow this incision line for one additional visit then d/c if no issues develop.    He has had a long term wound at the sacrum which today is open again.  It was mechanically debrided.  Endoform as applied to the wound bed, which will be left in place until Friday at which time he will change to silver alginate.     10/1/24 - The patient returns today for followup on several wounds.  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  This wound has failed to heal.  He was seen by a plastic surgeon on 09/25/2024 with the hopes of possibly having a flap performed.  However, he was told by the PA that nothing could be done due to the depth of the wound.  Today,  that wound was assessed and selectively debrided.  There is a slight amount of epithelial tissue migrated from the wound margin, however there is still quite a bit of depth.  We will manage the wound as much as we can in wound clinic since additional surgery is not an option.  The wound to the elbow is making excellent progress.  A selective debridement was performed.  He has a burn to the abdomen which is progressing well.  He has been using Silvadene on this area and he was instructed to begin just covering the area from now on.  Lastly, he does have a pressure injury at the sacrum.  Today, that wound appears to be closed, however, it has reopened multiple times.  We will leave that wound as an open wound to continue to monitor.    10/15/24 - The abdominal wound remains stable and will be monitored.  The sacral wound is slightly larger.  The right hip remains stable with no S & S of infection.  The right elbow was selectively debrided and has made good progress.     10/29/24 - Mr. Jarrett is seen today for f/up on three wounds.  A selective debridement was performed on the right elbow, and that wound is nearly healed.  The sacrum remains stable.  The right hip reportedly has an increase in drainage.  A culture was performed.  He will follow up with Dr. Delgado in 6 months. We will try to send him back to [facility] in [place].     11/12/4 - The patient was seen today for f/up on his pressure injuries.  A culture was obtained from the right hip on 10/29/24.  The patient was contacted to review the results and to discuss concerns over the pathogens found in that culture.  An Rx for Bactrim DS was sent to his pharmacy and he was instructed to call his infectious disease MD, Dr. Ceron, for a f/up appt due to this providers concerns over the bacteria found.  Today he reports that he called and left message, but did not follow up.  This provider had requested that a fax be sent to Dr. Ceron of these  results.  No confirmation was received.  Dr. Ceron's office was contacted to confirm that the fax was received and asked for Dr. Ceron's review.  The patient was instructed again today to f/up so that Dr. Ceron can help to determine if IV abx are indicated at this time or if Bactrim is sufficient.  All wounds remain stable.  The right hip has decreased in drainage slightly and the wound remains stable.  The elbow was mechanically debrided and the sacrum also remain stable. Of note, the patient has a newly developed rash on the right forearm and right side of his waist area.  He feels that these are mosquito bites.         11/21/24 - Mr. Jarrett returns to clinic today for followup on several wounds.  He did have a culture that was concerning.  We discussed a follow up with his infectious disease doctor.  He did see them on 11/20/2024.  They feel that these bacteria are colonized and no additional antibiotics were recommended.  No additional orders.  He will follow up with them in 3 months.  Today, the right hip is stable but draining.  The original wound to the right elbow has healed, however, a 2nd wound has opened.  That wound is stable.  The pressure injury at the sacrum is also stable and we will continue to applied called into the wound bed.  The patient will return to clinic in 1 month unless he has any deterioration.    12/5/24 - The turns today for followup on 3 wounds.  Today, the right elbow was assessed and is now closed.  The pressure injury at the sacrum remains stable.  A continuing is at right hip.  This wound does remain open.  He saw infectious disease a couple of weeks ago for Thanksgiving and he is scheduled for an MRI on Tuesday, 12/10/2024.  He will then follow up with infectious disease a proximally 3 weeks later to discuss those results.  He understood from infectious disease that the MRI is to determine if a cavity is forming in the right hip space.  Today, the drainage was slightly malodorous.   We did recently culture that hip and he is not currently on antibiotics.  We will begin doxycycline as a preventive and he will stay on that antibiotic until 1 week prior to his follow up appointment with infectious disease.  So he will DC the doxycycline on new year's.  He was cautioned to ensure that he takes a probe diabetic and/or consumes yogurt to prevent c diff.    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  He recently had an MRI of the right hip, which was positive for osteomyelitis.  He is scheduled to see the infectious disease doctor in a couple of weeks to discuss this.  In the meantime, he is on doxycycline oral.  He denies fever.    1/9/25 - The patient is seen today for follow up on the pressure injuries to the sacrum and right hip.  Both remain stable, but unchanged.  He did have a f/up MRI done on 12/10/24, and that MRI again reports osteomyelitis.  This dx has gone back and forth.  He follows up with ID on 1/28/25.  They would prefer a deep bone biopsy.  He will see Dr. Delgado on 1/16/25.  Hopefully she can schedule that to confirm if osteo is present.      2/4/25 - Mr. 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  Of note, he did have a f/up with Infectious disease on 1/27/25. Dr. Ceron ordered an MRI of the patients right hip to confirm that the current osteo is not progressing. She did verify this and also states that there is no new cavities forming in that area. She mentioned to the patient that in the future when a swab culture is done it will always come back positive due to the bacteria being colonized. He only needs to return PRN  He also followed up with Dr. Quijano on 1/16/25 and was given a good report. His next follow up is in 2 months. Today the wounds were assessed and both remain stable with no significant change or S & S of infection.   Going forward we will monitor the wounds only for outward signs of infection as ID have recommended not focusing on swab cultures.  A callus paring was performed on the right elbow.  There is no open wound.     Review of Systems   Constitutional: Negative.    Respiratory: Negative.     Cardiovascular: Negative.    Skin:         As documented in the HPI.   All other systems reviewed and are negative.        Objective:      Vitals:    02/04/25 1136   BP: 125/77   Pulse: 83   Resp: 17       Physical Exam  Vitals and nursing note reviewed. Exam conducted with a chaperone present.   Constitutional:       Appearance: Normal appearance.   Cardiovascular:      Rate and Rhythm: Normal rate.   Pulmonary:      Effort: Pulmonary effort is normal.   Skin:     General: Skin is warm and dry.      Comments: sacral pressure injury, right hip, trochanter   Neurological:      Mental Status: He is alert.            Altered Skin Integrity 09/25/23 1153 Sacral spine #1 (Active)   09/25/23 1153   Altered Skin Integrity Present on Admission - Did Patient arrive to the hospital with altered skin?: yes   Side:    Orientation:    Location: Sacral spine   Wound Number: #1   Is this injury device related?:    Primary Wound Type:    Description of Altered Skin Integrity:    Ankle-Brachial Index:    Pulses:    Removal Indication and Assessment:    Wound Outcome:    (Retired) Wound Length (cm):    (Retired) Wound Width (cm):    (Retired) Depth (cm):    Wound Description (Comments):    Removal Indications:    Description of Altered Skin Integrity Full thickness tissue loss. Subcutaneous fat may be visible but bone, tendon or muscle are not exposed 02/04/25 1136   Dressing Appearance Moist drainage 02/04/25 1136   Drainage Amount Moderate 02/04/25 1136   Drainage Characteristics/Odor Serosanguineous 02/04/25 1136   Appearance Intact;Moist;Epithelialization;Granulating 02/04/25 1136   Tissue loss description Full thickness 02/04/25 1136   Periwound  Area Intact;Pale white 02/04/25 1136   Wound Edges Open 02/04/25 1136   Wound Length (cm) 0.5 cm 02/04/25 1136   Wound Width (cm) 0.4 cm 02/04/25 1136   Wound Depth (cm) 0.2 cm 02/04/25 1136   Wound Volume (cm^3) 0.04 cm^3 02/04/25 1136   Wound Surface Area (cm^2) 0.2 cm^2 02/04/25 1136   Care Cleansed with: 02/04/25 1136   Dressing Applied;Other (comment) 02/04/25 1136   Periwound Care Absorptive dressing applied 02/04/25 1136   Dressing Change Due 02/05/25 02/04/25 1136            Altered Skin Integrity 10/09/23 1141 Right Hip #2 (Active)   10/09/23 1141   Altered Skin Integrity Present on Admission - Did Patient arrive to the hospital with altered skin?: yes   Side: Right   Orientation:    Location: Hip   Wound Number: #2   Is this injury device related?: No   Primary Wound Type:    Description of Altered Skin Integrity:    Ankle-Brachial Index:    Pulses:    Removal Indication and Assessment:    Wound Outcome:    (Retired) Wound Length (cm):    (Retired) Wound Width (cm):    (Retired) Depth (cm):    Wound Description (Comments):    Removal Indications:    Description of Altered Skin Integrity Full thickness tissue loss with exposed bone, tendon, or muscle. Often includes undermining and tunneling. May extend into muscle and/or supporting structures. 02/04/25 1136   Dressing Appearance Intact;Moist drainage 02/04/25 1136   Drainage Amount Large 02/04/25 1136   Drainage Characteristics/Odor Serosanguineous;Yellow;Serous 02/04/25 1136   Appearance Intact;Red 02/04/25 1136   Tissue loss description Full thickness 02/04/25 1136   Periwound Area Intact;Dry 02/04/25 1136   Wound Edges Open 02/04/25 1136   Wound Length (cm) 2.8 cm 02/04/25 1136   Wound Width (cm) 1 cm 02/04/25 1136   Wound Depth (cm) 1 cm 02/04/25 1136   Wound Volume (cm^3) 2.8 cm^3 02/04/25 1136   Wound Surface Area (cm^2) 2.8 cm^2 02/04/25 1136   Tunneling (depth (cm)/location) 6cm at 12 o'clock 02/04/25 1136   Care Cleansed with:;Wound cleanser  02/04/25 1136   Dressing Applied;Other (comment) 02/04/25 1136   Periwound Care Absorptive dressing applied 02/04/25 1136   Dressing Change Due 02/05/25 02/04/25 1136       2/4/25    Right hip   Silver alginate, 4x4 gauze, 6x6 gentle foam border dressing        Sacrum   Silver alginate, 4x4 gentle foam border dressing           Right elbow- Pre                                           Post  TR      Assessment:         ICD-10-CM ICD-9-CM   1. 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 Open wound of hip with tendon involvement, right, subsequent encounter  S71.001D V58.89    S76.001D 890.2   3. Pressure injury of sacral region, stage 4  L89.154 707.03     707.24   4. Osteomyelitis of right hip  M86.9 730.25           Procedures:     Mechanical debridement only     [] Yes [] No   I & D performed  [] Yes [] No   Excisional debridement performed  [] Yes [] No   Selective debridement performed        [x] Yes [] No   Mechanical debridement performed         [] Yes [] No  Silver nitrate applied                                     [] Yes [] No  Labs ordered this visit                                  [] Yes [] No   Imaging ordered this visit                           [] Yes [] No   Tissue pathology and/or culture taken       MEDICATIONS    Current Outpatient Medications:     acetaminophen (TYLENOL) 325 MG tablet, Take 650 mg by mouth every 6 (six) hours as needed for Pain., Disp: , Rfl:     ascorbic acid, vitamin C, (VITAMIN C) 1000 MG tablet, Take 1,000 mg by mouth every evening., Disp: , Rfl:     aspirin 325 MG tablet, Take 325 mg by mouth every morning. Stopped x 1 month, Disp: , Rfl:     baclofen (LIORESAL) 5 mg Tab tablet, Take 1 tablet (5 mg total) by mouth 3 (three) times daily., Disp: 90 tablet, Rfl: 0    bisacodyL (DULCOLAX) 10 mg Supp, Place 10 mg rectally daily as needed., Disp: , Rfl:     busPIRone (BUSPAR) 10 MG tablet, Take 1 tablet (10 mg total)  by mouth 2 (two) times daily., Disp: 60 tablet, Rfl: 5    cetirizine (ZYRTEC) 10 MG tablet, Take 10 mg by mouth 2 (two) times a day., Disp: , Rfl:     cloNIDine (CATAPRES) 0.1 MG tablet, Take 0.1 mg by mouth daily as needed., Disp: , Rfl:     DULoxetine (CYMBALTA) 30 MG capsule, Take 1 capsule by mouth 2 (two) times daily., Disp: , Rfl:     ferrous sulfate (SLOW RELEASE IRON) 142 mg (45 mg iron) TbSR, 1 tablet Orally Three times a Week for 30 days, Disp: , Rfl:     fexofenadine (ALLEGRA) 180 MG tablet, Take 180 mg by mouth every morning., Disp: , Rfl:     fluticasone propionate (FLONASE ALLERGY RELIEF) 50 mcg/actuation nasal spray, 1 spray by Each Nostril route daily as needed., Disp: , Rfl:     heparin, porcine, PF, (HEPARIN FLUSH 100 UNITS/ML) 100 unit/mL Syrg, flush mediport with 5ml every FOUR weeks, Disp: , Rfl:     JATENZO 237 mg Cap, , Disp: , Rfl:     meloxicam (MOBIC) 7.5 MG tablet, Take 7.5 mg by mouth 2 (two) times daily as needed for Pain., Disp: , Rfl:     midodrine (PROAMATINE) 2.5 MG Tab, TAKE ONE TABLET BY MOUTH THREE TIMES DAILY AS NEEDED Systolic blood pressure less than 90 or Diastolic less than 60, Disp: , Rfl:     multivitamin/iron/folic acid (CENTRUM ORAL), Take 1 tablet by mouth every morning., Disp: , Rfl:     NON FORMULARY MEDICATION, Take 3 drops by mouth 2 (two) times daily as needed. THC, Disp: , Rfl:     NORMAL SALINE FLUSH injection, flush mediport with 10ml every FOUR weeks, Disp: , Rfl:     urea (CARMOL) 40 % Crea, Apply topically once daily., Disp: 85 g, Rfl: 2    zinc gluconate 50 mg tablet, Take 1 tablet (50 mg total) by mouth once daily. (Patient taking differently: Take 50 mg by mouth every morning.), Disp: 30 tablet, Rfl: 1  No current facility-administered medications for this encounter.    Facility-Administered Medications Ordered in Other Encounters:     lactated ringers infusion, , Intravenous, Continuous, Cr Garibay DO, Last Rate: 10 mL/hr at 05/17/24 0705, Restarted  "at 05/17/24 0859   Review of patient's allergies indicates:   Allergen Reactions    Levofloxacin Rash       DIAGNOSTICS    Labs/Scans/Micro:    CBC:   Lab Results   Component Value Date    WBC 10.42 05/14/2024    HGB 13.9 (L) 05/14/2024    HCT 44.3 05/14/2024    MCV 88.8 05/14/2024     05/14/2024     Sedimentation rate:   Lab Results   Component Value Date    SEDRATE 22 (H) 12/15/2022    C-reactive protein:   Lab Results   Component Value Date    CRP 44.90 (H) 12/15/2022    Chemistry: [unfilled]  HBA1C: No components found for: "HBA1C"    Ankle Brachial Index: No results found for this or any previous visit.    Imaging:    Plain film: No results found for this or any previous visit.    MRI: No results found for this or any previous visit.    Bone Scan: No results found for this or any previous visit.    Vascular studies:    Microbiology: 10/29/24          HOME HEALTH AGENCY: [facility]     WOUND CARE ORDERS:  Right hip wound: Cleanse with wound cleanser, apply silver alginate, 4x4 gauze, and ABD pad and tape to be changed every other day  Sacrum: Cleanse with wound cleanser and apply silver alginate and an island dressing to be changed every other day.   Right elbow: Cleanse with wound cleanser, apply aquaphor to callused area daily       Follow up in about 1 month (around 3/4/2025).        "

## 2025-03-06 ENCOUNTER — HOSPITAL ENCOUNTER (OUTPATIENT)
Dept: WOUND CARE | Facility: HOSPITAL | Age: 45
Discharge: HOME | End: 2025-03-06
Attending: NURSE PRACTITIONER
Payer: MEDICARE

## 2025-03-06 VITALS
HEART RATE: 77 BPM | RESPIRATION RATE: 17 BRPM | BODY MASS INDEX: 18.62 KG/M2 | DIASTOLIC BLOOD PRESSURE: 65 MMHG | HEIGHT: 70 IN | WEIGHT: 130.06 LBS | SYSTOLIC BLOOD PRESSURE: 105 MMHG

## 2025-03-06 DIAGNOSIS — L89.44 PRESSURE INJURY OF CONTIGUOUS REGION INVOLVING BACK AND RIGHT HIP, STAGE 4: Primary | ICD-10-CM

## 2025-03-06 DIAGNOSIS — L89.154 PRESSURE INJURY OF SACRAL REGION, STAGE 4: ICD-10-CM

## 2025-03-06 DIAGNOSIS — G82.50 QUADRIPLEGIA: ICD-10-CM

## 2025-03-06 DIAGNOSIS — S76.001D OPEN WOUND OF HIP WITH TENDON INVOLVEMENT, RIGHT, SUBSEQUENT ENCOUNTER: ICD-10-CM

## 2025-03-06 DIAGNOSIS — L89.013 PRESSURE INJURY OF RIGHT ELBOW, STAGE 3: ICD-10-CM

## 2025-03-06 DIAGNOSIS — S71.001D OPEN WOUND OF HIP WITH TENDON INVOLVEMENT, RIGHT, SUBSEQUENT ENCOUNTER: ICD-10-CM

## 2025-03-06 PROCEDURE — 99212 OFFICE O/P EST SF 10 MIN: CPT

## 2025-03-06 PROCEDURE — 27000999 HC MEDICAL RECORD PHOTO DOCUMENTATION

## 2025-03-06 PROCEDURE — 97597 DBRDMT OPN WND 1ST 20 CM/<: CPT

## 2025-03-06 NOTE — PROGRESS NOTES
Referred by: Dr. Sidhu  Low air loss mattress [x] Yes [] No   Is the patient eligible for a graft, and/or currently grafting?  [] Yes [x] No   Smoker: yes, vape     Subjective:        Patient ID: Werner Jarrett is a 44 y.o. male.    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  Presents today with a new wound for the clinic.  He reports that a couple of weeks ago he has built a plate of red beans on his abdomen which subsequently birth an area on his stomach.  He was at his mobile home in the kitchen when this occured.  He has been applying Silvadene cream to the area daily.  Today, that burn was selectively debrided and he was instructed to continue using the Silvadene cream for the time being.  The right elbow was selectively debrided.  The right hip and sacrum were both mechanically debrided and fully assessed.  The right hip has no significant change.  He is scheduled to see a plastic surgeon on 09/25/2024 at [facility] ([address] [place] [address]).  He has completed his Bactrim DS and Augmentin that was prescribed from 9/6/to 9/16.  Today the wound at the right hip no longer has odor.    9/3/24 - Mr. Jarrett was last seen by this provider a couple of weeks ago prior to vacation.  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  Today, the wound at the right hip is extremely malodorous.  The patient also reports that he has seen an increase in drainage from this wound.  We have been using a wound VAC over this wound for several weeks with no change.  Today we will DC the wound VAC.  A culture was obtained and the patient was prescribed doxycycline and clindamycin pending results of the culture.  The sacrum remains stable.  The right elbow was selectively debrided and does have a  large area of exposed granular tissue that has developed since he was last seen by this provider.  Of note, he did see his surgeon, Dr. Sandy Jara last week.  We were hoping that he would see a surgeon for evaluation of the hip for a possible flap.  However, that office is closed and he has now been referred to a plastic surgeon in [place].   We will be waiting for that call with the hopes that the patient's condition does not significantly deteriorate prior to that time.  Will follow up with his surgeon in 6 weeks.    August 20, 2024:  43-year-old white male, who is here for follow up of the chronic right hip pressure injury, sacral pressure injury, and right posterior elbow pressure injury.  The wounds have now stalled.  He has been using a wound VAC on the right hip.  The right elbow we will need some debridement today.  He will see his general surgeon next week.    August 6, 2024:  43-year-old white male, with quadriplegia, is here for follow up of his right hip pressure injury, sacral pressure injury, and right posterior elbow pressure injury.  We have been using a wound VAC on his right hip ulcer.  He is using collagen on the sacral wound.  We have just open the right posterior elbow as a new stage II pressure injury, surrounded by callus.  There is no exposed bone at this time.    7/23/14 - Mr. 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  Today that wound was assessed and the hip bone is now nicely covered with tissue.  We will continue the wound vac, using black foam only and increasing the pressure to 125 mmHg continuous pressure.  The wound at the sacrum remains stable with no significant changes.  Of note, he had his appt with ID on 7/18/24, he will follow up with them again in 3 months, there were no new orders from them. He also saw Dr. Delgado, he will follow up again with her in 2 months, in the mean time she will refer him to plastic surgery in [place] for a flap.      7/9/24 - The turns today for followup on the wound to the right hip as well as the small wound to the sacrum.  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  We are using a wound VAC on the right hip and that does seem to be making some minor progress in closing the open space between the muscles of the leg.  Today, a selective debridement was performed in an effort to revise the margins and support growth of granular tissue.  The sacrum was also selectively debrided.  We will continue with the wound VAC on the right hip.  The patient does have an appointment with his surgeon, Dr. Sandy Jara, on 07/11/2024.  He also has an appointment with infectious disease on that same day and we are hoping for some additional guidance from both of them regarding his wounds.    6/26/24 - Mr. 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  The sacrum remained stable with no changes and no signs and symptoms of infection.  The right hip seems to be progressing well, with an increase in the development of granular tissue which is occurring across the exposed bone.  At his last visit there was quite a bit of bruising around the periwound so we decrease the wound VAC pressure from 125 mmHg to 100 mmHg and this seems to be improving the growth of granular tissue significantly.  He is scheduled for his bone biopsy on July 1st at [facility] with Dr. Sandy Jara.  He will then follow up with her on 7/11/24.  He also has an upcoming appointment with infectious disease on 07/14/2024.    6/18/24 - Mr. Jarrett returns today for followup on 2 wounds.  The wound to the sacrum continues to progress well with no signs and symptoms of infection.  The wound to the right hip was debrided today.  There was a large amount of slough at the top of the wound bed.  We are continuing to use a  wound VAC although there does not seem to be any significant progress towards closing this wound.  He did have a followup appointment with the surgeon, Dr. Sandy Jara and she does still intend to do a bone biopsy on this right hip.  Will have a followup appointment with her on 07/11/2024.  He will also see infectious disease in July, 2024.  He does not have that date as of today.  He does still have the dry area on his right elbow.  A refill for Urea cream has been sent to his pharmacy.    6/11/24 - The patient returns today for the wounds to the sacrum and right hip.  The sacral wound is stable and clean, with no S & S of infection.  The right hip does have some bruising around the surface of the wound.  We will decrease the wound vac pressure from 125 mmHg to 100 mmHg continuous pressure.  The elbow is still being monitored and he continues to use Urea cream every other day as well as MediHoney.  He has not heard any further information from Dr. Sandy lucero a bone biopsy, and he has been scheduled with ID for f/up.     6/4/24 - Mr. Jarrett returns today for followup on the 2 pressure injuries, 1 to the sacrum and 1 to the right hip.  He does report that he had a follow up with his surgeon, Dr. Sandy Jara on 05/30/2024.  He states that she has indicated that she will be scheduling a bone biopsy of the right hip due to the patient's osteomyelitis.  She is trying to determine his next course of treatment together with infectious disease consideration.  We are currently using a wound VAC and it does seem to be making progress with the development of granular tissue in the wound bed.  There are no signs and symptoms of infection in this wound.  The sacral wound does have a slight increase in depth.  We had changed to collagen and it may be that that product is to wet.  We will DC the collagen and begin application of silver alginate only.  A callus paring was also done on the patient's right elbow.   This has been an ongoing issue and he is currently using Aquaphor on the elbow.    5/28/24 - Mr. 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  He had excisional debridement of this right hip on 5/17/24 by Dr. Sandy Jara.  Her op note:   PROCEDURE -   Excisional debridement right hip wound, dimension of debridement 1 cm circumferentially, total wound dimensions 3.5 cm x 3 cm x 1 cm deep.  Blunt debridement right hip wound  FINDINGS -    Rolled edges of skin, completely removed by excisional debridement  Fibrinous material within the wound debrided bluntly and submitted to culture   Today that wound is clean with nice margins.  We have applied a wound vac using white foam covered with black foam.     He had a follow up with her scheduled for 5/23 but missed it and is now re-scheduled for 5/30.   He is expecting an appt with ID but has not been contacted as of today.   In addition to the right hip, he does have a wound at the sacrum that has reopened.  It is clean and we will apply collagen into this wound.  A callus paring was done on the right elbow.     5/14/24 - The Patient returns today for followup on the large pressure injury at the right hip as well as a small pressure injury to the sacrum.  He did have a followup appointment with his surgeon, Dr. Sandy Jara. He is scheduled for an I&D/debridment of his right hip with Dr. Quijano this Friday 5/17/24.  We had a lengthy discussion regarding possibilities such as a wound VAC or if she will leave the wound opened or close that wound.  He does not know at this time but this providers preference would be that she would close the wound.  This wound remains open at this time following another procedure and we were unable to get the wound to close completely in bite of the use of a wound VAC.  He does still have ligament exposed in the wound has increased in size.  Bone is fully exposed in the wound bed.  He has been using topical  antibiotics for 1 month and those will be discontinued as of today.  Until his procedure we will apply Adaptic over the exposed bone with silver alginate on top, with daily dressing changes.  He does expect to see an infectious disease doctor following his procedure and also mentioned that he expects to possibly have IV antibiotics in another short stay at [facility].    4/23/24 - Today the wound at the elbow is nearly resolved.  We will begin application of Aquaphor to the elbow.  The sacrum was assessed and has improved dramatically.  It is nearly resolved.  He has completed the PO Augmentin for a UTI and this has helped wounds as well  Additionally he began topical abx to the right hip on 4/16/24 (Clindamycin).  Today the hip is also showing improvement.  He does have a f/up with his surgeon, Dr. Sandy Jara, on 5/2/24 with the hope that she will revise the wound to assist with closure since the margins are rolled.  He will return to clinic in two weeks.     4/16/24 - Mr. Jarrett was seen today for f/up on three wounds.  He did have an MRI done on 4/4/24 of the right hip (at [facility], ordered by Dr. Sandy Jara).  Today those results were reviewed with the patient.  We discussed that this provider would reach out to Dr. Jara regarding possible treatment.  This was done with consideration for IV abx, surgical intervention and/or referral to infectious disease for the best possible outcome.  The patient is currently on Augmentin with two days remaining for a UTI.  Additionally he picked up his topical abx today (Clindamycin) which will be used on a moist 4 x 4 gauze, not Basal Gel.    A callus paring was done on the right elbow and it is now healed.  The sacrum was selectively debrided and remains stable.     4/2/24 - The patient was referred back to his surgeon, Dr. Macie Carbajal, who he saw on 3/26/24.  He reports that she is concerned over possible abscess in the space, as well  as infection.  A culture was obtained today.  She did prescribe Augmentin, to begin tomorrow, as a prophylaxis for the hip. He is expecting to have an MRI schedule prior to the follow up with her on 4/9/24 at which time they will discuss the MRI results, and options. Today the right hip wound has increasing moisture and bogginess.  The sacrum remains stable.  The right elbow as debrided and is nearly closed.     3/19/24 - We have been using NuShield grafts on the open pressure ulcer at the patient's right hip.  Tendon remains exposed in the wound bed, the margins are nereida, epibole and closed.  Through a series of several weeks the wound margins have been scored with a scalpel in an attempt to open these closed margins, to no avail.  Today the graft was held, and the patient will be seen by his surgeon, Dr. Sandy Jara next Tuesday, 3/26/24, for consultation regarding options to possible reopen the margins which might bring a successful closure to this wound based on how it is currently healed.  For the time being we will apply adaptic over the tendon, with silver alginate dressing on top.  Both the right elbow and the sacrum remain stable.  We are currently using Endoform on both.  Today the elbow was selectively debrided.     3/12/24 - The pressure injury to the right hip is being grafted using NuShield grafts.  Today graft #6 was applied.  There has been no significant change to this wound since grafting.  Graft #7 has been ordered.  However, the patient was advised to scheduled with his surgeon, Dr. Sandy Jara, for evaluation to determine if she will need to do further surgery to assist this wound with closure since the margins are rolled.  They were again scored with a scaple to attempt to stimulate the growth of epithelial tissue, which has been done several times to no avail.  The right elbow is progressing well.  The sacrum remains stable with no S & S of infection.  He has received his new  power wheelchair.  We are hopeful that this device might contribute to progress with his wounds.     3/5/24 - Mr. Jarrett is seen today for followup on 3 wounds.  The wound to the right elbow was selectively debrided and it does show considerable improvement.  The wound to the sacrum is stable.  We will begin application of moistened Endoform to both of these wounds to be changed on Friday to silver alginate.  The wound to the right hip is slightly moist with a little bit of drainage.  This is to be expected, however, due to the fact that we are grafting that wound.  There has been no significant change to the size of the wound, unfortunately, in spite of grafting multiple times.  We will continue the process with the hope of making some progress.    2/27/24 - Mr. Jarrett returns to clinic today for followup on 3 areas of concern.  The pressure injury remains stable with no significant change.  He has a new wound to the right elbow.  This has developed due to dryness and cracking which has opened into a wound.  The elbow was selectively debrided using a dermal curette.  We will begin applying MediHoney to the elbow, covering with 4 x 4 gauze, Cover and with a Tubigrip with the hopes of softening this callused tissue further.  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  We are currently grafting using NuShield grafts.  Today we applied graft #4 after the wound margins were crosshatched to try to allow for growth epithelial tissue.  Again this week, there was no significant change in that wound.  Ligament is still fully exposed.  Graft #5 is being ordered today.    2/20/24 - the patient returns to clinic today for followup on a small pressure injury to the sacrum as well as a large stage IV pressure injury to the right hip.  We are currently going through the grafting process.  Today we applied graft #3, which is the 1st of the NuShield grafts.  So far we have not seen any significant change in the wound  and the ligament is still fully exposed.  A # 15 scalpel was used to cross keen the wound margin due to rolling of that margin in the need to stimulate the growth of epithelial tissue as well as granular tissue.  The sacrum remains stable and basically the same size.      2/12/24 - Mr. Jarrett is seen today in follow up of two wounds, both of which remain stable.  The left hip is being grafted, and today PuraPly #2 was applied.  There is no significant change since graft #1 was applied.  Today graft #3 was ordered.  We will change from PuraPly to Epifix at this time.  The sacrum remains stable.  We have applied Endoform into that wound and it will be treated the same as a graft, with the hope that the patient does not have BMs which necessitate removal of the dressing. The patient is eligible and is in need of a new power wheelchair.      2/6/24 - Mr. Jarrett returns to clinic today with 2 wounds.  The very small stage IV pressure injury at the sacrum remains stable.  Today, the wound at the right hip, stage IV pressure injury, will receive the 1st graft application.  We are applying PuraPly grafts at this time and then we will change to [facility] after two PuraPly.  Of note, he is waiting on a demo to be shown to him for a possible new wheelchair.  PuraPly #2 ordered.    January 30, 2024:  43-year-old white male, with paraplegia, is here for follow up of the right hip pressure injury, and the sacral pressure injury.  The wounds are looking similar to the last visit.  He is getting ready to have skin substitute applied to the right hip wound.  There is white tissue in the wound bed, presumably tendon.  I do not see pink granulation tissue.  He has no longer using topical antibiotics.    1/23/24 - Mr. Jarrett returns today for followup on 2 wounds.  Today the sacral pressure injury remains stable and clean.  We have been using topical antibiotics on both of these wounds for several weeks.  That has been discontinued today.  " The pressure injury at the right hip was selectively debrided today using the ultrasonic debrider and graft prep was performed.  We will begin applying a small amount of mupirocin ointment on to this wound daily in preparation beginning the grafting process next week.  We will be use PuraPly/NuShield grafts.  Of note, the patient is responsible for contacting calor at the [facility] to have his measurements taken.  He has not done that as of today.    December 26, 2023:  43-year-old white male, with paraplegia, is here for follow up of a right hip pressure injury, and a sacral pressure injury.  He has been using topical antibiotics.  The measurements are about the same as last visit.  The nurse practitioner has discussed a possible skin substitute on the right hip.    12/11/23 - Mr. Jarrett for followup on the pressure ulcers to the right hip and sacrum.  We started using topical antibiotics on both wounds on 11/21/2023.  Additionally, the patient has been using the vinegar washes for sometime.  He was instructed to discontinue the use of the vinegar washes today and use only the topical antibiotics.  He has been approved for grafts (Puraply and Nushield).  We will plan to do graft prep in two weeks (once abx are complete) and then begin grafting.    Of note, we discussed today the need for proper support in his wheelchair to offload the pressure.  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  Also, he does have a low air loss mattress, but that mattress is on top of a "regular" mattress and he reports springs are protruding.  He needs a proper bedframe for the low airloss mattress.     12/4/23 - Mr. Jarrett returns for followup on 2 wounds.  He continues to use topical abx on both wounds.  The right hip continues to be concerning.  The tensor fascia ronny is till exposed in the wound bed.  We " are mixing the topical abx with basal gel to prevent drying.  The sacral wound is stable but has had a slight increase in size.  Abx are being used on this wound as well. We are attempting to authorize grafts on the right hip wound.    11/27/23 - the patient returns today for followup on pressure injuries to the right hip and the sacrum.  He has received his topical antibiotics (Linezolid/Gentamincin) and started using the antibiotics on Saturday, 11/25/23.  He did not receive basal gel with that order so we have contacted [facility] to send the patient that product as the antibiotics are very drying and they are being applied directly to his tendon.  He must have the moisture of the basal gel.  In the time being, we will mix his topical antibiotics with mupirocin antibiotic.  Today both wounds remains stable.  He was instructed to use the antibiotics on both wounds to avoid cross contamination.    11/20/23 - Mr. Jarrett returns for f/up on the wound to the right hip and the sacrum.  At his last visit the right hip was cultured and it was positive to have Pseudomonas.  He has been using Vinegar washes since 11/06/2023.  He does have a sensitivity to levofloxacin and so can not take that medication.  We have requested a topical recommendation and he is being prescribed linezolid/gentamicin to be applied directly into the wound.  If we do not see improvement from that topical medication we will be moving forward with an IV antibiotic.  He does have exposed ligament (possibly ischiofemoral ligament) in this wound.  The patient does still have a mediport so that would be the next best solution for him due to his sensitivity.  The wound at the sacrum remains stable and we are currently using silver alginate on both wounds.    11/6/23 - the patient seen today for followup on the right hip and sacral pressure wounds.  Although the sacrum is stable, the right hip does continue to deteriorate.  The right hip  was cultured today.  We have been using Endoform and/or collagen on these wounds and unfortunately these products or causing excessive moisture and deterioration to the wounds.  We will discontinue both products and use only silver alginate for the time being.  The right hip does have tendon exposed at this time.  The patient was reminded and encouraged to continue to offload both the right hip in the sacrum which he reports that he is wearing.  However, on the deterioration of the wounds it is concerning.    10/30/23 - Mr. Jarrett to clinic today for followup on the wound to the sacrum as well as the right hip.  Today, both have deteriorated.  We have been using collagen to the wound beds and it appears is though they are stain to wet.  There is still tendon present in the wound bed of the right hip.  We will discontinue the collagen and begin application Endoform to both wounds, changing every other day, covered with silver alginate.  He was reminded to offload as much as possible to prevent further deterioration.  Of note, reported that is blood pressure has been low, and it was very low today, 86/54.  He states that he noticed it last week that he has stopped taking Tylenol, aspirin, and probiotics.  We discussed today that it is unlikely that any of these medications would impact blood pressure but he prefers to remain off of them.    10/16/23 - The patient returns today for followup on the pressure injury to the right hip as well as the sacrum.  Today, there is now tendon exposed in the wound bed the right hip pressure injury.  We discussed today that it is imperative that the patient offload to allow this wound to heal so that he does not develop osteomyelitis in this hip as well.  The pressure injury to the sacrum has also deteriorated.  We discussed that now that the amputation to the left hip has fully healed, he must take the opportunity to begin offloading that right hip to allow it to heal.  We will begin  applying collagen every other day to both wound beds.    10/9/23 The patient returns today for the open wound to the right hip and sacrum.  The surgical incision to the left hip is now resolved and will no longer be followed.  Both the right hip and sacrum are wounds that have reopened.  We will begin application of collagen every third day to both wounds.  Neither wound has any signs and symptoms of infection.  He will return in two weeks. Of note, the patient reports that he has had diarrhea for several days, up to about two weeks.  He has had no treatment and is currently using only probiotics.  He has been prescribed imodium and advised to use that medication no more than 4 days.  He is to contact his PCP if the diarrhea does not resolve within that time period.     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 Sandy Owusu on 8/21/23.  This wound has progress except personally well and is remaining closed.  The patient is using only iodine along the surgical incision line.  He does have a follow up with his surgeon tomorrow, 09/26/2023.  He was advised today that he can begin application coconut oil with vitamin C along the closed incision line which is fully approximated.  We will follow this incision line for one additional visit then d/c if no issues develop.    He has had a long term wound at the sacrum which today is open again.  It was mechanically debrided.  Endoform as applied to the wound bed, which will be left in place until Friday at which time he will change to silver alginate.     10/1/24 - The patient returns today for followup on several wounds.  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  This wound has failed to heal.  He was seen by a plastic surgeon on 09/25/2024 with the hopes of possibly having a flap performed.  However, he was told by the PA that nothing could be done due to the depth of the wound.  Today,  that wound was assessed and selectively debrided.  There is a slight amount of epithelial tissue migrated from the wound margin, however there is still quite a bit of depth.  We will manage the wound as much as we can in wound clinic since additional surgery is not an option.  The wound to the elbow is making excellent progress.  A selective debridement was performed.  He has a burn to the abdomen which is progressing well.  He has been using Silvadene on this area and he was instructed to begin just covering the area from now on.  Lastly, he does have a pressure injury at the sacrum.  Today, that wound appears to be closed, however, it has reopened multiple times.  We will leave that wound as an open wound to continue to monitor.    10/15/24 - The abdominal wound remains stable and will be monitored.  The sacral wound is slightly larger.  The right hip remains stable with no S & S of infection.  The right elbow was selectively debrided and has made good progress.     10/29/24 - Mr. Jarrett is seen today for f/up on three wounds.  A selective debridement was performed on the right elbow, and that wound is nearly healed.  The sacrum remains stable.  The right hip reportedly has an increase in drainage.  A culture was performed.  He will follow up with Dr. Delgado in 6 months. We will try to send him back to [facility] in [place].     11/12/4 - The patient was seen today for f/up on his pressure injuries.  A culture was obtained from the right hip on 10/29/24.  The patient was contacted to review the results and to discuss concerns over the pathogens found in that culture.  An Rx for Bactrim DS was sent to his pharmacy and he was instructed to call his infectious disease MD, Dr. Ceron, for a f/up appt due to this providers concerns over the bacteria found.  Today he reports that he called and left message, but did not follow up.  This provider had requested that a fax be sent to Dr. Ceron of these  results.  No confirmation was received.  Dr. Ceron's office was contacted to confirm that the fax was received and asked for Dr. Ceron's review.  The patient was instructed again today to f/up so that Dr. Ceron can help to determine if IV abx are indicated at this time or if Bactrim is sufficient.  All wounds remain stable.  The right hip has decreased in drainage slightly and the wound remains stable.  The elbow was mechanically debrided and the sacrum also remain stable. Of note, the patient has a newly developed rash on the right forearm and right side of his waist area.  He feels that these are mosquito bites.         11/21/24 - Mr. Jarrett returns to clinic today for followup on several wounds.  He did have a culture that was concerning.  We discussed a follow up with his infectious disease doctor.  He did see them on 11/20/2024.  They feel that these bacteria are colonized and no additional antibiotics were recommended.  No additional orders.  He will follow up with them in 3 months.  Today, the right hip is stable but draining.  The original wound to the right elbow has healed, however, a 2nd wound has opened.  That wound is stable.  The pressure injury at the sacrum is also stable and we will continue to applied called into the wound bed.  The patient will return to clinic in 1 month unless he has any deterioration.    12/5/24 - The turns today for followup on 3 wounds.  Today, the right elbow was assessed and is now closed.  The pressure injury at the sacrum remains stable.  A continuing is at right hip.  This wound does remain open.  He saw infectious disease a couple of weeks ago for Thanksgiving and he is scheduled for an MRI on Tuesday, 12/10/2024.  He will then follow up with infectious disease a proximally 3 weeks later to discuss those results.  He understood from infectious disease that the MRI is to determine if a cavity is forming in the right hip space.  Today, the drainage was slightly malodorous.   We did recently culture that hip and he is not currently on antibiotics.  We will begin doxycycline as a preventive and he will stay on that antibiotic until 1 week prior to his follow up appointment with infectious disease.  So he will DC the doxycycline on new year's.  He was cautioned to ensure that he takes a probe diabetic and/or consumes yogurt to prevent c diff.    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  He recently had an MRI of the right hip, which was positive for osteomyelitis.  He is scheduled to see the infectious disease doctor in a couple of weeks to discuss this.  In the meantime, he is on doxycycline oral.  He denies fever.    1/9/25 - The patient is seen today for follow up on the pressure injuries to the sacrum and right hip.  Both remain stable, but unchanged.  He did have a f/up MRI done on 12/10/24, and that MRI again reports osteomyelitis.  This dx has gone back and forth.  He follows up with ID on 1/28/25.  They would prefer a deep bone biopsy.  He will see Dr. Delgado on 1/16/25.  Hopefully she can schedule that to confirm if osteo is present.      2/4/25 - Mr. 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  Of note, he did have a f/up with Infectious disease on 1/27/25. Dr. Ceron ordered an MRI of the patients right hip to confirm that the current osteo is not progressing. She did verify this and also states that there is no new cavities forming in that area. She mentioned to the patient that in the future when a swab culture is done it will always come back positive due to the bacteria being colonized. He only needs to return PRN  He also followed up with Dr. Quijano on 1/16/25 and was given a good report. His next follow up is in 2 months. Today the wounds were assessed and both remain stable with no significant change or S & S of infection.   Going forward we will monitor the wounds only for outward signs of infection as ID have recommended not focusing on swab cultures.  A callus paring was performed on the right elbow.  There is no open wound.     3/6/25 - The patient is seen today for f/up on several wounds.  The right hip was assessed and selectively debrided.  Within the wound bed there is a new area of tissue (unknown if adipose, ligament) that is protruding.  When the debridement was performed this tissue remained stable.  It will be left in place with the hopes that epithelial tissue will overgrow.  The sacrum remains stable.  A callus paring was performed on the elbow which is again quite dry.  There is no open wound.     Review of Systems   Constitutional: Negative.    Respiratory: Negative.     Cardiovascular: Negative.    Skin:         As documented in the HPI.   All other systems reviewed and are negative.        Objective:      Vitals:    03/06/25 1123   BP: 105/65   Pulse: 77   Resp: 17       Physical Exam  Vitals and nursing note reviewed. Exam conducted with a chaperone present.   Constitutional:       Appearance: Normal appearance.   Cardiovascular:      Rate and Rhythm: Normal rate.   Pulmonary:      Effort: Pulmonary effort is normal.   Skin:     General: Skin is warm and dry.      Comments: sacral pressure injury, right hip, trochanter   Neurological:      Mental Status: He is alert.            Altered Skin Integrity 09/25/23 1153 Sacral spine #1 (Active)   09/25/23 1153   Altered Skin Integrity Present on Admission - Did Patient arrive to the hospital with altered skin?: yes   Side:    Orientation:    Location: Sacral spine   Wound Number: #1   Is this injury device related?:    Primary Wound Type:    Description of Altered Skin Integrity:    Ankle-Brachial Index:    Pulses:    Removal Indication and Assessment:    Wound Outcome:    (Retired) Wound Length (cm):    (Retired) Wound Width (cm):    (Retired) Depth (cm):    Wound  Description (Comments):    Removal Indications:    Description of Altered Skin Integrity Full thickness tissue loss. Subcutaneous fat may be visible but bone, tendon or muscle are not exposed 03/06/25 1123   Dressing Appearance Intact;Moist drainage 03/06/25 1123   Drainage Amount Moderate 03/06/25 1123   Drainage Characteristics/Odor Serosanguineous 03/06/25 1123   Appearance Intact;Slough;Moist;Epithelialization 03/06/25 1123   Tissue loss description Full thickness 03/06/25 1123   Periwound Area Intact;Dry 03/06/25 1123   Wound Edges Open;Defined 03/06/25 1123   Wound Length (cm) 0.5 cm 03/06/25 1123   Wound Width (cm) 0.5 cm 03/06/25 1123   Wound Depth (cm) 0.4 cm 03/06/25 1123   Wound Volume (cm^3) 0.052 cm^3 03/06/25 1123   Wound Surface Area (cm^2) 0.2 cm^2 03/06/25 1123   Care Cleansed with:;Debrided;Wound cleanser 03/06/25 1123   Dressing Applied;Other (comment) 03/06/25 1123   Periwound Care Absorptive dressing applied 03/06/25 1123   Dressing Change Due 03/09/25 03/06/25 1123            Altered Skin Integrity 10/09/23 1141 Right Hip #2 (Active)   10/09/23 1141   Altered Skin Integrity Present on Admission - Did Patient arrive to the hospital with altered skin?: yes   Side: Right   Orientation:    Location: Hip   Wound Number: #2   Is this injury device related?: No   Primary Wound Type:    Description of Altered Skin Integrity:    Ankle-Brachial Index:    Pulses:    Removal Indication and Assessment:    Wound Outcome:    (Retired) Wound Length (cm):    (Retired) Wound Width (cm):    (Retired) Depth (cm):    Wound Description (Comments):    Removal Indications:    Description of Altered Skin Integrity Full thickness tissue loss with exposed bone, tendon, or muscle. Often includes undermining and tunneling. May extend into muscle and/or supporting structures. 03/06/25 1123   Dressing Appearance Moist drainage;Intact 03/06/25 1123   Drainage Amount Moderate 03/06/25 1123   Drainage Characteristics/Odor  "Serosanguineous;Serous 03/06/25 1123   Appearance Intact;Moist;Wet;Adipose;Slough 03/06/25 1123   Tissue loss description Full thickness 03/06/25 1123   Periwound Area Intact;Dry 03/06/25 1123   Wound Edges Open;Defined 03/06/25 1123   Wound Length (cm) 2.7 cm 03/06/25 1123   Wound Width (cm) 1.8 cm 03/06/25 1123   Wound Depth (cm) 1.1 cm 03/06/25 1123   Wound Volume (cm^3) 2.799 cm^3 03/06/25 1123   Wound Surface Area (cm^2) 3.82 cm^2 03/06/25 1123   Undermining (depth (cm)/location) 1.5 o'clock deepest at 5 o'clock 1.3cm 03/06/25 1123   Care Cleansed with:;Wound cleanser;Debrided 03/06/25 1123   Dressing Applied;Other (comment) 03/06/25 1123   Periwound Care Absorptive dressing applied 03/06/25 1123   Dressing Change Due 03/07/25 03/06/25 1123       3/6/25      Right hip- Pre                                              Post  silver alginate, 4x4, island dressing        Sacrum- Pre                                               Post  collagen, silver alginate, 4x4, sacral foam border         Right elbow  TR  Assessment:         ICD-10-CM ICD-9-CM   1. 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 Open wound of hip with tendon involvement, right, subsequent encounter  S71.001D V58.89    S76.001D 890.2   3. Pressure injury of sacral region, stage 4  L89.154 707.03     707.24   4. Pressure injury of right elbow, stage 3  L89.013 707.01     707.23   5. Quadriplegia  G82.50 344.00             Procedures:     Debridement     Date/Time: 3/6/2025 10:40 AM     Performed by: Ashley Coto NP  Authorized by: Ashley Coto NP    Time out: Immediately prior to procedure a "time out" was called to verify the correct patient, procedure, equipment, support staff and site/side marked as required.     Consent Done?:  Yes (Verbal)     Preparation: Patient was prepped and draped with clean technique    Local anesthesia used?: No       Wound Details:    Location:  " Right hip    Type of Debridement:  Non-excisional       Length (cm):  2.7       Width (cm):  1.8       Depth (cm):  1.1       Area (sq cm):  3.82       Percent Debrided (%):  100       Total Area Debrided (sq cm):  3.82    Depth of debridement:  Epidermis/Dermis    Devitalized tissue debrided:  Biofilm, Exudate, Fibrin and Slough    Instruments:  Curette  Bleeding:  None  Patient tolerance:  Patient tolerated the procedure well with no immediate complications       [] Yes [] No   I & D performed  [] Yes [] No   Excisional debridement performed  [x] Yes [] No   Selective debridement performed        [x] Yes [] No   Mechanical debridement performed         [] Yes [] No  Silver nitrate applied                                     [] Yes [] No  Labs ordered this visit                                  [] Yes [] No   Imaging ordered this visit                           [] Yes [] No   Tissue pathology and/or culture taken       MEDICATIONS    Current Outpatient Medications:     acetaminophen (TYLENOL) 325 MG tablet, Take 650 mg by mouth every 6 (six) hours as needed for Pain., Disp: , Rfl:     ascorbic acid, vitamin C, (VITAMIN C) 1000 MG tablet, Take 1,000 mg by mouth every evening., Disp: , Rfl:     aspirin 325 MG tablet, Take 325 mg by mouth every morning. Stopped x 1 month, Disp: , Rfl:     baclofen (LIORESAL) 5 mg Tab tablet, Take 1 tablet (5 mg total) by mouth 3 (three) times daily., Disp: 90 tablet, Rfl: 0    bisacodyL (DULCOLAX) 10 mg Supp, Place 10 mg rectally daily as needed., Disp: , Rfl:     busPIRone (BUSPAR) 10 MG tablet, Take 1 tablet (10 mg total) by mouth 2 (two) times daily., Disp: 60 tablet, Rfl: 5    cetirizine (ZYRTEC) 10 MG tablet, Take 10 mg by mouth 2 (two) times a day., Disp: , Rfl:     cloNIDine (CATAPRES) 0.1 MG tablet, Take 0.1 mg by mouth daily as needed., Disp: , Rfl:     DULoxetine (CYMBALTA) 30 MG capsule, Take 1 capsule by mouth 2 (two) times daily., Disp: , Rfl:     ferrous sulfate (SLOW  RELEASE IRON) 142 mg (45 mg iron) TbSR, 1 tablet Orally Three times a Week for 30 days, Disp: , Rfl:     fexofenadine (ALLEGRA) 180 MG tablet, Take 180 mg by mouth every morning., Disp: , Rfl:     fluticasone propionate (FLONASE ALLERGY RELIEF) 50 mcg/actuation nasal spray, 1 spray by Each Nostril route daily as needed., Disp: , Rfl:     heparin, porcine, PF, (HEPARIN FLUSH 100 UNITS/ML) 100 unit/mL Syrg, flush mediport with 5ml every FOUR weeks, Disp: , Rfl:     JATENZO 237 mg Cap, , Disp: , Rfl:     meloxicam (MOBIC) 7.5 MG tablet, Take 7.5 mg by mouth 2 (two) times daily as needed for Pain., Disp: , Rfl:     midodrine (PROAMATINE) 2.5 MG Tab, TAKE ONE TABLET BY MOUTH THREE TIMES DAILY AS NEEDED Systolic blood pressure less than 90 or Diastolic less than 60, Disp: , Rfl:     multivitamin/iron/folic acid (CENTRUM ORAL), Take 1 tablet by mouth every morning., Disp: , Rfl:     NON FORMULARY MEDICATION, Take 3 drops by mouth 2 (two) times daily as needed. THC, Disp: , Rfl:     NORMAL SALINE FLUSH injection, flush mediport with 10ml every FOUR weeks, Disp: , Rfl:     urea (CARMOL) 40 % Crea, Apply topically once daily., Disp: 85 g, Rfl: 2    zinc gluconate 50 mg tablet, Take 1 tablet (50 mg total) by mouth once daily. (Patient taking differently: Take 50 mg by mouth every morning.), Disp: 30 tablet, Rfl: 1  No current facility-administered medications for this encounter.    Facility-Administered Medications Ordered in Other Encounters:     lactated ringers infusion, , Intravenous, Continuous, Cr Garibay DO, Last Rate: 10 mL/hr at 05/17/24 0705, Restarted at 05/17/24 0859   Review of patient's allergies indicates:   Allergen Reactions    Levofloxacin Rash       DIAGNOSTICS    Labs/Scans/Micro:    CBC:   Lab Results   Component Value Date    WBC 10.42 05/14/2024    HGB 13.9 (L) 05/14/2024    HCT 44.3 05/14/2024    MCV 88.8 05/14/2024     05/14/2024     Sedimentation rate:   Lab Results   Component Value  "Date    SEDRATE 22 (H) 12/15/2022    C-reactive protein:   Lab Results   Component Value Date    CRP 44.90 (H) 12/15/2022    Chemistry: [unfilled]  HBA1C: No components found for: "HBA1C"    Ankle Brachial Index: No results found for this or any previous visit.    Imaging:    Plain film: No results found for this or any previous visit.    MRI: No results found for this or any previous visit.    Bone Scan: No results found for this or any previous visit.    Vascular studies:    Microbiology: 10/29/24          HOME HEALTH AGENCY: [facility]     WOUND CARE ORDERS:  Right hip wound: Cleanse with wound cleanser, apply silver alginate, 4x4 gauze, and ABD pad and tape to be changed every other day  Sacrum: Cleanse with wound cleanser and apply collagen to wound bed, cover with silver alginate and an island dressing to be changed every 3 days, PRN soiling   Right elbow: Cleanse with wound cleanser, apply aquaphor to callused area daily       Follow up in about 1 month (around 4/6/2025), or ST.        "

## 2025-03-06 NOTE — PROCEDURES
"Debridement    Date/Time: 3/6/2025 10:40 AM    Performed by: Ashley Coto NP  Authorized by: Ashley Coto NP    Time out: Immediately prior to procedure a "time out" was called to verify the correct patient, procedure, equipment, support staff and site/side marked as required.    Consent Done?:  Yes (Verbal)    Preparation: Patient was prepped and draped with clean technique    Local anesthesia used?: No      Wound Details:    Location:  Right hip    Type of Debridement:  Non-excisional       Length (cm):  2.7       Width (cm):  1.8       Depth (cm):  1.1       Area (sq cm):  3.82       Percent Debrided (%):  100       Total Area Debrided (sq cm):  3.82    Depth of debridement:  Epidermis/Dermis    Devitalized tissue debrided:  Biofilm, Exudate, Fibrin and Slough    Instruments:  Curette  Bleeding:  None  Patient tolerance:  Patient tolerated the procedure well with no immediate complications    "

## 2025-04-08 ENCOUNTER — HOSPITAL ENCOUNTER (OUTPATIENT)
Dept: WOUND CARE | Facility: HOSPITAL | Age: 45
Discharge: HOME | End: 2025-04-08
Attending: NURSE PRACTITIONER
Payer: MEDICARE

## 2025-04-08 VITALS
WEIGHT: 130.06 LBS | SYSTOLIC BLOOD PRESSURE: 98 MMHG | DIASTOLIC BLOOD PRESSURE: 65 MMHG | HEART RATE: 87 BPM | RESPIRATION RATE: 18 BRPM | BODY MASS INDEX: 18.62 KG/M2 | HEIGHT: 70 IN

## 2025-04-08 DIAGNOSIS — L89.44 PRESSURE INJURY OF CONTIGUOUS REGION INVOLVING BACK AND RIGHT HIP, STAGE 4: Primary | ICD-10-CM

## 2025-04-08 DIAGNOSIS — L89.154 PRESSURE INJURY OF SACRAL REGION, STAGE 4: ICD-10-CM

## 2025-04-08 DIAGNOSIS — L89.013 PRESSURE INJURY OF RIGHT ELBOW, STAGE 3: ICD-10-CM

## 2025-04-08 DIAGNOSIS — S71.001D OPEN WOUND OF HIP WITH TENDON INVOLVEMENT, RIGHT, SUBSEQUENT ENCOUNTER: ICD-10-CM

## 2025-04-08 DIAGNOSIS — S76.001D OPEN WOUND OF HIP WITH TENDON INVOLVEMENT, RIGHT, SUBSEQUENT ENCOUNTER: ICD-10-CM

## 2025-04-08 PROCEDURE — 99213 OFFICE O/P EST LOW 20 MIN: CPT

## 2025-04-08 PROCEDURE — 27000999 HC MEDICAL RECORD PHOTO DOCUMENTATION

## 2025-04-08 PROCEDURE — 97597 DBRDMT OPN WND 1ST 20 CM/<: CPT

## 2025-04-08 NOTE — PROCEDURES
"Debridement    Date/Time: 4/8/2025 11:00 AM    Performed by: Ashley Coto NP  Authorized by: Ashley Coto NP    Time out: Immediately prior to procedure a "time out" was called to verify the correct patient, procedure, equipment, support staff and site/side marked as required.    Consent Done?:  Yes (Verbal)    Preparation: Patient was prepped and draped with clean technique    Local anesthesia used?: No      Wound Details:    Location:  Right elbow    Type of Debridement:  Non-excisional       Length (cm):  1       Width (cm):  0.5       Depth (cm):  0.2       Area (sq cm):  0.39       Percent Debrided (%):  100       Total Area Debrided (sq cm):  0.39    Depth of debridement:  Epidermis/Dermis    Devitalized tissue debrided:  Biofilm, Callus, Exudate and Fibrin    Instruments:  Curette (Dermal)  Bleeding:  None  Patient tolerance:  Patient tolerated the procedure well with no immediate complications    "

## 2025-04-08 NOTE — PROGRESS NOTES
Referred by: Dr. Sidhu  Low air loss mattress [x] Yes [] No   Is the patient eligible for a graft, and/or currently grafting?  [] Yes [x] No   Smoker: yes, vape       Subjective:        Patient ID: Werner Jarrett is a 44 y.o. male.    Chief Complaint: Pressure Ulcer ((Sacrum - stage 4 /Right hip - stage 4))      9/17/24 - The patient returns to clinic for followup on several wounds.  Presents today with a new wound for the clinic.  He reports that a couple of weeks ago he has built a plate of red beans on his abdomen which subsequently birth an area on his stomach.  He was at his mobile home in the kitchen when this occured.  He has been applying Silvadene cream to the area daily.  Today, that burn was selectively debrided and he was instructed to continue using the Silvadene cream for the time being.  The right elbow was selectively debrided.  The right hip and sacrum were both mechanically debrided and fully assessed.  The right hip has no significant change.  He is scheduled to see a plastic surgeon on 09/25/2024 at [facility] ([address] [place] [address]).  He has completed his Bactrim DS and Augmentin that was prescribed from 9/6/to 9/16.  Today the wound at the right hip no longer has odor.    9/3/24 - Mr. Jarrett was last seen by this provider a couple of weeks ago prior to vacation.  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  Today, the wound at the right hip is extremely malodorous.  The patient also reports that he has seen an increase in drainage from this wound.  We have been using a wound VAC over this wound for several weeks with no change.  Today we will DC the wound VAC.  A culture was obtained and the patient was prescribed doxycycline and clindamycin pending results of the culture.  The sacrum remains stable.  The right elbow was selectively debrided and does  have a large area of exposed granular tissue that has developed since he was last seen by this provider.  Of note, he did see his surgeon, Dr. Sandy Jara last week.  We were hoping that he would see a surgeon for evaluation of the hip for a possible flap.  However, that office is closed and he has now been referred to a plastic surgeon in [place].   We will be waiting for that call with the hopes that the patient's condition does not significantly deteriorate prior to that time.  Will follow up with his surgeon in 6 weeks.    August 20, 2024:  43-year-old white male, who is here for follow up of the chronic right hip pressure injury, sacral pressure injury, and right posterior elbow pressure injury.  The wounds have now stalled.  He has been using a wound VAC on the right hip.  The right elbow we will need some debridement today.  He will see his general surgeon next week.    August 6, 2024:  43-year-old white male, with quadriplegia, is here for follow up of his right hip pressure injury, sacral pressure injury, and right posterior elbow pressure injury.  We have been using a wound VAC on his right hip ulcer.  He is using collagen on the sacral wound.  We have just open the right posterior elbow as a new stage II pressure injury, surrounded by callus.  There is no exposed bone at this time.    7/23/14 - Mr. 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  Today that wound was assessed and the hip bone is now nicely covered with tissue.  We will continue the wound vac, using black foam only and increasing the pressure to 125 mmHg continuous pressure.  The wound at the sacrum remains stable with no significant changes.  Of note, he had his appt with ID on 7/18/24, he will follow up with them again in 3 months, there were no new orders from them. He also saw Dr. Delgado, he will follow up again with her in 2 months, in the mean time she will refer him to plastic surgery in [place] for a  flap.     7/9/24 - The turns today for followup on the wound to the right hip as well as the small wound to the sacrum.  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  We are using a wound VAC on the right hip and that does seem to be making some minor progress in closing the open space between the muscles of the leg.  Today, a selective debridement was performed in an effort to revise the margins and support growth of granular tissue.  The sacrum was also selectively debrided.  We will continue with the wound VAC on the right hip.  The patient does have an appointment with his surgeon, Dr. Sandy Jara, on 07/11/2024.  He also has an appointment with infectious disease on that same day and we are hoping for some additional guidance from both of them regarding his wounds.    6/26/24 - Mr. 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  The sacrum remained stable with no changes and no signs and symptoms of infection.  The right hip seems to be progressing well, with an increase in the development of granular tissue which is occurring across the exposed bone.  At his last visit there was quite a bit of bruising around the periwound so we decrease the wound VAC pressure from 125 mmHg to 100 mmHg and this seems to be improving the growth of granular tissue significantly.  He is scheduled for his bone biopsy on July 1st at [facility] with Dr. Sandy Jara.  He will then follow up with her on 7/11/24.  He also has an upcoming appointment with infectious disease on 07/14/2024.    6/18/24 - Mr. Jarrett returns today for followup on 2 wounds.  The wound to the sacrum continues to progress well with no signs and symptoms of infection.  The wound to the right hip was debrided today.  There was a large amount of slough at the top of the wound bed.  We are continuing to use  a wound VAC although there does not seem to be any significant progress towards closing this wound.  He did have a followup appointment with the surgeon, Dr. Sandy Jara and she does still intend to do a bone biopsy on this right hip.  Will have a followup appointment with her on 07/11/2024.  He will also see infectious disease in July, 2024.  He does not have that date as of today.  He does still have the dry area on his right elbow.  A refill for Urea cream has been sent to his pharmacy.    6/11/24 - The patient returns today for the wounds to the sacrum and right hip.  The sacral wound is stable and clean, with no S & S of infection.  The right hip does have some bruising around the surface of the wound.  We will decrease the wound vac pressure from 125 mmHg to 100 mmHg continuous pressure.  The elbow is still being monitored and he continues to use Urea cream every other day as well as MediHoney.  He has not heard any further information from Dr. Sandy lucero a bone biopsy, and he has been scheduled with ID for f/up.     6/4/24 - Mr. Jarrett returns today for followup on the 2 pressure injuries, 1 to the sacrum and 1 to the right hip.  He does report that he had a follow up with his surgeon, Dr. Sandy Jara on 05/30/2024.  He states that she has indicated that she will be scheduling a bone biopsy of the right hip due to the patient's osteomyelitis.  She is trying to determine his next course of treatment together with infectious disease consideration.  We are currently using a wound VAC and it does seem to be making progress with the development of granular tissue in the wound bed.  There are no signs and symptoms of infection in this wound.  The sacral wound does have a slight increase in depth.  We had changed to collagen and it may be that that product is to wet.  We will DC the collagen and begin application of silver alginate only.  A callus paring was also done on the patient's right elbow.   This has been an ongoing issue and he is currently using Aquaphor on the elbow.    5/28/24 - Mr. 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  He had excisional debridement of this right hip on 5/17/24 by Dr. Sandy Jara.  Her op note:   PROCEDURE -   Excisional debridement right hip wound, dimension of debridement 1 cm circumferentially, total wound dimensions 3.5 cm x 3 cm x 1 cm deep.  Blunt debridement right hip wound  FINDINGS -    Rolled edges of skin, completely removed by excisional debridement  Fibrinous material within the wound debrided bluntly and submitted to culture   Today that wound is clean with nice margins.  We have applied a wound vac using white foam covered with black foam.     He had a follow up with her scheduled for 5/23 but missed it and is now re-scheduled for 5/30.   He is expecting an appt with ID but has not been contacted as of today.   In addition to the right hip, he does have a wound at the sacrum that has reopened.  It is clean and we will apply collagen into this wound.  A callus paring was done on the right elbow.     5/14/24 - The Patient returns today for followup on the large pressure injury at the right hip as well as a small pressure injury to the sacrum.  He did have a followup appointment with his surgeon, Dr. Sandy Jara. He is scheduled for an I&D/debridment of his right hip with Dr. Quijano this Friday 5/17/24.  We had a lengthy discussion regarding possibilities such as a wound VAC or if she will leave the wound opened or close that wound.  He does not know at this time but this providers preference would be that she would close the wound.  This wound remains open at this time following another procedure and we were unable to get the wound to close completely in bite of the use of a wound VAC.  He does still have ligament exposed in the wound has increased in size.  Bone is fully exposed in the wound bed.  He has been using topical  antibiotics for 1 month and those will be discontinued as of today.  Until his procedure we will apply Adaptic over the exposed bone with silver alginate on top, with daily dressing changes.  He does expect to see an infectious disease doctor following his procedure and also mentioned that he expects to possibly have IV antibiotics in another short stay at [facility].    4/23/24 - Today the wound at the elbow is nearly resolved.  We will begin application of Aquaphor to the elbow.  The sacrum was assessed and has improved dramatically.  It is nearly resolved.  He has completed the PO Augmentin for a UTI and this has helped wounds as well  Additionally he began topical abx to the right hip on 4/16/24 (Clindamycin).  Today the hip is also showing improvement.  He does have a f/up with his surgeon, Dr. Sandy Jara, on 5/2/24 with the hope that she will revise the wound to assist with closure since the margins are rolled.  He will return to clinic in two weeks.     4/16/24 - Mr. Jarrett was seen today for f/up on three wounds.  He did have an MRI done on 4/4/24 of the right hip (at [facility], ordered by Dr. Sandy Jara).  Today those results were reviewed with the patient.  We discussed that this provider would reach out to Dr. Jara regarding possible treatment.  This was done with consideration for IV abx, surgical intervention and/or referral to infectious disease for the best possible outcome.  The patient is currently on Augmentin with two days remaining for a UTI.  Additionally he picked up his topical abx today (Clindamycin) which will be used on a moist 4 x 4 gauze, not Basal Gel.    A callus paring was done on the right elbow and it is now healed.  The sacrum was selectively debrided and remains stable.     4/2/24 - The patient was referred back to his surgeon, Dr. Macie Carbajal, who he saw on 3/26/24.  He reports that she is concerned over possible abscess in the space, as well  as infection.  A culture was obtained today.  She did prescribe Augmentin, to begin tomorrow, as a prophylaxis for the hip. He is expecting to have an MRI schedule prior to the follow up with her on 4/9/24 at which time they will discuss the MRI results, and options. Today the right hip wound has increasing moisture and bogginess.  The sacrum remains stable.  The right elbow as debrided and is nearly closed.     3/19/24 - We have been using NuShield grafts on the open pressure ulcer at the patient's right hip.  Tendon remains exposed in the wound bed, the margins are nereida, epibole and closed.  Through a series of several weeks the wound margins have been scored with a scalpel in an attempt to open these closed margins, to no avail.  Today the graft was held, and the patient will be seen by his surgeon, Dr. Sandy Jara next Tuesday, 3/26/24, for consultation regarding options to possible reopen the margins which might bring a successful closure to this wound based on how it is currently healed.  For the time being we will apply adaptic over the tendon, with silver alginate dressing on top.  Both the right elbow and the sacrum remain stable.  We are currently using Endoform on both.  Today the elbow was selectively debrided.     3/12/24 - The pressure injury to the right hip is being grafted using NuShield grafts.  Today graft #6 was applied.  There has been no significant change to this wound since grafting.  Graft #7 has been ordered.  However, the patient was advised to scheduled with his surgeon, Dr. Sandy Jara, for evaluation to determine if she will need to do further surgery to assist this wound with closure since the margins are rolled.  They were again scored with a scaple to attempt to stimulate the growth of epithelial tissue, which has been done several times to no avail.  The right elbow is progressing well.  The sacrum remains stable with no S & S of infection.  He has received his new  power wheelchair.  We are hopeful that this device might contribute to progress with his wounds.     3/5/24 - Mr. Jarrett is seen today for followup on 3 wounds.  The wound to the right elbow was selectively debrided and it does show considerable improvement.  The wound to the sacrum is stable.  We will begin application of moistened Endoform to both of these wounds to be changed on Friday to silver alginate.  The wound to the right hip is slightly moist with a little bit of drainage.  This is to be expected, however, due to the fact that we are grafting that wound.  There has been no significant change to the size of the wound, unfortunately, in spite of grafting multiple times.  We will continue the process with the hope of making some progress.    2/27/24 - Mr. Jarrett returns to clinic today for followup on 3 areas of concern.  The pressure injury remains stable with no significant change.  He has a new wound to the right elbow.  This has developed due to dryness and cracking which has opened into a wound.  The elbow was selectively debrided using a dermal curette.  We will begin applying MediHoney to the elbow, covering with 4 x 4 gauze, Cover and with a Tubigrip with the hopes of softening this callused tissue further.  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  We are currently grafting using NuShield grafts.  Today we applied graft #4 after the wound margins were crosshatched to try to allow for growth epithelial tissue.  Again this week, there was no significant change in that wound.  Ligament is still fully exposed.  Graft #5 is being ordered today.    2/20/24 - the patient returns to clinic today for followup on a small pressure injury to the sacrum as well as a large stage IV pressure injury to the right hip.  We are currently going through the grafting process.  Today we applied graft #3, which is the 1st of the NuShield grafts.  So far we have not seen any significant change in the wound  and the ligament is still fully exposed.  A # 15 scalpel was used to cross keen the wound margin due to rolling of that margin in the need to stimulate the growth of epithelial tissue as well as granular tissue.  The sacrum remains stable and basically the same size.      2/12/24 - Mr. Jarrett is seen today in follow up of two wounds, both of which remain stable.  The left hip is being grafted, and today PuraPly #2 was applied.  There is no significant change since graft #1 was applied.  Today graft #3 was ordered.  We will change from PuraPly to Epifix at this time.  The sacrum remains stable.  We have applied Endoform into that wound and it will be treated the same as a graft, with the hope that the patient does not have BMs which necessitate removal of the dressing. The patient is eligible and is in need of a new power wheelchair.      2/6/24 - Mr. Jarrett returns to clinic today with 2 wounds.  The very small stage IV pressure injury at the sacrum remains stable.  Today, the wound at the right hip, stage IV pressure injury, will receive the 1st graft application.  We are applying PuraPly grafts at this time and then we will change to [facility] after two PuraPly.  Of note, he is waiting on a demo to be shown to him for a possible new wheelchair.  PuraPly #2 ordered.    January 30, 2024:  43-year-old white male, with paraplegia, is here for follow up of the right hip pressure injury, and the sacral pressure injury.  The wounds are looking similar to the last visit.  He is getting ready to have skin substitute applied to the right hip wound.  There is white tissue in the wound bed, presumably tendon.  I do not see pink granulation tissue.  He has no longer using topical antibiotics.    1/23/24 - Mr. Jarrett returns today for followup on 2 wounds.  Today the sacral pressure injury remains stable and clean.  We have been using topical antibiotics on both of these wounds for several weeks.  That has been discontinued today.  " The pressure injury at the right hip was selectively debrided today using the ultrasonic debrider and graft prep was performed.  We will begin applying a small amount of mupirocin ointment on to this wound daily in preparation beginning the grafting process next week.  We will be use PuraPly/NuShield grafts.  Of note, the patient is responsible for contacting calor at the [facility] to have his measurements taken.  He has not done that as of today.    December 26, 2023:  43-year-old white male, with paraplegia, is here for follow up of a right hip pressure injury, and a sacral pressure injury.  He has been using topical antibiotics.  The measurements are about the same as last visit.  The nurse practitioner has discussed a possible skin substitute on the right hip.    12/11/23 - Mr. Jarrett for followup on the pressure ulcers to the right hip and sacrum.  We started using topical antibiotics on both wounds on 11/21/2023.  Additionally, the patient has been using the vinegar washes for sometime.  He was instructed to discontinue the use of the vinegar washes today and use only the topical antibiotics.  He has been approved for grafts (Puraply and Nushield).  We will plan to do graft prep in two weeks (once abx are complete) and then begin grafting.    Of note, we discussed today the need for proper support in his wheelchair to offload the pressure.  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  Also, he does have a low air loss mattress, but that mattress is on top of a "regular" mattress and he reports springs are protruding.  He needs a proper bedframe for the low airloss mattress.     12/4/23 - Mr. Jarrett returns for followup on 2 wounds.  He continues to use topical abx on both wounds.  The right hip continues to be concerning.  The tensor fascia ronny is till exposed in the wound bed.  We " are mixing the topical abx with basal gel to prevent drying.  The sacral wound is stable but has had a slight increase in size.  Abx are being used on this wound as well. We are attempting to authorize grafts on the right hip wound.    11/27/23 - the patient returns today for followup on pressure injuries to the right hip and the sacrum.  He has received his topical antibiotics (Linezolid/Gentamincin) and started using the antibiotics on Saturday, 11/25/23.  He did not receive basal gel with that order so we have contacted [facility] to send the patient that product as the antibiotics are very drying and they are being applied directly to his tendon.  He must have the moisture of the basal gel.  In the time being, we will mix his topical antibiotics with mupirocin antibiotic.  Today both wounds remains stable.  He was instructed to use the antibiotics on both wounds to avoid cross contamination.    11/20/23 - Mr. Jarrett returns for f/up on the wound to the right hip and the sacrum.  At his last visit the right hip was cultured and it was positive to have Pseudomonas.  He has been using Vinegar washes since 11/06/2023.  He does have a sensitivity to levofloxacin and so can not take that medication.  We have requested a topical recommendation and he is being prescribed linezolid/gentamicin to be applied directly into the wound.  If we do not see improvement from that topical medication we will be moving forward with an IV antibiotic.  He does have exposed ligament (possibly ischiofemoral ligament) in this wound.  The patient does still have a mediport so that would be the next best solution for him due to his sensitivity.  The wound at the sacrum remains stable and we are currently using silver alginate on both wounds.    11/6/23 - the patient seen today for followup on the right hip and sacral pressure wounds.  Although the sacrum is stable, the right hip does continue to deteriorate.  The right hip  was cultured today.  We have been using Endoform and/or collagen on these wounds and unfortunately these products or causing excessive moisture and deterioration to the wounds.  We will discontinue both products and use only silver alginate for the time being.  The right hip does have tendon exposed at this time.  The patient was reminded and encouraged to continue to offload both the right hip in the sacrum which he reports that he is wearing.  However, on the deterioration of the wounds it is concerning.    10/30/23 - Mr. Jarrett to clinic today for followup on the wound to the sacrum as well as the right hip.  Today, both have deteriorated.  We have been using collagen to the wound beds and it appears is though they are stain to wet.  There is still tendon present in the wound bed of the right hip.  We will discontinue the collagen and begin application Endoform to both wounds, changing every other day, covered with silver alginate.  He was reminded to offload as much as possible to prevent further deterioration.  Of note, reported that is blood pressure has been low, and it was very low today, 86/54.  He states that he noticed it last week that he has stopped taking Tylenol, aspirin, and probiotics.  We discussed today that it is unlikely that any of these medications would impact blood pressure but he prefers to remain off of them.    10/16/23 - The patient returns today for followup on the pressure injury to the right hip as well as the sacrum.  Today, there is now tendon exposed in the wound bed the right hip pressure injury.  We discussed today that it is imperative that the patient offload to allow this wound to heal so that he does not develop osteomyelitis in this hip as well.  The pressure injury to the sacrum has also deteriorated.  We discussed that now that the amputation to the left hip has fully healed, he must take the opportunity to begin offloading that right hip to allow it to heal.  We will begin  applying collagen every other day to both wound beds.    10/9/23 The patient returns today for the open wound to the right hip and sacrum.  The surgical incision to the left hip is now resolved and will no longer be followed.  Both the right hip and sacrum are wounds that have reopened.  We will begin application of collagen every third day to both wounds.  Neither wound has any signs and symptoms of infection.  He will return in two weeks. Of note, the patient reports that he has had diarrhea for several days, up to about two weeks.  He has had no treatment and is currently using only probiotics.  He has been prescribed imodium and advised to use that medication no more than 4 days.  He is to contact his PCP if the diarrhea does not resolve within that time period.     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 Sandy Owusu on 8/21/23.  This wound has progress except personally well and is remaining closed.  The patient is using only iodine along the surgical incision line.  He does have a follow up with his surgeon tomorrow, 09/26/2023.  He was advised today that he can begin application coconut oil with vitamin C along the closed incision line which is fully approximated.  We will follow this incision line for one additional visit then d/c if no issues develop.    He has had a long term wound at the sacrum which today is open again.  It was mechanically debrided.  Endoform as applied to the wound bed, which will be left in place until Friday at which time he will change to silver alginate.     10/1/24 - The patient returns today for followup on several wounds.  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  This wound has failed to heal.  He was seen by a plastic surgeon on 09/25/2024 with the hopes of possibly having a flap performed.  However, he was told by the PA that nothing could be done due to the depth of the wound.  Today,  that wound was assessed and selectively debrided.  There is a slight amount of epithelial tissue migrated from the wound margin, however there is still quite a bit of depth.  We will manage the wound as much as we can in wound clinic since additional surgery is not an option.  The wound to the elbow is making excellent progress.  A selective debridement was performed.  He has a burn to the abdomen which is progressing well.  He has been using Silvadene on this area and he was instructed to begin just covering the area from now on.  Lastly, he does have a pressure injury at the sacrum.  Today, that wound appears to be closed, however, it has reopened multiple times.  We will leave that wound as an open wound to continue to monitor.    10/15/24 - The abdominal wound remains stable and will be monitored.  The sacral wound is slightly larger.  The right hip remains stable with no S & S of infection.  The right elbow was selectively debrided and has made good progress.     10/29/24 - Mr. Jarrett is seen today for f/up on three wounds.  A selective debridement was performed on the right elbow, and that wound is nearly healed.  The sacrum remains stable.  The right hip reportedly has an increase in drainage.  A culture was performed.  He will follow up with Dr. Delgado in 6 months. We will try to send him back to [facility] in [place].     11/12/4 - The patient was seen today for f/up on his pressure injuries.  A culture was obtained from the right hip on 10/29/24.  The patient was contacted to review the results and to discuss concerns over the pathogens found in that culture.  An Rx for Bactrim DS was sent to his pharmacy and he was instructed to call his infectious disease MD, Dr. Ceron, for a f/up appt due to this providers concerns over the bacteria found.  Today he reports that he called and left message, but did not follow up.  This provider had requested that a fax be sent to Dr. Ceron of these  results.  No confirmation was received.  Dr. Ceron's office was contacted to confirm that the fax was received and asked for Dr. Ceron's review.  The patient was instructed again today to f/up so that Dr. Ceron can help to determine if IV abx are indicated at this time or if Bactrim is sufficient.  All wounds remain stable.  The right hip has decreased in drainage slightly and the wound remains stable.  The elbow was mechanically debrided and the sacrum also remain stable. Of note, the patient has a newly developed rash on the right forearm and right side of his waist area.  He feels that these are mosquito bites.         11/21/24 - Mr. Jarrett returns to clinic today for followup on several wounds.  He did have a culture that was concerning.  We discussed a follow up with his infectious disease doctor.  He did see them on 11/20/2024.  They feel that these bacteria are colonized and no additional antibiotics were recommended.  No additional orders.  He will follow up with them in 3 months.  Today, the right hip is stable but draining.  The original wound to the right elbow has healed, however, a 2nd wound has opened.  That wound is stable.  The pressure injury at the sacrum is also stable and we will continue to applied called into the wound bed.  The patient will return to clinic in 1 month unless he has any deterioration.    12/5/24 - The turns today for followup on 3 wounds.  Today, the right elbow was assessed and is now closed.  The pressure injury at the sacrum remains stable.  A continuing is at right hip.  This wound does remain open.  He saw infectious disease a couple of weeks ago for Thanksgiving and he is scheduled for an MRI on Tuesday, 12/10/2024.  He will then follow up with infectious disease a proximally 3 weeks later to discuss those results.  He understood from infectious disease that the MRI is to determine if a cavity is forming in the right hip space.  Today, the drainage was slightly malodorous.   We did recently culture that hip and he is not currently on antibiotics.  We will begin doxycycline as a preventive and he will stay on that antibiotic until 1 week prior to his follow up appointment with infectious disease.  So he will DC the doxycycline on new year's.  He was cautioned to ensure that he takes a probe diabetic and/or consumes yogurt to prevent c diff.    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  He recently had an MRI of the right hip, which was positive for osteomyelitis.  He is scheduled to see the infectious disease doctor in a couple of weeks to discuss this.  In the meantime, he is on doxycycline oral.  He denies fever.    1/9/25 - The patient is seen today for follow up on the pressure injuries to the sacrum and right hip.  Both remain stable, but unchanged.  He did have a f/up MRI done on 12/10/24, and that MRI again reports osteomyelitis.  This dx has gone back and forth.  He follows up with ID on 1/28/25.  They would prefer a deep bone biopsy.  He will see Dr. Delgado on 1/16/25.  Hopefully she can schedule that to confirm if osteo is present.      2/4/25 - Mr. 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  Of note, he did have a f/up with Infectious disease on 1/27/25. Dr. Ceron ordered an MRI of the patients right hip to confirm that the current osteo is not progressing. She did verify this and also states that there is no new cavities forming in that area. She mentioned to the patient that in the future when a swab culture is done it will always come back positive due to the bacteria being colonized. He only needs to return PRN  He also followed up with Dr. Quijano on 1/16/25 and was given a good report. His next follow up is in 2 months. Today the wounds were assessed and both remain stable with no significant change or S & S of infection.   Going forward we will monitor the wounds only for outward signs of infection as ID have recommended not focusing on swab cultures.  A callus paring was performed on the right elbow.  There is no open wound.     3/6/25 - The patient is seen today for f/up on several wounds.  The right hip was assessed and selectively debrided.  Within the wound bed there is a new area of tissue (unknown if adipose, ligament) that is protruding.  When the debridement was performed this tissue remained stable.  It will be left in place with the hopes that epithelial tissue will overgrow.  The sacrum remains stable.  A callus paring was performed on the elbow which is again quite dry.  There is no open wound.     4/8/25 - The patient returns for f/up on several wounds.  This provider communicated with his surgeon, Dr. Sandy Jara, last week.  She requested that the wound to the hip be packed, wet to dry.  We discussed this today and the patient will begin packing.  The sacral wound is nearly closed.  The right elbow was selectively debrided and has again opened.  He will f/up with his surgeon in one month.      Review of Systems   Constitutional: Negative.    Respiratory: Negative.     Cardiovascular: Negative.    Skin:         As documented in the HPI.   All other systems reviewed and are negative.        Objective:      Vitals:    04/08/25 1115   BP: 98/65   Pulse: 87   Resp: 18       Physical Exam  Vitals and nursing note reviewed. Exam conducted with a chaperone present.   Constitutional:       Appearance: Normal appearance.   Cardiovascular:      Rate and Rhythm: Normal rate.   Pulmonary:      Effort: Pulmonary effort is normal.   Skin:     General: Skin is warm and dry.      Comments: sacral pressure injury, right hip, trochanter   Neurological:      Mental Status: He is alert.            Altered Skin Integrity 09/25/23 1153 Sacral spine #1 (Active)   09/25/23 1153   Altered Skin Integrity Present on Admission - Did Patient  arrive to the hospital with altered skin?: yes   Side:    Orientation:    Location: Sacral spine   Wound Number: #1   Is this injury device related?:    Primary Wound Type:    Description of Altered Skin Integrity:    Ankle-Brachial Index:    Pulses:    Removal Indication and Assessment:    Wound Outcome:    (Retired) Wound Length (cm):    (Retired) Wound Width (cm):    (Retired) Depth (cm):    Wound Description (Comments):    Removal Indications:    Dressing Appearance Moist drainage 04/08/25 1117   Drainage Amount Moderate 04/08/25 1117   Drainage Characteristics/Odor Serosanguineous 04/08/25 1117   Appearance Pink;Moist 04/08/25 1117   Tissue loss description Full thickness 04/08/25 1117   Periwound Area Dry 04/08/25 1117   Wound Edges Open 04/08/25 1117   Wound Length (cm) 0.3 cm 04/08/25 1117   Wound Width (cm) 0.5 cm 04/08/25 1117   Wound Depth (cm) 0.3 cm 04/08/25 1117   Wound Volume (cm^3) 0.024 cm^3 04/08/25 1117   Wound Surface Area (cm^2) 0.12 cm^2 04/08/25 1117   Care Cleansed with:;Wound cleanser 04/08/25 1117   Dressing Applied 04/08/25 1117            Altered Skin Integrity 10/09/23 1141 Right Hip #2 (Active)   10/09/23 1141   Altered Skin Integrity Present on Admission - Did Patient arrive to the hospital with altered skin?: yes   Side: Right   Orientation:    Location: Hip   Wound Number: #2   Is this injury device related?: No   Primary Wound Type:    Description of Altered Skin Integrity:    Ankle-Brachial Index:    Pulses:    Removal Indication and Assessment:    Wound Outcome:    (Retired) Wound Length (cm):    (Retired) Wound Width (cm):    (Retired) Depth (cm):    Wound Description (Comments):    Removal Indications:    Dressing Appearance Moist drainage 04/08/25 1117   Drainage Amount Moderate 04/08/25 1117   Drainage Characteristics/Odor Serosanguineous 04/08/25 1117   Appearance Pink;Wet;Slough 04/08/25 1117   Tissue loss description Full thickness 04/08/25 1117   Periwound Area Dry  "04/08/25 1117   Wound Edges Open 04/08/25 1117   Wound Length (cm) 2.3 cm 04/08/25 1117   Wound Width (cm) 0.8 cm 04/08/25 1117   Wound Depth (cm) 1.2 cm 04/08/25 1117   Wound Volume (cm^3) 1.156 cm^3 04/08/25 1117   Wound Surface Area (cm^2) 1.45 cm^2 04/08/25 1117   Undermining (depth (cm)/location) from 1-5 oclock, deepest is 3.5 at 5 oclock 04/08/25 1117   Care Cleansed with:;Wound cleanser 04/08/25 1117   Dressing Applied 04/08/25 1117            Wound 04/08/25 1118 Pressure Injury Right posterior Elbow #3 (Active)   04/08/25 1118 Elbow   Present on Original Admission: Y   Primary Wound Type: Pressure inj   Side: Right   Orientation: posterior   Wound Approximate Age at First Assessment (Weeks):    Wound Number: #3   Is this injury device related?:    Incision Type:    Closure Method:    Wound Description (Comments):    Type:    Additional Comments:    Ankle-Brachial Index:    Pulses:    Removal Indication and Assessment:    Wound Outcome:    Dressing Appearance Moist drainage 04/08/25 1117   Drainage Amount Moderate 04/08/25 1117   Drainage Characteristics/Odor Serosanguineous 04/08/25 1117   Appearance Pink;Moist 04/08/25 1117   Tissue loss description Full thickness 04/08/25 1117   Periwound Area Dry 04/08/25 1117   Wound Edges Open 04/08/25 1117   Wound Length (cm) 1 cm 04/08/25 1117   Wound Width (cm) 0.5 cm 04/08/25 1117   Wound Depth (cm) 0.2 cm 04/08/25 1117   Wound Volume (cm^3) 0.052 cm^3 04/08/25 1117   Wound Surface Area (cm^2) 0.39 cm^2 04/08/25 1117   Care Cleansed with:;Wound cleanser 04/08/25 1117   Dressing Applied 04/08/25 1117     4/8/25                   Sacrum (pre)                                                Right hip (pre)                                                   Right elbow (pre)                                          Right elbow (post)  (Silver alginate, gentle border)                    (NS wet to dry 2" kerlix, 4x4, gentle border)      (mepilex, kerlix, " "tape)    Assessment:         ICD-10-CM ICD-9-CM   1. 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 Open wound of hip with tendon involvement, right, subsequent encounter  S71.001D V58.89    S76.001D 890.2   3. Pressure injury of sacral region, stage 4  L89.154 707.03     707.24   4. Pressure injury of right elbow, stage 3  L89.013 707.01     707.23           Procedures:     Debridement     Date/Time: 4/8/2025 11:00 AM     Performed by: Ashley Coto NP  Authorized by: Ashley Coto NP    Time out: Immediately prior to procedure a "time out" was called to verify the correct patient, procedure, equipment, support staff and site/side marked as required.     Consent Done?:  Yes (Verbal)     Preparation: Patient was prepped and draped with clean technique    Local anesthesia used?: No       Wound Details:    Location:  Right elbow    Type of Debridement:  Non-excisional       Length (cm):  1       Width (cm):  0.5       Depth (cm):  0.2       Area (sq cm):  0.39       Percent Debrided (%):  100       Total Area Debrided (sq cm):  0.39    Depth of debridement:  Epidermis/Dermis    Devitalized tissue debrided:  Biofilm, Callus, Exudate and Fibrin    Instruments:  Curette (Dermal)  Bleeding:  None  Patient tolerance:  Patient tolerated the procedure well with no immediate complications    [] Yes [] No   I & D performed  [] Yes [] No   Excisional debridement performed  [x] Yes [] No   Selective debridement performed        [] Yes [] No   Mechanical debridement performed         [] Yes [] No  Silver nitrate applied                                     [] Yes [] No  Labs ordered this visit                                  [] Yes [] No   Imaging ordered this visit                           [] Yes [] No   Tissue pathology and/or culture taken       MEDICATIONS    Current Outpatient Medications:     acetaminophen (TYLENOL) 325 MG tablet, Take 650 mg by mouth " every 6 (six) hours as needed for Pain., Disp: , Rfl:     ascorbic acid, vitamin C, (VITAMIN C) 1000 MG tablet, Take 1,000 mg by mouth every evening., Disp: , Rfl:     aspirin 325 MG tablet, Take 325 mg by mouth every morning. Stopped x 1 month, Disp: , Rfl:     baclofen (LIORESAL) 5 mg Tab tablet, Take 1 tablet (5 mg total) by mouth 3 (three) times daily., Disp: 90 tablet, Rfl: 0    bisacodyL (DULCOLAX) 10 mg Supp, Place 10 mg rectally daily as needed., Disp: , Rfl:     busPIRone (BUSPAR) 10 MG tablet, Take 1 tablet (10 mg total) by mouth 2 (two) times daily., Disp: 60 tablet, Rfl: 5    cetirizine (ZYRTEC) 10 MG tablet, Take 10 mg by mouth 2 (two) times a day., Disp: , Rfl:     cloNIDine (CATAPRES) 0.1 MG tablet, Take 0.1 mg by mouth daily as needed., Disp: , Rfl:     DULoxetine (CYMBALTA) 30 MG capsule, Take 1 capsule by mouth 2 (two) times daily., Disp: , Rfl:     ferrous sulfate (SLOW RELEASE IRON) 142 mg (45 mg iron) TbSR, 1 tablet Orally Three times a Week for 30 days, Disp: , Rfl:     fexofenadine (ALLEGRA) 180 MG tablet, Take 180 mg by mouth every morning., Disp: , Rfl:     fluticasone propionate (FLONASE ALLERGY RELIEF) 50 mcg/actuation nasal spray, 1 spray by Each Nostril route daily as needed., Disp: , Rfl:     heparin, porcine, PF, (HEPARIN FLUSH 100 UNITS/ML) 100 unit/mL Syrg, flush mediport with 5ml every FOUR weeks, Disp: , Rfl:     JATENZO 237 mg Cap, , Disp: , Rfl:     meloxicam (MOBIC) 7.5 MG tablet, Take 7.5 mg by mouth 2 (two) times daily as needed for Pain., Disp: , Rfl:     midodrine (PROAMATINE) 2.5 MG Tab, TAKE ONE TABLET BY MOUTH THREE TIMES DAILY AS NEEDED Systolic blood pressure less than 90 or Diastolic less than 60, Disp: , Rfl:     multivitamin/iron/folic acid (CENTRUM ORAL), Take 1 tablet by mouth every morning., Disp: , Rfl:     NON FORMULARY MEDICATION, Take 3 drops by mouth 2 (two) times daily as needed. THC, Disp: , Rfl:     NORMAL SALINE FLUSH injection, flush mediport with 10ml  "every FOUR weeks, Disp: , Rfl:     urea (CARMOL) 40 % Crea, Apply topically once daily., Disp: 85 g, Rfl: 2    zinc gluconate 50 mg tablet, Take 1 tablet (50 mg total) by mouth once daily. (Patient taking differently: Take 50 mg by mouth every morning.), Disp: 30 tablet, Rfl: 1  No current facility-administered medications for this encounter.    Facility-Administered Medications Ordered in Other Encounters:     lactated ringers infusion, , Intravenous, Continuous, Cr Garibay DO, Last Rate: 10 mL/hr at 05/17/24 0705, Restarted at 05/17/24 0859   Review of patient's allergies indicates:   Allergen Reactions    Levofloxacin Rash       DIAGNOSTICS    Labs/Scans/Micro:    CBC:   Lab Results   Component Value Date    WBC 10.42 05/14/2024    HGB 13.9 (L) 05/14/2024    HCT 44.3 05/14/2024    MCV 88.8 05/14/2024     05/14/2024     Sedimentation rate:   Lab Results   Component Value Date    SEDRATE 22 (H) 12/15/2022    C-reactive protein:   Lab Results   Component Value Date    CRP 44.90 (H) 12/15/2022    Chemistry: [unfilled]  HBA1C: No components found for: "HBA1C"    Ankle Brachial Index: No results found for this or any previous visit.    Imaging:    Plain film: No results found for this or any previous visit.    MRI: No results found for this or any previous visit.    Bone Scan: No results found for this or any previous visit.    Vascular studies:    Microbiology: 10/29/24          HOME HEALTH AGENCY: [facility]     WOUND CARE ORDERS:  Right hip wound: Cleanse with wound cleanser,pack with wet to dry 2" kerlix (Bernells or hypertonic saline), 4x4 gauze, and ABD pad and tape to be changed every day  Sacrum: Cleanse with wound cleanser and apply silver alginate and an island dressing to be changed every day   Right elbow: Cleanse with wound cleanser, apply aquaphor to callused area daily and cover wth mepilex, wrap with kerlix, secure with tape, change daily      Follow up in 4 weeks (on " 5/6/2025) for ST.

## 2025-05-06 ENCOUNTER — HOSPITAL ENCOUNTER (OUTPATIENT)
Dept: WOUND CARE | Facility: HOSPITAL | Age: 45
Discharge: HOME | End: 2025-05-06
Attending: NURSE PRACTITIONER
Payer: MEDICARE

## 2025-05-06 VITALS
BODY MASS INDEX: 18.62 KG/M2 | DIASTOLIC BLOOD PRESSURE: 69 MMHG | RESPIRATION RATE: 17 BRPM | SYSTOLIC BLOOD PRESSURE: 107 MMHG | WEIGHT: 130.06 LBS | HEIGHT: 70 IN | HEART RATE: 81 BPM

## 2025-05-06 DIAGNOSIS — L89.013 PRESSURE INJURY OF RIGHT ELBOW, STAGE 3: ICD-10-CM

## 2025-05-06 DIAGNOSIS — G82.50 QUADRIPLEGIA: ICD-10-CM

## 2025-05-06 DIAGNOSIS — L89.44 PRESSURE INJURY OF CONTIGUOUS REGION INVOLVING BACK AND RIGHT HIP, STAGE 4: Primary | ICD-10-CM

## 2025-05-06 DIAGNOSIS — M86.9 OSTEOMYELITIS OF RIGHT HIP: ICD-10-CM

## 2025-05-06 DIAGNOSIS — L89.154 PRESSURE INJURY OF SACRAL REGION, STAGE 4: ICD-10-CM

## 2025-05-06 PROCEDURE — 97597 DBRDMT OPN WND 1ST 20 CM/<: CPT

## 2025-05-06 PROCEDURE — 99212 OFFICE O/P EST SF 10 MIN: CPT | Mod: 25

## 2025-05-06 PROCEDURE — 27000999 HC MEDICAL RECORD PHOTO DOCUMENTATION

## 2025-05-06 NOTE — PROGRESS NOTES
Referred by: Dr. Sidhu  Low air loss mattress [x] Yes [] No   Is the patient eligible for a graft, and/or currently grafting?  [] Yes [x] No   Smoker: yes, vape       Subjective:        Patient ID: Werner Jarrett is a 44 y.o. male.    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  Both remain stable, but unchanged.  He did have a f/up MRI done on 12/10/24, and that MRI again reports osteomyelitis.  This dx has gone back and forth.  He follows up with ID on 1/28/25.  They would prefer a deep bone biopsy.  He will see Dr. Delgado on 1/16/25.  Hopefully she can schedule that to confirm if osteo is present.      2/4/25 - Mr. 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  Of note, he did have a f/up with Infectious disease on 1/27/25. Dr. Ceron ordered an MRI of the patients right hip to confirm that the current osteo is not progressing. She did verify this and also states that there is no new cavities forming in that area. She mentioned to the patient that in the future when a swab culture is done it will always come back positive due to the bacteria being colonized. He only needs to return PRN  He also followed up with Dr. Quijano on 1/16/25 and was given a good report. His next follow up is in 2 months. Today the wounds were assessed and both remain stable with no significant change or S & S of infection.  Going forward we will monitor the wounds only for outward signs of infection as ID have recommended not focusing on swab cultures.  A callus paring was performed on the right elbow.  There is no open wound.     3/6/25 - The patient is seen today for f/up on several wounds.  The right hip was assessed and selectively debrided.  Within the wound bed there is a new area of tissue (unknown if  adipose, ligament) that is protruding.  When the debridement was performed this tissue remained stable.  It will be left in place with the hopes that epithelial tissue will overgrow.  The sacrum remains stable.  A callus paring was performed on the elbow which is again quite dry.  There is no open wound.     4/8/25 - The patient returns for f/up on several wounds.  This provider communicated with his surgeon, Dr. Sandy Jara, last week.  She requested that the wound to the hip be packed, wet to dry.  We discussed this today and the patient will begin packing.  The sacral wound is nearly closed.  The right elbow was selectively debrided and has again opened.  He will f/up with his surgeon in one month.    5/6/25 - Mr. Jarrett returns today for follow up.  He is scheduled for a f/up with his surgeon, Dr. Sandy Jara, on 5/15/25.  Today all three wounds were selectively debrided and all remain stable. We will continue with the current wound orders.  We will be requesting that  order a Imani Lift and pad.  The patient reports that he has not had on in several months and his was initially purchased more than 5 years ago.       Review of Systems   Constitutional: Negative.    Respiratory: Negative.     Cardiovascular: Negative.    Skin:         As documented in the HPI.   All other systems reviewed and are negative.        Objective:      Vitals:    05/06/25 1318   BP: 107/69   Pulse: 81   Resp: 17         Physical Exam  Vitals and nursing note reviewed. Exam conducted with a chaperone present.   Constitutional:       Appearance: Normal appearance.   Cardiovascular:      Rate and Rhythm: Normal rate.   Pulmonary:      Effort: Pulmonary effort is normal.   Skin:     General: Skin is warm and dry.      Comments: sacral pressure injury, right hip, trochanter   Neurological:      Mental Status: He is alert.            Altered Skin Integrity 09/25/23 1153 Sacral spine #1 (Active)   09/25/23 1153   Altered Skin  Integrity Present on Admission - Did Patient arrive to the hospital with altered skin?: yes   Side:    Orientation:    Location: Sacral spine   Wound Number: #1   Is this injury device related?:    Primary Wound Type:    Description of Altered Skin Integrity:    Ankle-Brachial Index:    Pulses:    Removal Indication and Assessment:    Wound Outcome:    (Retired) Wound Length (cm):    (Retired) Wound Width (cm):    (Retired) Depth (cm):    Wound Description (Comments):    Removal Indications:    Description of Altered Skin Integrity Full thickness tissue loss. Subcutaneous fat may be visible but bone, tendon or muscle are not exposed 05/06/25 1314   Dressing Appearance Intact;Moist drainage 05/06/25 1314   Drainage Amount Moderate 05/06/25 1314   Drainage Characteristics/Odor Serosanguineous 05/06/25 1314   Appearance Intact;Red;Slough;Moist 05/06/25 1314   Tissue loss description Full thickness 05/06/25 1314   Periwound Area Intact;Dry 05/06/25 1314   Wound Edges Open;Defined 05/06/25 1314   Wound Length (cm) 0.5 cm 05/06/25 1314   Wound Width (cm) 0.5 cm 05/06/25 1314   Wound Depth (cm) 0.3 cm 05/06/25 1314   Wound Volume (cm^3) 0.039 cm^3 05/06/25 1314   Wound Surface Area (cm^2) 0.2 cm^2 05/06/25 1314   Care Cleansed with:;Wound cleanser;Debrided 05/06/25 1314   Dressing Change Due 05/08/25 05/06/25 1314            Altered Skin Integrity 10/09/23 1141 Right Hip #2 (Active)   10/09/23 1141   Altered Skin Integrity Present on Admission - Did Patient arrive to the hospital with altered skin?: yes   Side: Right   Orientation:    Location: Hip   Wound Number: #2   Is this injury device related?: No   Primary Wound Type:    Description of Altered Skin Integrity:    Ankle-Brachial Index:    Pulses:    Removal Indication and Assessment:    Wound Outcome:    (Retired) Wound Length (cm):    (Retired) Wound Width (cm):    (Retired) Depth (cm):    Wound Description (Comments):    Removal Indications:    Description of  Altered Skin Integrity Full thickness tissue loss with exposed bone, tendon, or muscle. Often includes undermining and tunneling. May extend into muscle and/or supporting structures. 05/06/25 1314   Dressing Appearance Intact;Moist drainage 05/06/25 1314   Drainage Amount Moderate 05/06/25 1314   Drainage Characteristics/Odor Serosanguineous;Serous 05/06/25 1314   Appearance Intact;Slough;Pink;Moist 05/06/25 1314   Tissue loss description Full thickness 05/06/25 1314   Periwound Area Intact;Dry 05/06/25 1314   Wound Edges Open 05/06/25 1314   Wound Length (cm) 2.4 cm 05/06/25 1314   Wound Width (cm) 1 cm 05/06/25 1314   Wound Depth (cm) 1.1 cm 05/06/25 1314   Wound Volume (cm^3) 1.382 cm^3 05/06/25 1314   Wound Surface Area (cm^2) 1.88 cm^2 05/06/25 1314   Care Cleansed with:;Debrided;Wound cleanser 05/06/25 1314   Dressing Applied 05/06/25 1314   Dressing Change Due 05/07/25 05/06/25 1314            Wound 04/08/25 1118 Pressure Injury Right posterior Elbow #3 (Active)   04/08/25 1118 Elbow   Present on Original Admission: Y   Primary Wound Type: Pressure inj   Side: Right   Orientation: posterior   Wound Approximate Age at First Assessment (Weeks):    Wound Number: #3   Is this injury device related?:    Incision Type:    Closure Method:    Wound Description (Comments):    Type:    Additional Comments:    Ankle-Brachial Index:    Pulses:    Removal Indication and Assessment:    Wound Outcome:    Pressure Injury Stage 2 05/06/25 1200   Dressing Appearance Moist drainage;Intact 05/06/25 1200   Drainage Amount Moderate 05/06/25 1200   Drainage Characteristics/Odor Serosanguineous 05/06/25 1200   Appearance Dry 05/06/25 1200   Tissue loss description Full thickness 05/06/25 1200   Periwound Area Dry 05/06/25 1200   Wound Edges Open 05/06/25 1200   Wound Length (cm) 1.5 cm 05/06/25 1200   Wound Width (cm) 0.8 cm 05/06/25 1200   Wound Depth (cm) 0.2 cm 05/06/25 1200   Wound Volume (cm^3) 0.126 cm^3 05/06/25 1200   Wound  "Surface Area (cm^2) 0.94 cm^2 05/06/25 1200   Care Cleansed with:;Wound cleanser;Debrided 05/06/25 1200   Dressing Applied 05/06/25 1200   Dressing Change Due 05/07/25 05/06/25 1200         5/6/25           Sacrum (pre)                                                Right hip (pre)                                                   Right elbow (pre)                                          Right elbow (post)  (Silver alginate, gentle border)                    (vashe wet to dry 2" kerlix, 4x4, gentle border)      (mepilex, kerlix, tape)  TR      Sacrum ( Post )     Assessment:       No diagnosis found.          Procedures:       Debridement     Date/Time: 5/6/2025 11:00 AM     Performed by: Ashley Coto NP  Authorized by: Ashley Coto NP    Time out: Immediately prior to procedure a "time out" was called to verify the correct patient, procedure, equipment, support staff and site/side marked as required.     Consent Done?:  Yes (Verbal)     Preparation: Patient was prepped and draped with clean technique    Local anesthesia used?: No       Wound Details:    Location:  Right hip    Type of Debridement:  Non-excisional       Length (cm):  2.4       Width (cm):  1       Depth (cm):  1       Area (sq cm):  1.88       Percent Debrided (%):  100       Total Area Debrided (sq cm):  1.88    Depth of debridement:  Epidermis/Dermis    Devitalized tissue debrided:  Biofilm, Exudate, Fibrin and Slough    Instruments:  Curette  Bleeding:  None     2nd Wound Details:     Location:  Right elbow    Type of Debridement:  Non-excisional       Length (cm):  1.5       Area (sq cm):  0.94       Width (cm):  0.8       Percent Debrided (%):  100       Depth (cm):  0.2       Total Area Debrided (sq cm):  0.94    Depth of debridement:  Epidermis/Dermis    Devitalized tissue debrided:  Biofilm, Callus, Exudate and Fibrin    Instruments:  Curette (Dermal)     3rd Wound Details:     Debridement - 3rd Wound - General Location: " Sacrum.    Type of Debridement:  Non-excisional       Length (cm):  0.5       Area (sq cm):  0.2       Width (cm):  0.5       Percent Debrided (%):  100       Depth (cm):  0.3       Total Area Debrided (sq cm):  0.2    Depth of debridement:  Epidermis/Dermis    Devitalized tissue debrided:  Biofilm, Exudate and Fibrin    Instruments:  Curette  Bleeding:  None  Patient tolerance:  Patient tolerated the procedure well with no immediate complications         [] Yes [] No   I & D performed  [] Yes [] No   Excisional debridement performed  [x] Yes [] No   Selective debridement performed        [] Yes [] No   Mechanical debridement performed         [] Yes [] No  Silver nitrate applied                                     [] Yes [] No  Labs ordered this visit                                  [] Yes [] No   Imaging ordered this visit                           [] Yes [] No   Tissue pathology and/or culture taken       MEDICATIONS    Current Outpatient Medications:     acetaminophen (TYLENOL) 325 MG tablet, Take 650 mg by mouth every 6 (six) hours as needed for Pain., Disp: , Rfl:     ascorbic acid, vitamin C, (VITAMIN C) 1000 MG tablet, Take 1,000 mg by mouth every evening., Disp: , Rfl:     aspirin 325 MG tablet, Take 325 mg by mouth every morning. Stopped x 1 month, Disp: , Rfl:     baclofen (LIORESAL) 5 mg Tab tablet, Take 1 tablet (5 mg total) by mouth 3 (three) times daily., Disp: 90 tablet, Rfl: 0    bisacodyL (DULCOLAX) 10 mg Supp, Place 10 mg rectally daily as needed., Disp: , Rfl:     busPIRone (BUSPAR) 10 MG tablet, Take 1 tablet (10 mg total) by mouth 2 (two) times daily., Disp: 60 tablet, Rfl: 5    cetirizine (ZYRTEC) 10 MG tablet, Take 10 mg by mouth 2 (two) times a day., Disp: , Rfl:     cloNIDine (CATAPRES) 0.1 MG tablet, Take 0.1 mg by mouth daily as needed., Disp: , Rfl:     DULoxetine (CYMBALTA) 30 MG capsule, Take 1 capsule by mouth 2 (two) times daily., Disp: , Rfl:     ferrous sulfate (SLOW RELEASE IRON)  142 mg (45 mg iron) TbSR, 1 tablet Orally Three times a Week for 30 days, Disp: , Rfl:     fexofenadine (ALLEGRA) 180 MG tablet, Take 180 mg by mouth every morning., Disp: , Rfl:     fluticasone propionate (FLONASE ALLERGY RELIEF) 50 mcg/actuation nasal spray, 1 spray by Each Nostril route daily as needed., Disp: , Rfl:     heparin, porcine, PF, (HEPARIN FLUSH 100 UNITS/ML) 100 unit/mL Syrg, flush mediport with 5ml every FOUR weeks, Disp: , Rfl:     JATENZO 237 mg Cap, , Disp: , Rfl:     meloxicam (MOBIC) 7.5 MG tablet, Take 7.5 mg by mouth 2 (two) times daily as needed for Pain., Disp: , Rfl:     midodrine (PROAMATINE) 2.5 MG Tab, TAKE ONE TABLET BY MOUTH THREE TIMES DAILY AS NEEDED Systolic blood pressure less than 90 or Diastolic less than 60, Disp: , Rfl:     multivitamin/iron/folic acid (CENTRUM ORAL), Take 1 tablet by mouth every morning., Disp: , Rfl:     NON FORMULARY MEDICATION, Take 3 drops by mouth 2 (two) times daily as needed. THC, Disp: , Rfl:     NORMAL SALINE FLUSH injection, flush mediport with 10ml every FOUR weeks, Disp: , Rfl:     urea (CARMOL) 40 % Crea, Apply topically once daily., Disp: 85 g, Rfl: 2    zinc gluconate 50 mg tablet, Take 1 tablet (50 mg total) by mouth once daily. (Patient taking differently: Take 50 mg by mouth every morning.), Disp: 30 tablet, Rfl: 1  No current facility-administered medications for this encounter.    Facility-Administered Medications Ordered in Other Encounters:     lactated ringers infusion, , Intravenous, Continuous, Cr Garibay DO, Last Rate: 10 mL/hr at 05/17/24 0705, Restarted at 05/17/24 0859   Review of patient's allergies indicates:   Allergen Reactions    Levofloxacin Rash       DIAGNOSTICS    Labs/Scans/Micro:    CBC:   Lab Results   Component Value Date    WBC 10.42 05/14/2024    HGB 13.9 (L) 05/14/2024    HCT 44.3 05/14/2024    MCV 88.8 05/14/2024     05/14/2024     Sedimentation rate:   Lab Results   Component Value Date    [facility]  "22 (H) 12/15/2022    C-reactive protein:   Lab Results   Component Value Date    CRP 44.90 (H) 12/15/2022    Chemistry: [unfilled]  HBA1C: No components found for: "HBA1C"    Ankle Brachial Index: No results found for this or any previous visit.    Imaging:    Plain film: No results found for this or any previous visit.    MRI: No results found for this or any previous visit.    Bone Scan: No results found for this or any previous visit.    Vascular studies:    Microbiology: 10/29/24          HOME HEALTH AGENCY: [facility]     WOUND CARE ORDERS:  Right hip wound: Cleanse with wound cleanser,pack with wet to dry 2" kerlix (Bernells or hypertonic saline), 4x4 gauze, and ABD pad and tape to be changed every day  Sacrum: Cleanse with wound cleanser and apply silver alginate and a island dressing to be changed every day   Right elbow: Cleanse with wound cleanser, apply aquaphor to callused area daily and cover wth mepilex, wrap with kerlix, secure with tape, change daily    HH to order Imani Lift and Lift Pad from [facility] for patient.       Follow up in about 4 weeks (around 6/3/2025).        "

## 2025-05-06 NOTE — PROCEDURES
"Debridement    Date/Time: 5/6/2025 11:00 AM    Performed by: Ashley Coto NP  Authorized by: Ashley Ctoo NP    Time out: Immediately prior to procedure a "time out" was called to verify the correct patient, procedure, equipment, support staff and site/side marked as required.    Consent Done?:  Yes (Verbal)    Preparation: Patient was prepped and draped with clean technique    Local anesthesia used?: No      Wound Details:    Location:  Right hip    Type of Debridement:  Non-excisional       Length (cm):  2.4       Width (cm):  1       Depth (cm):  1       Area (sq cm):  1.88       Percent Debrided (%):  100       Total Area Debrided (sq cm):  1.88    Depth of debridement:  Epidermis/Dermis    Devitalized tissue debrided:  Biofilm, Exudate, Fibrin and Slough    Instruments:  Curette  Bleeding:  None    2nd Wound Details:     Location:  Right elbow    Type of Debridement:  Non-excisional       Length (cm):  1.5       Area (sq cm):  0.94       Width (cm):  0.8       Percent Debrided (%):  100       Depth (cm):  0.2       Total Area Debrided (sq cm):  0.94    Depth of debridement:  Epidermis/Dermis    Devitalized tissue debrided:  Biofilm, Callus, Exudate and Fibrin    Instruments:  Curette (Dermal)    3rd Wound Details:     Debridement - 3rd Wound - General Location: Sacrum.    Type of Debridement:  Non-excisional       Length (cm):  0.5       Area (sq cm):  0.2       Width (cm):  0.5       Percent Debrided (%):  100       Depth (cm):  0.3       Total Area Debrided (sq cm):  0.2    Depth of debridement:  Epidermis/Dermis    Devitalized tissue debrided:  Biofilm, Exudate and Fibrin    Instruments:  Curette  Bleeding:  None  Patient tolerance:  Patient tolerated the procedure well with no immediate complications    "

## 2025-06-04 ENCOUNTER — HOSPITAL ENCOUNTER (OUTPATIENT)
Dept: WOUND CARE | Facility: HOSPITAL | Age: 45
Discharge: HOME | End: 2025-06-04
Attending: NURSE PRACTITIONER
Payer: MEDICARE

## 2025-06-04 VITALS
SYSTOLIC BLOOD PRESSURE: 98 MMHG | DIASTOLIC BLOOD PRESSURE: 64 MMHG | RESPIRATION RATE: 18 BRPM | HEART RATE: 84 BPM | WEIGHT: 130.06 LBS | HEIGHT: 70 IN | BODY MASS INDEX: 18.62 KG/M2

## 2025-06-04 DIAGNOSIS — L89.013 PRESSURE INJURY OF RIGHT ELBOW, STAGE 3: ICD-10-CM

## 2025-06-04 DIAGNOSIS — L89.44 PRESSURE INJURY OF CONTIGUOUS REGION INVOLVING BACK AND RIGHT HIP, STAGE 4: Primary | ICD-10-CM

## 2025-06-04 DIAGNOSIS — M86.9 OSTEOMYELITIS OF RIGHT HIP: ICD-10-CM

## 2025-06-04 DIAGNOSIS — L89.154 PRESSURE INJURY OF SACRAL REGION, STAGE 4: ICD-10-CM

## 2025-06-04 DIAGNOSIS — G82.50 QUADRIPLEGIA: ICD-10-CM

## 2025-06-04 PROCEDURE — 99212 OFFICE O/P EST SF 10 MIN: CPT

## 2025-06-04 PROCEDURE — 97597 DBRDMT OPN WND 1ST 20 CM/<: CPT

## 2025-06-04 PROCEDURE — 27000999 HC MEDICAL RECORD PHOTO DOCUMENTATION

## 2025-06-18 ENCOUNTER — HOSPITAL ENCOUNTER (OUTPATIENT)
Dept: WOUND CARE | Facility: HOSPITAL | Age: 45
Discharge: HOME | End: 2025-06-18
Attending: NURSE PRACTITIONER
Payer: MEDICARE

## 2025-06-18 VITALS
WEIGHT: 130.06 LBS | RESPIRATION RATE: 18 BRPM | DIASTOLIC BLOOD PRESSURE: 75 MMHG | HEART RATE: 94 BPM | HEIGHT: 70 IN | BODY MASS INDEX: 18.62 KG/M2 | SYSTOLIC BLOOD PRESSURE: 85 MMHG

## 2025-06-18 DIAGNOSIS — L89.013 PRESSURE INJURY OF RIGHT ELBOW, STAGE 3: ICD-10-CM

## 2025-06-18 DIAGNOSIS — G82.50 QUADRIPLEGIA: ICD-10-CM

## 2025-06-18 DIAGNOSIS — L89.44 PRESSURE INJURY OF CONTIGUOUS REGION INVOLVING BACK AND RIGHT HIP, STAGE 4: Primary | ICD-10-CM

## 2025-06-18 PROCEDURE — 97597 DBRDMT OPN WND 1ST 20 CM/<: CPT

## 2025-06-18 PROCEDURE — 27000999 HC MEDICAL RECORD PHOTO DOCUMENTATION

## 2025-06-18 PROCEDURE — 99212 OFFICE O/P EST SF 10 MIN: CPT

## 2025-06-18 NOTE — PROCEDURES
"Debridement    Date/Time: 6/18/2025 11:00 AM    Performed by: Ashley Coto NP  Authorized by: Ashley Coto NP    Time out: Immediately prior to procedure a "time out" was called to verify the correct patient, procedure, equipment, support staff and site/side marked as required.    Consent Done?:  Yes (Verbal)    Preparation: Patient was prepped and draped with clean technique    Local anesthesia used?: No      Wound Details:    Location:  Right hip    Type of Debridement:  Non-excisional       Length (cm):  2.5       Width (cm):  1       Depth (cm):  1       Area (sq cm):  1.96       Percent Debrided (%):  100       Total Area Debrided (sq cm):  1.96    Depth of debridement:  Epidermis/Dermis    Devitalized tissue debrided:  Biofilm, Exudate, Fibrin and Slough    Instruments:  Curette  Bleeding:  None    2nd Wound Details:     Location:  Right elbow    Type of Debridement:  Non-excisional       Length (cm):  2.3       Area (sq cm):  3.61       Width (cm):  2       Percent Debrided (%):  100       Depth (cm):  0.2       Total Area Debrided (sq cm):  3.61    Depth of debridement:  Epidermis/Dermis    Devitalized tissue debrided:  Biofilm, Callus, Exudate and Fibrin    Instruments:  Curette (Dermal)  Bleeding:  None  Patient tolerance:  Patient tolerated the procedure well with no immediate complications    "

## 2025-06-18 NOTE — PROGRESS NOTES
Home Health: [facility]   Smoker   [x] Yes  [] No (THC vape)   Diabetic   [] Yes   [x] No   Low air loss mattress [x] Yes [] No   Is the patient eligible for a graft, and/or currently grafting?  [] Yes [x] No        Subjective:      Patient ID: Werner Jarrett is a 44 y.o. male.    Chief Complaint: Pressure Ulcer (Right hip - stage 4/Right elbow- Stage 2)      1/9/25 - The patient is seen today for follow up on the pressure injuries to the sacrum and right hip.  Both remain stable, but unchanged.  He did have a f/up MRI done on 12/10/24, and that MRI again reports osteomyelitis.  This dx has gone back and forth.  He follows up with ID on 1/28/25.  They would prefer a deep bone biopsy.  He will see Dr. Delgado on 1/16/25.  Hopefully she can schedule that to confirm if osteo is present.      2/4/25 - Mr. 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  Of note, he did have a f/up with Infectious disease on 1/27/25. Dr. Ceron ordered an MRI of the patients right hip to confirm that the current osteo is not progressing. She did verify this and also states that there is no new cavities forming in that area. She mentioned to the patient that in the future when a swab culture is done it will always come back positive due to the bacteria being colonized. He only needs to return PRN  He also followed up with Dr. Quijano on 1/16/25 and was given a good report. His next follow up is in 2 months. Today the wounds were assessed and both remain stable with no significant change or S & S of infection.  Going forward we will monitor the wounds only for outward signs of infection as ID have recommended not focusing on swab cultures.  A callus paring was performed on the right elbow.  There is no open wound.     3/6/25 - The patient is seen today for f/up on several wounds.  The right hip was assessed and selectively debrided.  Within the wound bed there is a new area of  tissue (unknown if adipose, ligament) that is protruding.  When the debridement was performed this tissue remained stable.  It will be left in place with the hopes that epithelial tissue will overgrow.  The sacrum remains stable.  A callus paring was performed on the elbow which is again quite dry.  There is no open wound.     4/8/25 - The patient returns for f/up on several wounds.  This provider communicated with his surgeon, Dr. Sandy Jara, last week.  She requested that the wound to the hip be packed, wet to dry.  We discussed this today and the patient will begin packing.  The sacral wound is nearly closed.  The right elbow was selectively debrided and has again opened.  He will f/up with his surgeon in one month.    5/6/25 - Mr. Jarrett returns today for follow up.  He is scheduled for a f/up with his surgeon, Dr. Sandy Jara, on 5/15/25.  Today all three wounds were selectively debrided and all remain stable. We will continue with the current wound orders.  We will be requesting that  order a Imani Lift and pad.  The patient reports that he has not had on in several months and his was initially purchased more than 5 years ago.     6/5/25 - The patient is seen today for f/up on several wounds.  The right elbow has fully reopened again and was selectively debrided.  The sacrum appears to be closed at this time.  The stage IV pressure injury to the right hip was assessed and has made no progress.  We are still using the wet to moist packing per Dr. Delgado's request.  He does follow up with his surgeon today and will follow up with wound care next week to discuss any changes or concerns she may have.   He recently completed Augmentin x 7 days for a UTI.    6/18/25 - Mr. Jarrett returns for f/up on pressure injuries.  The stage IV pressure injury at the right hip has not changed.  It remains stable, with no S * S of infection, but is still open with bone exposed in the hip socket.  He saw his surgeon,  Dr. Sandy Jara, last week.  They discussed that his next step with her would be amputation because the area is no healing, nor is it likely to heal.  We discussed his desire for a second opinion, and how difficult it is to get in with a plastic surgeon.  He was advised to reach out to surgeons of his choice, and if he is able to find someone who will take his insurance, and see him, that a referral will be sent.  Additionally, the right elbow was assessed and selectively debrided.  It remains stable.     Review of Systems   Constitutional: Negative.    Respiratory: Negative.     Cardiovascular: Negative.    Skin:         As documented in the HPI.   All other systems reviewed and are negative.        Objective:      Vitals:    06/18/25 1211   BP: (!) 85/75   Pulse: 94   Resp: 18     Physical Exam  Vitals and nursing note reviewed. Exam conducted with a chaperone present.   Constitutional:       Appearance: Normal appearance.   Cardiovascular:      Rate and Rhythm: Normal rate.   Pulmonary:      Effort: Pulmonary effort is normal.   Skin:     General: Skin is warm and dry.      Comments: sacral pressure injury, right hip, trochanter   Neurological:      Mental Status: He is alert.            Altered Skin Integrity 10/09/23 1141 Right Hip #2 (Active)   10/09/23 1141   Altered Skin Integrity Present on Admission - Did Patient arrive to the hospital with altered skin?: yes   Side: Right   Orientation:    Location: Hip   Wound Number: #2   Is this injury device related?: No   Primary Wound Type:    Description of Altered Skin Integrity:    Ankle-Brachial Index:    Pulses:    Removal Indication and Assessment:    Wound Outcome:    (Retired) Wound Length (cm):    (Retired) Wound Width (cm):    (Retired) Depth (cm):    Wound Description (Comments):    Removal Indications:    Description of Altered Skin Integrity Full thickness tissue loss with exposed bone, tendon, or muscle. Often includes undermining and tunneling.  May extend into muscle and/or supporting structures. 06/18/25 1210   Dressing Appearance Moist drainage 06/18/25 1210   Drainage Amount Moderate 06/18/25 1210   Drainage Characteristics/Odor Serosanguineous;No odor 06/18/25 1210   Appearance Pink;Moist;Slough 06/18/25 1210   Tissue loss description Full thickness 06/18/25 1210   Periwound Area Dry;Intact 06/18/25 1210   Wound Edges Open 06/18/25 1210   Flynn Classification (diabetic foot ulcers only) Grade 4 06/18/25 1210   Wound Length (cm) 2.5 cm 06/18/25 1210   Wound Width (cm) 1 cm 06/18/25 1210   Wound Depth (cm) 1 cm 06/18/25 1210   Wound Volume (cm^3) 1.309 cm^3 06/18/25 1210   Wound Surface Area (cm^2) 1.96 cm^2 06/18/25 1210   Undermining (depth (cm)/location) 3.0 cm circumfertential (deepest @11 o'clock) 06/18/25 1210   Care Cleansed with:;Wound cleanser 06/18/25 1210   Dressing Applied 06/18/25 1210   Dressing Change Due 06/19/25 06/18/25 1210            Wound 04/08/25 1118 Pressure Injury Right posterior Elbow #3 (Active)   04/08/25 1118 Elbow   Present on Original Admission: Y   Primary Wound Type: Pressure inj   Side: Right   Orientation: posterior   Wound Approximate Age at First Assessment (Weeks):    Wound Number: #3   Is this injury device related?:    Incision Type:    Closure Method:    Wound Description (Comments):    Type:    Additional Comments:    Ankle-Brachial Index:    Pulses:    Removal Indication and Assessment:    Wound Outcome:    Pressure Injury Stage 2 06/18/25 1210   Dressing Appearance Dried drainage 06/18/25 1210   Drainage Amount Moderate 06/18/25 1210   Drainage Characteristics/Odor Serosanguineous;No odor 06/18/25 1210   Appearance Pink;Moist 06/18/25 1210   Tissue loss description Full thickness 06/18/25 1210   Periwound Area Intact;Dry 06/18/25 1210   Wound Edges Open;Callused 06/18/25 1210   Wound Length (cm) 2.3 cm 06/18/25 1210   Wound Width (cm) 2 cm 06/18/25 1210   Wound Depth (cm) 0.2 cm 06/18/25 1210   Wound Volume  "(cm^3) 0.482 cm^3 06/18/25 1210   Wound Surface Area (cm^2) 3.61 cm^2 06/18/25 1210   Care Cleansed with:;Wound cleanser 06/18/25 1210   Dressing Applied 06/18/25 1210   Dressing Change Due 06/19/25 06/18/25 1210     06/18/25      Right elbow - pre                                       Right elbow - post   (Silver alginate, 4x4 gauze, kerlix, tape, tuibigrip)       Right hip - pre                                            Right hip - post   (Vashe, 2" kerlix, ABD pad, tape)   ML  Assessment:           ICD-10-CM ICD-9-CM   1. 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 Pressure injury of right elbow, stage 3  L89.013 707.01     707.23   3. Quadriplegia  G82.50 344.00           Procedures:     Debridement     Date/Time: 6/18/2025 11:00 AM     Performed by: Ashley Coto NP  Authorized by: Ashley Coto NP    Time out: Immediately prior to procedure a "time out" was called to verify the correct patient, procedure, equipment, support staff and site/side marked as required.     Consent Done?:  Yes (Verbal)     Preparation: Patient was prepped and draped with clean technique    Local anesthesia used?: No       Wound Details:    Location:  Right hip    Type of Debridement:  Non-excisional       Length (cm):  2.5       Width (cm):  1       Depth (cm):  1       Area (sq cm):  1.96       Percent Debrided (%):  100       Total Area Debrided (sq cm):  1.96    Depth of debridement:  Epidermis/Dermis    Devitalized tissue debrided:  Biofilm, Exudate, Fibrin and Slough    Instruments:  Curette  Bleeding:  None     2nd Wound Details:     Location:  Right elbow    Type of Debridement:  Non-excisional       Length (cm):  2.3       Area (sq cm):  3.61       Width (cm):  2       Percent Debrided (%):  100       Depth (cm):  0.2       Total Area Debrided (sq cm):  3.61    Depth of debridement:  Epidermis/Dermis    Devitalized tissue debrided:  Biofilm, Callus, " Exudate and Fibrin    Instruments:  Curette (Dermal)  Bleeding:  None  Patient tolerance:  Patient tolerated the procedure well with no immediate complications    [] Yes [] No   I & D performed  [] Yes [] No   Excisional debridement performed  [x] Yes [] No   Selective debridement performed        [] Yes [] No   Mechanical debridement performed         [] Yes [] No  Silver nitrate applied                                     [] Yes [] No  Labs ordered this visit                                  [] Yes [] No   Imaging ordered this visit                           [] Yes [] No   Tissue pathology and/or culture taken       MEDICATIONS    Current Outpatient Medications:     acetaminophen (TYLENOL) 325 MG tablet, Take 650 mg by mouth every 6 (six) hours as needed for Pain., Disp: , Rfl:     ascorbic acid, vitamin C, (VITAMIN C) 1000 MG tablet, Take 1,000 mg by mouth every evening., Disp: , Rfl:     aspirin 325 MG tablet, Take 325 mg by mouth every morning. Stopped x 1 month, Disp: , Rfl:     baclofen (LIORESAL) 5 mg Tab tablet, Take 1 tablet (5 mg total) by mouth 3 (three) times daily., Disp: 90 tablet, Rfl: 0    bisacodyL (DULCOLAX) 10 mg Supp, Place 10 mg rectally daily as needed., Disp: , Rfl:     busPIRone (BUSPAR) 10 MG tablet, Take 1 tablet (10 mg total) by mouth 2 (two) times daily., Disp: 60 tablet, Rfl: 5    cetirizine (ZYRTEC) 10 MG tablet, Take 10 mg by mouth 2 (two) times a day., Disp: , Rfl:     cloNIDine (CATAPRES) 0.1 MG tablet, Take 0.1 mg by mouth daily as needed., Disp: , Rfl:     DULoxetine (CYMBALTA) 30 MG capsule, Take 1 capsule by mouth 2 (two) times daily., Disp: , Rfl:     ferrous sulfate (SLOW RELEASE IRON) 142 mg (45 mg iron) TbSR, 1 tablet Orally Three times a Week for 30 days, Disp: , Rfl:     fexofenadine (ALLEGRA) 180 MG tablet, Take 180 mg by mouth every morning., Disp: , Rfl:     fluticasone propionate (FLONASE ALLERGY RELIEF) 50 mcg/actuation nasal spray, 1 spray by Each Nostril route daily  "as needed., Disp: , Rfl:     JATENZO 237 mg Cap, , Disp: , Rfl:     meloxicam (MOBIC) 7.5 MG tablet, Take 7.5 mg by mouth 2 (two) times daily as needed for Pain., Disp: , Rfl:     midodrine (PROAMATINE) 2.5 MG Tab, TAKE ONE TABLET BY MOUTH THREE TIMES DAILY AS NEEDED Systolic blood pressure less than 90 or Diastolic less than 60, Disp: , Rfl:     multivitamin/iron/folic acid (CENTRUM ORAL), Take 1 tablet by mouth every morning., Disp: , Rfl:     NON FORMULARY MEDICATION, Take 3 drops by mouth 2 (two) times daily as needed. THC, Disp: , Rfl:     urea (CARMOL) 40 % Crea, Apply topically once daily., Disp: 85 g, Rfl: 2    zinc gluconate 50 mg tablet, Take 1 tablet (50 mg total) by mouth once daily., Disp: 30 tablet, Rfl: 1    heparin, porcine, PF, (HEPARIN FLUSH 100 UNITS/ML) 100 unit/mL Syrg, flush mediport with 5ml every FOUR weeks, Disp: , Rfl:     NORMAL SALINE FLUSH injection, flush mediport with 10ml every FOUR weeks, Disp: , Rfl:   No current facility-administered medications for this encounter.    Facility-Administered Medications Ordered in Other Encounters:     lactated ringers infusion, , Intravenous, Continuous, Cr Garibay DO, Last Rate: 10 mL/hr at 05/17/24 0705, Restarted at 05/17/24 0859   Review of patient's allergies indicates:   Allergen Reactions    Levofloxacin Rash       DIAGNOSTICS    Labs/Scans/Micro:    CBC:   Lab Results   Component Value Date    WBC 10.42 05/14/2024    HGB 13.9 (L) 05/14/2024    HCT 44.3 05/14/2024    MCV 88.8 05/14/2024     05/14/2024     Sedimentation rate:   Lab Results   Component Value Date    SEDRATE 22 (H) 12/15/2022    C-reactive protein:   Lab Results   Component Value Date    CRP 44.90 (H) 12/15/2022    Chemistry: [unfilled]  HBA1C: No components found for: "HBA1C"    Ankle Brachial Index: No results found for this or any previous visit.    Imaging:    Plain film: No results found for this or any previous visit.    MRI: No results found for this or " "any previous visit.    Bone Scan: No results found for this or any previous visit.    Vascular studies:    Microbiology: 10/29/24          [facility]ALTH AG[facility]Y: [facility]     WOUND CARE ORDERS:  Right hip wound: Cleanse with wound cleanser, pack with wet to dry 2" kerlix (Bernells or hypertonic saline), cover with and ABD pad using tape to secure - to be changed daily.   Right elbow: Cleanse with wound cleanser, apply silver alginate to the wound bed, cover with and ABD using tape to secure, apply tubigrip - to be changed daily.      to order Imani Lift and Lift Pad from [facility] for patient.       Follow up in about 1 month (around 7/18/2025) for Provider Visit.        "

## 2025-06-26 ENCOUNTER — HOSPITAL ENCOUNTER (OUTPATIENT)
Dept: WOUND CARE | Facility: HOSPITAL | Age: 45
Discharge: HOME | End: 2025-06-26
Attending: NURSE PRACTITIONER
Payer: MEDICARE

## 2025-06-26 VITALS — BODY MASS INDEX: 18.62 KG/M2 | HEIGHT: 70 IN | RESPIRATION RATE: 18 BRPM | WEIGHT: 130.06 LBS

## 2025-06-26 DIAGNOSIS — M86.9 OSTEOMYELITIS OF RIGHT HIP: ICD-10-CM

## 2025-06-26 DIAGNOSIS — G82.50 QUADRIPLEGIA: ICD-10-CM

## 2025-06-26 DIAGNOSIS — L89.154 PRESSURE INJURY OF SACRAL REGION, STAGE 4: ICD-10-CM

## 2025-06-26 DIAGNOSIS — L89.44 PRESSURE INJURY OF CONTIGUOUS REGION INVOLVING BACK AND RIGHT HIP, STAGE 4: Primary | ICD-10-CM

## 2025-06-26 DIAGNOSIS — L89.013 PRESSURE INJURY OF RIGHT ELBOW, STAGE 3: ICD-10-CM

## 2025-06-26 PROCEDURE — 27000999 HC MEDICAL RECORD PHOTO DOCUMENTATION

## 2025-06-26 PROCEDURE — 97597 DBRDMT OPN WND 1ST 20 CM/<: CPT

## 2025-06-26 PROCEDURE — 99213 OFFICE O/P EST LOW 20 MIN: CPT | Mod: 25

## 2025-06-26 NOTE — PROCEDURES
"Debridement    Date/Time: 6/26/2025 1:00 PM    Performed by: Ashley Coto NP  Authorized by: Ashley Coto NP    Time out: Immediately prior to procedure a "time out" was called to verify the correct patient, procedure, equipment, support staff and site/side marked as required.    Consent Done?:  Yes (Verbal)    Preparation: Patient was prepped and draped with clean technique    Local anesthesia used?: No      Wound Details:    Location:  Right elbow    Type of Debridement:  Non-excisional       Length (cm):  3.5       Width (cm):  2.6       Depth (cm):  0.2       Area (sq cm):  7.15       Percent Debrided (%):  100       Total Area Debrided (sq cm):  7.15    Depth of debridement:  Epidermis/Dermis    Devitalized tissue debrided:  Biofilm, Callus, Exudate and Fibrin    Instruments:  Curette (Dermal)  Bleeding:  None  Patient tolerance:  Patient tolerated the procedure well with no immediate complications    "

## 2025-06-26 NOTE — PROGRESS NOTES
Home Health: [facility]   Smoker   [x] Yes  [] No (THC vape)   Diabetic   [] Yes   [x] No   Low air loss mattress [x] Yes [] No   Is the patient eligible for a graft, and/or currently grafting?  [] Yes [x] No          Subjective:      Patient ID: Werner Jarrett is a 44 y.o. male.    Chief Complaint: Pressure Ulcer ((Right hip - stage 4/Right elbow- Stage 2))      1/9/25 - The patient is seen today for follow up on the pressure injuries to the sacrum and right hip.  Both remain stable, but unchanged.  He did have a f/up MRI done on 12/10/24, and that MRI again reports osteomyelitis.  This dx has gone back and forth.  He follows up with ID on 1/28/25.  They would prefer a deep bone biopsy.  He will see Dr. Delgado on 1/16/25.  Hopefully she can schedule that to confirm if osteo is present.      2/4/25 - Mr. 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  Of note, he did have a f/up with Infectious disease on 1/27/25. Dr. Ceron ordered an MRI of the patients right hip to confirm that the current osteo is not progressing. She did verify this and also states that there is no new cavities forming in that area. She mentioned to the patient that in the future when a swab culture is done it will always come back positive due to the bacteria being colonized. He only needs to return PRN  He also followed up with Dr. Quijano on 1/16/25 and was given a good report. His next follow up is in 2 months. Today the wounds were assessed and both remain stable with no significant change or S & S of infection.  Going forward we will monitor the wounds only for outward signs of infection as ID have recommended not focusing on swab cultures.  A callus paring was performed on the right elbow.  There is no open wound.     3/6/25 - The patient is seen today for f/up on several wounds.  The right hip was assessed and selectively debrided.  Within the wound bed there is a new area  of tissue (unknown if adipose, ligament) that is protruding.  When the debridement was performed this tissue remained stable.  It will be left in place with the hopes that epithelial tissue will overgrow.  The sacrum remains stable.  A callus paring was performed on the elbow which is again quite dry.  There is no open wound.     4/8/25 - The patient returns for f/up on several wounds.  This provider communicated with his surgeon, Dr. Sandy Jara, last week.  She requested that the wound to the hip be packed, wet to dry.  We discussed this today and the patient will begin packing.  The sacral wound is nearly closed.  The right elbow was selectively debrided and has again opened.  He will f/up with his surgeon in one month.    5/6/25 - Mr. Jarrett returns today for follow up.  He is scheduled for a f/up with his surgeon, Dr. Sandy Jara, on 5/15/25.  Today all three wounds were selectively debrided and all remain stable. We will continue with the current wound orders.  We will be requesting that  order a Imani Lift and pad.  The patient reports that he has not had on in several months and his was initially purchased more than 5 years ago.     6/5/25 - The patient is seen today for f/up on several wounds.  The right elbow has fully reopened again and was selectively debrided.  The sacrum appears to be closed at this time.  The stage IV pressure injury to the right hip was assessed and has made no progress.  We are still using the wet to moist packing per Dr. Delgado's request.  He does follow up with his surgeon today and will follow up with wound care next week to discuss any changes or concerns she may have.   He recently completed Augmentin x 7 days for a UTI.    6/18/25 - Mr. Jarrett returns for f/up on pressure injuries.  The stage IV pressure injury at the right hip has not changed.  It remains stable, with no S * S of infection, but is still open with bone exposed in the hip socket.  He saw his  surgeon, Dr. Sandy Jara, last week.  They discussed that his next step with her would be amputation because the area is no healing, nor is it likely to heal.  We discussed his desire for a second opinion, and how difficult it is to get in with a plastic surgeon.  He was advised to reach out to surgeons of his choice, and if he is able to find someone who will take his insurance, and see him, that a referral will be sent.  Additionally, the right elbow was assessed and selectively debrided.  It remains stable.     6/26/25 - The patient returns today for followup on several pressure injuries.  Earlier this week the clinic got a call from the patient's/caregiver as well as a photo to review which showed what they felt was slough or deterioration of the wound to the elbow.  Today the caregiver reported pus coming from the wound yesterday.  However, today the wound looks very clean with no signs and symptoms of infection, no drainage.  It is likely that this was silver alginate that was overly wet that was thought to be pus.  Elbow was selectively debrided and we had a lengthy discussion about alternative ways to protect in cushion the elbow in an effort to prevent further deterioration.  He was instructed to begin using a cushion on the wheelchair arms such as egg crate and/or a pool noodle slipped around the arm.  He was also instructed to wear a heel protector on the elbow and use> cautioned when transferring.  In addition to the elbow, the pressure injury at the sacrum has reopened.  It has a very small wound at this time but has potential to quickly deteriorate again.  He still has the stage IV pressure injury at the right hip.  There has been no significant change to that wound and he will follow up with his surgeon, Dr. Sandy Jara next month.  He does have to wait until August to get new lift and lift pad, that is when his 5 years are up.    Review of Systems   Constitutional: Negative.     Respiratory: Negative.     Cardiovascular: Negative.    Skin:         As documented in the HPI.   All other systems reviewed and are negative.        Objective:      Vitals:    06/26/25 1318   Resp: 18     Physical Exam  Vitals and nursing note reviewed. Exam conducted with a chaperone present.   Constitutional:       Appearance: Normal appearance.   Cardiovascular:      Rate and Rhythm: Normal rate.   Pulmonary:      Effort: Pulmonary effort is normal.   Skin:     General: Skin is warm and dry.      Comments: sacral pressure injury, right hip, trochanter   Neurological:      Mental Status: He is alert.            Altered Skin Integrity 09/25/23 1153 Sacral spine #1 (Active)   09/25/23 1153   Altered Skin Integrity Present on Admission - Did Patient arrive to the hospital with altered skin?: yes   Side:    Orientation:    Location: Sacral spine   Wound Number: #1   Is this injury device related?:    Primary Wound Type:    Description of Altered Skin Integrity:    Ankle-Brachial Index:    Pulses:    Removal Indication and Assessment:    Wound Outcome:    (Retired) Wound Length (cm):    (Retired) Wound Width (cm):    (Retired) Depth (cm):    Wound Description (Comments):    Removal Indications:    Dressing Appearance Moist drainage 06/26/25 1320   Drainage Amount Moderate 06/26/25 1320   Drainage Characteristics/Odor Serosanguineous 06/26/25 1320   Appearance Moist;Pink 06/26/25 1320   Tissue loss description Full thickness 06/26/25 1320   Periwound Area Dry 06/26/25 1320   Wound Edges Open 06/26/25 1320   Wound Length (cm) 1 cm 06/26/25 1320   Wound Width (cm) 0.6 cm 06/26/25 1320   Wound Depth (cm) 0.2 cm 06/26/25 1320   Wound Volume (cm^3) 0.063 cm^3 06/26/25 1320   Wound Surface Area (cm^2) 0.47 cm^2 06/26/25 1320   Care Cleansed with:;Wound cleanser 06/26/25 1320   Dressing Applied 06/26/25 1320            Altered Skin Integrity 10/09/23 1141 Right Hip #2 (Active)   10/09/23 1141   Altered Skin Integrity  Present on Admission - Did Patient arrive to the hospital with altered skin?: yes   Side: Right   Orientation:    Location: Hip   Wound Number: #2   Is this injury device related?: No   Primary Wound Type:    Description of Altered Skin Integrity:    Ankle-Brachial Index:    Pulses:    Removal Indication and Assessment:    Wound Outcome:    (Retired) Wound Length (cm):    (Retired) Wound Width (cm):    (Retired) Depth (cm):    Wound Description (Comments):    Removal Indications:    Dressing Appearance Moist drainage 06/26/25 1320   Drainage Amount Moderate 06/26/25 1320   Drainage Characteristics/Odor Serosanguineous 06/26/25 1320   Appearance Red;Slough;Moist 06/26/25 1320   Tissue loss description Full thickness 06/26/25 1320   Periwound Area Dry 06/26/25 1320   Wound Edges Open 06/26/25 1320   Wound Length (cm) 2.5 cm 06/26/25 1320   Wound Width (cm) 1 cm 06/26/25 1320   Wound Depth (cm) 1.5 cm 06/26/25 1320   Wound Volume (cm^3) 1.963 cm^3 06/26/25 1320   Wound Surface Area (cm^2) 1.96 cm^2 06/26/25 1320   Undermining (depth (cm)/location) 3 cm circumferential 06/26/25 1320   Care Cleansed with:;Wound cleanser 06/26/25 1320   Dressing Applied 06/26/25 1320            Wound 04/08/25 1118 Pressure Injury Right posterior Elbow #3 (Active)   04/08/25 1118 Elbow   Present on Original Admission: Y   Primary Wound Type: Pressure inj   Side: Right   Orientation: posterior   Wound Approximate Age at First Assessment (Weeks):    Wound Number: #3   Is this injury device related?:    Incision Type:    Closure Method:    Wound Description (Comments):    Type:    Additional Comments:    Ankle-Brachial Index:    Pulses:    Removal Indication and Assessment:    Wound Outcome:    Pressure Injury Stage 2 06/26/25 1320   Dressing Appearance Moist drainage 06/26/25 1320   Drainage Amount Moderate 06/26/25 1320   Drainage Characteristics/Odor Serosanguineous 06/26/25 1320   Appearance Pink;Moist 06/26/25 1320   Tissue loss  "description Full thickness 06/26/25 1320   Periwound Area Dry 06/26/25 1320   Wound Edges Callused;Open 06/26/25 1320   Wound Length (cm) 3.5 cm 06/26/25 1320   Wound Width (cm) 2.6 cm 06/26/25 1320   Wound Depth (cm) 0.2 cm 06/26/25 1320   Wound Volume (cm^3) 0.953 cm^3 06/26/25 1320   Wound Surface Area (cm^2) 7.15 cm^2 06/26/25 1320   Supply Surface Area (L x W) 9.1 sq cm 06/26/25 1320   Care Cleansed with:;Wound cleanser 06/26/25 1320   Dressing Applied 06/26/25 1320     6/26/25        Right hip (pre)                                                   Sacrum (pre)  (Vashe 2" kerlix, ABD, tape)                            (silver alginate, sacral border)        Right elbow (pre)                                              Right elbow (post)  (Silver alginate, ABD, kerlix, tape, tubigrip)      Assessment:           ICD-10-CM ICD-9-CM   1. 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 Pressure injury of right elbow, stage 3  L89.013 707.01     707.23   3. Pressure injury of sacral region, stage 4  L89.154 707.03     707.24   4. Quadriplegia  G82.50 344.00   5. Osteomyelitis of right hip  M86.9 730.25         Procedures:     Debridement     Date/Time: 6/26/2025 1:00 PM     Performed by: Ashley Coto NP  Authorized by: Ashley Coto NP    Time out: Immediately prior to procedure a "time out" was called to verify the correct patient, procedure, equipment, support staff and site/side marked as required.     Consent Done?:  Yes (Verbal)     Preparation: Patient was prepped and draped with clean technique    Local anesthesia used?: No       Wound Details:    Location:  Right elbow    Type of Debridement:  Non-excisional       Length (cm):  3.5       Width (cm):  2.6       Depth (cm):  0.2       Area (sq cm):  7.15       Percent Debrided (%):  100       Total Area Debrided (sq cm):  7.15    Depth of debridement:  Epidermis/Dermis    Devitalized tissue " debrided:  Biofilm, Callus, Exudate and Fibrin    Instruments:  Curette (Dermal)  Bleeding:  None  Patient tolerance:  Patient tolerated the procedure well with no immediate complications    [] Yes [] No   I & D performed  [] Yes [] No   Excisional debridement performed  [x] Yes [] No   Selective debridement performed        [] Yes [] No   Mechanical debridement performed         [] Yes [] No  Silver nitrate applied                                     [] Yes [] No  Labs ordered this visit                                  [] Yes [] No   Imaging ordered this visit                           [] Yes [] No   Tissue pathology and/or culture taken       MEDICATIONS    Current Outpatient Medications:     acetaminophen (TYLENOL) 325 MG tablet, Take 650 mg by mouth every 6 (six) hours as needed for Pain., Disp: , Rfl:     ascorbic acid, vitamin C, (VITAMIN C) 1000 MG tablet, Take 1,000 mg by mouth every evening., Disp: , Rfl:     aspirin 325 MG tablet, Take 325 mg by mouth every morning. Stopped x 1 month, Disp: , Rfl:     baclofen (LIORESAL) 5 mg Tab tablet, Take 1 tablet (5 mg total) by mouth 3 (three) times daily., Disp: 90 tablet, Rfl: 0    bisacodyL (DULCOLAX) 10 mg Supp, Place 10 mg rectally daily as needed., Disp: , Rfl:     busPIRone (BUSPAR) 10 MG tablet, Take 1 tablet (10 mg total) by mouth 2 (two) times daily., Disp: 60 tablet, Rfl: 5    cetirizine (ZYRTEC) 10 MG tablet, Take 10 mg by mouth 2 (two) times a day., Disp: , Rfl:     cloNIDine (CATAPRES) 0.1 MG tablet, Take 0.1 mg by mouth daily as needed., Disp: , Rfl:     DULoxetine (CYMBALTA) 30 MG capsule, Take 1 capsule by mouth 2 (two) times daily., Disp: , Rfl:     ferrous sulfate (SLOW RELEASE IRON) 142 mg (45 mg iron) TbSR, 1 tablet Orally Three times a Week for 30 days, Disp: , Rfl:     fexofenadine (ALLEGRA) 180 MG tablet, Take 180 mg by mouth every morning., Disp: , Rfl:     fluticasone propionate (FLONASE ALLERGY RELIEF) 50 mcg/actuation nasal spray, 1 spray  "by Each Nostril route daily as needed., Disp: , Rfl:     heparin, porcine, PF, (HEPARIN FLUSH 100 UNITS/ML) 100 unit/mL Syrg, flush mediport with 5ml every FOUR weeks, Disp: , Rfl:     JATENZO 237 mg Cap, , Disp: , Rfl:     meloxicam (MOBIC) 7.5 MG tablet, Take 7.5 mg by mouth 2 (two) times daily as needed for Pain., Disp: , Rfl:     midodrine (PROAMATINE) 2.5 MG Tab, TAKE ONE TABLET BY MOUTH THREE TIMES DAILY AS NEEDED Systolic blood pressure less than 90 or Diastolic less than 60, Disp: , Rfl:     multivitamin/iron/folic acid (CENTRUM ORAL), Take 1 tablet by mouth every morning., Disp: , Rfl:     NON FORMULARY MEDICATION, Take 3 drops by mouth 2 (two) times daily as needed. THC, Disp: , Rfl:     NORMAL SALINE FLUSH injection, flush mediport with 10ml every FOUR weeks, Disp: , Rfl:     urea (CARMOL) 40 % Crea, Apply topically once daily., Disp: 85 g, Rfl: 2    zinc gluconate 50 mg tablet, Take 1 tablet (50 mg total) by mouth once daily., Disp: 30 tablet, Rfl: 1  No current facility-administered medications for this encounter.    Facility-Administered Medications Ordered in Other Encounters:     lactated ringers infusion, , Intravenous, Continuous, Cr Garibay DO, Last Rate: 10 mL/hr at 05/17/24 0705, Restarted at 05/17/24 0859   Review of patient's allergies indicates:   Allergen Reactions    Levofloxacin Rash       DIAGNOSTICS    Labs/Scans/Micro:    CBC:   Lab Results   Component Value Date    WBC 10.42 05/14/2024    HGB 13.9 (L) 05/14/2024    HCT 44.3 05/14/2024    MCV 88.8 05/14/2024     05/14/2024     Sedimentation rate:   Lab Results   Component Value Date    SEDRATE 22 (H) 12/15/2022    C-reactive protein:   Lab Results   Component Value Date    CRP 44.90 (H) 12/15/2022    Chemistry: [unfilled]  HBA1C: No components found for: "HBA1C"    Ankle Brachial Index: No results found for this or any previous visit.    Imaging:    Plain film: No results found for this or any previous visit.    MRI: " "No results found for this or any previous visit.    Bone Scan: No results found for this or any previous visit.    Vascular studies:    Microbiology: 10/29/24          [facility]ALTH AGENCY: [facility]     WOUND CARE ORDERS:  Right hip wound: Cleanse with wound cleanser, pack with wet to dry 2" kerlix (Bernells or hypertonic saline), cover with and ABD pad using tape to secure - to be changed daily.   Right elbow: Cleanse with wound cleanser, apply silver alginate to the wound bed, cover with and ABD using tape to secure, apply tubigrip - to be changed daily.   Sacrum: Cleanse with wound cleanser, apply silver alginate and sacral border dressing, change daily    HH to order Imani Lift and Lift Pad from [facility] for patient.       Follow up in 20 days (on 7/16/2025) for ST."

## 2025-07-22 ENCOUNTER — HOSPITAL ENCOUNTER (OUTPATIENT)
Dept: WOUND CARE | Facility: HOSPITAL | Age: 45
Discharge: HOME | End: 2025-07-22
Attending: NURSE PRACTITIONER
Payer: MEDICARE

## 2025-07-22 VITALS
WEIGHT: 130.06 LBS | DIASTOLIC BLOOD PRESSURE: 34 MMHG | HEART RATE: 93 BPM | RESPIRATION RATE: 19 BRPM | HEIGHT: 70 IN | BODY MASS INDEX: 18.62 KG/M2 | SYSTOLIC BLOOD PRESSURE: 72 MMHG

## 2025-07-22 DIAGNOSIS — L89.44 PRESSURE INJURY OF CONTIGUOUS REGION INVOLVING BACK AND RIGHT HIP, STAGE 4: Primary | ICD-10-CM

## 2025-07-22 DIAGNOSIS — S76.001D OPEN WOUND OF HIP WITH TENDON INVOLVEMENT, RIGHT, SUBSEQUENT ENCOUNTER: ICD-10-CM

## 2025-07-22 DIAGNOSIS — S71.001D OPEN WOUND OF HIP WITH TENDON INVOLVEMENT, RIGHT, SUBSEQUENT ENCOUNTER: ICD-10-CM

## 2025-07-22 DIAGNOSIS — G82.50 QUADRIPLEGIA: ICD-10-CM

## 2025-07-22 DIAGNOSIS — L89.154 PRESSURE INJURY OF SACRAL REGION, STAGE 4: ICD-10-CM

## 2025-07-22 DIAGNOSIS — M86.9 OSTEOMYELITIS OF RIGHT HIP: ICD-10-CM

## 2025-07-22 DIAGNOSIS — L89.013 PRESSURE INJURY OF RIGHT ELBOW, STAGE 3: ICD-10-CM

## 2025-07-22 PROCEDURE — 27000999 HC MEDICAL RECORD PHOTO DOCUMENTATION

## 2025-07-22 PROCEDURE — 97597 DBRDMT OPN WND 1ST 20 CM/<: CPT

## 2025-07-22 NOTE — PROCEDURES
"Debridement    Date/Time: 7/22/2025 11:00 AM    Performed by: Ashley Coto NP  Authorized by: Ashley Coto NP    Time out: Immediately prior to procedure a "time out" was called to verify the correct patient, procedure, equipment, support staff and site/side marked as required.    Consent Done?:  Yes (Verbal)    Preparation: Patient was prepped and draped with clean technique    Local anesthesia used?: No      Wound Details:    Location:  Right elbow    Type of Debridement:  Non-excisional       Length (cm):  2.4       Width (cm):  1.7       Depth (cm):  0.2       Area (sq cm):  3.2       Percent Debrided (%):  100       Total Area Debrided (sq cm):  3.2    Depth of debridement:  Epidermis/Dermis    Devitalized tissue debrided:  Biofilm, Callus, Exudate and Fibrin    Instruments:  Curette (Dermal)  Bleeding:  Minimal  Hemostasis Achieved: Yes  Method Used:  Pressure and Silver Nitrate  Patient tolerance:  Patient tolerated the procedure well with no immediate complications    "

## 2025-07-22 NOTE — PROGRESS NOTES
Home Health: [facility]   Smoker   [x] Yes  [] No (THC vape)   Diabetic   [] Yes   [x] No   Low air loss mattress [x] Yes [] No   Is the patient eligible for a graft, and/or currently grafting?  [] Yes [x] No      Subjective:      Patient ID: Werner Jarrett is a 44 y.o. male.    Chief Complaint: Pressure Ulcer (Right hip - stage 4/Right elbow- Stage 2)      1/9/25 - The patient is seen today for follow up on the pressure injuries to the sacrum and right hip.  Both remain stable, but unchanged.  He did have a f/up MRI done on 12/10/24, and that MRI again reports osteomyelitis.  This dx has gone back and forth.  He follows up with ID on 1/28/25.  They would prefer a deep bone biopsy.  He will see Dr. Delgado on 1/16/25.  Hopefully she can schedule that to confirm if osteo is present.      2/4/25 - Mr. 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  Of note, he did have a f/up with Infectious disease on 1/27/25. Dr. Ceron ordered an MRI of the patients right hip to confirm that the current osteo is not progressing. She did verify this and also states that there is no new cavities forming in that area. She mentioned to the patient that in the future when a swab culture is done it will always come back positive due to the bacteria being colonized. He only needs to return PRN  He also followed up with Dr. Quijano on 1/16/25 and was given a good report. His next follow up is in 2 months. Today the wounds were assessed and both remain stable with no significant change or S & S of infection.  Going forward we will monitor the wounds only for outward signs of infection as ID have recommended not focusing on swab cultures.  A callus paring was performed on the right elbow.  There is no open wound.     3/6/25 - The patient is seen today for f/up on several wounds.  The right hip was assessed and selectively debrided.  Within the wound bed there is a new area of  tissue (unknown if adipose, ligament) that is protruding.  When the debridement was performed this tissue remained stable.  It will be left in place with the hopes that epithelial tissue will overgrow.  The sacrum remains stable.  A callus paring was performed on the elbow which is again quite dry.  There is no open wound.     4/8/25 - The patient returns for f/up on several wounds.  This provider communicated with his surgeon, Dr. Sandy Jara, last week.  She requested that the wound to the hip be packed, wet to dry.  We discussed this today and the patient will begin packing.  The sacral wound is nearly closed.  The right elbow was selectively debrided and has again opened.  He will f/up with his surgeon in one month.    5/6/25 - Mr. Jarrett returns today for follow up.  He is scheduled for a f/up with his surgeon, Dr. Sandy Jara, on 5/15/25.  Today all three wounds were selectively debrided and all remain stable. We will continue with the current wound orders.  We will be requesting that  order a Imani Lift and pad.  The patient reports that he has not had on in several months and his was initially purchased more than 5 years ago.     6/5/25 - The patient is seen today for f/up on several wounds.  The right elbow has fully reopened again and was selectively debrided.  The sacrum appears to be closed at this time.  The stage IV pressure injury to the right hip was assessed and has made no progress.  We are still using the wet to moist packing per Dr. Delgado's request.  He does follow up with his surgeon today and will follow up with wound care next week to discuss any changes or concerns she may have.   He recently completed Augmentin x 7 days for a UTI.    6/18/25 - Mr. Jarrett returns for f/up on pressure injuries.  The stage IV pressure injury at the right hip has not changed.  It remains stable, with no S * S of infection, but is still open with bone exposed in the hip socket.  He saw his surgeon,  Dr. Sandy Jara, last week.  They discussed that his next step with her would be amputation because the area is no healing, nor is it likely to heal.  We discussed his desire for a second opinion, and how difficult it is to get in with a plastic surgeon.  He was advised to reach out to surgeons of his choice, and if he is able to find someone who will take his insurance, and see him, that a referral will be sent.  Additionally, the right elbow was assessed and selectively debrided.  It remains stable.     6/26/25 - The patient returns today for followup on several pressure injuries.  Earlier this week the clinic got a call from the patient's/caregiver as well as a photo to review which showed what they felt was slough or deterioration of the wound to the elbow.  Today the caregiver reported pus coming from the wound yesterday.  However, today the wound looks very clean with no signs and symptoms of infection, no drainage.  It is likely that this was silver alginate that was overly wet that was thought to be pus.  Elbow was selectively debrided and we had a lengthy discussion about alternative ways to protect in cushion the elbow in an effort to prevent further deterioration.  He was instructed to begin using a cushion on the wheelchair arms such as egg crate and/or a pool noodle slipped around the arm.  He was also instructed to wear a heel protector on the elbow and use> cautioned when transferring.  In addition to the elbow, the pressure injury at the sacrum has reopened.  It has a very small wound at this time but has potential to quickly deteriorate again.  He still has the stage IV pressure injury at the right hip.  There has been no significant change to that wound and he will follow up with his surgeon, Dr. Sandy Jara next month.  He does have to wait until August to get new lift and lift pad, that is when his 5 years are up.    7/22/25 - Mr. Jarrett is seen today for f/up on three wounds.  The  right hip stage IV pressure injury remains stable, but open to tendon.  He is followed closely by his surgeon, Dr. Sandy Jara, and has a follow up with her on 7/31/25.  We are both monitoring this wound, however, we have been unsuccessful at locating anyone who will do a flap on the wound due to the patient smoking and other factors.  We will continue to monitor.  The sacrum was assessed and remains stable.  The right elbow was heavily callused.  This was debrided and he was encouraged to wear the heel protector on this elbow, which he reports he is not doing.           Review of Systems   Constitutional: Negative.    Respiratory: Negative.     Cardiovascular: Negative.    Skin:         As documented in the HPI.   All other systems reviewed and are negative.        Objective:      Vitals:    07/22/25 1123   BP: (!) 72/34   Pulse: 93   Resp: 19     Physical Exam  Vitals and nursing note reviewed. Exam conducted with a chaperone present.   Constitutional:       Appearance: Normal appearance.   Cardiovascular:      Rate and Rhythm: Normal rate.   Pulmonary:      Effort: Pulmonary effort is normal.   Skin:     General: Skin is warm and dry.      Comments: sacral pressure injury, right hip, trochanter   Neurological:      Mental Status: He is alert.            Altered Skin Integrity 09/25/23 1153 Sacral spine #1 (Active)   09/25/23 1153   Altered Skin Integrity Present on Admission - Did Patient arrive to the hospital with altered skin?: yes   Side:    Orientation:    Location: Sacral spine   Wound Number: #1   Is this injury device related?:    Primary Wound Type:    Description of Altered Skin Integrity:    Ankle-Brachial Index:    Pulses:    Removal Indication and Assessment:    Wound Outcome:    (Retired) Wound Length (cm):    (Retired) Wound Width (cm):    (Retired) Depth (cm):    Wound Description (Comments):    Removal Indications:    Dressing Appearance Moist drainage 07/22/25 1124   Drainage Amount  Moderate 07/22/25 1124   Drainage Characteristics/Odor Serosanguineous 07/22/25 1124   Appearance Pink;Moist 07/22/25 1124   Tissue loss description Full thickness 07/22/25 1124   Periwound Area Intact 07/22/25 1124   Wound Edges Open 07/22/25 1124   Wound Length (cm) 0.5 cm 07/22/25 1124   Wound Width (cm) 0.4 cm 07/22/25 1124   Wound Depth (cm) 0.2 cm 07/22/25 1124   Wound Volume (cm^3) 0.021 cm^3 07/22/25 1124   Wound Surface Area (cm^2) 0.16 cm^2 07/22/25 1124   Care Cleansed with:;Wound cleanser 07/22/25 1124   Dressing Applied;Other (comment) 07/22/25 1124   Dressing Change Due 07/23/25 07/22/25 1124            Altered Skin Integrity 10/09/23 1141 Right Hip #2 (Active)   10/09/23 1141   Altered Skin Integrity Present on Admission - Did Patient arrive to the hospital with altered skin?: yes   Side: Right   Orientation:    Location: Hip   Wound Number: #2   Is this injury device related?: No   Primary Wound Type:    Description of Altered Skin Integrity:    Ankle-Brachial Index:    Pulses:    Removal Indication and Assessment:    Wound Outcome:    (Retired) Wound Length (cm):    (Retired) Wound Width (cm):    (Retired) Depth (cm):    Wound Description (Comments):    Removal Indications:    Dressing Appearance Moist drainage 07/22/25 1124   Drainage Amount Moderate 07/22/25 1124   Drainage Characteristics/Odor Serosanguineous 07/22/25 1124   Appearance Pink;Red;Moist;Slough 07/22/25 1124   Tissue loss description Full thickness 07/22/25 1124   Periwound Area Intact 07/22/25 1124   Wound Edges Open 07/22/25 1124   Wound Length (cm) 2 cm 07/22/25 1124   Wound Width (cm) 0.8 cm 07/22/25 1124   Wound Depth (cm) 1.5 cm 07/22/25 1124   Wound Volume (cm^3) 1.257 cm^3 07/22/25 1124   Wound Surface Area (cm^2) 1.26 cm^2 07/22/25 1124   Undermining (depth (cm)/location) 3cm circumf. 07/22/25 1124   Care Cleansed with:;Wound cleanser 07/22/25 1124   Dressing Applied;Other (comment) 07/22/25 1124   Dressing Change Due  "07/23/25 07/22/25 1124            Wound 04/08/25 1118 Pressure Injury Right posterior Elbow #3 (Active)   04/08/25 1118 Elbow   Present on Original Admission: Y   Primary Wound Type: Pressure inj   Side: Right   Orientation: posterior   Wound Approximate Age at First Assessment (Weeks):    Wound Number: #3   Is this injury device related?:    Incision Type:    Closure Method:    Wound Description (Comments):    Type:    Additional Comments:    Ankle-Brachial Index:    Pulses:    Removal Indication and Assessment:    Wound Outcome:    Dressing Appearance Moist drainage 07/22/25 1124   Drainage Amount Moderate 07/22/25 1124   Drainage Characteristics/Odor Serosanguineous 07/22/25 1124   Appearance Pink;Moist 07/22/25 1124   Tissue loss description Full thickness 07/22/25 1124   Periwound Area Intact 07/22/25 1124   Wound Edges Callused;Open 07/22/25 1124   Wound Length (cm) 2.4 cm 07/22/25 1124   Wound Width (cm) 1.7 cm 07/22/25 1124   Wound Depth (cm) 0.2 cm 07/22/25 1124   Wound Volume (cm^3) 0.427 cm^3 07/22/25 1124   Wound Surface Area (cm^2) 3.2 cm^2 07/22/25 1124   Supply Surface Area (L x W) 4.08 sq cm 07/22/25 1124   Care Cleansed with:;Wound cleanser 07/22/25 1124   Dressing Applied;Other (comment) 07/22/25 1124   Dressing Change Due 07/23/25 07/22/25 1124         7/22/25      Right elbow        Post debridement  Silver alginate, mepilex foam, kerlix, tape       Right hip   2" kerlix, gentle foam border dressing       Sacrum   Silver alginate, gentle foam border dressing   CT        Assessment:         ICD-10-CM ICD-9-CM   1. 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 Pressure injury of right elbow, stage 3  L89.013 707.01     707.23   3. Pressure injury of sacral region, stage 4  L89.154 707.03     707.24   4. Quadriplegia  G82.50 344.00   5. Osteomyelitis of right hip  M86.9 730.25   6. Open wound of hip with tendon involvement, right, " "subsequent encounter  S71.001D V58.89    S76.001D 890.2         Procedures:     Debridement     Date/Time: 7/22/2025 11:00 AM     Performed by: Ashley Coto NP  Authorized by: Ashley Coto NP    Time out: Immediately prior to procedure a "time out" was called to verify the correct patient, procedure, equipment, support staff and site/side marked as required.     Consent Done?:  Yes (Verbal)     Preparation: Patient was prepped and draped with clean technique    Local anesthesia used?: No       Wound Details:    Location:  Right elbow    Type of Debridement:  Non-excisional       Length (cm):  2.4       Width (cm):  1.7       Depth (cm):  0.2       Area (sq cm):  3.2       Percent Debrided (%):  100       Total Area Debrided (sq cm):  3.2    Depth of debridement:  Epidermis/Dermis    Devitalized tissue debrided:  Biofilm, Callus, Exudate and Fibrin    Instruments:  Curette (Dermal)  Bleeding:  Minimal  Hemostasis Achieved: Yes  Method Used:  Pressure   Patient tolerance:  Patient tolerated the procedure well with no immediate complications         [] Yes [] No   I & D performed  [] Yes [] No   Excisional debridement performed  [x] Yes [] No   Selective debridement performed        [] Yes [] No   Mechanical debridement performed         [] Yes [] No  Silver nitrate applied                                     [] Yes [] No  Labs ordered this visit                                  [] Yes [] No   Imaging ordered this visit                           [] Yes [] No   Tissue pathology and/or culture taken       MEDICATIONS    Current Outpatient Medications:     acetaminophen (TYLENOL) 325 MG tablet, Take 650 mg by mouth every 6 (six) hours as needed for Pain., Disp: , Rfl:     ascorbic acid, vitamin C, (VITAMIN C) 1000 MG tablet, Take 1,000 mg by mouth every evening., Disp: , Rfl:     aspirin 325 MG tablet, Take 325 mg by mouth every morning. Stopped x 1 month, Disp: , Rfl:     baclofen (LIORESAL) 5 mg Tab tablet, " Take 1 tablet (5 mg total) by mouth 3 (three) times daily., Disp: 90 tablet, Rfl: 0    bisacodyL (DULCOLAX) 10 mg Supp, Place 10 mg rectally daily as needed., Disp: , Rfl:     busPIRone (BUSPAR) 10 MG tablet, Take 1 tablet (10 mg total) by mouth 2 (two) times daily., Disp: 60 tablet, Rfl: 5    cetirizine (ZYRTEC) 10 MG tablet, Take 10 mg by mouth 2 (two) times a day., Disp: , Rfl:     cloNIDine (CATAPRES) 0.1 MG tablet, Take 0.1 mg by mouth daily as needed., Disp: , Rfl:     DULoxetine (CYMBALTA) 30 MG capsule, Take 1 capsule by mouth 2 (two) times daily., Disp: , Rfl:     ferrous sulfate (SLOW RELEASE IRON) 142 mg (45 mg iron) TbSR, 1 tablet Orally Three times a Week for 30 days, Disp: , Rfl:     fexofenadine (ALLEGRA) 180 MG tablet, Take 180 mg by mouth every morning., Disp: , Rfl:     fluticasone propionate (FLONASE ALLERGY RELIEF) 50 mcg/actuation nasal spray, 1 spray by Each Nostril route daily as needed., Disp: , Rfl:     heparin, porcine, PF, (HEPARIN FLUSH 100 UNITS/ML) 100 unit/mL Syrg, flush mediport with 5ml every FOUR weeks, Disp: , Rfl:     JATENZO 237 mg Cap, , Disp: , Rfl:     meloxicam (MOBIC) 7.5 MG tablet, Take 7.5 mg by mouth 2 (two) times daily as needed for Pain., Disp: , Rfl:     midodrine (PROAMATINE) 2.5 MG Tab, TAKE ONE TABLET BY MOUTH THREE TIMES DAILY AS NEEDED Systolic blood pressure less than 90 or Diastolic less than 60, Disp: , Rfl:     multivitamin/iron/folic acid (CENTRUM ORAL), Take 1 tablet by mouth every morning., Disp: , Rfl:     NON FORMULARY MEDICATION, Take 3 drops by mouth 2 (two) times daily as needed. THC, Disp: , Rfl:     NORMAL SALINE FLUSH injection, flush mediport with 10ml every FOUR weeks, Disp: , Rfl:     urea (CARMOL) 40 % Crea, Apply topically once daily., Disp: 85 g, Rfl: 2    zinc gluconate 50 mg tablet, Take 1 tablet (50 mg total) by mouth once daily., Disp: 30 tablet, Rfl: 1  No current facility-administered medications for this  "encounter.    Facility-Administered Medications Ordered in Other Encounters:     lactated ringers infusion, , Intravenous, Continuous, Cr Garibay DO, Last Rate: 10 mL/hr at 05/17/24 0705, Restarted at 05/17/24 0859   Review of patient's allergies indicates:   Allergen Reactions    Levofloxacin Rash       DIAGNOSTICS    Labs/Scans/Micro:    CBC:   Lab Results   Component Value Date    WBC 10.42 05/14/2024    HGB 13.9 (L) 05/14/2024    HCT 44.3 05/14/2024    MCV 88.8 05/14/2024     05/14/2024     Sedimentation rate:   Lab Results   Component Value Date    SEDRATE 22 (H) 12/15/2022    C-reactive protein:   Lab Results   Component Value Date    CRP 44.90 (H) 12/15/2022    Chemistry: [unfilled]  HBA1C: No components found for: "HBA1C"    Ankle Brachial Index: No results found for this or any previous visit.    Imaging:    Plain film: No results found for this or any previous visit.    MRI: No results found for this or any previous visit.    Bone Scan: No results found for this or any previous visit.    Vascular studies:    Microbiology: 10/29/24          [facility]ALTH AGENCY: [facility]     WOUND CARE ORDERS:  Right hip wound: Cleanse with wound cleanser, pack with wet to dry 2" kerlix (Bern[place] or hypertonic saline), cover with and ABD pad using tape to secure - to be changed daily.   Right elbow: Cleanse with wound cleanser, apply silver alginate to the wound bed, cover with and ABD using tape to secure, apply tubigrip - to be changed daily.   Sacrum: Cleanse with wound cleanser, apply silver alginate and sacral border dressing, change every other day     HH to order Imani Lift and Lift Pad from [facility] for patient.       Follow up in 4 weeks (on 8/19/2025) for ST/ multiple PU .        "

## 2025-08-19 ENCOUNTER — HOSPITAL ENCOUNTER (OUTPATIENT)
Dept: WOUND CARE | Facility: HOSPITAL | Age: 45
Discharge: HOME | End: 2025-08-19
Attending: NURSE PRACTITIONER
Payer: MEDICARE

## 2025-08-19 VITALS
WEIGHT: 158.06 LBS | SYSTOLIC BLOOD PRESSURE: 83 MMHG | RESPIRATION RATE: 18 BRPM | BODY MASS INDEX: 22.63 KG/M2 | HEART RATE: 80 BPM | DIASTOLIC BLOOD PRESSURE: 53 MMHG | HEIGHT: 70 IN

## 2025-08-19 DIAGNOSIS — L89.013 PRESSURE INJURY OF RIGHT ELBOW, STAGE 3: ICD-10-CM

## 2025-08-19 DIAGNOSIS — L89.154 PRESSURE INJURY OF SACRAL REGION, STAGE 4: ICD-10-CM

## 2025-08-19 DIAGNOSIS — L89.44 PRESSURE INJURY OF CONTIGUOUS REGION INVOLVING BACK AND RIGHT HIP, STAGE 4: Primary | ICD-10-CM

## 2025-08-19 DIAGNOSIS — G82.50 QUADRIPLEGIA: ICD-10-CM

## 2025-08-19 DIAGNOSIS — M86.9 OSTEOMYELITIS OF RIGHT HIP: ICD-10-CM

## 2025-08-19 PROCEDURE — 27000999 HC MEDICAL RECORD PHOTO DOCUMENTATION

## 2025-08-19 PROCEDURE — 99212 OFFICE O/P EST SF 10 MIN: CPT

## 2025-08-19 PROCEDURE — 97597 DBRDMT OPN WND 1ST 20 CM/<: CPT

## 2025-08-22 ENCOUNTER — ANESTHESIA EVENT (OUTPATIENT)
Dept: SURGERY | Facility: HOSPITAL | Age: 45
DRG: 902 | End: 2025-08-22
Payer: MEDICARE

## 2025-08-22 RX ORDER — FENTANYL CITRATE 50 UG/ML
25 INJECTION, SOLUTION INTRAMUSCULAR; INTRAVENOUS EVERY 5 MIN PRN
Refills: 0 | OUTPATIENT
Start: 2025-08-22

## 2025-08-22 RX ORDER — GLUCAGON 1 MG
1 KIT INJECTION
OUTPATIENT
Start: 2025-08-22

## 2025-08-22 RX ORDER — HYDROMORPHONE HYDROCHLORIDE 2 MG/ML
0.2 INJECTION, SOLUTION INTRAMUSCULAR; INTRAVENOUS; SUBCUTANEOUS EVERY 5 MIN PRN
Refills: 0 | OUTPATIENT
Start: 2025-08-22

## 2025-08-22 RX ORDER — SODIUM CHLORIDE 0.9 % (FLUSH) 0.9 %
10 SYRINGE (ML) INJECTION
OUTPATIENT
Start: 2025-08-22

## 2025-08-25 ENCOUNTER — HOSPITAL ENCOUNTER (INPATIENT)
Facility: HOSPITAL | Age: 45
LOS: 1 days | Discharge: HOME | DRG: 902 | End: 2025-08-25
Attending: SURGERY | Admitting: SURGERY
Payer: MEDICARE

## 2025-08-25 ENCOUNTER — ANESTHESIA (OUTPATIENT)
Dept: SURGERY | Facility: HOSPITAL | Age: 45
DRG: 902 | End: 2025-08-25
Payer: MEDICARE

## 2025-08-25 VITALS
TEMPERATURE: 98 F | DIASTOLIC BLOOD PRESSURE: 61 MMHG | WEIGHT: 150 LBS | BODY MASS INDEX: 21.47 KG/M2 | SYSTOLIC BLOOD PRESSURE: 96 MMHG | RESPIRATION RATE: 18 BRPM | HEIGHT: 70 IN | OXYGEN SATURATION: 95 % | HEART RATE: 67 BPM

## 2025-08-25 DIAGNOSIS — S71.001D COMPLICATED OPEN WOUND OF RIGHT HIP, SUBSEQUENT ENCOUNTER: Primary | ICD-10-CM

## 2025-08-25 DIAGNOSIS — S73.024D: ICD-10-CM

## 2025-08-25 DIAGNOSIS — Z01.810 PREOP CARDIOVASCULAR EXAM: ICD-10-CM

## 2025-08-25 DIAGNOSIS — S71.001A: ICD-10-CM

## 2025-08-25 DIAGNOSIS — S71.001D: ICD-10-CM

## 2025-08-25 LAB
GRAM STN SPEC: NORMAL
OHS QRS DURATION: 80 MS
OHS QTC CALCULATION: 434 MS

## 2025-08-25 PROCEDURE — 87205 SMEAR GRAM STAIN: CPT | Performed by: SURGERY

## 2025-08-25 PROCEDURE — 0JBM0ZZ EXCISION OF LEFT UPPER LEG SUBCUTANEOUS TISSUE AND FASCIA, OPEN APPROACH: ICD-10-PCS | Performed by: SURGERY

## 2025-08-25 PROCEDURE — 36000707: Performed by: SURGERY

## 2025-08-25 PROCEDURE — 63600175 PHARM REV CODE 636 W HCPCS: Performed by: SURGERY

## 2025-08-25 PROCEDURE — 87075 CULTR BACTERIA EXCEPT BLOOD: CPT | Performed by: SURGERY

## 2025-08-25 PROCEDURE — 63600175 PHARM REV CODE 636 W HCPCS: Performed by: ANESTHESIOLOGY

## 2025-08-25 PROCEDURE — 27201423 OPTIME MED/SURG SUP & DEVICES STERILE SUPPLY: Performed by: SURGERY

## 2025-08-25 PROCEDURE — 71000016 HC POSTOP RECOV ADDL HR: Performed by: SURGERY

## 2025-08-25 PROCEDURE — 11000001 HC ACUTE MED/SURG PRIVATE ROOM

## 2025-08-25 PROCEDURE — 93005 ELECTROCARDIOGRAM TRACING: CPT

## 2025-08-25 PROCEDURE — 87077 CULTURE AEROBIC IDENTIFY: CPT | Performed by: SURGERY

## 2025-08-25 PROCEDURE — 87102 FUNGUS ISOLATION CULTURE: CPT | Performed by: SURGERY

## 2025-08-25 PROCEDURE — 36000706: Performed by: SURGERY

## 2025-08-25 PROCEDURE — 93010 ELECTROCARDIOGRAM REPORT: CPT | Mod: ,,, | Performed by: INTERNAL MEDICINE

## 2025-08-25 PROCEDURE — 71000015 HC POSTOP RECOV 1ST HR: Performed by: SURGERY

## 2025-08-25 PROCEDURE — 37000008 HC ANESTHESIA 1ST 15 MINUTES: Performed by: SURGERY

## 2025-08-25 PROCEDURE — 37000009 HC ANESTHESIA EA ADD 15 MINS: Performed by: SURGERY

## 2025-08-25 PROCEDURE — 63600175 PHARM REV CODE 636 W HCPCS: Performed by: NURSE ANESTHETIST, CERTIFIED REGISTERED

## 2025-08-25 RX ORDER — DOXYCYCLINE HYCLATE 100 MG
100 TABLET ORAL EVERY 12 HOURS
Status: DISCONTINUED | OUTPATIENT
Start: 2025-08-25 | End: 2025-08-25 | Stop reason: HOSPADM

## 2025-08-25 RX ORDER — PROPOFOL 10 MG/ML
VIAL (ML) INTRAVENOUS
Status: DISCONTINUED | OUTPATIENT
Start: 2025-08-25 | End: 2025-08-25

## 2025-08-25 RX ORDER — SODIUM CHLORIDE, SODIUM LACTATE, POTASSIUM CHLORIDE, CALCIUM CHLORIDE 600; 310; 30; 20 MG/100ML; MG/100ML; MG/100ML; MG/100ML
INJECTION, SOLUTION INTRAVENOUS CONTINUOUS
Status: DISCONTINUED | OUTPATIENT
Start: 2025-08-25 | End: 2025-08-25 | Stop reason: HOSPADM

## 2025-08-25 RX ORDER — PHENYLEPHRINE HYDROCHLORIDE 10 MG/ML
INJECTION INTRAVENOUS
Status: DISCONTINUED | OUTPATIENT
Start: 2025-08-25 | End: 2025-08-25

## 2025-08-25 RX ORDER — FENTANYL CITRATE 50 UG/ML
INJECTION, SOLUTION INTRAMUSCULAR; INTRAVENOUS
Status: DISCONTINUED | OUTPATIENT
Start: 2025-08-25 | End: 2025-08-25

## 2025-08-25 RX ORDER — LIDOCAINE HYDROCHLORIDE 20 MG/ML
INJECTION INTRAVENOUS
Status: DISCONTINUED | OUTPATIENT
Start: 2025-08-25 | End: 2025-08-25

## 2025-08-25 RX ORDER — CEFOXITIN 2 G/1
INJECTION, POWDER, FOR SOLUTION INTRAVENOUS
Status: DISCONTINUED | OUTPATIENT
Start: 2025-08-25 | End: 2025-08-25

## 2025-08-25 RX ORDER — MIDAZOLAM HYDROCHLORIDE 1 MG/ML
INJECTION INTRAMUSCULAR; INTRAVENOUS
Status: DISCONTINUED | OUTPATIENT
Start: 2025-08-25 | End: 2025-08-25

## 2025-08-25 RX ORDER — HEPARIN 100 UNIT/ML
5 SYRINGE INTRAVENOUS ONCE
Status: COMPLETED | OUTPATIENT
Start: 2025-08-25 | End: 2025-08-25

## 2025-08-25 RX ADMIN — MIDAZOLAM 2 MG: 1 INJECTION INTRAMUSCULAR; INTRAVENOUS at 09:08

## 2025-08-25 RX ADMIN — CEFOXITIN SODIUM 2 G: 2 POWDER, FOR SOLUTION INTRAVENOUS at 10:08

## 2025-08-25 RX ADMIN — PHENYLEPHRINE HYDROCHLORIDE 100 MCG: 10 INJECTION INTRAVENOUS at 10:08

## 2025-08-25 RX ADMIN — PROPOFOL 40 MG: 10 INJECTION, EMULSION INTRAVENOUS at 10:08

## 2025-08-25 RX ADMIN — PROPOFOL 20 MG: 10 INJECTION, EMULSION INTRAVENOUS at 09:08

## 2025-08-25 RX ADMIN — LIDOCAINE HYDROCHLORIDE 50 MG: 20 INJECTION, SOLUTION INTRAVENOUS at 09:08

## 2025-08-25 RX ADMIN — FENTANYL CITRATE 100 MCG: 50 INJECTION, SOLUTION INTRAMUSCULAR; INTRAVENOUS at 09:08

## 2025-08-25 RX ADMIN — PROPOFOL 20 MG: 10 INJECTION, EMULSION INTRAVENOUS at 10:08

## 2025-08-25 RX ADMIN — SODIUM CHLORIDE, POTASSIUM CHLORIDE, SODIUM LACTATE AND CALCIUM CHLORIDE: 600; 310; 30; 20 INJECTION, SOLUTION INTRAVENOUS at 09:08

## 2025-08-25 RX ADMIN — HEPARIN 500 UNITS: 100 SYRINGE at 02:08

## 2025-08-27 ENCOUNTER — PATIENT OUTREACH (OUTPATIENT)
Dept: ADMINISTRATIVE | Facility: CLINIC | Age: 45
End: 2025-08-27
Payer: MEDICARE

## 2025-08-28 LAB — BACTERIA SPEC ANAEROBE CULT: NORMAL

## 2025-08-30 LAB — BACTERIA TISS AEROBE CULT: ABNORMAL

## 2025-09-02 ENCOUNTER — HOSPITAL ENCOUNTER (OUTPATIENT)
Dept: WOUND CARE | Facility: HOSPITAL | Age: 45
Discharge: HOME | End: 2025-09-02
Attending: SURGERY
Payer: MEDICARE

## 2025-09-02 VITALS
HEART RATE: 56 BPM | RESPIRATION RATE: 18 BRPM | HEIGHT: 70 IN | WEIGHT: 149.94 LBS | SYSTOLIC BLOOD PRESSURE: 168 MMHG | BODY MASS INDEX: 21.47 KG/M2 | DIASTOLIC BLOOD PRESSURE: 94 MMHG

## 2025-09-02 DIAGNOSIS — G82.50 QUADRIPLEGIA: ICD-10-CM

## 2025-09-02 DIAGNOSIS — L89.154 PRESSURE INJURY OF SACRAL REGION, STAGE 4: ICD-10-CM

## 2025-09-02 DIAGNOSIS — L89.44 PRESSURE INJURY OF CONTIGUOUS REGION INVOLVING BACK AND RIGHT HIP, STAGE 4: Primary | ICD-10-CM

## 2025-09-02 DIAGNOSIS — L89.013 PRESSURE INJURY OF RIGHT ELBOW, STAGE 3: ICD-10-CM

## 2025-09-02 PROCEDURE — 99213 OFFICE O/P EST LOW 20 MIN: CPT

## 2025-09-02 PROCEDURE — 27000999 HC MEDICAL RECORD PHOTO DOCUMENTATION

## 2025-09-02 PROCEDURE — 97597 DBRDMT OPN WND 1ST 20 CM/<: CPT

## 2025-09-02 PROCEDURE — 97605 NEG PRS WND THER DME<=50SQCM: CPT

## 2025-09-05 ENCOUNTER — ANESTHESIA (OUTPATIENT)
Dept: SURGERY | Facility: HOSPITAL | Age: 45
End: 2025-09-05
Payer: MEDICARE

## 2025-09-05 ENCOUNTER — ANESTHESIA EVENT (OUTPATIENT)
Dept: SURGERY | Facility: HOSPITAL | Age: 45
End: 2025-09-05
Payer: MEDICARE

## 2025-09-05 PROBLEM — T14.8XXA CHRONIC WOUND: Status: ACTIVE | Noted: 2025-09-05

## 2025-09-05 PROCEDURE — 63600175 PHARM REV CODE 636 W HCPCS: Performed by: SURGERY

## 2025-09-05 PROCEDURE — 25000003 PHARM REV CODE 250: Performed by: NURSE ANESTHETIST, CERTIFIED REGISTERED

## 2025-09-05 PROCEDURE — 63600175 PHARM REV CODE 636 W HCPCS: Performed by: NURSE ANESTHETIST, CERTIFIED REGISTERED

## 2025-09-05 RX ORDER — PROPOFOL 10 MG/ML
VIAL (ML) INTRAVENOUS
Status: DISCONTINUED | OUTPATIENT
Start: 2025-09-05 | End: 2025-09-05

## 2025-09-05 RX ORDER — PHENYLEPHRINE HYDROCHLORIDE 10 MG/ML
INJECTION INTRAVENOUS
Status: DISCONTINUED | OUTPATIENT
Start: 2025-09-05 | End: 2025-09-05

## 2025-09-05 RX ORDER — DEXMEDETOMIDINE HYDROCHLORIDE 100 UG/ML
INJECTION, SOLUTION INTRAVENOUS
Status: DISCONTINUED | OUTPATIENT
Start: 2025-09-05 | End: 2025-09-05

## 2025-09-05 RX ORDER — LIDOCAINE HYDROCHLORIDE 20 MG/ML
INJECTION INTRAVENOUS
Status: DISCONTINUED | OUTPATIENT
Start: 2025-09-05 | End: 2025-09-05

## 2025-09-05 RX ORDER — MIDAZOLAM HYDROCHLORIDE 1 MG/ML
INJECTION INTRAMUSCULAR; INTRAVENOUS
Status: DISCONTINUED | OUTPATIENT
Start: 2025-09-05 | End: 2025-09-05

## 2025-09-05 RX ORDER — ONDANSETRON HYDROCHLORIDE 2 MG/ML
INJECTION, SOLUTION INTRAVENOUS
Status: DISCONTINUED | OUTPATIENT
Start: 2025-09-05 | End: 2025-09-05

## 2025-09-05 RX ORDER — FENTANYL CITRATE 50 UG/ML
INJECTION, SOLUTION INTRAMUSCULAR; INTRAVENOUS
Status: DISCONTINUED | OUTPATIENT
Start: 2025-09-05 | End: 2025-09-05

## 2025-09-05 RX ADMIN — PROPOFOL 100 MG: 10 INJECTION, EMULSION INTRAVENOUS at 07:09

## 2025-09-05 RX ADMIN — CEFAZOLIN SODIUM 2 G: 2 SOLUTION INTRAVENOUS at 07:09

## 2025-09-05 RX ADMIN — LIDOCAINE HYDROCHLORIDE 100 MG: 20 INJECTION, SOLUTION INTRAVENOUS at 07:09

## 2025-09-05 RX ADMIN — PROPOFOL 50 MG: 10 INJECTION, EMULSION INTRAVENOUS at 08:09

## 2025-09-05 RX ADMIN — PHENYLEPHRINE HYDROCHLORIDE 200 MCG: 10 INJECTION INTRAVENOUS at 08:09

## 2025-09-05 RX ADMIN — MIDAZOLAM 2 MG: 1 INJECTION INTRAMUSCULAR; INTRAVENOUS at 07:09

## 2025-09-05 RX ADMIN — FENTANYL CITRATE 100 MCG: 50 INJECTION, SOLUTION INTRAMUSCULAR; INTRAVENOUS at 07:09

## 2025-09-05 RX ADMIN — DEXMEDETOMIDINE 10 MCG: 200 INJECTION, SOLUTION INTRAVENOUS at 08:09

## 2025-09-05 RX ADMIN — ONDANSETRON 4 MG: 2 INJECTION INTRAMUSCULAR; INTRAVENOUS at 07:09

## (undated) DEVICE — GLOVE SIGNATURE ESSNTL LTX 6.5

## (undated) DEVICE — Device

## (undated) DEVICE — CANISTER INFOV.A.C WOUND 500ML

## (undated) DEVICE — CULTSWAB+ AMIES W/O CHARC SNG

## (undated) DEVICE — NDL SAFETY 22G X 1.5 ECLIPSE

## (undated) DEVICE — STAPLER SKIN PROXIMATE REG

## (undated) DEVICE — SPONGE LAP XRAY STERILE 18X18

## (undated) DEVICE — BLADE SURG CARBON STEEL #10

## (undated) DEVICE — GLOVE SENSICARE PI MICRO 5.5

## (undated) DEVICE — SYR 10CC LUER LOCK

## (undated) DEVICE — SUT CTD VICRYL VIL BR CR/SH

## (undated) DEVICE — PAD ELECTROSURGICAL SPL W/CORD

## (undated) DEVICE — POSITIONER HEEL FOAM CONVOLTD

## (undated) DEVICE — PENCIL SMOKE EVAC TELSCP 15FT

## (undated) DEVICE — HEMOSTAT SURGICEL 4X8IN

## (undated) DEVICE — CONTAINER SPECIMEN SCREW 4OZ

## (undated) DEVICE — GLOVE SENSICARE PI GRN 7

## (undated) DEVICE — SOL NACL IRR 1000ML BTL

## (undated) DEVICE — KIT PROBE COVER GEL 6X72IN

## (undated) DEVICE — TRAY SKIN SCRUB WET PREMIUM

## (undated) DEVICE — TIP YANKAUERS BULB NO VENT

## (undated) DEVICE — GOWN SMARTGOWN LVL4 X-LONG XL

## (undated) DEVICE — GLOVE PROTEXIS HYDROGEL SZ6.5

## (undated) DEVICE — GLOVE PROTEXIS BLUE LATEX 7

## (undated) DEVICE — SUT SILK 0 STRANDS 30IN BLK

## (undated) DEVICE — EVACUATOR WOUND BULB 100CC

## (undated) DEVICE — SUPPORT ULNA NERVE PROTECTOR

## (undated) DEVICE — SUT CTD VICRYL 3-0 CR/SH

## (undated) DEVICE — HEMOSTAT SURGICEL PWD 3G

## (undated) DEVICE — GLOVE PROTEXIS BLUE LATEX 8.5

## (undated) DEVICE — SEALER LIGASURE IMPACT 18CM

## (undated) DEVICE — KIT DRSNG GRNUFM MED 18X12X5CM

## (undated) DEVICE — BLADE 4IN EDGE INSULATED

## (undated) DEVICE — SWAB CULTURETTE SINGLE

## (undated) DEVICE — GOWN POLY REINF BRTH SLV XL

## (undated) DEVICE — GLOVE PROTEXIS HYDROGEL SZ7.5

## (undated) DEVICE — SEE MEDLINE ITEM 157216

## (undated) DEVICE — GLOVE SENSICARE PI SURG 6.5

## (undated) DEVICE — DRESSING TELFA STRL 4X3 LF

## (undated) DEVICE — MARKER SKIN RULER AND LABEL

## (undated) DEVICE — DRESSING INFOVAC SMALL BLK

## (undated) DEVICE — SPONGE LAP 18X18 PREWASHED

## (undated) DEVICE — KIT SURGICAL TURNOVER

## (undated) DEVICE — SPONGE DERMACEA GAUZE 4X4

## (undated) DEVICE — PAD ABD 8X10 STERILE

## (undated) DEVICE — SPONGE GAUZE 16PLY 4X4

## (undated) DEVICE — GAUZE SPONGE 4X4 12PLY

## (undated) DEVICE — ELECTRODE PATIENT RETURN DISP

## (undated) DEVICE — SUT CTD VICRYL 0 UND CR/CTX

## (undated) DEVICE — DRAPE T EXTRM SURG 121X128X90

## (undated) DEVICE — SUT 3-0 12-18IN SILK

## (undated) DEVICE — GLOVE PROTEXIS HYDROGEL SZ8.5

## (undated) DEVICE — BLANKET SNUGGLE WARM UPPER BDY

## (undated) DEVICE — BLADE SAW LG BONE 1.19X25X90MM

## (undated) DEVICE — SUT BONE WAX 2.5 GRMS 12/BX

## (undated) DEVICE — STAPLER SKIN ROTATING HEAD

## (undated) DEVICE — GLOVE PROTEXIS LTX 6.5

## (undated) DEVICE — GLOVE PROTEXIS HYDROGEL SZ7

## (undated) DEVICE — DRESSING VERSA-FOAM W/O TUBE

## (undated) DEVICE — SYR BULB IRRIG ST 60 LF

## (undated) DEVICE — WRAP COBAN NL STRL 4INX5YD

## (undated) DEVICE — TRAY DRY SKIN SCRUB PREP

## (undated) DEVICE — BANDAGE STRETCH COHES 6INX5YD

## (undated) DEVICE — PENCIL SMOKE EVAC ROCKER 70MM

## (undated) DEVICE — KIT PREVENA INCISION MGMT 13CM

## (undated) DEVICE — DRAPE SPLIT SHEET 4X40IN

## (undated) DEVICE — DRESSING TRANS 4X4 TEGADERM

## (undated) DEVICE — ELECTRODE BLADE INSULATED 1 IN

## (undated) DEVICE — APPLIER LIGACLIP LG 13IN

## (undated) DEVICE — CLIP LIGATION MEDIUM CLIPS 1

## (undated) DEVICE — CHLORAPREP W TINT 26ML APPL

## (undated) DEVICE — SUT ETHILON 2-0 FS 18IN BLK

## (undated) DEVICE — DRESSING MEDIPORE CLTH 3.5X6IN

## (undated) DEVICE — GLOVE PROTEXIS BLUE LATEX 7.5